# Patient Record
Sex: FEMALE | Race: BLACK OR AFRICAN AMERICAN | Employment: FULL TIME | URBAN - METROPOLITAN AREA
[De-identification: names, ages, dates, MRNs, and addresses within clinical notes are randomized per-mention and may not be internally consistent; named-entity substitution may affect disease eponyms.]

---

## 2017-05-19 ENCOUNTER — HOSPITAL ENCOUNTER (EMERGENCY)
Age: 47
Discharge: HOME | End: 2017-05-19
Attending: EMERGENCY MEDICINE
Payer: OTHER GOVERNMENT

## 2017-05-19 VITALS
WEIGHT: 153 LBS | TEMPERATURE: 97.9 F | HEIGHT: 63 IN | OXYGEN SATURATION: 98 % | SYSTOLIC BLOOD PRESSURE: 147 MMHG | DIASTOLIC BLOOD PRESSURE: 94 MMHG | BODY MASS INDEX: 27.11 KG/M2 | RESPIRATION RATE: 16 BRPM | HEART RATE: 56 BPM

## 2017-05-19 DIAGNOSIS — B02.9 HERPES ZOSTER WITHOUT COMPLICATION: Primary | ICD-10-CM

## 2017-05-19 PROCEDURE — 99282 EMERGENCY DEPT VISIT SF MDM: CPT

## 2017-05-19 RX ORDER — PREDNISONE 10 MG/1
TABLET ORAL
Qty: 21 TAB | Refills: 0 | Status: SHIPPED | OUTPATIENT
Start: 2017-05-19 | End: 2017-06-29

## 2017-05-19 RX ORDER — VALACYCLOVIR HYDROCHLORIDE 1 G/1
1000 TABLET, FILM COATED ORAL 3 TIMES DAILY
Qty: 21 TAB | Refills: 0 | Status: SHIPPED | OUTPATIENT
Start: 2017-05-19 | End: 2017-05-26

## 2017-05-19 RX ORDER — HYDROCODONE BITARTRATE AND ACETAMINOPHEN 5; 325 MG/1; MG/1
TABLET ORAL
Qty: 20 TAB | Refills: 0 | Status: SHIPPED | OUTPATIENT
Start: 2017-05-19 | End: 2018-01-21

## 2017-05-19 NOTE — ED PROVIDER NOTES
Patient is a 56 y.o. female presenting with rash. The history is provided by the patient. Rash    This is a new problem. The current episode started more than 2 days ago. The problem has been gradually worsening. There has been no fever. The rash is present on the trunk. The pain is at a severity of 8/10. The pain is moderate. The pain has been constant since onset. Associated symptoms include blisters, itching and pain. She has tried nothing for the symptoms. Pt states that a few blisters were near her left breast area 4 days ago but now have spread and is painful and itchy. She admits to having chicken pox as a child. No facial rash. No other complaint. Past Medical History:   Diagnosis Date    Allergic rhinitis, seasonal     spring    Anemia NEC     iron deficiency associated with heavy menstruation and fibroids noted 2008 with pregnancy    Dysmenorrhea     increasing past year    Fibroid, uterine     Headache     Menorrhagia     worsening past year       Past Surgical History:   Procedure Laterality Date    HX [facility]ST[facility]CTOMY  04/2014    [facility]         Family History:   Problem Relation Age of Onset    Hypertension Mother     Hypertension Father     Headache Sister     Diabetes Paternal Aunt        Social History     Social History    Marital status: UNKNOWN     Spouse name: N/A    Number of children: N/A    Years of education: N/A     Occupational History    Not on file. Social History Main Topics    Smoking status: Never Smoker    Smokeless tobacco: Never Used    Alcohol use 0.0 oz/week     0 Standard drinks or equivalent per week      Comment: occass: 2/month    Drug use: No    Sexual activity: Yes     Partners: Male     Birth control/ protection: Surgical      Comment: hysterectomy     Other Topics Concern    Not on file     Social History Narrative         ALLERGIES: Review of patient's allergies indicates no known allergies.     Review of Systems   Constitutional: Negative for appetite change, chills and fever. Eyes: Negative for visual disturbance. Respiratory: Negative for shortness of breath. Cardiovascular: Negative. Gastrointestinal: Negative. Endocrine: Negative. Genitourinary: Negative. Musculoskeletal: Negative for myalgias and neck pain. Skin: Positive for itching and rash. Painful rash on left chest wall near breast   Neurological: Negative for dizziness and headaches. All other systems reviewed and are negative. There were no vitals filed for this visit. Physical Exam   Constitutional: She is oriented to person, place, and time. She appears well-developed and well-nourished. She appears distressed. Appears in pain   HENT:   Head: Normocephalic and atraumatic. Mouth/Throat: Oropharynx is clear and moist.   No facial rash or mucosal rash   Eyes: Conjunctivae and EOM are normal. Right eye exhibits no discharge. Left eye exhibits no discharge. Neck: Normal range of motion. Pulmonary/Chest: Effort normal. No respiratory distress. Musculoskeletal: Normal range of motion. Neurological: She is alert and oriented to person, place, and time. Skin: Skin is warm. Rash noted. She is not diaphoretic. Red vesicular cluster/patch located at left chest near breast, also similar on left side upper back. Rash does not cross midline. No drainage    Is consistent with shingles outbreak   Psychiatric: Her behavior is normal.   Nursing note and vitals reviewed. MDM  Number of Diagnoses or Management Options  Herpes zoster without complication:   Diagnosis management comments: Shingles outbreak on torso. No other skin involvement    Rx valtrex, steroid dose pk, norco    Will dc home with fu pcp. Return to ED if any worsening sx's. Pt agrees with plan.      Risk of Complications, Morbidity, and/or Mortality  Presenting problems: moderate    Patient Progress  Patient progress: stable    ED Course       Procedures Vitals:  Patient Vitals for the past 12 hrs:   Temp Pulse Resp BP SpO2   05/19/17 0216 97.9 °F (36.6 °C) (!) 56 16 (!) 147/94 98 %               Disposition:  Diagnosis:   1. Herpes zoster without complication        Disposition: discharged    Follow-up Information     Follow up With Details Comments Contact Info    Lynnette Castro MD Call in 1 day  [address] EMERGENCY DEPT  As needed, If symptoms worsen [facility]  [phone]           Discharge Medication List as of 5/19/2017  3:21 AM      START taking these medications    Details   valACYclovir (VALTREX) 1 gram tablet Take 1 Tab by mouth three (3) times daily for 7 days. , Print, Disp-21 Tab, R-0      HYDROcodone-acetaminophen (NORCO) 5-325 mg per tablet Take 1-2 tabs PO every 4-6 hours PRN pain, Print, Disp-20 Tab, R-0      predniSONE (STERAPRED DS) 10 mg dose pack Take as written, Print, Disp-21 Tab, R-0         CONTINUE these medications which have NOT CHANGED    Details   topiramate (TOPAMAX) 25 mg tablet Take 1 Tab by mouth daily (with breakfast). , Print, Disp-90 Tab, R-1      amLODIPine (NORVASC) 5 mg tablet Take 1 Tab by mouth daily. , Print, Disp-90 Tab, R-1

## 2017-05-19 NOTE — ED TRIAGE NOTES
Reports rash on L breast and back since Monday. Reports pain, itching. Pt states \"it feels like a bunch of needles stabbing me\".

## 2017-05-19 NOTE — DISCHARGE INSTRUCTIONS

## 2017-05-22 ENCOUNTER — OFFICE VISIT (OUTPATIENT)
Dept: FAMILY MEDICINE CLINIC | Age: 47
End: 2017-05-22

## 2017-05-22 VITALS
BODY MASS INDEX: 27.11 KG/M2 | OXYGEN SATURATION: 99 % | DIASTOLIC BLOOD PRESSURE: 94 MMHG | SYSTOLIC BLOOD PRESSURE: 138 MMHG | TEMPERATURE: 97.9 F | RESPIRATION RATE: 16 BRPM | WEIGHT: 153 LBS | HEART RATE: 49 BPM | HEIGHT: 63 IN

## 2017-05-22 DIAGNOSIS — B02.29 NEURALGIA, POST-HERPETIC: ICD-10-CM

## 2017-05-22 DIAGNOSIS — B02.9 HERPES ZOSTER WITHOUT COMPLICATION: Primary | ICD-10-CM

## 2017-05-22 RX ORDER — GABAPENTIN 100 MG/1
100 CAPSULE ORAL 3 TIMES DAILY
Qty: 90 CAP | Refills: 0 | Status: SHIPPED | OUTPATIENT
Start: 2017-05-22 | End: 2017-06-29

## 2017-05-22 NOTE — PATIENT INSTRUCTIONS

## 2017-05-22 NOTE — PROGRESS NOTES
Yamile Connolly, 56 y.o.,  female    SUBJECTIVE  Shingles ff-up (Dr. Lila Montanez pt)    Developed painful itchy rash on L side of chest 5/15, seen in ED dx with shingles 5/19 and started on valtrex, po steroids, prn norco.  Says rash is crusting over, however pain on L side is the same and not better. ROS:  See HPI, all others negative        Patient Active Problem List   Diagnosis Code    Allergic rhinitis, seasonal J30.2    Anemia NEC     Essential hypertension I10    Migraine without aura and without status migrainosus, not intractable G43.009    S/P hysterectomy/ due to menorrhagea  Z90.710    Goiter/ seen endocrinology/ observe E04.9    Abnormal CBC/ seen hematology R79.89    Positive ALY (antinuclear antibody)/ test done by hemotology and requested referral by PCP R76.8    Chronic pain of both knees/ following ortho M25.561, M25.562, G89.29       Current Outpatient Prescriptions   Medication Sig Dispense Refill    gabapentin (NEURONTIN) 100 mg capsule Take 1 Cap by mouth three (3) times daily. 90 Cap 0    valACYclovir (VALTREX) 1 gram tablet Take 1 Tab by mouth three (3) times daily for 7 days. 21 Tab 0    HYDROcodone-acetaminophen (NORCO) 5-325 mg per tablet Take 1-2 tabs PO every 4-6 hours PRN pain 20 Tab 0    predniSONE (STERAPRED DS) 10 mg dose pack Take as written 21 Tab 0    topiramate (TOPAMAX) 25 mg tablet Take 1 Tab by mouth daily (with breakfast). 90 Tab 1    amLODIPine (NORVASC) 5 mg tablet Take 1 Tab by mouth daily.  80 Tab 1       No Known Allergies    Past Medical History:   Diagnosis Date    Allergic rhinitis, seasonal     spring    Anemia NEC     iron deficiency associated with heavy menstruation and fibroids noted 2008 with pregnancy    Dysmenorrhea     increasing past year    Fibroid, uterine     Headache     Menorrhagia     worsening past year       Social History     Social History    Marital status:      Spouse name: N/A    Number of children: N/A    Years of education: N/A     Occupational History    Not on file. Social History Main Topics    Smoking status: Never Smoker    Smokeless tobacco: Never Used    Alcohol use 0.0 oz/week     0 Standard drinks or equivalent per week      Comment: occass: 2/month    Drug use: No    Sexual activity: Yes     Partners: Male     Birth control/ protection: Surgical      Comment: hysterectomy     Other Topics Concern    Not on file     Social History Narrative       Family History   Problem Relation Age of Onset    Hypertension Mother     Hypertension Father     Headache Sister     Diabetes Paternal Aunt          OBJECTIVE    Physical Exam:     Visit Vitals    BP (!) 138/94 (BP 1 Location: Right arm, BP Patient Position: Sitting)    Pulse (!) 49    Temp 97.9 °F (36.6 °C) (Oral)    Resp 16    Ht 5' 3\" (1.6 m)    Wt 153 lb (69.4 kg)    LMP 10/08/2012    SpO2 99%    BMI 27.1 kg/m2       General: alert, well-appearing, in no apparent distress or pain  Skin: + crusting papular rash on L chest wall, along breast area extending to same level at the back  Psych:  mood and affect normal        ASSESSMENT/PLAN  Carlos was seen today for shingles. Diagnoses and all orders for this visit:    Herpes zoster without complication  To complete valtrex  Neuralgia, post-herpetic  Start:  -     gabapentin (NEURONTIN) 100 mg capsule; Take 1 Cap by mouth three (3) times daily. Discussed side effect profile, if with drowsiness, only take qhs. Follow-up Disposition:  Return in about 2 weeks (around 6/5/2017), or with Dr. Sanjeev Rodriguez. Patient understands plan of care. Patient has provided input and agrees with goals.

## 2017-05-22 NOTE — MR AVS SNAPSHOT
Visit Information Date & Time Provider Department Dept. Phone Encounter #  
 5/22/2017  8:45 AM Ophelia Skiff, [address] [id number] Follow-up Instructions Return in about 2 weeks (around 6/5/2017), or with Dr. Roselyn Sandoval. Upcoming Health Maintenance Date Due INFLUENZA AGE 9 TO ADULT 8/1/2017 PAP AKA CERVICAL CYTOLOGY 12/9/2018 DTaP/Tdap/Td series (3 - Td) 8/13/2024 Allergies as of 5/22/2017  Review Complete On: 5/22/2017 By: Precious Morris LPN No Known Allergies Current Immunizations  Reviewed on 4/1/2011 Name Date TDAP Vaccine 1/1/2008 Tdap 8/13/2014  6:15 AM  
  
 Not reviewed this visit You Were Diagnosed With   
  
 Codes Comments Herpes zoster without complication    -  Primary ICD-10-CM: B02.9 ICD-9-CM: 053.9 Neuralgia, post-herpetic     ICD-10-CM: B02.29 ICD-9-CM: 053.19 Vitals BP Pulse Temp Resp Height(growth percentile) Weight(growth percentile) (!) 138/94 (BP 1 Location: Right arm, BP Patient Position: Sitting) (!) 49 97.9 °F (36.6 °C) (Oral) 16 5' 3\" (1.6 m) 153 lb (69.4 kg) LMP SpO2 BMI OB Status Smoking Status 10/08/2012 99% 27.1 kg/m2 Hysterectomy Never Smoker BMI and BSA Data Body Mass Index Body Surface Area  
 27.1 kg/m 2 1.76 m 2 Preferred Pharmacy Pharmacy Name Phone [phone] [address], [address] [phone] Your Updated Medication List  
  
   
This list is accurate as of: 5/22/17  9:11 AM.  Always use your most recent med list. amLODIPine 5 mg tablet Commonly known as:  Jagdish Austin Take 1 Tab by mouth daily. gabapentin 100 mg capsule Commonly known as:  NEURONTIN Take 1 Cap by mouth three (3) times daily. HYDROcodone-acetaminophen 5-325 mg per tablet Commonly known as:  Milinda Copier Take 1-2 tabs PO every 4-6 hours PRN pain predniSONE 10 mg dose pack Commonly known as:  STERAPRED DS Take as written  
  
 topiramate 25 mg tablet Commonly known as:  TOPAMAX Take 1 Tab by mouth daily (with breakfast). valACYclovir 1 gram tablet Commonly known as:  VALTREX Take 1 Tab by mouth three (3) times daily for 7 days. Prescriptions Sent to Pharmacy Refills  
 gabapentin (NEURONTIN) 100 mg capsule 0 Sig: Take 1 Cap by mouth three (3) times daily. Class: Normal  
 Pharmacy: [facility] [address] [place] [address] AT [address] Ph #: [phone] Route: Oral  
  
Follow-up Instructions Return in about 2 weeks (around 6/5/2017), or with Dr. Cade Rowland. Patient Instructions Shingles: Care Instructions Your Care Instructions Shingles (herpes zoster) causes pain and a blistered rash. The rash can appear anywhere on the body but will be on only one side of the body, the left or right. It will be in a band, a strip, or a small area. The pain can be very severe. Shingles can also cause tingling or itching in the area of the rash. The blisters scab over after a few days and heal in 2 to 4 weeks. Medicines can help you feel better and may help prevent more serious problems caused by shingles. Shingles is caused by the same virus that causes chickenpox. When you have chickenpox, the virus gets into your nerve roots and stays there (becomes dormant) long after you get over the chickenpox. If the virus becomes active again, it can cause shingles. Follow-up care is a key part of your treatment and safety. Be sure to make and go to all appointments, and call your doctor if you are having problems. It's also a good idea to know your test results and keep a list of the medicines you take. How can you care for yourself at home? · Be safe with medicines. Take your medicines exactly as prescribed.  Call your doctor if you think you are having a problem with your medicine. Antiviral medicine helps you get better faster. · Try not to scratch or pick at the blisters. They will crust over and fall off on their own if you leave them alone. · Put cool, wet cloths on the area to relieve pain and itching. You can also use calamine lotion. Try not to use so much lotion that it cakes and is hard to get off. · Put cornstarch or baking soda on the sores to help dry them out so they heal faster. · Do not use thick ointment, such as petroleum jelly, on the sores. This will keep them from drying and healing. · To help remove loose crusts, soak them in tap water. This can help decrease oozing, and dry and soothe the skin. · Take an over-the-counter pain medicine, such as acetaminophen (Tylenol), ibuprofen (Advil, Motrin), or naproxen (Aleve). Read and follow all instructions on the label. · Avoid close contact with people until the blisters have healed. It is very important for you to avoid contact with anyone who has never had chickenpox or the chickenpox vaccine. Pregnant women, young babies, and anyone else who has a hard time fighting infection (such as someone with HIV, diabetes, or cancer) is especially at risk. When should you call for help? Call your doctor now or seek immediate medical care if: 
· You have a new or higher fever. · You have a severe headache and a stiff neck. · You lose the ability to think clearly. · The rash spreads to your forehead, nose, eyes, or eyelids. · You have eye pain, or your vision gets worse. · You have new pain in your face, or you cannot move the muscles in your face. · Blisters spread to new parts of your body. Watch closely for changes in your health, and be sure to contact your doctor if: · The rash has not healed after 2 to 4 weeks. · You still have pain after the rash has healed. Where can you learn more? Go to http://chilo.info/. Heaven Bird in the search box to learn more about \"Shingles: Care Instructions. \" Current as of: May 24, 2016 Content Version: 11.2 © 9340-4705 [employer]. Care instructions adapted under license by [employer] (which disclaims liability or warranty for this information). If you have questions about a medical condition or this instruction, always ask your healthcare professional. [address] any warranty or liability for your use of this information. Introducing [facility]! Dear Janet Duncan: 
Thank you for requesting a [facility] account. Our records indicate that you already have an active [facility] account. You can access your account anytime at [email or web address] Did you know that you can access your hospital and ER discharge instructions at any time in [facility]? You can also review all of your test results from your hospital stay or ER visit. Additional Information If you have questions, please visit the Frequently Asked Questions section of the [facility] website at [email or web address]. Remember, [facility] is NOT to be used for urgent needs. For medical emergencies, dial 911. Now available from your iPhone and Android! Please provide this summary of care documentation to your next provider. Your primary care clinician is listed as Keeley Briones. If you have any questions after today's visit, please call [phone].

## 2017-06-29 ENCOUNTER — OFFICE VISIT (OUTPATIENT)
Dept: FAMILY MEDICINE CLINIC | Age: 47
End: 2017-06-29

## 2017-06-29 VITALS
TEMPERATURE: 97.7 F | OXYGEN SATURATION: 94 % | RESPIRATION RATE: 16 BRPM | DIASTOLIC BLOOD PRESSURE: 86 MMHG | HEART RATE: 57 BPM | SYSTOLIC BLOOD PRESSURE: 120 MMHG | HEIGHT: 63 IN | WEIGHT: 157.2 LBS | BODY MASS INDEX: 27.85 KG/M2

## 2017-06-29 DIAGNOSIS — M54.50 ACUTE BILATERAL LOW BACK PAIN WITHOUT SCIATICA: Primary | ICD-10-CM

## 2017-06-29 DIAGNOSIS — G43.009 MIGRAINE WITHOUT AURA AND WITHOUT STATUS MIGRAINOSUS, NOT INTRACTABLE: ICD-10-CM

## 2017-06-29 DIAGNOSIS — I10 ESSENTIAL HYPERTENSION WITH GOAL BLOOD PRESSURE LESS THAN 130/80: ICD-10-CM

## 2017-06-29 RX ORDER — NAPROXEN 500 MG/1
500 TABLET ORAL 2 TIMES DAILY WITH MEALS
Qty: 60 TAB | Refills: 0 | Status: SHIPPED | OUTPATIENT
Start: 2017-06-29 | End: 2018-01-21

## 2017-06-29 RX ORDER — TOPIRAMATE 25 MG/1
25 TABLET ORAL
Qty: 90 TAB | Refills: 1 | Status: SHIPPED | OUTPATIENT
Start: 2017-06-29 | End: 2018-04-16 | Stop reason: SDUPTHER

## 2017-06-29 RX ORDER — CYCLOBENZAPRINE HCL 10 MG
TABLET ORAL
COMMUNITY
Start: 2017-06-24 | End: 2018-01-21

## 2017-06-29 RX ORDER — IBUPROFEN 800 MG/1
TABLET ORAL
COMMUNITY
Start: 2017-06-24 | End: 2017-06-29 | Stop reason: ALTCHOICE

## 2017-06-29 RX ORDER — AMLODIPINE BESYLATE 5 MG/1
5 TABLET ORAL DAILY
Qty: 90 TAB | Refills: 1 | Status: SHIPPED | OUTPATIENT
Start: 2017-06-29 | End: 2018-04-16 | Stop reason: SDUPTHER
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
## 2017-06-29 NOTE — PATIENT INSTRUCTIONS
Back Stretches: Exercises  Your Care Instructions  Here are some examples of exercises for stretching your back. Start each exercise slowly. Ease off the exercise if you start to have pain. Your doctor or physical therapist will tell you when you can start these exercises and which ones will work best for you. How to do the exercises  Overhead stretch    1. Stand comfortably with your feet shoulder-width apart. 2. Looking straight ahead, raise both arms over your head and reach toward the ceiling. Do not allow your head to tilt back. 3. Hold for 15 to 30 seconds, then lower your arms to your sides. 4. Repeat 2 to 4 times. Side stretch    1. Stand comfortably with your feet shoulder-width apart. 2. Raise one arm over your head, and then lean to the other side. 3. Slide your hand down your leg as you let the weight of your arm gently stretch your side muscles. Hold for 15 to 30 seconds. 4. Repeat 2 to 4 times on each side. Press-up    1. Lie on your stomach, supporting your body with your forearms. 2. Press your elbows down into the floor to raise your upper back. As you do this, relax your stomach muscles and allow your back to arch without using your back muscles. As your press up, do not let your hips or pelvis come off the floor. 3. Hold for 15 to 30 seconds, then relax. 4. Repeat 2 to 4 times. Relax and rest    1. Lie on your back with a rolled towel under your neck and a pillow under your knees. Extend your arms comfortably to your sides. 2. Relax and breathe normally. 3. Remain in this position for about 10 minutes. 4. If you can, do this 2 or 3 times each day. Follow-up care is a key part of your treatment and safety. Be sure to make and go to all appointments, and call your doctor if you are having problems. It's also a good idea to know your test results and keep a list of the medicines you take. Where can you learn more? Go to http://martin-ifeanyi.info/.   Enter K923 in the search box to learn more about \"Back Stretches: Exercises. \"  Current as of: March 21, 2017  Content Version: 11.3  © 2446-7719 [employer]. Care instructions adapted under license by [employer] (which disclaims liability or warranty for this information). If you have questions about a medical condition or this instruction, always ask your healthcare professional. [place] [address] any warranty or liability for your use of this information.

## 2017-06-29 NOTE — MR AVS SNAPSHOT
Visit Information Date & Time Provider Department Dept. Phone Encounter #  
 6/29/2017  8:00 AM Marvin Salamanca, [address] [id number] Follow-up Instructions Return in about 1 month (around 7/29/2017). Upcoming Health Maintenance Date Due INFLUENZA AGE 9 TO ADULT 8/1/2017 PAP AKA CERVICAL CYTOLOGY 12/9/2018 DTaP/Tdap/Td series (3 - Td) 8/13/2024 Allergies as of 6/29/2017  Review Complete On: 6/29/2017 By: Marvin Salamanca MD  
 No Known Allergies Current Immunizations  Reviewed on 4/1/2011 Name Date TDAP Vaccine 1/1/2008 Tdap 8/13/2014  6:15 AM  
  
 Not reviewed this visit You Were Diagnosed With   
  
 Codes Comments Acute bilateral low back pain without sciatica    -  Primary ICD-10-CM: M54.5 ICD-9-CM: 724.2, 338.19 Essential hypertension with goal blood pressure less than 130/80     ICD-10-CM: I10 
ICD-9-CM: 401.9 Migraine without aura and without status migrainosus, not intractable     ICD-10-CM: W24.715 ICD-9-CM: 346.10 Vitals BP Pulse Temp Resp Height(growth percentile) Weight(growth percentile) 120/86 (BP 1 Location: Left arm, BP Patient Position: Sitting) (!) 57 97.7 °F (36.5 °C) (Oral) 16 5' 3\" (1.6 m) 157 lb 3.2 oz (71.3 kg) LMP SpO2 BMI OB Status Smoking Status 10/08/2012 94% 27.85 kg/m2 Hysterectomy Never Smoker Vitals History BMI and BSA Data Body Mass Index Body Surface Area  
 27.85 kg/m 2 1.78 m 2 Preferred Pharmacy Pharmacy Name Phone [facility] [phone] [place], [address] AT [address] [phone] Your Updated Medication List  
  
   
This list is accurate as of: 6/29/17  8:38 AM.  Always use your most recent med list. amLODIPine 5 mg tablet Commonly known as:  Opal Heymann Take 1 Tab by mouth daily. cyclobenzaprine 10 mg tablet Commonly known as:  FLEXERIL  
  
 HYDROcodone-acetaminophen 5-325 mg per tablet Commonly known as:  Elyn Barley Take 1-2 tabs PO every 4-6 hours PRN pain  
  
 naproxen 500 mg tablet Commonly known as:  NAPROSYN Take 1 Tab by mouth two (2) times daily (with meals). topiramate 25 mg tablet Commonly known as:  TOPAMAX Take 1 Tab by mouth daily (with breakfast). Prescriptions Sent to Pharmacy Refills  
 amLODIPine (NORVASC) 5 mg tablet 1 Sig: Take 1 Tab by mouth daily. Class: Normal  
 Pharmacy: [facility] [place] [id number] AT [address] [place] [address] Rd [address] Ph #: [phone] Route: Oral  
 topiramate (TOPAMAX) 25 mg tablet 1 Sig: Take 1 Tab by mouth daily (with breakfast). Class: Normal  
 Pharmacy: [place] [address] AT [address] [place] [address] Rd [address] Ph #: [phone] Route: Oral  
 naproxen (NAPROSYN) 500 mg tablet 0 Sig: Take 1 Tab by mouth two (2) times daily (with meals). Class: Normal  
 Pharmacy: [place] [address] AT [address] [place] [address] Rd [address] Ph #: [phone] Route: Oral  
  
We Performed the Following REFERRAL TO PHYSICAL THERAPY [ETQ30 Custom] Comments:  
 Please evaluate patient for lower back pain Follow-up Instructions Return in about 1 month (around 7/29/2017). Referral Information Referral ID Referred By Referred To  
  
 [id number] [facility] R Not Available Visits Status Start Date End Date 1 New Request 6/29/17 6/29/18 If your referral has a status of pending review or denied, additional information will be sent to support the outcome of this decision. Patient Instructions Back Stretches: Exercises Your Care Instructions Here are some examples of exercises for stretching your back. Start each exercise slowly. Ease off the exercise if you start to have pain. Your doctor or physical therapist will tell you when you can start these exercises and which ones will work best for you. How to do the exercises Overhead stretch 1. Stand comfortably with your feet shoulder-width apart. 2. Looking straight ahead, raise both arms over your head and reach toward the ceiling. Do not allow your head to tilt back. 3. Hold for 15 to 30 seconds, then lower your arms to your sides. 4. Repeat 2 to 4 times. Side stretch 1. Stand comfortably with your feet shoulder-width apart. 2. Raise one arm over your head, and then lean to the other side. 3. Slide your hand down your leg as you let the weight of your arm gently stretch your side muscles. Hold for 15 to 30 seconds. 4. Repeat 2 to 4 times on each side. Press-up 1. Lie on your stomach, supporting your body with your forearms. 2. Press your elbows down into the floor to raise your upper back. As you do this, relax your stomach muscles and allow your back to arch without using your back muscles. As your press up, do not let your hips or pelvis come off the floor. 3. Hold for 15 to 30 seconds, then relax. 4. Repeat 2 to 4 times. Relax and rest 
 
1. Lie on your back with a rolled towel under your neck and a pillow under your knees. Extend your arms comfortably to your sides. 2. Relax and breathe normally. 3. Remain in this position for about 10 minutes. 4. If you can, do this 2 or 3 times each day. Follow-up care is a key part of your treatment and safety. Be sure to make and go to all appointments, and call your doctor if you are having problems. It's also a good idea to know your test results and keep a list of the medicines you take. Where can you learn more? Go to http://martin-ifeanyi.info/. Enter V596 in the search box to learn more about \"Back Stretches: Exercises. \" Current as of: March 21, 2017 Content Version: 11.3 © 6404-0859 [employer].  Care instructions adapted under license by [address] (which disclaims liability or warranty for this information). If you have questions about a medical condition or this instruction, always ask your healthcare professional. [address] any warranty or liability for your use of this information. Introducing [facility] & HEALTH SERVICES! Dear Shantanu Batres: 
Thank you for requesting a Talenz account. Our records indicate that you already have an active Talenz account. You can access your account anytime at [email or web address] Did you know that you can access your hospital and ER discharge instructions at any time in Talenz? You can also review all of your test results from your hospital stay or ER visit. Additional Information If you have questions, please visit the Frequently Asked Questions section of the Talenz website at [email or web address] Remember, Talenz is NOT to be used for urgent needs. For medical emergencies, dial 911. Now available from your iPhone and Android! Please provide this summary of care documentation to your next provider. Your primary care clinician is listed as Hari Brownlee. If you have any questions after today's visit, please call [phone].

## 2017-06-29 NOTE — PROGRESS NOTES
1. Have you been to the ER, urgent care clinic since your last visit? Hospitalized since your last visit? [facility] [address] 6/24/17    2. Have you seen or consulted any other health care providers outside of the [address] [place] [address] since your last visit? Include any pap smears or colon screening.  No

## 2017-07-11 ENCOUNTER — APPOINTMENT (OUTPATIENT)
Dept: PHYSICAL THERAPY | Age: 47
End: 2017-07-11

## 2017-07-25 ENCOUNTER — HOSPITAL ENCOUNTER (OUTPATIENT)
Dept: PHYSICAL THERAPY | Age: 47
Discharge: HOME | End: 2017-07-25
Payer: OTHER GOVERNMENT

## 2017-07-25 PROCEDURE — 97110 THERAPEUTIC EXERCISES: CPT

## 2017-07-25 PROCEDURE — 97162 PT EVAL MOD COMPLEX 30 MIN: CPT

## 2017-07-25 NOTE — MR AVS SNAPSHOT
Visit Information Date & Time Provider Department Dept. Phone Encounter #  
 7/25/2017  8:00 AM Loraine Galicia,  [address] [place] [id number] Your Appointments 7/27/2017  8:00 AM  
ROUTINE CARE with Harlan Turcios MD  
[facility] ([facility]) Appt Note: routine f/u back pain [address]  
[address] [facility] [address] Upcoming Health Maintenance Date Due INFLUENZA AGE 9 TO ADULT 8/1/2017 PAP AKA CERVICAL CYTOLOGY 12/9/2018 DTaP/Tdap/Td series (3 - Td) 8/13/2024 Allergies as of 7/25/2017  Review Complete On: 7/25/2017 By: Loraine Galicia PT No Known Allergies Current Immunizations  Reviewed on 4/1/2011 Name Date TDAP Vaccine 1/1/2008 Tdap 8/13/2014  6:15 AM  
  
 Not reviewed this visit Vitals LMP OB Status Smoking Status 10/08/2012 Hysterectomy Never Smoker Your Updated Medication List  
  
ASK your doctor about these medications   
 amLODIPine 5 mg tablet Commonly known as:  Sundra Tazewell Take 1 Tab by mouth daily. cyclobenzaprine 10 mg tablet Commonly known as:  FLEXERIL  
  
 HYDROcodone-acetaminophen 5-325 mg per tablet Commonly known as:  Verona Laity Take 1-2 tabs PO every 4-6 hours PRN pain  
  
 naproxen 500 mg tablet Commonly known as:  NAPROSYN Take 1 Tab by mouth two (2) times daily (with meals). topiramate 25 mg tablet Commonly known as:  TOPAMAX Take 1 Tab by mouth daily (with breakfast). Introducing [facility] & HEALTH SERVICES! Dear Ross Longo: 
Thank you for requesting a WellFX account. Our records indicate that you already have an active WellFX account. You can access your account anytime at [email or web address]. [email or web address] Therapeutics Did you know that you can access your hospital and ER discharge instructions at any time in Perficient? You can also review all of your test results from your hospital stay or ER visit. Additional Information If you have questions, please visit the Frequently Asked Questions section of the Perficient website at [email or web address] [email or web address]/[email or web address] Remember, Perficient is NOT to be used for urgent needs. For medical emergencies, dial 911. Now available from your iPhone and Android! Please provide this summary of care documentation to your next provider. Your primary care clinician is listed as Nyla Mireles. If you have any questions after today's visit, please call [phone].
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
## 2017-07-25 NOTE — PROGRESS NOTES
PT DAILY TREATMENT NOTE     Patient Name: Emily Chicago  Date:2017  : 1970  [x]  Patient  Verified  Payor:  / Plan: Rhiannon Carreno DEPENDENTS / Product Type: Merry Leak /    In time:0800  Out [id number]  Total Treatment Time (min): 45  Visit #: 1 of 8    Treatment Area: Low back pain [M54.5]    SUBJECTIVE  Pain Level (0-10 scale):  2  Any medication changes, allergies to medications, adverse drug reactions, diagnosis change, or new procedure performed?: [x] No    [] Yes (see summary sheet for update)  Subjective functional status/changes:   [] No changes reported       OBJECTIVE    Modality rationale:    Min Type Additional Details    [] Estim:  []Unatt       []IFC  []Premod                        []Other:  []w/ice   []w/heat  Position:  Location:    [] Estim: []Att    []TENS instruct  []NMES                    []Other:  []w/US   []w/ice   []w/heat  Position:  Location:    []  Traction: [] Cervical       []Lumbar                       [] Prone          []Supine                       []Intermittent   []Continuous Lbs:  [] before manual  [] after manual    []  Ultrasound: []Continuous   [] Pulsed                           []1MHz   []3MHz W/cm2:  Location:    []  Iontophoresis with dexamethasone         Location: [] Take home patch   [] In clinic    []  Ice     []  heat  []  Ice massage  []  Laser   []  Anodyne Position:  Location:    []  Laser with stim  []  Other:  Position:  Location:    []  Vasopneumatic Device Pressure:       [] lo [] med [] hi   Temperature: [] lo [] med [] hi   [] Skin assessment post-treatment:  []intact []redness- no adverse reaction    []redness - adverse reaction:     20 min [x]Eval                  []Re-Eval       25 min Therapeutic Exercise:  [] See flow sheet : HEP/TM run   Rationale: increase ROM and increase strength to improve the patients ability to perform ADL              With   [x] TE   [] TA   [] neuro   [] other: Patient Education: [x] Review HEP    [] Progressed/Changed HEP based on:   [] positioning   [] body mechanics   [] transfers   [] heat/ice application    [] other:      Other Objective/Functional Measures:       Pain Level (0-10 scale) post treatment: 2    ASSESSMENT/Changes in Function:      Patient will continue to benefit from skilled PT services to modify and progress therapeutic interventions, address functional mobility deficits, address ROM deficits, address strength deficits, analyze and address soft tissue restrictions, analyze and cue movement patterns and analyze and modify body mechanics/ergonomics to attain remaining goals.      []  See Plan of Care  []  See progress note/recertification  []  See Discharge Summary         Progress towards goals / Updated goals:  Per POC    PLAN  []  Upgrade activities as tolerated     [x]  Continue plan of care  []  Update interventions per flow sheet       []  Discharge due to:_  []  Other:_      Pricilla Hagan PT 7/25/2017  7:48 AM    Future Appointments  Date Time Provider Kingsley Argelia   7/25/2017 8:00 AM Pricilla Hagan PT [facility]   7/27/2017 8:00 AM Garth Alva MD [facility] 10.

## 2017-07-31 ENCOUNTER — HOSPITAL ENCOUNTER (OUTPATIENT)
Dept: PHYSICAL THERAPY | Age: 47
Discharge: HOME | End: 2017-07-31
Payer: OTHER GOVERNMENT

## 2017-07-31 PROCEDURE — 97140 MANUAL THERAPY 1/> REGIONS: CPT

## 2017-07-31 PROCEDURE — 97110 THERAPEUTIC EXERCISES: CPT

## 2017-07-31 PROCEDURE — 97112 NEUROMUSCULAR REEDUCATION: CPT

## 2017-07-31 NOTE — PROGRESS NOTES
PT DAILY TREATMENT NOTE     Patient Name: Michael Bush  Date:2017  : 1970  [x]  Patient  Verified  Payor:  / Plan: [facility]  RETIREES AND DEPENDENTS / Product Type: Keith Stammer /    In [id number]  Out time:8:20  Total Treatment Time (min): 48  Visit #: 2 of 8    Treatment Area: Low back pain [M54.5]    SUBJECTIVE  Pain Level (0-10 scale): 0  Any medication changes, allergies to medications, adverse drug reactions, diagnosis change, or new procedure performed?: [x] No    [] Yes (see summary sheet for update)  Subjective functional status/changes:   [] No changes reported  Pt reports she is doing okay at the moment    OBJECTIVE    Modality rationale:    Min Type Additional Details    [] Estim:  []Unatt       []IFC  []Premod                        []Other:  []w/ice   []w/heat  Position:  Location:    [] Estim: []Att    []TENS instruct  []NMES                    []Other:  []w/US   []w/ice   []w/heat  Position:  Location:    []  Traction: [] Cervical       []Lumbar                       [] Prone          []Supine                       []Intermittent   []Continuous Lbs:  [] before manual  [] after manual    []  Ultrasound: []Continuous   [] Pulsed                           []1MHz   []3MHz W/cm2:  Location:    []  Iontophoresis with dexamethasone         Location: [] Take home patch   [] In clinic    []  Ice     []  heat  []  Ice massage  []  Laser   []  Anodyne Position:  Location:    []  Laser with stim  []  Other:  Position:  Location:    []  Vasopneumatic Device Pressure:       [] lo [] med [] hi   Temperature: [] lo [] med [] hi   [] Skin assessment post-treatment:  []intact []redness- no adverse reaction    []redness - adverse reaction:       25 min Therapeutic Exercise:  [x] See flow sheet :   Rationale: increase ROM and increase strength to improve the patients ability to perform functional tasks     15 min Neuromuscular Re-education:  [x]  See flow sheet :   Rationale: increase strength, improve coordination, improve balance and increase proprioception  to improve the patients ability to perform functional tasks    8 min Manual Therapy:  Graston to L/S paraspinals   Rationale: decrease pain, increase ROM and increase tissue extensibility to perform functional tasks            With   [] TE   [] TA   [] neuro   [] other: Patient Education: [x] Review HEP    [] Progressed/Changed HEP based on:   [] positioning   [] body mechanics   [] transfers   [] heat/ice application    [] other:      Other Objective/Functional Measures: initiated therex per flow sheet     Pain Level (0-10 scale) post treatment: 0    ASSESSMENT/Changes in Function:  Pt glute and core weakness apparent. She will benefit from continued PT to with focus on mobility and stability improvement. Patient will continue to benefit from skilled PT services to modify and progress therapeutic interventions, address functional mobility deficits, address ROM deficits, address strength deficits, analyze and address soft tissue restrictions, analyze and cue movement patterns and analyze and modify body mechanics/ergonomics to attain remaining goals. []  See Plan of Care  []  See progress note/recertification  []  See Discharge Summary         Progress towards goals / Updated goals:  Short Term Goals: To be accomplished in 1 weeks:                         1. Pt will be I and compliant with HEP- progressing 7-31-17  Long Term Goals: To be accomplished in 4 weeks:                         1. Improve FOTO score to predicted outcome for improved ability for functional tasks                         2. Pt will report no c/o R LE symptoms with running                         3. Pt will demonstrate 4/5 glute max strength for improved ability for functional tasks. 4. Pt will demonstrate 4/5 B glute med strength for improved ability for running.                                 PLAN  []  Upgrade activities as tolerated [x]  Continue plan of care  []  Update interventions per flow sheet       []  Discharge due to:_  []  Other:_      Tarah Ospina, PT 7/31/2017  7:40 AM    Future Appointments  Date Time Provider Kingsley Stout   8/2/2017 7:30 AM Tracie Teague, PT [facility]   8/7/2017 7:30 AM Tarah Ospina, PT [facility]   8/9/2017 7:30 AM Tracie Teague, PT [facility]   8/14/2017 7:30 AM Tarah Ospina, PT [facility]   8/16/2017 7:30 AM Tracie Teague, PT [facility]   8/21/2017 7:30 AM Tarah Ospina, PT [facility]

## 2017-08-02 ENCOUNTER — HOSPITAL ENCOUNTER (OUTPATIENT)
Dept: PHYSICAL THERAPY | Age: 47
Discharge: HOME | End: 2017-08-02
Payer: OTHER GOVERNMENT

## 2017-08-02 PROCEDURE — 97110 THERAPEUTIC EXERCISES: CPT

## 2017-08-02 PROCEDURE — 97140 MANUAL THERAPY 1/> REGIONS: CPT

## 2017-08-02 PROCEDURE — 97112 NEUROMUSCULAR REEDUCATION: CPT

## 2017-08-02 NOTE — PROGRESS NOTES
PT DAILY TREATMENT NOTE 3-16    Patient Name: Denzel Rule  Date:2017  : 1970  [x]  Patient  Verified  Payor:  / Plan: [facility] DEPENDENTS / Product Type: [facility] [place] /    In [facility]  Out [id number]  Total Treatment Time (min): 38   1:1 35 minutes  Visit #: 3 of 8    Treatment Area: Low back pain [M54.5]    SUBJECTIVE  Pain Level (0-10 scale): 2  Any medication changes, allergies to medications, adverse drug reactions, diagnosis change, or new procedure performed?: [x] No    [] Yes (see summary sheet for update)  Subjective functional status/changes:   [] No changes reported  Pt reports pain in her lower back. Pt reports she has Hashimoto's. OBJECTIVE     min []Eval                  []Re-Eval     15 min Therapeutic Exercise:  [x] See flow sheet :   Rationale: increase ROM and increase strength to improve the patients ability to perform ADLs    15 min Neuromuscular Re-education:  [x]  See flow sheet :   Rationale: increase strength, improve coordination and increase proprioception  to improve the patients ability to improve stability     8 min Manual Therapy:  Lumbar  L1-5 grade II 5\"x8 each, R ant/L post SIJ with MET and shot gun with audible cavitation   Rationale: decrease pain, increase ROM and increase tissue extensibility to improve ADLs           With   [] TE   [] TA   [] neuro   [] other: Patient Education: [x] Review HEP    [] Progressed/Changed HEP based on:   [] positioning   [] body mechanics   [] transfers   [] heat/ice application    [] other:      Other Objective/Functional Measures:   Reports no R LE symptoms with ADLs, but increased low back pain. Pain Level (0-10 scale) post treatment: 1    ASSESSMENT/Changes in Function:   Pt demonstrates improving ability to perform stability and strengthening exercises without increase in pain. She stated the  along lumbar spine caused some soreness but she felt relieved pain overall before she left. Pt plans to order a standing desk today for work. Patient will continue to benefit from skilled PT services to modify and progress therapeutic interventions, address functional mobility deficits, address ROM deficits, address strength deficits, analyze and address soft tissue restrictions, analyze and cue movement patterns, analyze and modify body mechanics/ergonomics, assess and modify postural abnormalities and instruct in home and community integration to attain remaining goals. []  See Plan of Care  []  See progress note/recertification  []  See Discharge Summary         Progress towards goals / Updated goals:  Short Term Goals: To be accomplished in 1 weeks:                         1. Pt will be I and compliant with HEP- progressing 7-31-17  Long Term Goals: To be accomplished in 4 weeks:                         1. Improve FOTO score to predicted outcome for improved ability for functional tasks                         2. Pt will report no c/o R LE symptoms with running. No R LE symptoms with ADLs, but pt has not tried running yet 8/2/2017                         3. Pt will demonstrate 4/5 glute max strength for improved ability for functional tasks.                         4. Pt will demonstrate 4/5 B glute med strength for improved ability for running.      PLAN  [x]  Upgrade activities as tolerated     [x]  Continue plan of care  [x]  Update interventions per flow sheet       []  Discharge due to:_  []  Other:_      Ethan Bamberger, PT 8/2/2017  7:40 AM    Future Appointments  Date Time Provider Kingsley Stout   8/7/2017 7:30 AM Nohemy Marin, PT [facility]   8/9/2017 7:30 AM Ethan Bamberger, PT [facility]   8/14/2017 7:30 AM Nohemy Marin PT [facility]   8/16/2017 7:30 AM Ethan Bamberger, PT [facility]   8/21/2017 7:30 AM Nohemy Marin, PT [facility]

## 2017-08-07 ENCOUNTER — HOSPITAL ENCOUNTER (OUTPATIENT)
Dept: PHYSICAL THERAPY | Age: 47
Discharge: HOME | End: 2017-08-07
Payer: OTHER GOVERNMENT

## 2017-08-07 PROCEDURE — 97112 NEUROMUSCULAR REEDUCATION: CPT

## 2017-08-07 PROCEDURE — 97110 THERAPEUTIC EXERCISES: CPT

## 2017-08-07 PROCEDURE — 97140 MANUAL THERAPY 1/> REGIONS: CPT

## 2017-08-07 NOTE — PROGRESS NOTES
PT DAILY TREATMENT NOTE 3-16    Patient Name: Linda Wan  Date:2017  : 1970  [x]  Patient  Verified  Payor:  / Plan: [facility]  RETIREES AND DEPENDENTS / Product Type: [facility] /    In Creasie Boas time:819  Total Treatment Time (min):49    1:1  Minutes: 52  Visit #: 4 of 8    Treatment Area: Low back pain [M54.5]    SUBJECTIVE  Pain Level (0-10 scale): 0  Any medication changes, allergies to medications, adverse drug reactions, diagnosis change, or new procedure performed?: [x] No    [] Yes (see summary sheet for update)  Subjective functional status/changes:   [] No changes reported  Pt reports compliance with HEP. She feels HEP exercises and PT is helping. OBJECTIVE    25 min Therapeutic Exercise:  [x] See flow sheet :   Rationale: increase ROM and increase strength to improve the patients ability to perform ADLs    16 min Neuromuscular Re-education:  [x]  See flow sheet :   Rationale: increase strength, improve coordination and increase proprioception  to improve the patients ability to improve stability     8 min Manual Therapy:  Lumbar  L1-5 grade II, Graston B paraspinals and QL   Rationale: decrease pain, increase ROM and increase tissue extensibility to improve ADLs           With   [] TE   [] TA   [] neuro   [] other: Patient Education: [x] Review HEP    [] Progressed/Changed HEP based on:   [] positioning   [] body mechanics   [] transfers   [] heat/ice application    [] other:      Other Objective/Functional Measures:  No changes to therex. Level SI alignment    Pain Level (0-10 scale) post treatment: 0    ASSESSMENT/Changes in Function: pt progressing well with PT. Pain has diminished and myofascial restrictions are improving.        Patient will continue to benefit from skilled PT services to modify and progress therapeutic interventions, address functional mobility deficits, address ROM deficits, address strength deficits, analyze and address soft tissue restrictions, analyze and cue movement patterns, analyze and modify body mechanics/ergonomics, assess and modify postural abnormalities and instruct in home and community integration to attain remaining goals. []  See Plan of Care  []  See progress note/recertification  []  See Discharge Summary         Progress towards goals / Updated goals:  Short Term Goals: To be accomplished in 1 weeks:                         1. Pt will be I and compliant with HEP- MET 8-7-17  Long Term Goals: To be accomplished in 4 weeks:                         1. Improve FOTO score to predicted outcome for improved ability for functional tasks                         2. Pt will report no c/o R LE symptoms with running. No R LE symptoms with ADLs, but pt has not tried running yet 8/7/2017                         3. Pt will demonstrate 4/5 glute max strength for improved ability for functional tasks.                         4. Pt will demonstrate 4/5 B glute med strength for improved ability for running.      PLAN  [x]  Upgrade activities as tolerated     [x]  Continue plan of care  [x]  Update interventions per flow sheet       []  Discharge due to:_  []  Other:_      López Lassiter, PT 8/7/2017  7:40 AM    Future Appointments  Date Time Provider Kingsley Stout   8/9/2017 7:30 AM Ashley Barry, [facility]   8/14/2017 7:30 AM López Lassiter, NIMESH [facility]   8/16/2017 7:30 AM Ashley Barry, PT [facility]   8/21/2017 7:30 AM López Lassiter, NIMESH [facility]

## 2017-08-09 ENCOUNTER — HOSPITAL ENCOUNTER (OUTPATIENT)
Dept: PHYSICAL THERAPY | Age: 47
Discharge: HOME | End: 2017-08-09
Payer: OTHER GOVERNMENT

## 2017-08-09 PROCEDURE — 97110 THERAPEUTIC EXERCISES: CPT

## 2017-08-09 PROCEDURE — 97112 NEUROMUSCULAR REEDUCATION: CPT

## 2017-08-09 NOTE — PROGRESS NOTES
PT DAILY TREATMENT NOTE 3-16    Patient Name: Ehsan Copping  Date:2017  : 1970  [x]  Patient  Verified  Payor:  / Plan: [facility]  RETIREES AND DEPENDENTS / Product Type: Cre[facility]ie Dec[place] /    In time:730  Out time:811  Total Treatment Time (min): 41  (1:1  = 34 minutes)  Visit #: 5 of 8    Treatment Area: Low back pain [M54.5]    SUBJECTIVE  Pain Level (0-10 scale): 0  Any medication changes, allergies to medications, adverse drug reactions, diagnosis change, or new procedure performed?: [x] No    [] Yes (see summary sheet for update)  Subjective functional status/changes:   [] No changes reported  Pt reports she has been feeling pretty good, with the graston helping her back. Pt reports running a flat track on Saturday, 14 miles, without pain. She is not going to run hills right now. OBJECTIVE     min []Eval                  []Re-Eval     18 min Therapeutic Exercise:  [x] See flow sheet :   Rationale: increase ROM and increase strength to improve the patients ability to perform ADLs    23 min Neuromuscular Re-education:  [x]  See flow sheet :   Rationale: increase strength, improve coordination and increase proprioception  to improve the patients ability to improve stability            With   [] TE   [] TA   [] neuro   [] other: Patient Education: [x] Review HEP    [] Progressed/Changed HEP based on:   [] positioning   [] body mechanics   [] transfers   [] heat/ice application    [] other:      Other Objective/Functional Measures:   FOTO 61     Pain Level (0-10 scale) post treatment: 0    ASSESSMENT/Changes in Function:   Pt demonstrates improved stability and functional progress with exercises. Able to progress to green band, and to use the OOV with supine exercises today, though more instability noted with lifting L LE than R LE. Issued band for home exercises with warm up prior to running. FOTO declined 5 points to 61.     Patient will continue to benefit from skilled PT services to modify and progress therapeutic interventions, address functional mobility deficits, address ROM deficits, address strength deficits, analyze and address soft tissue restrictions, analyze and cue movement patterns, analyze and modify body mechanics/ergonomics, assess and modify postural abnormalities and instruct in home and community integration to attain remaining goals. []  See Plan of Care  []  See progress note/recertification  []  See Discharge Summary         Progress towards goals / Updated goals:  Short Term Goals: To be accomplished in 1 weeks:                         1. Pt will be I and compliant with HEP- MET 8-7-17  Long Term Goals: To be accomplished in 4 weeks:                         1. Improve FOTO score to predicted outcome for improved ability for functional tasks not met, 61/100 8/9/2017                         2. Pt will report no c/o R LE symptoms with running. No R LE symptoms with ADLs, but pt has not tried running yet 8/7/2017; met 8/9/2017                         3. Pt will demonstrate 4/5 glute max strength for improved ability for functional tasks.                         4. Pt will demonstrate 4/5 B glute med strength for improved ability for running.      PLAN  [x]  Upgrade activities as tolerated     [x]  Continue plan of care  [x]  Update interventions per flow sheet       []  Discharge due to:_  []  Other:_      Tamir Mulligan PT 8/9/2017  7:38 AM    Future Appointments  Date Time Provider Kingsley Stout   8/14/2017 7:30 AM Francis Estrada PT [facility]   [facility]16[facility]2017 [facility]   8/21/2017 7:30 AM Francis Estrada PT [facility]

## 2017-08-14 ENCOUNTER — APPOINTMENT (OUTPATIENT)
Dept: PHYSICAL THERAPY | Age: 47
End: 2017-08-14
Payer: OTHER GOVERNMENT

## 2017-08-16 ENCOUNTER — HOSPITAL ENCOUNTER (OUTPATIENT)
Dept: PHYSICAL THERAPY | Age: 47
Discharge: HOME | End: 2017-08-16
Payer: OTHER GOVERNMENT

## 2017-08-16 ENCOUNTER — APPOINTMENT (OUTPATIENT)
Dept: PHYSICAL THERAPY | Age: 47
End: 2017-08-16
Payer: OTHER GOVERNMENT

## 2017-08-16 PROCEDURE — 97112 NEUROMUSCULAR REEDUCATION: CPT

## 2017-08-16 PROCEDURE — 97110 THERAPEUTIC EXERCISES: CPT

## 2017-08-16 NOTE — PROGRESS NOTES
PT DAILY TREATMENT NOTE 3-16    Patient Name: Ayan Kang  Date:2017  : 1970  [x]  Patient  Verified  Payor:  / Plan: [facility]  RETIREES AND DEPENDENTS / Product Type:  /    In time:5:30  Out time:6:11  Total Treatment Time (min): 41   (1:1  = 41 minutes)  Visit #: 6 of 8    Treatment Area: Low back pain [M54.5]    SUBJECTIVE  Pain Level (0-10 scale): 0  Any medication changes, allergies to medications, adverse drug reactions, diagnosis change, or new procedure performed?: [x] No    [] Yes (see summary sheet for update)  Subjective functional status/changes:   [] No changes reported  Pt reports she has been feeling pretty good overall    OBJECTIVE      18 min Therapeutic Exercise:  [x] See flow sheet :   Rationale: increase ROM and increase strength to improve the patients ability to perform ADLs    23 min Neuromuscular Re-education:  [x]  See flow sheet :   Rationale: increase strength, improve coordination and increase proprioception  to improve the patients ability to improve stability            With   [] TE   [] TA   [] neuro   [] other: Patient Education: [x] Review HEP    [] Progressed/Changed HEP based on:   [] positioning   [] body mechanics   [] transfers   [] heat/ice application    [] other:      Other Objective/Functional Measures: added QP Oov     Pain Level (0-10 scale) post treatment: 0    ASSESSMENT/Changes in Function:  Pt with gradual progress. Stability and glute strength progressing but still noted weakness. Patient will continue to benefit from skilled PT services to modify and progress therapeutic interventions, address functional mobility deficits, address ROM deficits, address strength deficits, analyze and address soft tissue restrictions, analyze and cue movement patterns, analyze and modify body mechanics/ergonomics, assess and modify postural abnormalities and instruct in home and community integration to attain remaining goals.      [] See Plan of Care  []  See progress note/recertification  []  See Discharge Summary         Progress towards goals / Updated goals:  Short Term Goals: To be accomplished in 1 weeks:                         1. Pt will be I and compliant with HEP- MET 8-7-17  Long Term Goals: To be accomplished in 4 weeks:                         1. Improve FOTO score to predicted outcome for improved ability for functional tasks not met, 61/100 8/16/2017                         2. Pt will report no c/o R LE symptoms with running. No R LE symptoms with ADLs, but pt has not tried running yet 8/7/2017; met 8/16/2017                         3. Pt will demonstrate 4/5 glute max strength for improved ability for functional tasks.                         4. Pt will demonstrate 4/5 B glute med strength for improved ability for running.      PLAN  [x]  Upgrade activities as tolerated     [x]  Continue plan of care  [x]  Update interventions per flow sheet       []  Discharge due to:_  []  Other:_      Francis Estrada PT 8/16/2017  5:49 PM    Future Appointments  Date Time Provider Kingsley Stout   8/18/2017 5:30 PM Francis Estrada PT [facility]   8/23/2017 5:30 PM Francis Estrada PT [facility]

## 2017-08-18 ENCOUNTER — APPOINTMENT (OUTPATIENT)
Dept: PHYSICAL THERAPY | Age: 47
End: 2017-08-18
Payer: OTHER GOVERNMENT

## 2017-08-21 ENCOUNTER — APPOINTMENT (OUTPATIENT)
Dept: PHYSICAL THERAPY | Age: 47
End: 2017-08-21
Payer: OTHER GOVERNMENT

## 2017-08-23 ENCOUNTER — APPOINTMENT (OUTPATIENT)
Dept: PHYSICAL THERAPY | Age: 47
End: 2017-08-23
Payer: OTHER GOVERNMENT

## 2017-10-26 ENCOUNTER — TELEPHONE (OUTPATIENT)
Dept: FAMILY MEDICINE CLINIC | Age: 47
End: 2017-10-26

## 2017-10-26 NOTE — TELEPHONE ENCOUNTER
Patient requesting to see different orthopedic specialist.      Patient is requesting (New  Updated) referral to the following office:    Speciality: Orthopedics  Specialist Name: [address] [facility] orthopedic and spine specialist  Office Location: [place] [address], [address] [facility]  Phone (if available): [phone]  Fax (if available):   Diagnosis: Osteoarthritis bilat knee   Date of appointment (if scheduled): Np appt

## 2017-10-27 DIAGNOSIS — G89.29 CHRONIC PAIN OF BOTH KNEES: Primary | ICD-10-CM

## 2017-10-27 DIAGNOSIS — M25.561 CHRONIC PAIN OF BOTH KNEES: Primary | ICD-10-CM

## 2017-10-27 DIAGNOSIS — M25.562 CHRONIC PAIN OF BOTH KNEES: Primary | ICD-10-CM

## 2017-10-27 NOTE — TELEPHONE ENCOUNTER
A referral request has been faxed to [facility]. A fax confirmation has been received.

## 2017-11-14 ENCOUNTER — OFFICE VISIT (OUTPATIENT)
Dept: ORTHOPEDIC SURGERY | Age: 47
End: 2017-11-14

## 2017-11-14 VITALS
OXYGEN SATURATION: 100 % | WEIGHT: 157 LBS | HEIGHT: 63 IN | BODY MASS INDEX: 27.82 KG/M2 | SYSTOLIC BLOOD PRESSURE: 146 MMHG | HEART RATE: 61 BPM | DIASTOLIC BLOOD PRESSURE: 86 MMHG

## 2017-11-14 DIAGNOSIS — M25.562 CHRONIC PAIN OF BOTH KNEES: ICD-10-CM

## 2017-11-14 DIAGNOSIS — G89.29 CHRONIC PAIN OF BOTH KNEES: ICD-10-CM

## 2017-11-14 DIAGNOSIS — M17.0 PRIMARY OSTEOARTHRITIS OF BOTH KNEES: Primary | ICD-10-CM

## 2017-11-14 DIAGNOSIS — M25.561 CHRONIC PAIN OF BOTH KNEES: ICD-10-CM

## 2017-11-14 NOTE — PROGRESS NOTES
Shikha Oliveros  1970   Chief Complaint   Patient presents with    Knee Pain     bilateral        HISTORY OF PRESENT ILLNESS  Shikha Oliveros is a 56 y.o. female who presents today for evaluation of B/L knee pain, R>L. Patient referred by Dr. Vanessa Brewer for further evaluation. . she rates her pain 2/10 today. Pain has been present for years. States she has been getting Euflexxa injections by Dr. Yohan Pearson. Last Euflexxa injections were in January. She is not interested in returning to Dr. Linnea Rodriguez practice. She states she is a frequent runner and runs about 4 marathons a year. States she she has a goal to run marathons on every continent. Patient describes the pain as aching that is Constant in nature. Symptoms are worse with Exercise and running long periods and is better with  Rest and previous Euflexxa injections. Associated symptoms include Swelling. Since problem started, it: has worsened. Pain does not wake patient up at night. Has taken no medication for the problem. Has tried following treatments: Injections:YES; Brace:NO; Therapy:NO; Cane/Crutch:NO       No Known Allergies     Past Medical History:   Diagnosis Date    Allergic rhinitis, seasonal     spring    Anemia NEC     iron deficiency associated with heavy menstruation and fibroids noted 2008 with pregnancy    Dysmenorrhea     increasing past year    Fibroid, uterine     Headache(784.0)     Menorrhagia     worsening past year      Social History     Social History    Marital status:      Spouse name: N/A    Number of children: N/A    Years of education: N/A     Occupational History    Not on file.      Social History Main Topics    Smoking status: Never Smoker    Smokeless tobacco: Never Used    Alcohol use 0.0 oz/week     0 Standard drinks or equivalent per week      Comment: occass: 2/month    Drug use: No    Sexual activity: Yes     Partners: Male     Birth control/ protection: Surgical      Comment: hysterectomy     Other Topics Concern    Not on file     Social History Narrative      Past Surgical History:   Procedure Laterality Date    HX HYSTERECTOMY  04/2014    [facility]      Family History   Problem Relation Age of Onset    Hypertension Mother     Hypertension Father     Headache Sister     Diabetes Paternal Aunt       Current Outpatient Prescriptions   Medication Sig    amLODIPine (NORVASC) 5 mg tablet Take 1 Tab by mouth daily.  topiramate (TOPAMAX) 25 mg tablet Take 1 Tab by mouth daily (with breakfast).  cyclobenzaprine (FLEXERIL) 10 mg tablet     naproxen (NAPROSYN) 500 mg tablet Take 1 Tab by mouth two (2) times daily (with meals).  HYDROcodone-acetaminophen (NORCO) 5-325 mg per tablet Take 1-2 tabs PO every 4-6 hours PRN pain     No current facility-administered medications for this visit. REVIEW OF SYSTEM   Patient denies: Weight loss, Fever/Chills, HA, Visual changes, Fatigue, Chest pain, SOB, Abdominal pain, N/V/D/C, Blood in stool or urine, Edema. Pertinent positive as above in HPI. All others were negative    PHYSICAL EXAM:   Visit Vitals    /86    Pulse 61    Ht 5' 3\" (1.6 m)    Wt 157 lb (71.2 kg)    LMP 10/08/2012    SpO2 100%    BMI 27.81 kg/m2     The patient is a well-developed, well-nourished female   in no acute distress. The patient is alert and oriented times three. The patient is alert and oriented times three. Mood and affect are normal.  LYMPHATIC: lymph nodes are not enlarged and are within normal limits  SKIN: normal in color and non tender to palpation. There are no bruises or abrasions noted. NEUROLOGICAL: Motor sensory exam is within normal limits. Reflexes are equal bilaterally.  There is normal sensation to pinprick and light touch  MUSCULOSKELETAL:  Examination Left knee Right knee   Skin Intact Intact   Range of motion 0-130 0-130   Effusion + +   Medial joint line tenderness + +   Lateral joint line tenderness - -   Tenderness Pes Bursa - - Tenderness insertion MCL - -   Tenderness insertion LCL - -   Tos - -   Patella crepitus - -   Patella grind - -   Lachman - -   Pivot shift - -   Anterior drawer - -   Posterior drawer - -   Varus stress - -   Valgus stress - -   Neurovascular Intact Intact   Calf Swelling and Tenderness to Palpation - -   Madelyn's Test - -   Hamstring Cord Tightness - -       IMAGING: XR of the B/L knees dated 11/14/17 was reviewed and read: mildly decreased medial joint line and mild patellofemoral joint degenerative changes B/L    IMPRESSION:      ICD-10-CM ICD-9-CM    1. Primary osteoarthritis of both knees M17.0 715.16 PROCEDURE AUTHORIZATION TO    2. Chronic pain of both knees M25.561 719.46 AMB POC XRAY, KNEE; 1/2 VIEWS    M25.562 338.29 AMB POC XRAY, KNEE; 1/2 VIEWS    G89.29          PLAN:  1. Patient has XR documented degenerative changes of the B/L knees. Since visco supplementation has worked well for the patient in the past, we will seek authorization for Euflexxa in the B/L knees. Risk factors include: n/a  2. No cortisone injection indicated today   3. No Physical/Occupational Therapy indicated today  4. No diagnostic test indicated today  5. No durable medical equipment indicated today  6. No referral indicated today   7. No medications indicated today  8. No Narcotic indicated today    RTC Once we have received authorization for Euflexxa B/L knees  Office note will be sent to the referring provider. Follow-up Disposition: Not on File    Scribed by Demar Sanford [address]) as dictated by Raz Magallanes MD    I, Dr. Raz Magallanes, confirm that all documentation is accurate.     Raz Magallanes M.D.   [facility]

## 2017-11-14 NOTE — PATIENT INSTRUCTIONS
Arthritis: Care Instructions  Your Care Instructions  Arthritis, also called osteoarthritis, is a breakdown of the cartilage that cushions your joints. When the cartilage wears down, your bones rub against each other. This causes pain and stiffness. Many people have some arthritis as they age. Arthritis most often affects the joints of the spine, hands, hips, knees, or feet. You can take simple measures to protect your joints, ease your pain, and help you stay active. Follow-up care is a key part of your treatment and safety. Be sure to make and go to all appointments, and call your doctor if you are having problems. It's also a good idea to know your test results and keep a list of the medicines you take. How can you care for yourself at home? · Stay at a healthy weight. Being overweight puts extra strain on your joints. · Talk to your doctor or physical therapist about exercises that will help ease joint pain. ¨ Stretch. You may enjoy gentle forms of yoga to help keep your joints and muscles flexible. ¨ Walk instead of jog. Other types of exercise that are less stressful on the joints include riding a bicycle, swimming, steven chi, or water exercise. ¨ Lift weights. Strong muscles help reduce stress on your joints. Stronger thigh muscles, for example, take some of the stress off of the knees and hips. Learn the right way to lift weights so you do not make joint pain worse. · Take your medicines exactly as prescribed. Call your doctor if you think you are having a problem with your medicine. · Take pain medicines exactly as directed. ¨ If the doctor gave you a prescription medicine for pain, take it as prescribed. ¨ If you are not taking a prescription pain medicine, ask your doctor if you can take an over-the-counter medicine. · Use a cane, crutch, walker, or another device if you need help to get around. These can help rest your joints.  You also can use other things to make life easier, such as a higher toilet seat and padded handles on kitchen utensils. · Do not sit in low chairs, which can make it hard to get up. · Put heat or cold on your sore joints as needed. Use whichever helps you most. You also can take turns with hot and cold packs. ¨ Apply heat 2 or 3 times a day for 20 to 30 minutes-using a heating pad, hot shower, or hot pack-to relieve pain and stiffness. ¨ Put ice or a cold pack on your sore joint for 10 to 20 minutes at a time. Put a thin cloth between the ice and your skin. When should you call for help? Call your doctor now or seek immediate medical care if:  ? · You have sudden swelling, warmth, or pain in any joint. ? · You have joint pain and a fever or rash. ? · You have such bad pain that you cannot use a joint. ? Watch closely for changes in your health, and be sure to contact your doctor if:  ? · You have mild joint symptoms that continue even with more than 6 weeks of care at home. ? · You have stomach pain or other problems with your medicine. Where can you learn more? Go to http://martin-ifeanyi.info/. Enter I866 in the search box to learn more about \"Arthritis: Care Instructions. \"  Current as of: October 31, 2016  Content Version: 11.4  © 7156-4120 [employer]. Care instructions adapted under license by [employer] (which disclaims liability or warranty for this information). If you have questions about a medical condition or this instruction, always ask your healthcare professional. [place] [address] any warranty or liability for your use of this information.

## 2017-11-21 ENCOUNTER — DOCUMENTATION ONLY (OUTPATIENT)
Dept: ORTHOPEDIC SURGERY | Age: 47
End: 2017-11-21

## 2017-11-21 NOTE — PROGRESS NOTES
delfinm for pt to call back to offer sooner appt to begin her euflexxa injections. Please give call to arnie in the [facility].

## 2017-12-07 ENCOUNTER — OFFICE VISIT (OUTPATIENT)
Dept: ORTHOPEDIC SURGERY | Age: 47
End: 2017-12-07

## 2017-12-07 VITALS
SYSTOLIC BLOOD PRESSURE: 141 MMHG | TEMPERATURE: 98 F | WEIGHT: 162 LBS | HEART RATE: 78 BPM | BODY MASS INDEX: 28.7 KG/M2 | DIASTOLIC BLOOD PRESSURE: 82 MMHG | OXYGEN SATURATION: 99 %

## 2017-12-07 DIAGNOSIS — M17.0 PRIMARY OSTEOARTHRITIS OF BOTH KNEES: Primary | ICD-10-CM

## 2017-12-07 RX ORDER — HYALURONATE SODIUM 10 MG/ML
2 SYRINGE (ML) INTRAARTICULAR ONCE
Qty: 2 ML | Refills: 0
Start: 2017-12-07 | End: 2017-12-07

## 2017-12-07 NOTE — PROGRESS NOTES
Patient: Nabil Cabrales                MRN: [id number]       SSN: [id number]  YOB: 1970        AGE: 56 y.o. SEX: female  Body mass index is 28.7 kg/(m^2). PCP: Jairon Mendoza MD  12/07/17    No chief complaint on file. HISTORY:  Nabil Cabrales is a 56 y.o. female who is seen for first Euflexxa injections in B/L knees. PROCEDURE:  Patient's B/L knees, after timeout under sterile conditions, was injected with 2 cc of Euflexxa. [facility]  OFFICE PROCEDURE PROGRESS NOTE        Chart reviewed for the following:   Hipolito West MD, have reviewed the History, Physical and updated the Allergic reactions for Airais V Buenrostro     TIME OUT performed immediately prior to start of procedure:   Hipolito West MD, have performed the following reviews on Airais V Buenrostro prior to the start of the procedure:            * Patient was identified by name and date of birth   * Agreement on procedure being performed was verified  * Risks and Benefits explained to the patient  * Procedure site verified and marked as necessary  * Patient was positioned for comfort  * Consent was signed and verified     Time: 8:24 AM       Date of procedure: 12/7/2017    Procedure performed by:  Eneida Rojas MD    Provider assisted by: None     How tolerated by patient: tolerated the procedure well with no complications    Comments: none    IMPRESSION:     ICD-10-CM ICD-9-CM    1. Primary osteoarthritis of both knees M17.0 715.16 RI DRAIN/INJECT LARGE JOINT/BURSA      EUFLEXXA INJECTION PER DOSE      sodium hyaluronate (SUPARTZ FX/HYALGAN/GENIVSC) 10 mg/mL syrg injection        PLAN:  Ms. Clint Dinero will return in one week for her second Euflexxa injection. Scribed by Louie Muhammad ([address]) as dictated by Eneida Rojas MD    I, Dr. Germain, confirm that all documentation is accurate.     Eneida Rojas M.D. [facility] [address] [facility]ist

## 2017-12-14 ENCOUNTER — OFFICE VISIT (OUTPATIENT)
Dept: ORTHOPEDIC SURGERY | Age: 47
End: 2017-12-14

## 2017-12-14 VITALS
RESPIRATION RATE: 16 BRPM | HEART RATE: 72 BPM | DIASTOLIC BLOOD PRESSURE: 77 MMHG | HEIGHT: 63 IN | SYSTOLIC BLOOD PRESSURE: 121 MMHG | OXYGEN SATURATION: 96 % | BODY MASS INDEX: 28.46 KG/M2 | WEIGHT: 160.6 LBS

## 2017-12-14 DIAGNOSIS — M17.0 PRIMARY OSTEOARTHRITIS OF BOTH KNEES: Primary | ICD-10-CM

## 2017-12-14 RX ORDER — HYALURONATE SODIUM 10 MG/ML
2 SYRINGE (ML) INTRAARTICULAR ONCE
Qty: 2 ML | Refills: 0
Start: 2017-12-14 | End: 2017-12-14

## 2017-12-14 NOTE — PROGRESS NOTES
Patient: Sean Salas                MRN: [id number]       SSN: [id number]  YOB: 1970        AGE: 56 y.o. SEX: female  Body mass index is 28.45 kg/(m^2). PCP: Buddy Swann MD  12/14/17    No chief complaint on file. HISTORY:  Sean Salas is a 56 y.o. female who is seen for reevaluation of Bilateral knee here for 2nd injection of euflexxa. PROCEDURE:  Patient's Bilateral knee, after timeout under sterile conditions, was injected with 2 cc of Euflexxa. [facility]  OFFICE PROCEDURE PROGRESS NOTE        Chart reviewed for the following:   Rupert Deleon MD, have reviewed the History, Physical and updated the Allergic reactions for Airais V Buenrostro     TIME OUT performed immediately prior to start of procedure:   Rupert Deleon MD, have performed the following reviews on Airais V Buenrostro prior to the start of the procedure:            * Patient was identified by name and date of birth   * Agreement on procedure being performed was verified  * Risks and Benefits explained to the patient  * Procedure site verified and marked as necessary  * Patient was positioned for comfort  * Consent was signed and verified     Time: 8:45 AM    Date of procedure: 12/14/2017    Procedure performed by:  Ruiz Baptiste PA-C    Provider assisted by: None     How tolerated by patient: tolerated the procedure well with no complications    Comments: none    IMPRESSION:     ICD-10-CM ICD-9-CM    1. Primary osteoarthritis of both knees M17.0 715.16 GA DRAIN/INJECT LARGE JOINT/BURSA      EUFLEXXA INJECTION PER DOSE      sodium hyaluronate (SUPARTZ FX/HYALGAN/GENIVSC) 10 mg/mL syrg injection        PLAN:  Ms. Amber Davila will return in one week for her third Euflexxa injection.       Scribed by Rosi Gates[address]) as dictated by TAM Mcdonald [address] and Spine Specialist

## 2017-12-14 NOTE — PATIENT INSTRUCTIONS
Arthritis: Care Instructions  Your Care Instructions  Arthritis, also called osteoarthritis, is a breakdown of the cartilage that cushions your joints. When the cartilage wears down, your bones rub against each other. This causes pain and stiffness. Many people have some arthritis as they age. Arthritis most often affects the joints of the spine, hands, hips, knees, or feet. You can take simple measures to protect your joints, ease your pain, and help you stay active. Follow-up care is a key part of your treatment and safety. Be sure to make and go to all appointments, and call your doctor if you are having problems. It's also a good idea to know your test results and keep a list of the medicines you take. How can you care for yourself at home? · Stay at a healthy weight. Being overweight puts extra strain on your joints. · Talk to your doctor or physical therapist about exercises that will help ease joint pain. ¨ Stretch. You may enjoy gentle forms of yoga to help keep your joints and muscles flexible. ¨ Walk instead of jog. Other types of exercise that are less stressful on the joints include riding a bicycle, swimming, steven chi, or water exercise. ¨ Lift weights. Strong muscles help reduce stress on your joints. Stronger thigh muscles, for example, take some of the stress off of the knees and hips. Learn the right way to lift weights so you do not make joint pain worse. · Take your medicines exactly as prescribed. Call your doctor if you think you are having a problem with your medicine. · Take pain medicines exactly as directed. ¨ If the doctor gave you a prescription medicine for pain, take it as prescribed. ¨ If you are not taking a prescription pain medicine, ask your doctor if you can take an over-the-counter medicine. · Use a cane, crutch, walker, or another device if you need help to get around. These can help rest your joints.  You also can use other things to make life easier, such as a higher toilet seat and padded handles on kitchen utensils. · Do not sit in low chairs, which can make it hard to get up. · Put heat or cold on your sore joints as needed. Use whichever helps you most. You also can take turns with hot and cold packs. ¨ Apply heat 2 or 3 times a day for 20 to 30 minutes-using a heating pad, hot shower, or hot pack-to relieve pain and stiffness. ¨ Put ice or a cold pack on your sore joint for 10 to 20 minutes at a time. Put a thin cloth between the ice and your skin. When should you call for help? Call your doctor now or seek immediate medical care if:  ? · You have sudden swelling, warmth, or pain in any joint. ? · You have joint pain and a fever or rash. ? · You have such bad pain that you cannot use a joint. ? Watch closely for changes in your health, and be sure to contact your doctor if:  ? · You have mild joint symptoms that continue even with more than 6 weeks of care at home. ? · You have stomach pain or other problems with your medicine. Where can you learn more? Go to http://martin-ifeanyi.info/. Enter J796 in the search box to learn more about \"Arthritis: Care Instructions. \"  Current as of: October 31, 2016  Content Version: 11.4  © 3614-4048 [employer]. Care instructions adapted under license by [employer] (which disclaims liability or warranty for this information). If you have questions about a medical condition or this instruction, always ask your healthcare professional. [place] [address] any warranty or liability for your use of this information.

## 2017-12-14 NOTE — PROGRESS NOTES
Patient: Austin Rolon                MRN: [id number]       SSN: [id number]  YOB: 1970        AGE: 56 y.o. SEX: female  Body mass index is 28.45 kg/(m^2). PCP: Marlene Tobias MD  12/14/17    No chief complaint on file. HISTORY:  Austin Rolon is a 56 y.o. female who is seen for her second Euflexxa injections in B/L knees. PROCEDURE:  Patient's B/L knees, after timeout under sterile conditions, was injected with 2 cc of Euflexxa. [facility]  OFFICE PROCEDURE PROGRESS NOTE        Chart reviewed for the following:   Germaine Ortiz MD, have reviewed the History, Physical and updated the Allergic reactions for Airais V Buenrostro     TIME OUT performed immediately prior to start of procedure:   Germaine Ortiz MD, have performed the following reviews on Airais V Buenrostro prior to the start of the procedure:            * Patient was identified by name and date of birth   * Agreement on procedure being performed was verified  * Risks and Benefits explained to the patient  * Procedure site verified and marked as necessary  * Patient was positioned for comfort  * Consent was signed and verified     Time: 8:44 AM    Date of procedure: 12/14/2017    Procedure performed by:  Chuy Qureshi MD    Provider assisted by: None     How tolerated by patient: tolerated the procedure well with no complications    Comments: none    IMPRESSION:     ICD-10-CM ICD-9-CM    1. Primary osteoarthritis of both knees M17.0 715.16 NE DRAIN/INJECT LARGE JOINT/BURSA      EUFLEXXA INJECTION PER DOSE      sodium hyaluronate (SUPARTZ FX/HYALGAN/GENIVSC) 10 mg/mL syrg injection        PLAN:  Ms. Mervat Del Toro will return in one week for her third Euflexxa injection. Scribed by Dejan Mata ([address]) as dictated by Chuy Qureshi MD    I, Dr. Chuy Qureshi, confirm that all documentation is accurate.     Chuy Qureshi M.D. [facility]

## 2017-12-21 ENCOUNTER — OFFICE VISIT (OUTPATIENT)
Dept: ORTHOPEDIC SURGERY | Age: 47
End: 2017-12-21

## 2017-12-21 VITALS
TEMPERATURE: 96.5 F | SYSTOLIC BLOOD PRESSURE: 133 MMHG | HEIGHT: 63 IN | HEART RATE: 64 BPM | BODY MASS INDEX: 28.35 KG/M2 | WEIGHT: 160 LBS | RESPIRATION RATE: 16 BRPM | OXYGEN SATURATION: 100 % | DIASTOLIC BLOOD PRESSURE: 89 MMHG

## 2017-12-21 DIAGNOSIS — M17.0 PRIMARY OSTEOARTHRITIS OF BOTH KNEES: Primary | ICD-10-CM

## 2017-12-21 RX ORDER — HYALURONATE SODIUM 10 MG/ML
2 SYRINGE (ML) INTRAARTICULAR ONCE
Qty: 2 ML | Refills: 0
Start: 2017-12-21 | End: 2017-12-21

## 2017-12-21 NOTE — PROGRESS NOTES
Patient: Adore Slater                MRN: [id number]       SSN: [id number]  YOB: 1970        AGE: 56 y.o. SEX: female  Body mass index is 28.34 kg/(m^2). PCP: Julius Sigala MD  12/21/17    Chief Complaint   Patient presents with    Knee Pain     Bilateral       HISTORY:  Adore Slater is a 56 y.o. female who is seen for her third Euflexxa injection in the b/l knees. PROCEDURE:  Patient's b/l knees, after timeout under sterile conditions, was injected with 2 cc of Euflexxa. [facility]  OFFICE PROCEDURE PROGRESS NOTE        Chart reviewed for the following:   Karin Camacho MD, have reviewed the History, Physical and updated the Allergic reactions for Airais V Buenrostro     TIME OUT performed immediately prior to start of procedure:   Karin Camacho MD, have performed the following reviews on Airais V Buenrostro prior to the start of the procedure:            * Patient was identified by name and date of birth   * Agreement on procedure being performed was verified  * Risks and Benefits explained to the patient  * Procedure site verified and marked as necessary  * Patient was positioned for comfort  * Consent was signed and verified     Time: 8:14 AM       Date of procedure: 12/21/2017    Procedure performed by:  Klever Dunne MD    Provider assisted by: None     How tolerated by patient: tolerated the procedure well with no complications    Comments: none    IMPRESSION:     ICD-10-CM ICD-9-CM    1. Primary osteoarthritis of both knees M17.0 715.16 RI DRAIN/INJECT LARGE JOINT/BURSA      EUFLEXXA INJECTION PER DOSE      sodium hyaluronate (SUPARTZ FX/HYALGAN/GENIVSC) 10 mg/mL syrg injection        PLAN: Ms. Ziyad Nixon has completed her Euflexxa injection series. she will return as needed.       Scribed by Emelia Kunz ([address]) as dictated by Klever Dunne MD    I, Dr. Klever Dunne, confirm that all documentation is accurate.     Bernie Lehman M.D.   [facility]ist

## 2018-01-11 NOTE — PROGRESS NOTES
[facility]  [address] [facility] [address]  [place], [address]  [phone] fax    Physical Therapy Discharge Summary  Patient name: Corey Hamilton Start of Care: 2017   Referral source: Zacarias Lee MD : 1970                          Medical Diagnosis: Low back pain [M54.5] Onset Date:6-10-17                          Treatment Diagnosis: LBP   Prior Hospitalization: see medical history Provider#: [id number]   Medications: Verified on Patient summary List    Comorbidities: HTN, OA B knees   Prior Level of Function: functionally I with all activities, desk job, runs marathons    Vis[facility] from [facility] Care: 6    Missed Visits: 0  Reporting Period : 2017 to 2017    Summary of Care:  Short Term Goals: To be accomplished in 1 weeks:                         1. Pt will be I and compliant with HEP- MET 17  Long Term Goals: To be accomplished in 4 weeks:                         1. Improve FOTO score to predicted outcome for improved ability for functional tasks not met, 61/100 2017                         2. Pt will report no c/o R LE symptoms with running. No R LE symptoms with ADLs, but pt has not tried running yet 2017; met 2017                         3. Pt will demonstrate 4/5 glute max strength for improved ability for functional tasks.                         4. Pt will demonstrate 4/5 B glute med strength for improved ability for running. ASSESSMENT/RECOMMENDATIONS: Unable to further assess progress towards goals at this time due to non-compliance/lack of attendance. DC at this time with no further instructions to the patient.   Thank you for this referral.  [x]Discontinue therapy: []Patient has reached or is progressing toward set goals      [x]Patient is non-compliant or has abdicated      []Due to lack of appreciable progress towards set goals    Adrianna Jessica PT 2018 2:07 PM

## 2018-01-21 ENCOUNTER — HOSPITAL ENCOUNTER (EMERGENCY)
Age: 48
Discharge: HOME | End: 2018-01-21
Attending: EMERGENCY MEDICINE
Payer: OTHER GOVERNMENT

## 2018-01-21 VITALS
OXYGEN SATURATION: 96 % | HEIGHT: 63 IN | WEIGHT: 157 LBS | TEMPERATURE: 97.9 F | SYSTOLIC BLOOD PRESSURE: 115 MMHG | HEART RATE: 77 BPM | RESPIRATION RATE: 20 BRPM | BODY MASS INDEX: 27.82 KG/M2 | DIASTOLIC BLOOD PRESSURE: 78 MMHG

## 2018-01-21 DIAGNOSIS — G89.29 CHRONIC PAIN OF RIGHT KNEE: Primary | ICD-10-CM

## 2018-01-21 DIAGNOSIS — M25.561 CHRONIC PAIN OF RIGHT KNEE: Primary | ICD-10-CM

## 2018-01-21 PROCEDURE — 99283 EMERGENCY DEPT VISIT LOW MDM: CPT

## 2018-01-21 PROCEDURE — 74011250637 HC RX REV CODE- 250/637: Performed by: EMERGENCY MEDICINE

## 2018-01-21 RX ORDER — MELOXICAM 7.5 MG/1
7.5 TABLET ORAL DAILY
Qty: 14 TAB | Refills: 0 | Status: SHIPPED | OUTPATIENT
Start: 2018-01-21 | End: 2018-04-16

## 2018-01-21 RX ORDER — OXYCODONE AND ACETAMINOPHEN 5; 325 MG/1; MG/1
TABLET ORAL
Qty: 12 TAB | Refills: 0 | Status: SHIPPED | OUTPATIENT
Start: 2018-01-21 | End: 2018-04-16

## 2018-01-21 RX ORDER — IBUPROFEN 600 MG/1
600 TABLET ORAL
Status: COMPLETED | OUTPATIENT
Start: 2018-01-21 | End: 2018-01-21

## 2018-01-21 RX ORDER — OXYCODONE AND ACETAMINOPHEN 5; 325 MG/1; MG/1
1 TABLET ORAL
Status: COMPLETED | OUTPATIENT
Start: 2018-01-21 | End: 2018-01-21

## 2018-01-21 RX ADMIN — OXYCODONE HYDROCHLORIDE AND ACETAMINOPHEN 1 TABLET: 5; 325 TABLET ORAL at 21:50

## 2018-01-21 RX ADMIN — IBUPROFEN 600 MG: 600 TABLET, FILM COATED ORAL at 21:50

## 2018-01-22 ENCOUNTER — OFFICE VISIT (OUTPATIENT)
Dept: ORTHOPEDIC SURGERY | Age: 48
End: 2018-01-22

## 2018-01-22 VITALS
WEIGHT: 162 LBS | DIASTOLIC BLOOD PRESSURE: 77 MMHG | HEART RATE: 65 BPM | SYSTOLIC BLOOD PRESSURE: 128 MMHG | BODY MASS INDEX: 28.7 KG/M2 | HEIGHT: 63 IN | OXYGEN SATURATION: 100 % | TEMPERATURE: 97.1 F

## 2018-01-22 DIAGNOSIS — S83.241A ACUTE MEDIAL MENISCUS TEAR OF RIGHT KNEE, INITIAL ENCOUNTER: ICD-10-CM

## 2018-01-22 DIAGNOSIS — M17.11 PRIMARY OSTEOARTHRITIS OF RIGHT KNEE: Primary | ICD-10-CM

## 2018-01-22 RX ORDER — TRIAMCINOLONE ACETONIDE 40 MG/ML
40 INJECTION, SUSPENSION INTRA-ARTICULAR; INTRAMUSCULAR ONCE
Qty: 1 ML | Refills: 0
Start: 2018-01-22 | End: 2018-01-22

## 2018-01-22 NOTE — ED NOTES
Patient up for discharge. Discharge results have been reviewed with patient by the Provider.  Patient has been counseled regarding her diagnosis, treatment, and plan. Patient verbally conveys understanding and agreement of the signs, symptoms, diagnosis, treatment and prognosis and additionally agrees to follow up as discussed.  Patient also agrees with the care-plan and conveys that all of her questions have been answered.  I have also provided discharge instructions for the patient by the Provider, that include: educational information regarding their diagnosis and treatment, and list of reasons why they would want to return to the ED prior to their follow-up appointment, should her condition change. Patient has been provided with education for proper emergency department utilization. Any and all prescriptions for medicationif any, including it's  name, dosage, action, frequency, and side effects have been reviewed with the patient. Patient verbally stated their discharge instructions/ discharge plan of care, follow up with PCP and specialists, in their own words,  and stated what their discharge medications are; including the name, actions. Encouraged to adhere to MD discharge instructions. Armband removed and shredded per policy. Encouraged to voice any concerns, and all concerns addressed. Patient discharged in stable condition with no apparent distress.

## 2018-01-22 NOTE — PROGRESS NOTES
Pawan Barton  1970   Chief Complaint   Patient presents with    Knee Pain     right knee pain        HISTORY OF PRESENT ILLNESS  Carlos Buenrostro is a 56 y.o. female who presents today for reevaluation of right knee. She states she had euflexxa injections about 1 month ago in both knees. Her left knee is doing well but her right knee has persistent problems. She has significant swelling. Describes a sharp stabbing pain along with a pressure feeling. Worse with prolonged walking and better with rest.     Patient denies any fever, chills, chest pain, shortness of breath or calf pain. There are no new illness or injuries to report since last seen in the office. No changes in medications, allergies, social or family history. PHYSICAL EXAM:    Visit Vitals    /77 (BP 1 Location: Left arm, BP Patient Position: Sitting)    Pulse 65    Temp 97.1 °F (36.2 °C)    Ht 5' 3\" (1.6 m)    Wt 162 lb (73.5 kg)    LMP 10/08/2012    SpO2 100%    BMI 28.7 kg/m2     The patient is a well-developed, well-nourished female   in no acute distress. The patient is alert and oriented times three. The patient is alert and oriented times three. Mood and affect are normal.  LYMPHATIC: lymph nodes are not enlarged and are within normal limits  SKIN: normal in color and non tender to palpation. There are no bruises or abrasions noted. NEUROLOGICAL: Motor sensory exam is within normal limits. Reflexes are equal bilaterally.  There is normal sensation to pinprick and light touch  MUSCULOSKELETAL:  Examination Left knee   Skin Intact   Range of motion -5-100   Effusion +   Medial joint line tenderness +   Lateral joint line tenderness -   Tenderness Pes Bursa -   Tenderness insertion MCL -   Tenderness insertion LCL -   Tos -   Patella crepitus -   Patella grind -   Lachman -   Pivot shift -   Anterior drawer -   Posterior drawer -   Varus stress -   Valgus stress -   Neurovascular Intact   Calf Swelling and Tenderness to Palpation -   Madelyn's Test -   Hamstring Cord Tightness -          PROCEDURE: After sterile prep, left knee was aspirated. 46 cc of yellow fluid were obtained. The fluid was discarded. After sterile prep, 4 cc of Xylocaine and 1 cc of Kenalog were injected into the left knee. [facility]  OFFICE PROCEDURE PROGRESS NOTE        Chart reviewed for the following:  Susan RODAZ PA-C, have reviewed the History, Physical and updated the Allergic reactions for Airais V Buenrostro     TIME OUT performed immediately prior to start of procedure:  Susan ORDAZ PA-C, have performed the following reviews on Airais V Buenrostro prior to the start of the procedure:            * Patient was identified by name and date of birth   * Agreement on procedure being performed was verified  * Risks and Benefits explained to the patient  * Procedure site verified and marked as necessary  * Patient was positioned for comfort  * Consent was signed and verified     Time: 4:19 PM     Date of procedure: 1/22/2018    Procedure performed by:  HELEN Wade    Provider assisted by: (see medication administration)    How tolerated by patient: tolerated the procedure well with no complications    Comments: none            IMPRESSION:      ICD-10-CM ICD-9-CM    1. Primary osteoarthritis of right knee M17.11 715.16    2. Acute medial meniscus tear of right knee, initial encounter S83.241A 836.0         PLAN:   1. Patient with acute swelling in knee ddx degen arthritis acute exacerbation vs medial meniscus tear  2. Yes cortisone injection indicated today , knee aspirated as well  3. No Physical/Occupational Therapy indicated today  4. Yes diagnostic test indicated today: MRI r/o MMT  5. No durable medical equipment indicated today  6. No referral indicated today   7. No medications indicated today:   8.  No Narcotic indicated today for short term acute pain     RTC following MRI    Follow-up Disposition: Not on [address] [place] [address], T[facility] Spine Specialist

## 2018-01-22 NOTE — DISCHARGE INSTRUCTIONS
Chronic Pain: Care Instructions  Your Care Instructions    Chronic pain is pain that lasts a long time (months or even years) and may or may not have a clear cause. It is different from acute pain, which usually does have a clear cause-like an injury or illness-and gets better over time. Chronic pain:  · Lasts over time but may vary from day to day. · Does not go away despite efforts to end it. · May disrupt your sleep and lead to fatigue. · May cause depression or anxiety. · May make your muscles tense, causing more pain. · Can disrupt your work, hobbies, home life, and relationships with friends and family. Chronic pain is a very real condition. It is not just in your head. Treatment can help and usually includes several methods used together, such as medicines, physical therapy, exercise, and other treatments. Learning how to relax and changing negative thought patterns can also help you cope. Chronic pain is complex. Taking an active role in your treatment will help you better manage your pain. Tell your doctor if you have trouble dealing with your pain. You may have to try several things before you find what works best for you. Follow-up care is a key part of your treatment and safety. Be sure to make and go to all appointments, and call your doctor if you are having problems. It's also a good idea to know your test results and keep a list of the medicines you take. How can you care for yourself at home? · Pace yourself. Break up large jobs into smaller tasks. Save harder tasks for days when you have less pain, or go back and forth between hard tasks and easier ones. Take rest breaks. · Relax, and reduce stress. Relaxation techniques such as deep breathing or meditation can help. · Keep moving. Gentle, daily exercise can help reduce pain over the long run. Try low- or no-impact exercises such as walking, swimming, and stationary biking. Do stretches to stay flexible.   · Try heat, cold packs, and massage. · Get enough sleep. Chronic pain can make you tired and drain your energy. Talk with your doctor if you have trouble sleeping because of pain. · Think positive. Your thoughts can affect your pain level. Do things that you enjoy to distract yourself when you have pain instead of focusing on the pain. See a movie, read a book, listen to music, or spend time with a friend. · If you think you are depressed, talk to your doctor about treatment. · Keep a daily pain diary. Record how your moods, thoughts, sleep patterns, activities, and medicine affect your pain. You may find that your pain is worse during or after certain activities or when you are feeling a certain emotion. Having a record of your pain can help you and your doctor find the best ways to treat your pain. · Take pain medicines exactly as directed. ¨ If the doctor gave you a prescription medicine for pain, take it as prescribed. ¨ If you are not taking a prescription pain medicine, ask your doctor if you can take an over-the-counter medicine. Reducing constipation caused by pain medicine  · Include fruits, vegetables, beans, and whole grains in your diet each day. These foods are high in fiber. · Drink plenty of fluids, enough so that your urine is light yellow or clear like water. If you have kidney, heart, or liver disease and have to limit fluids, talk with your doctor before you increase the amount of fluids you drink. · If your doctor recommends it, get more exercise. Walking is a good choice. Bit by bit, increase the amount you walk every day. Try for at least 30 minutes on most days of the week. · Schedule time each day for a bowel movement. A daily routine may help. Take your time and do not strain when having a bowel movement. When should you call for help? Call your doctor now or seek immediate medical care if:  ? · Your pain gets worse or is out of control.    ? · You feel down or blue, or you do not enjoy things like you once did. You may be depressed, which is common in people with chronic pain. Depression can be treated. ? · You have vomiting or cramps for more than 2 hours. ? Watch closely for changes in your health, and be sure to contact your doctor if:  ? · You cannot sleep because of pain. ? · You are very worried or anxious about your pain. ? · You have trouble taking your pain medicine. ? · You have any concerns about your pain medicine. ? · You have trouble with bowel movements, such as:  ¨ No bowel movement in 3 days. ¨ Blood in the anal area, in your stool, or on the toilet paper. ¨ Diarrhea for more than 24 hours. Where can you learn more? Go to http://martin-ifeanyi.info/. Enter N004 in the search box to learn more about \"Chronic Pain: Care Instructions. \"  Current as of: October 14, 2016  Content Version: 11.4  © 7784-5540 [employer]. Care instructions adapted under license by [employer] (which disclaims liability or warranty for this information). If you have questions about a medical condition or this instruction, always ask your healthcare professional. [place] [address] any warranty or liability for your use of this information. Joint Pain: Care Instructions  Your Care Instructions    Many people have small aches and pains from overuse or injury to muscles and joints. Joint injuries often happen during sports or recreation, work tasks, or projects around the home. An overuse injury can happen when you put too much stress on a joint or when you do an activity that stresses the joint over and over, such as using the computer or rowing a boat. You can take action at home to help your muscles and joints get better. You should feel better in 1 to 2 weeks, but it can take 3 months or more to heal completely. Follow-up care is a key part of your treatment and safety.  Be sure to make and go to all appointments, and call your doctor if you are having problems. It's also a good idea to know your test results and keep a list of the medicines you take. How can you care for yourself at home? · Do not put weight on the injured joint for at least a day or two. · For the first day or two after an injury, do not take hot showers or baths, and do not use hot packs. The heat could make swelling worse. · Put ice or a cold pack on the sore joint for 10 to 20 minutes at a time. Try to do this every 1 to 2 hours for the next 3 days (when you are awake) or until the swelling goes down. Put a thin cloth between the ice and your skin. · Wrap the injury in an elastic bandage. Do not wrap it too tightly because this can cause more swelling. · Prop up the sore joint on a pillow when you ice it or anytime you sit or lie down during the next 3 days. Try to keep it above the level of your heart. This will help reduce swelling. · Take an over-the-counter pain medicine, such as acetaminophen (Tylenol), ibuprofen (Advil, Motrin), or naproxen (Aleve). Read and follow all instructions on the label. · After 1 or 2 days of rest, begin moving the joint gently. While the joint is still healing, you can begin to exercise using activities that do not strain or hurt the painful joint. When should you call for help? Call your doctor now or seek immediate medical care if:  ? · You have signs of infection, such as:  ¨ Increased pain, swelling, warmth, and redness. ¨ Red streaks leading from the joint. ¨ A fever. ? Watch closely for changes in your health, and be sure to contact your doctor if:  ? · Your movement or symptoms are not getting better after 1 to 2 weeks of home treatment. Where can you learn more? Go to http://martin-ifeanyi.info/. Enter P205 in the search box to learn more about \"Joint Pain: Care Instructions. \"  Current as of: March 21, 2017  Content Version: 11.4  © 4515-9300 [employer].  Care instructions adapted under license by [address] (which disclaims liability or warranty for this information). If you have questions about a medical condition or this instruction, always ask your healthcare professional. [address] any warranty or liability for your use of this information.

## 2018-01-22 NOTE — ED PROVIDER NOTES
EMERGENCY DEPARTMENT HISTORY AND PHYSICAL EXAM    9:33 PM      Date: 1/21/2018  Patient Name: Linnea Hernandez    History of Presenting Illness     Chief Complaint   Patient presents with    Knee Swelling         History Provided By: Patient    Chief Complaint: knee swelling  Duration:  since october 2017  Timing:  Constant and Worsening  Location: Rt knee  Quality: Aching  Severity: Mild  Modifying Factors: Pain worsens with walking. No alleviating factors. Associated Symptoms: denies any other associated signs or symptoms      Additional History (Context): Carlos Doherty is a 56 y.o. female with arthritis who presents with knee mild constant worsening aching Rt swelling since October 2017. Pain worsens with walking, No alleviating factors. Pt reports that she went to Dr Guanako Connolly and has been getting shots in the knee for the pain. She was told to wait and see if the swelling improves with time, but the Pt notes that the knee swelling worsened to the point where it hurts to walk. Pt would like the fluid on the right knee removed. Pt notes that she was told in the past she needs a knee replacement but the pt is on a quest to run marathons in all states. Pt is also being followed by Dr Bart Hughes for this. Pt denies fevers, chills, trauma, and any other Sx or complaints at this time. PCP: Alisa Marrufo MD    Current Facility-Administered Medications   Medication Dose Route Frequency Provider Last Rate Last Dose    oxyCODONE-acetaminophen (PERCOCET) 5-325 mg per tablet 1 Tab  1 Tab Oral NOW Prasad Buenrostro MD        ibuprofen (MOTRIN) tablet 600 mg  600 mg Oral NOW Prasad Buenrostro MD         Current Outpatient Prescriptions   Medication Sig Dispense Refill    oxyCODONE-acetaminophen (PERCOCET) 5-325 mg per tablet Take 1 tablet every 4-6 hours as needed for pain control.   If you were instructed to try over the counter ibuprofen or tylenol, only take the percocet for pain not controlled with the over the counter medication. 12 Tab 0    meloxicam (MOBIC) 7.5 mg tablet Take 1 Tab by mouth daily. 14 Tab 0    amLODIPine (NORVASC) 5 mg tablet Take 1 Tab by mouth daily. 90 Tab 1    topiramate (TOPAMAX) 25 mg tablet Take 1 Tab by mouth daily (with breakfast). 80 Tab 1       Past History     Past Medical History:  Past Medical History:   Diagnosis Date    Allergic rhinitis, seasonal     spring    Anemia NEC     iron deficiency associated with heavy menstruation and fibroids noted 2008 with pregnancy    Dysmenorrhea     increasing past year    Fibroid, uterine     Headache(784.0)     Ill-defined condition     Chronic knee pain    Menorrhagia     worsening past year       Past Surgical History:  Past Surgical History:   Procedure Laterality Date    [facility]       Family History:  Family History   Problem Relation Age of Onset    Hypertension Mother     Hypertension Father     Headache Sister     Diabetes Paternal Aunt        Social History:  Social History   Substance Use Topics    Smoking status: Never Smoker    Smokeless tobacco: Never Used    Alcohol use 0.0 oz/week     0 Standard drinks or equivalent per week      Comment: occass: 2/month       Allergies:  No Known Allergies      Review of Systems       Review of Systems   Constitutional: Negative. Negative for chills and fever. HENT: Negative. Eyes: Negative. Respiratory: Negative. Cardiovascular: Negative. Gastrointestinal: Negative. Endocrine: Negative. Genitourinary: Negative. Musculoskeletal: Positive for arthralgias. (+) Rt knee swelling   Skin: Negative. Allergic/Immunologic: Negative. Neurological: Negative. Hematological: Negative. Psychiatric/Behavioral: Negative. All other systems reviewed and are negative.         Physical Exam     Visit Vitals    /78 (BP 1 Location: Left arm, BP Patient Position: At rest)    Pulse 77    Temp 97.9 °F (36.6 °C)    Resp 20     5' 3\" (1.6 m)    Wt 71.2 kg (157 lb)    LMP 10/08/2012    SpO2 96%    BMI 27.81 kg/m2         Physical Exam   Constitutional: She is oriented to person, place, and time. She appears well-developed and well-nourished. No distress. HENT:   Head: Normocephalic. Right Ear: External ear normal.   Left Ear: External ear normal.   Mouth/Throat: No oropharyngeal exudate. Eyes: Conjunctivae and EOM are normal. Pupils are equal, round, and reactive to light. Right eye exhibits no discharge. Left eye exhibits no discharge. No scleral icterus. Neck: Normal range of motion. Neck supple. No JVD present. No tracheal deviation present. No thyromegaly present. Cardiovascular: Normal rate, regular rhythm, normal heart sounds and intact distal pulses. Exam reveals no gallop and no friction rub. No murmur heard. Pulmonary/Chest: Effort normal and breath sounds normal. No stridor. No respiratory distress. She has no wheezes. She has no rales. She exhibits no tenderness. Abdominal: Soft. Bowel sounds are normal. She exhibits no distension and no mass. There is no tenderness. There is no rebound and no guarding. Musculoskeletal: Normal range of motion. She exhibits no edema or tenderness. Mild Rt knee swelling noted on palpation    Lymphadenopathy:     She has no cervical adenopathy. Neurological: She is alert and oriented to person, place, and time. She displays normal reflexes. No cranial nerve deficit. She exhibits normal muscle tone. Coordination normal.   Skin: Skin is warm and dry. No rash noted. She is not diaphoretic. No erythema. No pallor. Nursing note and vitals reviewed. Diagnostic Study Results     Labs -  No results found for this or any previous visit (from the past 12 hour(s)). Radiologic Studies -   No orders to display         Medical Decision Making   I am the first provider for this patient.     I reviewed the vital signs, available nursing notes, past medical history, past surgical history, family history and social history. Vital Signs-Reviewed the patient's vital signs. Records Reviewed: Nursing Notes (Time of Review: 9:33 PM)    ED Course: Progress Notes, Reevaluation, and Consults:  9:44 PM I have assessed the patient and discussed her results and diagnosis. Pt is discharged is in stable condition. Patient will f/u with Dr Maxi Cleaning tomorrow. Patient is to return to emergency department if any new or worsening condition. Patient understands and verbalizes agreement with plan. Provider Notes (Medical Decision Making): arthritis, gout, Rheumatoid arthritis, pseudogout, ligament strain, bursitis     Diagnosis     Clinical Impression:   1. Chronic pain of right knee        Disposition: discharge    Follow-up Information     Follow up With Details Comments [address], [place] In 1 day For Follow Up [address] [place] [id number] [phone] [address] [facility] EMERGENCY DEPT Go to As needed, If symptoms worsen [address] [id number] [phone]           Patient's Medications   Start Taking    MELOXICAM (MOBIC) 7.5 MG TABLET    Take 1 Tab by mouth daily. OXYCODONE-ACETAMINOPHEN (PERCOCET) 5-325 MG PER TABLET    Take 1 tablet every 4-6 hours as needed for pain control. If you were instructed to try over the counter ibuprofen or tylenol, only take the percocet for pain not controlled with the over the counter medication. Continue Taking    AMLODIPINE (NORVASC) 5 MG TABLET    Take 1 Tab by mouth daily. TOPIRAMATE (TOPAMAX) 25 MG TABLET    Take 1 Tab by mouth daily (with breakfast). These Medications have changed    No medications on file   Stop Taking    CYCLOBENZAPRINE (FLEXERIL) 10 MG TABLET        HYDROCODONE-ACETAMINOPHEN (NORCO) 5-325 MG PER TABLET    Take 1-2 tabs PO every 4-6 hours PRN pain    NAPROXEN (NAPROSYN) 500 MG TABLET    Take 1 Tab by mouth two (2) times daily (with meals). _______________________________    Attestations:  Scribe Attestation     Wilton Jon acting as a scribe for and in the presence of Danny Martino MD      January 21, 2018 at 9:33 PM       Provider Attestation:      I personally performed the services described in the documentation, reviewed the documentation, as recorded by the scribe in my presence, and it accurately and completely records my words and actions.  January 21, 2018 at 9:33 PM - Danny Martino MD    _______________________________

## 2018-01-24 ENCOUNTER — HOSPITAL ENCOUNTER (OUTPATIENT)
Age: 48
Discharge: HOME | End: 2018-01-24
Attending: PHYSICIAN ASSISTANT
Payer: OTHER GOVERNMENT

## 2018-01-24 DIAGNOSIS — M17.11 PRIMARY OSTEOARTHRITIS OF RIGHT KNEE: ICD-10-CM

## 2018-01-24 DIAGNOSIS — S83.241A ACUTE MEDIAL MENISCUS TEAR OF RIGHT KNEE, INITIAL ENCOUNTER: ICD-10-CM

## 2018-01-24 PROCEDURE — 73721 MRI JNT OF LWR EXTRE W/O DYE: CPT

## 2018-01-30 ENCOUNTER — OFFICE VISIT (OUTPATIENT)
Dept: ORTHOPEDIC SURGERY | Age: 48
End: 2018-01-30

## 2018-01-30 VITALS
BODY MASS INDEX: 27.46 KG/M2 | OXYGEN SATURATION: 93 % | HEART RATE: 69 BPM | TEMPERATURE: 96.9 F | DIASTOLIC BLOOD PRESSURE: 76 MMHG | SYSTOLIC BLOOD PRESSURE: 113 MMHG | WEIGHT: 155 LBS | RESPIRATION RATE: 18 BRPM | HEIGHT: 63 IN

## 2018-01-30 DIAGNOSIS — M17.11 PRIMARY OSTEOARTHRITIS OF RIGHT KNEE: Primary | ICD-10-CM

## 2018-01-30 RX ORDER — MELOXICAM 15 MG/1
15 TABLET ORAL
Qty: 30 TAB | Refills: 1 | Status: SHIPPED | OUTPATIENT
Start: 2018-01-30 | End: 2018-08-13

## 2018-01-30 NOTE — PROGRESS NOTES
Zander Kraus  1970   Chief Complaint   Patient presents with    Knee Pain     Right        HISTORY OF PRESENT ILLNESS  Zander Kraus is a 56 y.o. female who presents today for reevaluation of right knee and to review MRI results. Patient rates pain as 1/10 today. At last OV, patient had a cortisone injection. She has some swelling in the knee. She completed a series of Euflexxa injections in December 2017. Patient states that she gained some weight over the last year. Pain is worse with prolonged walking and standing. Patient denies any fever, chills, chest pain, shortness of breath or calf pain. There are no new illness or injuries to report since last seen in the office. No changes in medications, allergies, social or family history. PHYSICAL EXAM:    Visit Vitals    /76    Pulse 69    Temp 96.9 °F (36.1 °C) (Oral)    Resp 18    Ht 5' 3\" (1.6 m)    Wt 155 lb (70.3 kg)    LMP 10/08/2012    SpO2 93%    BMI 27.46 kg/m2     The patient is a well-developed, well-nourished female   in no acute distress. The patient is alert and oriented times three. The patient is alert and oriented times three. Mood and affect are normal.  LYMPHATIC: lymph nodes are not enlarged and are within normal limits  SKIN: normal in color and non tender to palpation. There are no bruises or abrasions noted. NEUROLOGICAL: Motor sensory exam is within normal limits. Reflexes are equal bilaterally.  There is normal sensation to pinprick and light touch  MUSCULOSKELETAL:  Examination Left knee   Skin Intact   Range of motion 0-120   Effusion -   Medial joint line tenderness -   Lateral joint line tenderness -   Tenderness Pes Bursa -   Tenderness insertion MCL -   Tenderness insertion LCL -   Tos -   Patella crepitus -   Patella grind -   Lachman -   Pivot shift -   Anterior drawer -   Posterior drawer -   Varus stress -   Valgus stress -   Neurovascular Intact   Calf Swelling and Tenderness to Palpation -   Madelyn's Test -   Hamstring Cord Tightness -     IMAGING: MRI of the right knee dated 1/24/18 was reviewed and read: IMPRESSION:   1.  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 Mild  tricompartmental degenerative changes. 2.  Posterior horn of the medial meniscus has a focus of abnormal signal of uncertain significance. IMPRESSION:      ICD-10-CM ICD-9-CM    1. Primary osteoarthritis of right knee M17.11 715.16 meloxicam (MOBIC) 15 mg tablet        PLAN:   1. I discussed the results of the MRI and the treatment options with the patient. Patient has MRI documented degenerative changes. I am hopeful that we can treat her symptoms to buy time before proceeding with arthroscopy to clean the knee out. Risk factors include: htn  2. No cortisone injection indicated today   3. No Physical/Occupational Therapy indicated today  4. No diagnostic test indicated today: MRI r/o MMT  5. No durable medical equipment indicated today  6. No referral indicated today   7. Yes medications indicated today: MOBIC  8. No Narcotic indicated today for short term acute pain     RTC prn    Follow-up Disposition: Not on File    Scribed by Grabiel Viera [address] [place] [address] [address]) as dictated by Tim Ortega MD    I, Dr. Tim Ortega, confirm that all documentation is accurate.     Tim Ortega M.D.   [facility]

## 2018-02-20 ENCOUNTER — PATIENT MESSAGE (OUTPATIENT)
Dept: ORTHOPEDIC SURGERY | Age: 48
End: 2018-02-20

## 2018-02-20 DIAGNOSIS — M17.11 PRIMARY OSTEOARTHRITIS OF RIGHT KNEE: Primary | ICD-10-CM

## 2018-02-20 NOTE — TELEPHONE ENCOUNTER
From: Isrrael Smith  To: Sarah Lassiter MD  Sent: 2/20/2018 2:06 PM EST  Subject: Visit Follow-Up Question    I had Euflexxa injections followed by inflammation. The fluid was removed from my knee. Is it possible that removed the Euflexxa? If so can I get another round of injections before six months?

## 2018-02-20 NOTE — TELEPHONE ENCOUNTER
It is possible that some of the euflexxa was removed. We can put authorization in for injections to see if we can do earlier.

## 2018-02-23 NOTE — TELEPHONE ENCOUNTER
Patient called in requesting to speak with a nurse or Dr. Bart Hughes, states she has not heard a response from her [employer] message. Patient can be reached at [phone].

## 2018-02-26 NOTE — TELEPHONE ENCOUNTER
New order entered for Euflexxa injections for the right knee. LMOVM telling patient that we would submit this order to insurance and see if they will authorize it this early.

## 2018-03-01 ENCOUNTER — TELEPHONE (OUTPATIENT)
Dept: FAMILY MEDICINE CLINIC | Age: 48
End: 2018-03-01

## 2018-03-01 NOTE — TELEPHONE ENCOUNTER
Patient is requesting (New  Updated) referral to the following office:    Speciality: orthopedic  Specialist Name: [facility]  Office Location: [address], [address]  Phone (if available): [phone]  Fax (if available): [phone]  Diagnosis: osteoarthritis in knee/ 2nd opinion request  Date of appointment (if scheduled):  None  Prime

## 2018-03-02 ENCOUNTER — TELEPHONE (OUTPATIENT)
Dept: ORTHOPEDIC SURGERY | Age: 48
End: 2018-03-02

## 2018-03-02 NOTE — TELEPHONE ENCOUNTER
Patient calling checking on auth for Euflexxa injections which was to be done 02/26/2018. Please check and call patient as she is having a lot of rt knee pain.  Patient can be reached at [phone]

## 2018-03-08 ENCOUNTER — TELEPHONE (OUTPATIENT)
Dept: ORTHOPEDIC SURGERY | Facility: CLINIC | Age: 48
End: 2018-03-08

## 2018-03-08 NOTE — TELEPHONE ENCOUNTER
Called patient to inform her Euflexxa cannot be authorized for 6 months (last series ended 12/21/17)  Patient in pain. Very upset. She said her right knee was swollen when she came in . She asked for the fluid to be removed before Euflexxa given but Euflexxa was administered first.  She said she is in pain. She feels the Euflexxa was not effective because the fluid was removed after the Euflexxa was administered.  Please call patient back [phone]

## 2018-04-16 ENCOUNTER — OFFICE VISIT (OUTPATIENT)
Dept: FAMILY MEDICINE CLINIC | Age: 48
End: 2018-04-16

## 2018-04-16 VITALS
WEIGHT: 156 LBS | TEMPERATURE: 97.9 F | HEART RATE: 68 BPM | DIASTOLIC BLOOD PRESSURE: 76 MMHG | SYSTOLIC BLOOD PRESSURE: 126 MMHG | HEIGHT: 63 IN | RESPIRATION RATE: 16 BRPM | OXYGEN SATURATION: 98 % | BODY MASS INDEX: 27.64 KG/M2

## 2018-04-16 DIAGNOSIS — E04.9 GOITER: ICD-10-CM

## 2018-04-16 DIAGNOSIS — G43.009 MIGRAINE WITHOUT AURA AND WITHOUT STATUS MIGRAINOSUS, NOT INTRACTABLE: ICD-10-CM

## 2018-04-16 DIAGNOSIS — Z13.220 SCREENING FOR HYPERLIPIDEMIA: ICD-10-CM

## 2018-04-16 DIAGNOSIS — Z12.39 SCREENING FOR BREAST CANCER: ICD-10-CM

## 2018-04-16 DIAGNOSIS — Z13.1 SCREENING FOR DIABETES MELLITUS: ICD-10-CM

## 2018-04-16 DIAGNOSIS — I10 ESSENTIAL HYPERTENSION WITH GOAL BLOOD PRESSURE LESS THAN 130/80: Primary | ICD-10-CM

## 2018-04-16 DIAGNOSIS — R53.83 OTHER FATIGUE: ICD-10-CM

## 2018-04-16 RX ORDER — TOPIRAMATE 25 MG/1
25 TABLET ORAL
Qty: 90 TAB | Refills: 1 | Status: SHIPPED | OUTPATIENT
Start: 2018-04-16 | End: 2018-09-18 | Stop reason: SDUPTHER

## 2018-04-16 RX ORDER — AMLODIPINE BESYLATE 5 MG/1
5 TABLET ORAL DAILY
Qty: 90 TAB | Refills: 1 | Status: SHIPPED | OUTPATIENT
Start: 2018-04-16 | End: 2018-09-18 | Stop reason: SDUPTHER

## 2018-04-16 NOTE — PROGRESS NOTES
1. Have you been to the ER, urgent care clinic since your last visit? Hospitalized since your last visit? [facility] 1/21/2018 for knee pain    2. Have you seen or consulted any other health care providers outside of the [address] [place] [address] since your last visit? Include any pap smears or colon screening.  No    Last flu vaccine 10/2017

## 2018-04-16 NOTE — PATIENT INSTRUCTIONS
Learning About Low-Carbohydrate Diets for Weight Loss  What is a low-carbohydrate diet? Low-carb diets avoid foods that are high in carbohydrate. These high-carb foods include pasta, bread, rice, cereal, fruits, and starchy vegetables. Instead, these diets usually have you eat foods that are high in fat and protein. Many people lose weight quickly on a low-carb diet. But the early weight loss is water. People on this diet often gain the weight back after they start eating carbs again. Not all diet plans are safe or work well. A lot of the evidence shows that low-carb diets aren't healthy. That's because these diets often don't include healthy foods like fruits and vegetables. Losing weight safely means balancing protein, fat, and carbs with every meal and snack. And low-carb diets don't always provide the vitamins, minerals, and fiber you need. If you have a serious medical condition, talk to your doctor before you try any diet. These conditions include kidney disease, heart disease, type 2 diabetes, high cholesterol, and high blood pressure. If you are pregnant, it may not be safe for your baby if you are on a low-carb diet. How can you lose weight safely? You might have heard that a diet plan helped another person lose weight. But that doesn't mean that it will work for you. It is very hard to stay on a diet that includes lots of big changes in your eating habits. If you want to get to a healthy weight and stay there, making healthy lifestyle changes will often work better than dieting. These steps can help. · Make a plan for change. Work with your doctor to create a plan that is right for you. · See a dietitian. He or she can show you how to make healthy changes in your eating habits. · Manage stress. If you have a lot of stress in your life, it can be hard to focus on making healthy changes to your daily habits. · Track your food and activity.  You are likely to do better at losing weight if you keep track of what you eat and what you do. Follow-up care is a key part of your treatment and safety. Be sure to make and go to all appointments, and call your doctor if you are having problems. It's also a good idea to know your test results and keep a list of the medicines you take. Where can you learn more? Go to http://martin-ifeanyi.info/. Enter A121 in the search box to learn more about \"Learning About Low-Carbohydrate Diets for Weight Loss. \"  Current as of: May 12, 2017  Content Version: 11.4  © 2858-5318 [employer]. Care instructions adapted under license by [employer] (which disclaims liability or warranty for this information). If you have questions about a medical condition or this instruction, always ask your healthcare professional. [address] any warranty or liability for your use of this information. Migraine Headache: Care Instructions  Your Care Instructions  Migraines are painful, throbbing headaches that often start on one side of the head. They may cause nausea and vomiting and make you sensitive to light, sound, or smell. Without treatment, migraines can last from 4 hours to a few days. Medicines can help prevent migraines or stop them after they have started. Your doctor can help you find which ones work best for you. Follow-up care is a key part of your treatment and safety. Be sure to make and go to all appointments, and call your doctor if you are having problems. It's also a good idea to know your test results and keep a list of the medicines you take. How can you care for yourself at home? · Do not drive if you have taken a prescription pain medicine. · Rest in a quiet, dark room until your headache is gone. Close your eyes, and try to relax or go to sleep. Don't watch TV or read. · Put a cold, moist cloth or cold pack on the painful area for 10 to 20 minutes at a time.  Put a thin cloth between the cold pack and your skin. · Use a warm, moist towel or a heating pad set on low to relax tight shoulder and neck muscles. · Have someone gently massage your neck and shoulders. · Take your medicines exactly as prescribed. Call your doctor if you think you are having a problem with your medicine. You will get more details on the specific medicines your doctor prescribes. · Be careful not to take pain medicine more often than the instructions allow. You could get worse or more frequent headaches when the medicine wears off. To prevent migraines  · Keep a headache diary so you can figure out what triggers your headaches. Avoiding triggers may help you prevent headaches. Record when each headache began, how long it lasted, and what the pain was like. (Was it throbbing, aching, stabbing, or dull?) Write down any other symptoms you had with the headache, such as nausea, flashing lights or dark spots, or sensitivity to bright light or loud noise. Note if the headache occurred near your period. List anything that might have triggered the headache. Triggers may include certain foods (chocolate, cheese, wine) or odors, smoke, bright light, stress, or lack of sleep. · If your doctor has prescribed medicine for your migraines, take it as directed. You may have medicine that you take only when you get a migraine and medicine that you take all the time to help prevent migraines. ¨ If your doctor has prescribed medicine for when you get a headache, take it at the first sign of a migraine, unless your doctor has given you other instructions. ¨ If your doctor has prescribed medicine to prevent migraines, take it exactly as prescribed. Call your doctor if you think you are having a problem with your medicine. · Find healthy ways to deal with stress. Migraines are most common during or right after stressful times.  Take time to relax before and after you do something that has caused a migraine in the past.  · Try to keep your muscles relaxed by keeping good posture. Check your jaw, face, neck, and shoulder muscles for tension. Try to relax them. When you sit at a desk, change positions often. And make sure to stretch for 30 seconds each hour. · Get plenty of sleep and exercise. · Eat meals on a regular schedule. Avoid foods and drinks that often trigger migraines. These include chocolate, alcohol (especially red wine and port), aspartame, monosodium glutamate (MSG), and some additives found in foods (such as hot dogs, leon, cold cuts, aged cheeses, and pickled foods). · Limit caffeine. Don't drink too much coffee, tea, or soda. But don't quit caffeine suddenly. That can also give you migraines. · Do not smoke or allow others to smoke around you. If you need help quitting, talk to your doctor about stop-smoking programs and medicines. These can increase your chances of quitting for good. · If you are taking birth control pills or hormone therapy, talk to your doctor about whether they are triggering your migraines. When should you call for help? Call 911 anytime you think you may need emergency care. For example, call if:  ? · You have signs of a stroke. These may include:  ¨ Sudden numbness, paralysis, or weakness in your face, arm, or leg, especially on only one side of your body. ¨ Sudden vision changes. ¨ Sudden trouble speaking. ¨ Sudden confusion or trouble understanding simple statements. ¨ Sudden problems with walking or balance. ¨ A sudden, severe headache that is different from past headaches. ?Call your doctor now or seek immediate medical care if:  ? · You have new or worse nausea and vomiting. ? · You have a new or higher fever. ? · Your headache gets much worse. ? Watch closely for changes in your health, and be sure to contact your doctor if:  ? · You are not getting better after 2 days (48 hours). Where can you learn more? Go to http://martin-ifeanyi.info/.   Enter N521 in the search box to learn more about \"Migraine Headache: Care Instructions. \"  Current as of: October 14, 2016  Content Version: 11.4  © 4380-9201 [employer]. Care instructions adapted under license by [employer] (which disclaims liability or warranty for this information). If you have questions about a medical condition or this instruction, always ask your healthcare professional. [address] any warranty or liability for your use of this information. Goiter: Care Instructions  Your Care Instructions    A goiter is an enlarged thyroid gland. It can cause swelling in your neck. Your thyroid is found in the front of your neck. It makes a hormone that controls how your body uses energy. Goiters are caused by different things. Some are caused by high or low levels of thyroid hormone. Others are caused by too little iodine in the diet, or a growth or disease in the thyroid. In the [place], most goiters are caused by long-term autoimmune thyroiditis. This is also called Hashimoto's thyroiditis. It happens when the body's immune system damages the thyroid. You may take thyroid hormone to reduce the size of your goiter. Or you may need surgery or radioactive iodine treatment. Some people don't need any treatment. They only need to watch for changes in the goiter. Follow-up care is a key part of your treatment and safety. Be sure to make and go to all appointments, and call your doctor if you are having problems. It's also a good idea to know your test results and keep a list of the medicines you take. How can you care for yourself at home? · Be safe with medicines. Take your medicines exactly as prescribed. Call your doctor if you think you are having a problem with your medicine. You will get more details on the specific medicines your doctor prescribes. When should you call for help? Call 911 anytime you think you may need emergency care.  For example, call if:  ? · You have trouble breathing. ? Watch closely for changes in your health, and be sure to contact your doctor if:  ? · Your eyes turn red and bulge. ? · You have trouble swallowing. ? · You feel very tired or weak. ? · You lose weight but are eating the same or more than usual.   Where can you learn more? Go to http://martin-ifeanyi.info/. Enter H920 in the search box to learn more about \"Goiter: Care Instructions. \"  Current as of: May 12, 2017  Content Version: 11.4  © 2546-2792 [employer]. Care instructions adapted under license by [employer] (which disclaims liability or warranty for this information). If you have questions about a medical condition or this instruction, always ask your healthcare professional. [address] any warranty or liability for your use of this information. Thyroid Nodules: Care Instructions  Your Care Instructions  Thyroid nodules are growths or lumps in the thyroid gland. Your thyroid is in the front of your neck. It controls how your body uses energy. You may have tests to see if the nodule is caused by cancer. Most nodules aren't cancer and don't cause problems. Many don't even need treatment. If you do have cancer, it can usually be cured. Treatment will probably include surgery. You may also get radioactive iodine treatment. If your thyroid can't make thyroid hormone after treatment, you can take a pill every day to replace the hormone. Follow-up care is a key part of your treatment and safety. Be sure to make and go to all appointments, and call your doctor if you are having problems. It's also a good idea to know your test results and keep a list of the medicines you take. How can you care for yourself at home? · Be safe with medicines. If you take thyroid hormone medicine:  ¨ Take it exactly as prescribed. Call your doctor if you think you are having a problem with your medicine.  If you take the right amount and don't skip doses, you probably won't have side effects. ¨ Do not take it with calcium, vitamins, or iron. ¨ Try not to miss a dose. ¨ Do not take extra doses. This will not help you get better any faster. It may also cause side effects. ¨ Tell your doctor about any medicines you take. This includes over-the-counter medicines. ¨ Wear a medical alert bracelet or necklace that says you take thyroid hormones. You can buy these at most drugstores. When should you call for help? Call 911 anytime you think you may need emergency care. For example, call if:  · You lose consciousness. Call your doctor now or seek immediate medical care if:  · You have shortness of breath. Watch closely for changes in your health, and be sure to contact your doctor if:  · You have pain in your neck, jaw, or ear. · You have problems swallowing. · You feel weak and tired. · You have nervousness, a fast heartbeat, hand tremors, problems sleeping, increased sweating, and weight loss. · You do not feel better even though you are taking your medicine. Where can you learn more? Go to http://martin-ifeanyi.info/. Enter L347 in the search box to learn more about \"Thyroid Nodules: Care Instructions. \"  Current as of: May 12, 2017  Content Version: 11.4  © 3345-1482 [employer]. Care instructions adapted under license by [employer] (which disclaims liability or warranty for this information). If you have questions about a medical condition or this instruction, always ask your healthcare professional. [place] [address] any warranty or liability for your use of this information.

## 2018-04-16 NOTE — MR AVS SNAPSHOT
[facility] [address] 
[phone] Patient: Yousuf Cadet MRN: [id number] RWM:[id number] Visit Information Date & Time Provider Department Dept. Phone Encounter #  
 4/16/2018 10:00 AM Walker Drake, [address] [facility] [phone] [id number] Follow-up Instructions Return in about 3 months (around 7/16/2018) for also need an appt for physical . Upcoming Health Maintenance Date Due Influenza Age 5 to Adult 10/1/2018* PAP AKA CERVICAL CYTOLOGY 12/9/2018 DTaP/Tdap/Td series (3 - Td) 8/13/2024 *Topic was postponed. The date shown is not the original due date. Allergies as of 4/16/2018  Review Complete On: 4/16/2018 By: Walker Drake MD  
 No Known Allergies Current Immunizations  Reviewed on 4/1/2011 Name Date TDAP Vaccine 1/1/2008 Tdap 8/13/2014  6:15 AM  
  
 Not reviewed this visit You Were Diagnosed With   
  
 Codes Comments Essential hypertension with goal blood pressure less than 130/80    -  Primary ICD-10-CM: I10 
ICD-9-CM: 401.9 Migraine without aura and without status migrainosus, not intractable     ICD-10-CM: K85.332 ICD-9-CM: 346.10 BMI 27.0-27.9,adult     ICD-10-CM: L60.12 ICD-9-CM: V85.23 Goiter     ICD-10-CM: E04.9 ICD-9-CM: 240.9 Other fatigue     ICD-10-CM: R53.83 ICD-9-CM: 780.79 Screening for diabetes mellitus     ICD-10-CM: Z13.1 ICD-9-CM: V77.1 Screening for hyperlipidemia     ICD-10-CM: Z13.220 ICD-9-CM: V77.91 Screening for breast cancer     ICD-10-CM: Z12.31 
ICD-9-CM: V76.10 Vitals BP Pulse Temp Resp Height(growth percentile) Weight(growth percentile) 126/76 (BP 1 Location: Left arm, BP Patient Position: Sitting) 68 97.9 °F (36.6 °C) (Oral) 16 5' 3\" (1.6 m) 156 lb (70.8 kg) LMP SpO2 BMI OB Status Smoking Status 10/08/2012 98% 27.63 kg/m2 Hysterectomy Never Smoker BMI and BSA Data Body Mass Index Body Surface Area  
 27.63 kg/m 2 1.77 m 2 Preferred Pharmacy Pharmacy Name Phone Radha Flores [address] [facility], [address] [place] [address] [phone] Your Updated Medication List  
  
   
This list is accurate as of 4/16/18 10:37 AM.  Always use your most recent med list. amLODIPine 5 mg tablet Commonly known as:  Seble Maira Take 1 Tab by mouth daily. meloxicam 15 mg tablet Commonly known as:  MOBIC Take 1 Tab by mouth daily (with breakfast). topiramate 25 mg tablet Commonly known as:  TOPAMAX Take 1 Tab by mouth daily (with breakfast). Prescriptions Sent to Pharmacy Refills  
 amLODIPine (NORVASC) 5 mg tablet 1 Sig: Take 1 Tab by mouth daily. Class: Normal  
 Pharmacy: [facility] [address] ANI PERERA [address] Ph #: [phone] Route: Oral  
 topiramate (TOPAMAX) 25 mg tablet 1 Sig: Take 1 Tab by mouth daily (with breakfast). Class: Normal  
 Pharmacy: [facility] [address] ANI PERERA [address] Ph #: [phone] Route: Oral  
  
Follow-up Instructions Return in about 3 months (around 7/16/2018) for also need an appt for physical . To-Do List   
 04/16/2018 Lab:  CBC W/O DIFF   
  
 04/16/2018 Lab:  HEMOGLOBIN A1C WITH EAG   
  
 04/16/2018 Lab:  LIPID PANEL   
  
 04/16/2018 Imaging:  [facility] BI SCREENING INCL CAD   
  
 04/16/2018 Lab:  TSH 3RD GENERATION   
  
 04/16/2018 Imaging:  US THYROID/PARATHYROID/SOFT TISS   
  
 04/16/2018 Lab:  VITAMIN D, 25 HYDROXY Patient Instructions Learning About Low-Carbohydrate Diets for Weight Loss What is a low-carbohydrate diet? Low-carb diets avoid foods that are high in carbohydrate. These high-carb foods include pasta, bread, rice, cereal, fruits, and starchy vegetables.  Instead, these diets usually have you eat foods that are high in fat and protein. Many people lose weight quickly on a low-carb diet. But the early weight loss is water. People on this diet often gain the weight back after they start eating carbs again. Not all diet plans are safe or work well. A lot of the evidence shows that low-carb diets aren't healthy. That's because these diets often don't include healthy foods like fruits and vegetables. Losing weight safely means balancing protein, fat, and carbs with every meal and snack. And low-carb diets don't always provide the vitamins, minerals, and fiber you need. If you have a serious medical condition, talk to your doctor before you try any diet. These conditions include kidney disease, heart disease, type 2 diabetes, high cholesterol, and high blood pressure. If you are pregnant, it may not be safe for your baby if you are on a low-carb diet. How can you lose weight safely? You might have heard that a diet plan helped another person lose weight. But that doesn't mean that it will work for you. It is very hard to stay on a diet that includes lots of big changes in your eating habits. If you want to get to a healthy weight and stay there, making healthy lifestyle changes will often work better than dieting. These steps can help. · Make a plan for change. Work with your doctor to create a plan that is right for you. · See a dietitian. He or she can show you how to make healthy changes in your eating habits. · Manage stress. If you have a lot of stress in your life, it can be hard to focus on making healthy changes to your daily habits. · Track your food and activity. You are likely to do better at losing weight if you keep track of what you eat and what you do. Follow-up care is a key part of your treatment and safety. Be sure to make and go to all appointments, and call your doctor if you are having problems. It's also a good idea to know your test results and keep a list of the medicines you take. Where can you learn more? Go to http://martin-ifeanyi.info/. Enter A121 in the search box to learn more about \"Learning About Low-Carbohydrate Diets for Weight Loss. \" Current as of: May 12, 2017 Content Version: 11.4 © 3403-7755 [employer]. Care instructions adapted under license by [employer] (which disclaims liability or warranty for this information). If you have questions about a medical condition or this instruction, always ask your healthcare professional. [address] any warranty or liability for your use of this information. Migraine Headache: Care Instructions Your Care Instructions Migraines are painful, throbbing headaches that often start on one side of the head. They may cause nausea and vomiting and make you sensitive to light, sound, or smell. Without treatment, migraines can last from 4 hours to a few days. Medicines can help prevent migraines or stop them after they have started. Your doctor can help you find which ones work best for you. Follow-up care is a key part of your treatment and safety. Be sure to make and go to all appointments, and call your doctor if you are having problems. It's also a good idea to know your test results and keep a list of the medicines you take. How can you care for yourself at home? · Do not drive if you have taken a prescription pain medicine. · Rest in a quiet, dark room until your headache is gone. Close your eyes, and try to relax or go to sleep. Don't watch TV or read. · Put a cold, moist cloth or cold pack on the painful area for 10 to 20 minutes at a time. Put a thin cloth between the cold pack and your skin. · Use a warm, moist towel or a heating pad set on low to relax tight shoulder and neck muscles. · Have someone gently massage your neck and shoulders. · Take your medicines exactly as prescribed.  Call your doctor if you think you are having a problem with your medicine. You will get more details on the specific medicines your doctor prescribes. · Be careful not to take pain medicine more often than the instructions allow. You could get worse or more frequent headaches when the medicine wears off. To prevent migraines · Keep a headache diary so you can figure out what triggers your headaches. Avoiding triggers may help you prevent headaches. Record when each headache began, how long it lasted, and what the pain was like. (Was it throbbing, aching, stabbing, or dull?) Write down any other symptoms you had with the headache, such as nausea, flashing lights or dark spots, or sensitivity to bright light or loud noise. Note if the headache occurred near your period. List anything that might have triggered the headache. Triggers may include certain foods (chocolate, cheese, wine) or odors, smoke, bright light, stress, or lack of sleep. · If your doctor has prescribed medicine for your migraines, take it as directed. You may have medicine that you take only when you get a migraine and medicine that you take all the time to help prevent migraines. ¨ If your doctor has prescribed medicine for when you get a headache, take it at the first sign of a migraine, unless your doctor has given you other instructions. ¨ If your doctor has prescribed medicine to prevent migraines, take it exactly as prescribed. Call your doctor if you think you are having a problem with your medicine. · Find healthy ways to deal with stress. Migraines are most common during or right after stressful times. Take time to relax before and after you do something that has caused a migraine in the past. 
· Try to keep your muscles relaxed by keeping good posture. Check your jaw, face, neck, and shoulder muscles for tension. Try to relax them. When you sit at a desk, change positions often. And make sure to stretch for 30 seconds each hour. · Get plenty of sleep and exercise. · Eat meals on a regular schedule. Avoid foods and drinks that often trigger migraines. These include chocolate, alcohol (especially red wine and port), aspartame, monosodium glutamate (MSG), and some additives found in foods (such as hot dogs, leon, cold cuts, aged cheeses, and pickled foods). · Limit caffeine. Don't drink too much coffee, tea, or soda. But don't quit caffeine suddenly. That can also give you migraines. · Do not smoke or allow others to smoke around you. If you need help quitting, talk to your doctor about stop-smoking programs and medicines. These can increase your chances of quitting for good. · If you are taking birth control pills or hormone therapy, talk to your doctor about whether they are triggering your migraines. When should you call for help? Call 911 anytime you think you may need emergency care. For example, call if: 
? · You have signs of a stroke. These may include: 
¨ Sudden numbness, paralysis, or weakness in your face, arm, or leg, especially on only one side of your body. ¨ Sudden vision changes. ¨ Sudden trouble speaking. ¨ Sudden confusion or trouble understanding simple statements. ¨ Sudden problems with walking or balance. ¨ A sudden, severe headache that is different from past headaches. ?Call your doctor now or seek immediate medical care if: 
? · You have new or worse nausea and vomiting. ? · You have a new or higher fever. ? · Your headache gets much worse. ? Watch closely for changes in your health, and be sure to contact your doctor if: 
? · You are not getting better after 2 days (48 hours). Where can you learn more? Go to http://martin-ifeanyi.info/. Enter G470 in the search box to learn more about \"Migraine Headache: Care Instructions. \" Current as of: October 14, 2016 Content Version: 11.4 © 3386-0140 [employer]wise[employer] Inc[employer].  Care instructions adapted under license by [address] (which disclaims liability or warranty for this information). If you have questions about a medical condition or this instruction, always ask your healthcare professional. [address] any warranty or liability for your use of this information. Goiter: Care Instructions Your Care Instructions A goiter is an enlarged thyroid gland. It can cause swelling in your neck. Your thyroid is found in the front of your neck. It makes a hormone that controls how your body uses energy. Goiters are caused by different things. Some are caused by high or low levels of thyroid hormone. Others are caused by too little iodine in the diet, or a growth or disease in the thyroid. In the [place], most goiters are caused by long-term autoimmune thyroiditis. This is also called Hashimoto's thyroiditis. It happens when the body's immune system damages the thyroid. You may take thyroid hormone to reduce the size of your goiter. Or you may need surgery or radioactive iodine treatment. Some people don't need any treatment. They only need to watch for changes in the goiter. Follow-up care is a key part of your treatment and safety. Be sure to make and go to all appointments, and call your doctor if you are having problems. It's also a good idea to know your test results and keep a list of the medicines you take. How can you care for yourself at home? · Be safe with medicines. Take your medicines exactly as prescribed. Call your doctor if you think you are having a problem with your medicine. You will get more details on the specific medicines your doctor prescribes. When should you call for help? Call 911 anytime you think you may need emergency care. For example, call if: 
? · You have trouble breathing. ? Watch closely for changes in your health, and be sure to contact your doctor if: 
? · Your eyes turn red and bulge. ? · You have trouble swallowing. ? · You feel very tired or weak. ? · You lose weight but are eating the same or more than usual.  
Where can you learn more? Go to http://martin-ifeanyi.info/. Enter D537 in the search box to learn more about \"Goiter: Care Instructions. \" Current as of: May 12, 2017 Content Version: 11.4 © 3462-6170 [employer]. Care instructions adapted under license by [employer] (which disclaims liability or warranty for this information). If you have questions about a medical condition or this instruction, always ask your healthcare professional. [place] [address] any warranty or liability for your use of this information. Thyroid Nodules: Care Instructions Your Care Instructions Thyroid nodules are growths or lumps in the thyroid gland. Your thyroid is in the front of your neck. It controls how your body uses energy. You may have tests to see if the nodule is caused by cancer. Most nodules aren't cancer and don't cause problems. Many don't even need treatment. If you do have cancer, it can usually be cured. Treatment will probably include surgery. You may also get radioactive iodine treatment. If your thyroid can't make thyroid hormone after treatment, you can take a pill every day to replace the hormone. Follow-up care is a key part of your treatment and safety. Be sure to make and go to all appointments, and call your doctor if you are having problems. It's also a good idea to know your test results and keep a list of the medicines you take. How can you care for yourself at home? · Be safe with medicines. If you take thyroid hormone medicine: ¨ Take it exactly as prescribed. Call your doctor if you think you are having a problem with your medicine. If you take the right amount and don't skip doses, you probably won't have side effects. ¨ Do not take it with calcium, vitamins, or iron. ¨ Try not to miss a dose. ¨ Do not take extra doses. This will not help you get better any faster. It may also cause side effects. ¨ Tell your doctor about any medicines you take. This includes over-the-counter medicines. ¨ Wear a medical alert bracelet or necklace that says you take thyroid hormones. You can buy these at most drugstores. When should you call for help? Call 911 anytime you think you may need emergency care. For example, call if: 
· You lose consciousness. Call your doctor now or seek immediate medical care if: 
· You have shortness of breath. Watch closely for changes in your health, and be sure to contact your doctor if: 
· You have pain in your neck, jaw, or ear. · You have problems swallowing. · You feel weak and tired. · You have nervousness, a fast heartbeat, hand tremors, problems sleeping, increased sweating, and weight loss. · You do not feel better even though you are taking your medicine. Where can you learn more? Go to http://martin-ifeanyi.info/. Enter T330 in the search box to learn more about \"Thyroid Nodules: Care Instructions. \" Current as of: May 12, 2017 Content Version: 11.4 © 4301-8994 [email or web address]. Care instructions adapted under license by [employer] (which disclaims liability or warranty for this information). If you have questions about a medical condition or this instruction, always ask your healthcare professional. [place] [address] any warranty or liability for your use of this information. Introducing [address]! Dear Gertrudis Patel: 
Thank you for requesting a [employer] account. Our records indicate that you already have an active [employer] account. You can access your account anytime at [email or web address] Did you know that you can access your hospital and ER discharge instructions at any time in [employer]? You can also review all of your test results from your hospital stay or ER visit. Additional Information If you have questions, please visit the Frequently Asked Questions section of the [employer] website at [email or web address] Remember, [employer] is NOT to be used for urgent needs. For medical emergencies, dial 911. Now available from your iPhone and Android! Please provide this summary of care documentation to your next provider. Your primary care clinician is listed as Heather Andino. If you have any questions after today's visit, please call [phone].

## 2018-04-16 NOTE — PROGRESS NOTES
HISTORY OF PRESENT ILLNESS  Arabella Gonzalez is a 53 y.o. female. HPI: Here for routine follow up. H/o hypertension. Stable today. Asymptomatic. complaint with medication and no side effects. Denies any headache, dizziness, no chest pain or trouble breathing, no arm or leg weakness. No nausea or vomiting, no weight or appetite changes, no depression or anxiety. No urine or bowel complains, no palpitation, no diaphoresis. H/o rt thyroid nodule. Last ultrasound in 2016. Said lately feeling fatigue. No heat or cold intolerance. No change in mood or bowel movement. Discussed high BMI. She is doing healthy diet and also exercise. Very active. Discussed importance of weight loss and healthy life style. Discussed to be compliant with it. Visit Vitals    /76 (BP 1 Location: Left arm, BP Patient Position: Sitting)    Pulse 68    Temp 97.9 °F (36.6 °C) (Oral)    Resp 16    Ht 5' 3\" (1.6 m)    Wt 156 lb (70.8 kg)    SpO2 98%    BMI 27.63 kg/m2     Review medication list, vitals, problem list,allergies. Also h/o migraine headache. Topamax is helping and now reduce frequency and intensity of migraine headaches. Happy with the result. No side effects of medication.    Lab Results   Component Value Date/Time    WBC 3.2 (L) 02/24/2016 09:02 AM    HGB 13.1 02/24/2016 09:02 AM    HCT 39.0 02/24/2016 09:02 AM    PLATELET 966 82/03/8431 09:02 AM    MCV 91.1 02/24/2016 09:02 AM     Lab Results   Component Value Date/Time    Sodium 142 12/21/2015 07:40 AM    Potassium 3.8 12/21/2015 07:40 AM    Chloride 108 12/21/2015 07:40 AM    CO2 28 12/21/2015 07:40 AM    Anion gap 6 12/21/2015 07:40 AM    Glucose 89 12/21/2015 07:40 AM    BUN 12 12/21/2015 07:40 AM    Creatinine 0.84 12/21/2015 07:40 AM    BUN/Creatinine ratio 14 12/21/2015 07:40 AM    GFR est AA >60 12/21/2015 07:40 AM    GFR est non-AA >60 12/21/2015 07:40 AM    Calcium 8.1 (L) 12/21/2015 07:40 AM    Bilirubin, total 0.5 12/21/2015 07:40 AM    AST (SGOT) 23 12/21/2015 07:40 AM    Alk. phosphatase 80 12/21/2015 07:40 AM    Protein, total 7.3 12/21/2015 07:40 AM    Albumin 3.7 12/21/2015 07:40 AM    Globulin 3.6 12/21/2015 07:40 AM    A-G Ratio 1.0 12/21/2015 07:40 AM    ALT (SGPT) 25 12/21/2015 07:40 AM     Lab Results   Component Value Date/Time    Cholesterol, total 184 12/21/2015 07:40 AM    HDL Cholesterol 71 (H) 12/21/2015 07:40 AM    LDL, calculated 102.6 (H) 12/21/2015 07:40 AM    VLDL, calculated 10.4 12/21/2015 07:40 AM    Triglyceride 52 12/21/2015 07:40 AM    CHOL/HDL Ratio 2.6 12/21/2015 07:40 AM     Lab Results   Component Value Date/Time    TSH 1.52 12/30/2016 03:02 PM     Lab Results   Component Value Date/Time    Hemoglobin A1c 5.7 12/21/2015 07:40 AM     ROS: see HPI     Physical Exam   Constitutional: She is oriented to person, place, and time. No distress. Neck: Thyromegaly present. Cardiovascular: Normal rate, regular rhythm and normal heart sounds. Pulmonary/Chest:   CTA   Abdominal: Soft. Bowel sounds are normal. There is no tenderness. Musculoskeletal: She exhibits no edema. Lymphadenopathy:     She has no cervical adenopathy. Neurological: She is oriented to person, place, and time. Psychiatric: Her behavior is normal.       ASSESSMENT and PLAN    ICD-10-CM ICD-9-CM    1. Essential hypertension with goal blood pressure less than 130/80: stable at this time. Low salt diet. Exercise as tolerated. Will continue current plan. I10 401.9 amLODIPine (NORVASC) 5 mg tablet   2. Migraine without aura and without status migrainosus, not intractable: topamax is helping . reduced frequency and intensity of headaches. Will continue current plan. G43.009 346.10 topiramate (TOPAMAX) 25 mg tablet   3. BMI 27.0-27.9,adult: Discussed high BMI. She is doing healthy diet and also exercise. Very active. Discussed importance of weight loss and healthy life style. Discussed to be compliant with it. Z68.27 V85.23    4.  Goiter/ seen endocrinology/ observe; repeat ultrasound and labs. E04.9 240.9 TSH 3RD GENERATION      US THYROID/PARATHYROID/SOFT TISS   5. Other fatigue: checking labs. Recently started multivitamin. R53.83 780.79 TSH 3RD GENERATION      VITAMIN D, 25 HYDROXY      CBC W/O DIFF   6. Screening for diabetes mellitus Z13.1 V77.1 HEMOGLOBIN A1C WITH EAG   7.  Screening for hyperlipidemia Z13.220 V77.91 LIPID PANEL   8. Screening for breast cancer Z12.31 V76.10 [facility] BI SCREENING INCL CAD   Pt understood and agree with the plan   Review    Follow-up Disposition:  Return in about 3 months (around 7/16/2018) for also need an appt for physical .

## 2018-04-30 ENCOUNTER — HOSPITAL ENCOUNTER (OUTPATIENT)
Dept: ULTRASOUND IMAGING | Age: 48
Discharge: HOME | End: 2018-04-30
Attending: FAMILY MEDICINE
Payer: OTHER GOVERNMENT

## 2018-04-30 ENCOUNTER — HOSPITAL ENCOUNTER (OUTPATIENT)
Dept: MAMMOGRAPHY | Age: 48
Discharge: HOME | End: 2018-04-30
Attending: FAMILY MEDICINE
Payer: OTHER GOVERNMENT

## 2018-04-30 DIAGNOSIS — Z12.39 SCREENING FOR BREAST CANCER: ICD-10-CM

## 2018-04-30 DIAGNOSIS — E04.9 GOITER: ICD-10-CM

## 2018-04-30 PROCEDURE — 76536 US EXAM OF HEAD AND NECK: CPT

## 2018-04-30 PROCEDURE — 77067 SCR MAMMO BI INCL CAD: CPT

## 2018-04-30 NOTE — PROGRESS NOTES
Let pt know that thyroid ultrasound showed mild thyroid gland enlargement. For now observe and further discussion on follow up visit. Thanks.

## 2018-05-03 NOTE — PROGRESS NOTES
Contacted patient and verified identity using name and date of birth (2- identifiers)  Spoke with patient and she verbalized understanding of mild thyroid gland enlargement- plan to observe for now and further discussion at follow-up.

## 2018-05-03 NOTE — PROGRESS NOTES
Contacted patient and verified identity using name and date of birth (2- identifiers)  Spoke with patient and she verbalized understanding of normal mammogram

## 2018-05-04 ENCOUNTER — HOSPITAL ENCOUNTER (OUTPATIENT)
Dept: LAB | Age: 48
Discharge: HOME | End: 2018-05-04
Payer: OTHER GOVERNMENT

## 2018-05-04 ENCOUNTER — HOSPITAL ENCOUNTER (OUTPATIENT)
Dept: LAB | Age: 48
Discharge: HOME | End: 2018-05-04

## 2018-05-04 DIAGNOSIS — E04.9 GOITER: ICD-10-CM

## 2018-05-04 DIAGNOSIS — R53.83 OTHER FATIGUE: ICD-10-CM

## 2018-05-04 DIAGNOSIS — Z13.220 SCREENING FOR HYPERLIPIDEMIA: ICD-10-CM

## 2018-05-04 DIAGNOSIS — E55.9 VITAMIN D DEFICIENCY: Primary | ICD-10-CM

## 2018-05-04 DIAGNOSIS — I10 ESSENTIAL HYPERTENSION, MALIGNANT: ICD-10-CM

## 2018-05-04 DIAGNOSIS — Z13.1 SCREENING FOR DIABETES MELLITUS: ICD-10-CM

## 2018-05-04 LAB
25(OH)D3 SERPL-MCNC: 19.4 NG/ML (ref 30–100)
ALBUMIN SERPL-MCNC: 4.1 G/DL (ref 3.4–5)
ALBUMIN/GLOB SERPL: 1.2 {RATIO} (ref 0.8–1.7)
ALP SERPL-CCNC: 92 U/L (ref 45–117)
ALT SERPL-CCNC: 16 U/L (ref 13–56)
ANION GAP SERPL CALC-SCNC: 2 MMOL/L (ref 3–18)
AST SERPL-CCNC: 17 U/L (ref 15–37)
BILIRUB SERPL-MCNC: 0.4 MG/DL (ref 0.2–1)
BUN SERPL-MCNC: 8 MG/DL (ref 7–18)
BUN/CREAT SERPL: 13 (ref 12–20)
CALCIUM SERPL-MCNC: 9.2 MG/DL (ref 8.5–10.1)
CHLORIDE SERPL-SCNC: 111 MMOL/L (ref 100–108)
CHOLEST SERPL-MCNC: 198 MG/DL
CO2 SERPL-SCNC: 31 MMOL/L (ref 21–32)
CREAT SERPL-MCNC: 0.61 MG/DL (ref 0.6–1.3)
ERYTHROCYTE [DISTWIDTH] IN BLOOD BY AUTOMATED COUNT: 13.1 % (ref 11.6–14.5)
EST. AVERAGE GLUCOSE BLD GHB EST-MCNC: 105 MG/DL
GLOBULIN SER CALC-MCNC: 3.4 G/DL (ref 2–4)
GLUCOSE SERPL-MCNC: 76 MG/DL (ref 74–99)
HBA1C MFR BLD: 5.3 % (ref 4.2–5.6)
HCT VFR BLD AUTO: 39.2 % (ref 35–45)
HDLC SERPL-MCNC: 72 MG/DL (ref 40–60)
HDLC SERPL: 2.8 {RATIO} (ref 0–5)
HGB BLD-MCNC: 13 G/DL (ref 12–16)
LDLC SERPL CALC-MCNC: 111 MG/DL (ref 0–100)
LIPID PROFILE,FLP: ABNORMAL
MCH RBC QN AUTO: 29.9 PG (ref 24–34)
MCHC RBC AUTO-ENTMCNC: 33.2 G/DL (ref 31–37)
MCV RBC AUTO: 90.1 FL (ref 74–97)
PLATELET # BLD AUTO: 254 K/UL (ref 135–420)
PMV BLD AUTO: 10.5 FL (ref 9.2–11.8)
POTASSIUM SERPL-SCNC: 3.8 MMOL/L (ref 3.5–5.5)
PROT SERPL-MCNC: 7.5 G/DL (ref 6.4–8.2)
RBC # BLD AUTO: 4.35 M/UL (ref 4.2–5.3)
SODIUM SERPL-SCNC: 144 MMOL/L (ref 136–145)
TRIGL SERPL-MCNC: 75 MG/DL (ref ?–150)
TSH SERPL DL<=0.05 MIU/L-ACNC: 1.24 UIU/ML (ref 0.36–3.74)
VLDLC SERPL CALC-MCNC: 15 MG/DL
WBC # BLD AUTO: 2.8 K/UL (ref 4.6–13.2)

## 2018-05-04 PROCEDURE — 85027 COMPLETE CBC AUTOMATED: CPT | Performed by: FAMILY MEDICINE

## 2018-05-04 PROCEDURE — 83036 HEMOGLOBIN GLYCOSYLATED A1C: CPT | Performed by: FAMILY MEDICINE

## 2018-05-04 PROCEDURE — 84443 ASSAY THYROID STIM HORMONE: CPT | Performed by: FAMILY MEDICINE

## 2018-05-04 PROCEDURE — 80061 LIPID PANEL: CPT | Performed by: FAMILY MEDICINE

## 2018-05-04 PROCEDURE — 93005 ELECTROCARDIOGRAM TRACING: CPT

## 2018-05-04 PROCEDURE — 80053 COMPREHEN METABOLIC PANEL: CPT | Performed by: ORTHOPAEDIC SURGERY

## 2018-05-04 PROCEDURE — 36415 COLL VENOUS BLD VENIPUNCTURE: CPT | Performed by: FAMILY MEDICINE

## 2018-05-04 PROCEDURE — 82306 VITAMIN D 25 HYDROXY: CPT | Performed by: FAMILY MEDICINE

## 2018-05-04 RX ORDER — ERGOCALCIFEROL 1.25 MG/1
50000 CAPSULE ORAL
Qty: 12 CAP | Refills: 0 | Status: SHIPPED | OUTPATIENT
Start: 2018-05-04 | End: 2018-08-13

## 2018-05-04 NOTE — PROGRESS NOTES
Let pt know that low vitamin d level. Giving drisdol and repeat labs in 3 months. Still cbc result pending. Thanks.

## 2018-05-05 LAB
ATRIAL RATE: 49 BPM
CALCULATED P AXIS, ECG09: 8 DEGREES
CALCULATED R AXIS, ECG10: 42 DEGREES
CALCULATED T AXIS, ECG11: 32 DEGREES
DIAGNOSIS, 93000: NORMAL
P-R INTERVAL, ECG05: 152 MS
Q-T INTERVAL, ECG07: 452 MS
QRS DURATION, ECG06: 82 MS
QTC CALCULATION (BEZET), ECG08: 408 MS
VENTRICULAR RATE, ECG03: 49 BPM

## 2018-05-07 NOTE — PROGRESS NOTES
Le tpt know that wbc count still low. Need to have follow up with hematology in case any further recommendations. Thanks.

## 2018-05-09 NOTE — PROGRESS NOTES
Notes Recorded by Opal Hdez LPN on 5/0/3084 at 6:47 PM  Contacted patient and verified identity using name and date of birth (2- identifiers)  Spoke with patient and advised of low Vit D and need for Drisdol weekly for 3 months and repeat after completion.  Also advised of low wbc level and need to schedule a follow-up with Hematology

## 2018-05-09 NOTE — PROGRESS NOTES
Contacted patient and verified identity using name and date of birth (2- identifiers)  Spoke with patient and advised of low Vit D and need for Drisdol weekly for 3 months and repeat after completion.  Also advised of low wbc level and need to schedule a follow-up with Hematology

## 2018-08-13 ENCOUNTER — OFFICE VISIT (OUTPATIENT)
Dept: FAMILY MEDICINE CLINIC | Age: 48
End: 2018-08-13

## 2018-08-13 VITALS
TEMPERATURE: 97.7 F | WEIGHT: 150 LBS | DIASTOLIC BLOOD PRESSURE: 84 MMHG | HEIGHT: 63 IN | SYSTOLIC BLOOD PRESSURE: 126 MMHG | OXYGEN SATURATION: 98 % | BODY MASS INDEX: 26.58 KG/M2 | HEART RATE: 52 BPM | RESPIRATION RATE: 16 BRPM

## 2018-08-13 DIAGNOSIS — E55.9 VITAMIN D DEFICIENCY: ICD-10-CM

## 2018-08-13 DIAGNOSIS — R79.89 ABNORMAL CBC: ICD-10-CM

## 2018-08-13 DIAGNOSIS — Z00.00 WELL WOMAN EXAM (NO GYNECOLOGICAL EXAM): Primary | ICD-10-CM

## 2018-08-13 DIAGNOSIS — I10 ESSENTIAL HYPERTENSION: ICD-10-CM

## 2018-08-13 RX ORDER — ONDANSETRON 4 MG/1
TABLET, ORALLY DISINTEGRATING ORAL
COMMUNITY
Start: 2018-05-08 | End: 2019-02-08

## 2018-08-13 RX ORDER — MELOXICAM 7.5 MG/1
TABLET ORAL
COMMUNITY
Start: 2018-06-30 | End: 2019-02-08

## 2018-08-13 NOTE — PROGRESS NOTES
Subjective:   53 y.o. female for Well Woman Check. H/o hysterectomy for benign reason. Has ovaries. No pelvic pain or vaginal discharge. Sexually active with one partner. Not using protection. Does on and off self breast exam. No concern. Patient Active Problem List    Diagnosis Date Noted    Chronic pain of both knees/ following ortho 06/27/2016    Positive ALY (antinuclear antibody)/ test done by hemotology and requested referral by PCP 05/02/2016    Abnormal CBC/ seen hematology 03/22/2016    Goiter/ seen endocrinology/ observe 12/27/2015    Essential hypertension 12/09/2015    Migraine without aura and without status migrainosus, not intractable 12/09/2015    S/P hysterectomy/ due to menorrhagea  12/09/2015    Allergic rhinitis, seasonal     Anemia NEC      Current Outpatient Prescriptions   Medication Sig Dispense Refill    ondansetron (ZOFRAN ODT) 4 mg disintegrating tablet       meloxicam (MOBIC) 7.5 mg tablet       amLODIPine (NORVASC) 5 mg tablet Take 1 Tab by mouth daily. 90 Tab 1    topiramate (TOPAMAX) 25 mg tablet Take 1 Tab by mouth daily (with breakfast). 90 Tab 1    ergocalciferol (DRISDOL) 50,000 unit capsule Take 1 Cap by mouth every seven (7) days. 12 Cap 0    meloxicam (MOBIC) 15 mg tablet Take 1 Tab by mouth daily (with breakfast).  30 Tab 1     No Known Allergies  Past Medical History:   Diagnosis Date    Allergic rhinitis, seasonal     spring    Anemia NEC     iron deficiency associated with heavy menstruation and fibroids noted 2008 with pregnancy    Dysmenorrhea     increasing past year    Fibroid, uterine     Headache(784.0)     Hypertension     Ill-defined condition     Chronic knee pain    Menorrhagia     worsening past year     Past Surgical History:   Procedure Laterality Date    HX [facility]ST[facility]  04/2014    [facility]     Family History   Problem Relation Age of Onset    Hypertension Mother     Hypertension Father     Headache Sister    [facility] Diabetes Paternal Aunt      Social History   Substance Use Topics    Smoking status: Never Smoker    Smokeless tobacco: Never Used    Alcohol use 0.0 oz/week     0 Standard drinks or equivalent per week      Comment: occass: 2/month        Review labs. Lab Results   Component Value Date/Time    WBC 2.8 (L) 05/04/2018 10:07 AM    HGB 13.0 05/04/2018 10:07 AM    HCT 39.2 05/04/2018 10:07 AM    PLATELET 431 31/15/7398 10:07 AM    MCV 90.1 05/04/2018 10:07 AM     Lab Results   Component Value Date/Time    Sodium 144 05/04/2018 09:47 AM    Potassium 3.8 05/04/2018 09:47 AM    Chloride 111 (H) 05/04/2018 09:47 AM    CO2 31 05/04/2018 09:47 AM    Anion gap 2 (L) 05/04/2018 09:47 AM    Glucose 76 05/04/2018 09:47 AM    BUN 8 05/04/2018 09:47 AM    Creatinine 0.61 05/04/2018 09:47 AM    BUN/Creatinine ratio 13 05/04/2018 09:47 AM    GFR est AA >60 05/04/2018 09:47 AM    GFR est non-AA >60 05/04/2018 09:47 AM    Calcium 9.2 05/04/2018 09:47 AM    Bilirubin, total 0.4 05/04/2018 09:47 AM    AST (SGOT) 17 05/04/2018 09:47 AM    Alk. phosphatase 92 05/04/2018 09:47 AM    Protein, total 7.5 05/04/2018 09:47 AM    Albumin 4.1 05/04/2018 09:47 AM    Globulin 3.4 05/04/2018 09:47 AM    A-G Ratio 1.2 05/04/2018 09:47 AM    ALT (SGPT) 16 05/04/2018 09:47 AM   '  Lab Results   Component Value Date/Time    Cholesterol, total 198 05/04/2018 10:07 AM    HDL Cholesterol 72 (H) 05/04/2018 10:07 AM    LDL, calculated 111 (H) 05/04/2018 10:07 AM    VLDL, calculated 15 05/04/2018 10:07 AM    Triglyceride 75 05/04/2018 10:07 AM    CHOL/HDL Ratio 2.8 05/04/2018 10:07 AM     Lab Results   Component Value Date/Time    TSH 1.24 05/04/2018 10:07 AM     Lab Results   Component Value Date/Time    Vitamin D 25-Hydroxy 19.4 (L) 05/04/2018 10:07 AM       H/o low vitamin d. Now due to repeat labs. Denies any unusual fatigue. Also seen hematology for low wbc. Denies any frequent infection or sickness. ROS: Feeling generally well.  No TIA's or unusual headaches, no dysphagia. No prolonged cough. No dyspnea or chest pain on exertion. No abdominal pain, change in bowel habits, black or bloody stools. No urinary tract symptoms. No new or unusual musculoskeletal symptoms. Objective: The patient appears well, alert, oriented x 3, in no distress. Visit Vitals    /84 (BP 1 Location: Left arm, BP Patient Position: Sitting)    Pulse (!) 52    Temp 97.7 °F (36.5 °C) (Oral)    Resp 16    Ht 5' 3\" (1.6 m)    Wt 150 lb (68 kg)    LMP 10/08/2012    SpO2 98%    BMI 26.57 kg/m2     ENT normal.  Neck supple. No adenopathy or thyromegaly. JOSE. Lungs are clear, good air entry, no wheezes, rhonchi or rales. S1 and S2 normal, no murmurs, regular rate and rhythm. Abdomen soft without tenderness, guarding, mass or organomegaly. Extremities show no edema, normal peripheral pulses. Neurological is normal, no focal findings. Breast exam: bilaterally symmetrical, no palpable lump or abnormality. No axillary lymph nodes palpable bilaterally. No nipple pain or discharge bilaterally. Pelvic pain: absence of cervix and uterus. No adnexal mass or tenderness. Assessment/Plan:       ICD-10-CM ICD-9-CM    1. Well woman exam (no gynecological exam) Z00.00 V70.0     [V70.0]   2. Abnormal CBC; seen hematology. Following their recommendations. R79.89 790.6    3. Essential hypertension: stable at this time. Low salt diet. Exercise as tolerated. Will continue current plan. I10 401.9    4. Vitamin D deficiency: not on supplement. Recheck labs. E55.9 268.9    Pt understood and agree with the plan     Follow-up Disposition:  Return in about 4 months (around 12/13/2018).

## 2018-08-13 NOTE — MR AVS SNAPSHOT
[address] 
[phone] Patient: Adore Slater MRN: [id number]:[id number] Visit Information Date & Time Provider Department Dept. Phone Encounter #  
 8/13/2018 11:30 AM Julius Sigala, [address] [place] [address] Road [id number] Follow-up Instructions Return in about 4 months (around 12/13/2018). Upcoming Health Maintenance Date Due Influenza Age 5 to Adult 10/1/2018* DTaP/Tdap/Td series (3 - Td) 8/13/2024 *Topic was postponed. The date shown is not the original due date. Allergies as of 8/13/2018  Review Complete On: 8/13/2018 By: Gume Mobley LPN No Known Allergies Current Immunizations  Reviewed on 4/1/2011 Name Date TDAP Vaccine 1/1/2008 Tdap 8/13/2014  6:15 AM  
  
 Not reviewed this visit You Were Diagnosed With   
  
 Codes Comments Well woman exam (no gynecological exam)    -  Primary ICD-10-CM: Z00.00 ICD-9-CM: V70.0 [V70.0] Abnormal CBC     ICD-10-CM: R79.89 ICD-9-CM: 790.6 Essential hypertension     ICD-10-CM: I10 
ICD-9-CM: 401.9 Vitamin D deficiency     ICD-10-CM: E55.9 ICD-9-CM: 268.9 Vitals BP Pulse Temp Resp Height(growth percentile) Weight(growth percentile) 126/84 (BP 1 Location: Left arm, BP Patient Position: Sitting) (!) 52 97.7 °F (36.5 °C) (Oral) 16 5' 3\" (1.6 m) 150 lb (68 kg) LMP SpO2 BMI OB Status Smoking Status 10/08/2012 98% 26.57 kg/m2 Hysterectomy Never Smoker BMI and BSA Data Body Mass Index Body Surface Area  
 26.57 kg/m 2 1.74 m 2 Preferred Pharmacy Pharmacy Name Phone [address] [place] [address] [phone] Your Updated Medication List  
  
   
This list is accurate as of 8/13/18 12:06 PM.  Always use your most recent med list. amLODIPine 5 mg tablet Commonly known as:  [place] Take 1 Tab by mouth daily. ergocalciferol 50,000 unit capsule Commonly known as:  DRISDOL Take 1 Cap by mouth every seven (7) days. * meloxicam 15 mg tablet Commonly known as:  MOBIC Take 1 Tab by mouth daily (with breakfast). * meloxicam 7.5 mg tablet Commonly known as:  MOBIC  
  
 ondansetron 4 mg disintegrating tablet Commonly known as:  ZOFRAN ODT  
  
 topiramate 25 mg tablet Commonly known as:  TOPAMAX Take 1 Tab by mouth daily (with breakfast). * Notice: This list has 2 medication(s) that are the same as other medications prescribed for you. Read the directions carefully, and ask your doctor or other care provider to review them with you. Follow-up Instructions Return in about 4 months (around 12/13/2018). Patient Instructions Well Visit, Ages 25 to 48: Care Instructions Your Care Instructions Physical exams can help you stay healthy. Your doctor has checked your overall health and may have suggested ways to take good care of yourself. He or she also may have recommended tests. At home, you can help prevent illness with healthy eating, regular exercise, and other steps. Follow-up care is a key part of your treatment and safety. Be sure to make and go to all appointments, and call your doctor if you are having problems. It's also a good idea to know your test results and keep a list of the medicines you take. How can you care for yourself at home? · Reach and stay at a healthy weight. This will lower your risk for many problems, such as obesity, diabetes, heart disease, and high blood pressure. · Get at least 30 minutes of physical activity on most days of the week. Walking is a good choice. You also may want to do other activities, such as running, swimming, cycling, or playing tennis or team sports. Discuss any changes in your exercise program with your doctor. · Do not smoke or allow others to smoke around you. If you need help quitting, talk to your doctor about stop-smoking programs and medicines. These can increase your chances of quitting for good. · Talk to your doctor about whether you have any risk factors for sexually transmitted infections (STIs). Having one sex partner (who does not have STIs and does not have sex with anyone else) is a good way to avoid these infections. · Use birth control if you do not want to have children at this time. Talk with your doctor about the choices available and what might be best for you. · Protect your skin from too much sun. When you're outdoors from 10 a.m. to 4 p.m., stay in the shade or cover up with clothing and a hat with a wide brim. Wear sunglasses that block UV rays. Even when it's cloudy, put broad-spectrum sunscreen (SPF 30 or higher) on any exposed skin. · See a dentist one or two times a year for checkups and to have your teeth cleaned. · Wear a seat belt in the car. · Drink alcohol in moderation, if at all. That means no more than 2 drinks a day for men and 1 drink a day for women. Follow your doctor's advice about when to have certain tests. These tests can spot problems early. For everyone · Cholesterol. Have the fat (cholesterol) in your blood tested after age 21. Your doctor will tell you how often to have this done based on your age, family history, or other things that can increase your risk for heart disease. · Blood pressure. Have your blood pressure checked during a routine doctor visit. Your doctor will tell you how often to check your blood pressure based on your age, your blood pressure results, and other factors. · Vision. Talk with your doctor about how often to have a glaucoma test. 
· Diabetes. Ask your doctor whether you should have tests for diabetes. · Colon cancer. Have a test for colon cancer at age 48.  You may have one of several tests. If you are younger than 48, you may need a test earlier if you have any risk factors. Risk factors include whether you already had a precancerous polyp removed from your colon or whether your parent, brother, sister, or child has had colon cancer. For women · Breast exam and mammogram. Talk to your doctor about when you should have a clinical breast exam and a mammogram. Medical experts differ on whether and how often women under 50 should have these tests. Your doctor can help you decide what is right for you. · Pap test and pelvic exam. Begin Pap tests at age 24. A Pap test is the best way to find cervical cancer. The test often is part of a pelvic exam. Ask how often to have this test. 
· Tests for sexually transmitted infections (STIs). Ask whether you should have tests for STIs. You may be at risk if you have sex with more than one person, especially if your partners do not wear condoms. For men · Tests for sexually transmitted infections (STIs). Ask whether you should have tests for STIs. You may be at risk if you have sex with more than one person, especially if you do not wear a condom. · Testicular cancer exam. Ask your doctor whether you should check your testicles regularly. · Prostate exam. Talk to your doctor about whether you should have a blood test (called a PSA test) for prostate cancer. Experts differ on whether and when men should have this test. Some experts suggest it if you are older than 39 and are -American or have a father or brother who got prostate cancer when he was younger than 72. When should you call for help? Watch closely for changes in your health, and be sure to contact your doctor if you have any problems or symptoms that concern you. Where can you learn more? Go to http://martin-ifeanyi.info/. Enter P072 in the search box to learn more about \"Well Visit, Ages 25 to 48: Care Instructions. \" Current as of: May 16, 2017 Content Version: 11.7 © 1338-7218 [employer]. Care instructions adapted under license by [employer] (which disclaims liability or warranty for this information). If you have questions about a medical condition or this instruction, always ask your healthcare professional. [address] any warranty or liability for your use of this information. Breast Self-Exam: Care Instructions Your Care Instructions A breast self-exam is when you check your breasts for lumps or changes. This regular exam helps you learn how your breasts normally look and feel. Most breast problems or changes are not because of cancer. Breast self-exam is not a substitute for a mammogram. Having regular breast exams by your doctor and regular mammograms improve your chances of finding any problems with your breasts. Some women set a time each month to do a step-by-step breast self-exam. Other women like a less formal system. They might look at their breasts as they brush their teeth, or feel their breasts once in a while in the shower. If you notice a change in your breast, tell your doctor. Follow-up care is a key part of your treatment and safety. Be sure to make and go to all appointments, and call your doctor if you are having problems. It's also a good idea to know your test results and keep a list of the medicines you take. How do you do a breast self-exam? 
· The best time to examine your breasts is usually one week after your menstrual period begins. Your breasts should not be tender then. If you do not have periods, you might do your exam on a day of the month that is easy to remember. · To examine your breasts: ¨ Remove all your clothes above the waist and lie down. When you are lying down, your breast tissue spreads evenly over your chest wall, which makes it easier to feel all your breast tissue.  
¨ Use the pads-not the fingertips-of the 3 middle fingers of your left hand to check your right breast. Move your fingers slowly in small coin-sized circles that overlap. ¨ Use three levels of pressure to feel of all your breast tissue. Use light pressure to feel the tissue close to the skin surface. Use medium pressure to feel a little deeper. Use firm pressure to feel your tissue close to your breastbone and ribs. Use each pressure level to feel your breast tissue before moving on to the next spot. ¨ Check your entire breast, moving up and down as if following a strip from the collarbone to the bra line, and from the armpit to the ribs. Repeat until you have covered the entire breast. 
¨ Repeat this procedure for your left breast, using the pads of the 3 middle fingers of your right hand. · To examine your breasts while in the shower: 
¨ Place one arm over your head and lightly soap your breast on that side. ¨ Using the pads of your fingers, gently move your hand over your breast (in the strip pattern described above), feeling carefully for any lumps or changes. ¨ Repeat for the other breast. 
· Have your doctor inspect anything you notice to see if you need further testing. Where can you learn more? Go to http://martin-ifeanyi.info/. Enter P148 in the search box to learn more about \"Breast Self-Exam: Care Instructions. \" Current as of: May 12, 2017 Content Version: 11.7 © 0783-2414 [employer]. Care instructions adapted under license by [employer] (which disclaims liability or warranty for this information). If you have questions about a medical condition or this instruction, always ask your healthcare professional. [place] [address] any warranty or liability for your use of this information. Introducing [facility]! Dear Cesilia Nobles: 
Thank you for requesting a [employer] account. Our records indicate that you already have an active [employer] account.   You can access your account anytime at [email or web address] Did you know that you can access your hospital and ER discharge instructions at any time in [employer]? You can also review all of your test results from your hospital stay or ER visit. Additional Information If you have questions, please visit the Frequently Asked Questions section of the [employer] website at [email or web address] Remember, [employer] is NOT to be used for urgent needs. For medical emergencies, dial 911. Now available from your iPhone and Android! Please provide this summary of care documentation to your next provider. Your primary care clinician is listed as Larry Mendez. If you have any questions after today's visit, please call [phone].

## 2018-08-13 NOTE — PATIENT INSTRUCTIONS
Well Visit, Ages 25 to 48: Care Instructions  Your Care Instructions    Physical exams can help you stay healthy. Your doctor has checked your overall health and may have suggested ways to take good care of yourself. He or she also may have recommended tests. At home, you can help prevent illness with healthy eating, regular exercise, and other steps. Follow-up care is a key part of your treatment and safety. Be sure to make and go to all appointments, and call your doctor if you are having problems. It's also a good idea to know your test results and keep a list of the medicines you take. How can you care for yourself at home? · Reach and stay at a healthy weight. This will lower your risk for many problems, such as obesity, diabetes, heart disease, and high blood pressure. · Get at least 30 minutes of physical activity on most days of the week. Walking is a good choice. You also may want to do other activities, such as running, swimming, cycling, or playing tennis or team sports. Discuss any changes in your exercise program with your doctor. · Do not smoke or allow others to smoke around you. If you need help quitting, talk to your doctor about stop-smoking programs and medicines. These can increase your chances of quitting for good. · Talk to your doctor about whether you have any risk factors for sexually transmitted infections (STIs). Having one sex partner (who does not have STIs and does not have sex with anyone else) is a good way to avoid these infections. · Use birth control if you do not want to have children at this time. Talk with your doctor about the choices available and what might be best for you. · Protect your skin from too much sun. When you're outdoors from 10 a.m. to 4 p.m., stay in the shade or cover up with clothing and a hat with a wide brim. Wear sunglasses that block UV rays. Even when it's cloudy, put broad-spectrum sunscreen (SPF 30 or higher) on any exposed skin.   · See a dentist one or two times a year for checkups and to have your teeth cleaned. · Wear a seat belt in the car. · Drink alcohol in moderation, if at all. That means no more than 2 drinks a day for men and 1 drink a day for women. Follow your doctor's advice about when to have certain tests. These tests can spot problems early. For everyone  · Cholesterol. Have the fat (cholesterol) in your blood tested after age 21. Your doctor will tell you how often to have this done based on your age, family history, or other things that can increase your risk for heart disease. · Blood pressure. Have your blood pressure checked during a routine doctor visit. Your doctor will tell you how often to check your blood pressure based on your age, your blood pressure results, and other factors. · Vision. Talk with your doctor about how often to have a glaucoma test.  · Diabetes. Ask your doctor whether you should have tests for diabetes. · Colon cancer. Have a test for colon cancer at age 48. You may have one of several tests. If you are younger than 48, you may need a test earlier if you have any risk factors. Risk factors include whether you already had a precancerous polyp removed from your colon or whether your parent, brother, sister, or child has had colon cancer. For women  · Breast exam and mammogram. Talk to your doctor about when you should have a clinical breast exam and a mammogram. Medical experts differ on whether and how often women under 50 should have these tests. Your doctor can help you decide what is right for you. · Pap test and pelvic exam. Begin Pap tests at age 24. A Pap test is the best way to find cervical cancer. The test often is part of a pelvic exam. Ask how often to have this test.  · Tests for sexually transmitted infections (STIs). Ask whether you should have tests for STIs. You may be at risk if you have sex with more than one person, especially if your partners do not wear condoms.   For men  · Tests for sexually transmitted infections (STIs). Ask whether you should have tests for STIs. You may be at risk if you have sex with more than one person, especially if you do not wear a condom. · Testicular cancer exam. Ask your doctor whether you should check your testicles regularly. · Prostate exam. Talk to your doctor about whether you should have a blood test (called a PSA test) for prostate cancer. Experts differ on whether and when men should have this test. Some experts suggest it if you are older than 39 and are -American or have a father or brother who got prostate cancer when he was younger than 72. When should you call for help? Watch closely for changes in your health, and be sure to contact your doctor if you have any problems or symptoms that concern you. Where can you learn more? Go to http://martin-ifeanyi.info/. Enter P072 in the search box to learn more about \"Well Visit, Ages 25 to 48: Care Instructions. \"  Current as of: May 16, 2017  Content Version: 11.7  © 5837-5365 [employer]. Care instructions adapted under license by [employer] (which disclaims liability or warranty for this information). If you have questions about a medical condition or this instruction, always ask your healthcare professional. [address] any warranty or liability for your use of this information. Breast Self-Exam: Care Instructions  Your Care Instructions    A breast self-exam is when you check your breasts for lumps or changes. This regular exam helps you learn how your breasts normally look and feel. Most breast problems or changes are not because of cancer. Breast self-exam is not a substitute for a mammogram. Having regular breast exams by your doctor and regular mammograms improve your chances of finding any problems with your breasts.   Some women set a time each month to do a step-by-step breast self-exam. Other women like a less formal system. They might look at their breasts as they brush their teeth, or feel their breasts once in a while in the shower. If you notice a change in your breast, tell your doctor. Follow-up care is a key part of your treatment and safety. Be sure to make and go to all appointments, and call your doctor if you are having problems. It's also a good idea to know your test results and keep a list of the medicines you take. How do you do a breast self-exam?  · The best time to examine your breasts is usually one week after your menstrual period begins. Your breasts should not be tender then. If you do not have periods, you might do your exam on a day of the month that is easy to remember. · To examine your breasts:  ¨ Remove all your clothes above the waist and lie down. When you are lying down, your breast tissue spreads evenly over your chest wall, which makes it easier to feel all your breast tissue. ¨ Use the pads-not the fingertips-of the 3 middle fingers of your left hand to check your right breast. Move your fingers slowly in small coin-sized circles that overlap. ¨ Use three levels of pressure to feel of all your breast tissue. Use light pressure to feel the tissue close to the skin surface. Use medium pressure to feel a little deeper. Use firm pressure to feel your tissue close to your breastbone and ribs. Use each pressure level to feel your breast tissue before moving on to the next spot. ¨ Check your entire breast, moving up and down as if following a strip from the collarbone to the bra line, and from the armpit to the ribs. Repeat until you have covered the entire breast.  ¨ Repeat this procedure for your left breast, using the pads of the 3 middle fingers of your right hand. · To examine your breasts while in the shower:  ¨ Place one arm over your head and lightly soap your breast on that side.   ¨ Using the pads of your fingers, gently move your hand over your breast (in the strip pattern described above), feeling carefully for any lumps or changes. ¨ Repeat for the other breast.  · Have your doctor inspect anything you notice to see if you need further testing. Where can you learn more? Go to http://martin-ifeanyi.info/. Enter P148 in the search box to learn more about \"Breast Self-Exam: Care Instructions. \"  Current as of: May 12, 2017  Content Version: 11.7  © 9363-0869 [employer]. Care instructions adapted under license by [employer] (which disclaims liability or warranty for this information). If you have questions about a medical condition or this instruction, always ask your healthcare professional. [place] [address] any warranty or liability for your use of this information.

## 2018-08-13 NOTE — PROGRESS NOTES
Chief Complaint   Patient presents with    Well Woman     No pap- hysterectomy     Patient presents for annual physical. Last pap 12. Abnormal Pap smears -No.  Procedures if indicated Hysterectomy. Form of contraception -Hyst. Mammogram (if indicated) 18. Family history of breast CA -No, colon CA -MGF , cervical CA -No.[place] 14.

## 2018-09-18 DIAGNOSIS — I10 ESSENTIAL HYPERTENSION WITH GOAL BLOOD PRESSURE LESS THAN 130/80: ICD-10-CM

## 2018-09-18 DIAGNOSIS — G43.009 MIGRAINE WITHOUT AURA AND WITHOUT STATUS MIGRAINOSUS, NOT INTRACTABLE: ICD-10-CM

## 2018-09-19 NOTE — TELEPHONE ENCOUNTER
Please print to take to [facility]. Patient did not  Rx that were recently sent to other local pharm.

## 2018-09-20 RX ORDER — AMLODIPINE BESYLATE 5 MG/1
5 TABLET ORAL DAILY
Qty: 90 TAB | Refills: 1 | Status: SHIPPED | OUTPATIENT
Start: 2018-09-20 | End: 2019-11-26 | Stop reason: SDUPTHER

## 2018-09-20 RX ORDER — TOPIRAMATE 25 MG/1
25 TABLET ORAL
Qty: 90 TAB | Refills: 1 | Status: SHIPPED | OUTPATIENT
Start: 2018-09-20 | End: 2020-04-14

## 2019-02-08 ENCOUNTER — OFFICE VISIT (OUTPATIENT)
Dept: FAMILY MEDICINE CLINIC | Age: 49
End: 2019-02-08

## 2019-02-08 VITALS
HEART RATE: 59 BPM | TEMPERATURE: 98.1 F | RESPIRATION RATE: 16 BRPM | BODY MASS INDEX: 26.05 KG/M2 | SYSTOLIC BLOOD PRESSURE: 118 MMHG | DIASTOLIC BLOOD PRESSURE: 70 MMHG | OXYGEN SATURATION: 98 % | WEIGHT: 147 LBS | HEIGHT: 63 IN

## 2019-02-08 DIAGNOSIS — R76.8 POSITIVE ANA (ANTINUCLEAR ANTIBODY): ICD-10-CM

## 2019-02-08 DIAGNOSIS — M25.562 CHRONIC PAIN OF BOTH KNEES: ICD-10-CM

## 2019-02-08 DIAGNOSIS — G43.009 MIGRAINE WITHOUT AURA AND WITHOUT STATUS MIGRAINOSUS, NOT INTRACTABLE: ICD-10-CM

## 2019-02-08 DIAGNOSIS — E04.9 GOITER: ICD-10-CM

## 2019-02-08 DIAGNOSIS — E55.9 VITAMIN D DEFICIENCY: ICD-10-CM

## 2019-02-08 DIAGNOSIS — R79.89 ABNORMAL CBC: ICD-10-CM

## 2019-02-08 DIAGNOSIS — Z90.710 S/P HYSTERECTOMY: ICD-10-CM

## 2019-02-08 DIAGNOSIS — I10 ESSENTIAL HYPERTENSION: Primary | ICD-10-CM

## 2019-02-08 DIAGNOSIS — M25.561 CHRONIC PAIN OF BOTH KNEES: ICD-10-CM

## 2019-02-08 DIAGNOSIS — G89.29 CHRONIC PAIN OF BOTH KNEES: ICD-10-CM

## 2019-02-08 NOTE — PATIENT INSTRUCTIONS
Low Sodium Diet (2,000 Milligram): Care Instructions Your Care Instructions Too much sodium causes your body to hold on to extra water. This can raise your blood pressure and force your heart and kidneys to work harder. In very serious cases, this could cause you to be put in the hospital. It might even be life-threatening. By limiting sodium, you will feel better and lower your risk of serious problems. The most common source of sodium is salt. People get most of the salt in their diet from canned, prepared, and packaged foods. Fast food and restaurant meals also are very high in sodium. Your doctor will probably limit your sodium to less than 2,000 milligrams (mg) a day. This limit counts all the sodium in prepared and packaged foods and any salt you add to your food. Follow-up care is a key part of your treatment and safety. Be sure to make and go to all appointments, and call your doctor if you are having problems. It's also a good idea to know your test results and keep a list of the medicines you take. How can you care for yourself at home? Read food labels · Read labels on cans and food packages. The labels tell you how much sodium is in each serving. Make sure that you look at the serving size. If you eat more than the serving size, you have eaten more sodium. · Food labels also tell you the Percent Daily Value for sodium. Choose products with low Percent Daily Values for sodium. · Be aware that sodium can come in forms other than salt, including monosodium glutamate (MSG), sodium citrate, and sodium bicarbonate (baking soda). MSG is often added to Asian food. When you eat out, you can sometimes ask for food without MSG or added salt. Buy low-sodium foods · Buy foods that are labeled \"unsalted\" (no salt added), \"sodium-free\" (less than 5 mg of sodium per serving), or \"low-sodium\" (less than 140 mg of sodium per serving).  Foods labeled \"reduced-sodium\" and \"light sodium\" may still have too much sodium. Be sure to read the label to see how much sodium you are getting. · Buy fresh vegetables, or frozen vegetables without added sauces. Buy low-sodium versions of canned vegetables, soups, and other canned goods. Prepare low-sodium meals · Cut back on the amount of salt you use in cooking. This will help you adjust to the taste. Do not add salt after cooking. One teaspoon of salt has about 2,300 mg of sodium. · Take the salt shaker off the table. · Flavor your food with garlic, lemon juice, onion, vinegar, herbs, and spices. Do not use soy sauce, lite soy sauce, steak sauce, onion salt, garlic salt, celery salt, mustard, or ketchup on your food. · Use low-sodium salad dressings, sauces, and ketchup. Or make your own salad dressings and sauces without adding salt. · Use less salt (or none) when recipes call for it. You can often use half the salt a recipe calls for without losing flavor. Other foods such as rice, pasta, and grains do not need added salt. · Rinse canned vegetables, and cook them in fresh water. This removes somebut not allof the salt. · Avoid water that is naturally high in sodium or that has been treated with water softeners, which add sodium. Call your local water company to find out the sodium content of your water supply. If you buy bottled water, read the label and choose a sodium-free brand. Avoid high-sodium foods · Avoid eating: 
? Smoked, cured, salted, and canned meat, fish, and poultry. ? Ham, leon, hot dogs, and luncheon meats. ? Regular, hard, and processed cheese and regular peanut butter. ? Crackers with salted tops, and other salted snack foods such as pretzels, chips, and salted popcorn. ? Frozen prepared meals, unless labeled low-sodium. ? Canned and dried soups, broths, and bouillon, unless labeled sodium-free or low-sodium. ? Canned vegetables, unless labeled sodium-free or low-sodium. ? [employer]ur fries, pizza, tacos, and other fast foods. ? Pickles, olives, ketchup, and other condiments, especially soy sauce, unless labeled sodium-free or low-sodium. Where can you learn more? Go to http://martin-ifeanyi.info/. Enter L579 in the search box to learn more about \"Low Sodium Diet (2,000 Milligram): Care Instructions. \" Current as of: March 28, 2018 Content Version: 11.9 © 1091-6276 [employer]. Care instructions adapted under license by [employer] (which disclaims liability or warranty for this information). If you have questions about a medical condition or this instruction, always ask your healthcare professional. [address] any warranty or liability for your use of this information. Eating Healthy Foods: Care Instructions Your Care Instructions Eating healthy foods can help lower your risk for disease. Healthy food gives you energy and keeps your heart strong, your brain active, your muscles working, and your bones strong. A healthy diet includes a variety of foods from the basic food groups: grains, vegetables, fruits, milk and milk products, and meat and beans. Some people may eat more of their favorite foods from only one food group and, as a result, miss getting the nutrients they need. So, it is important to pay attention not only to what you eat but also to what you are missing from your diet. You can eat a healthy, balanced diet by making a few small changes. Follow-up care is a key part of your treatment and safety. Be sure to make and go to all appointments, and call your doctor if you are having problems. It's also a good idea to know your test results and keep a list of the medicines you take. How can you care for yourself at home? Look at what you eat · Keep a food diary for a week or two and record everything you eat or drink. Track the number of servings you eat from each food group. · For a balanced diet every day, eat a variety of: 
? 6 or more ounce-equivalents of grains, such as cereals, breads, crackers, rice, or pasta, every day. An ounce-equivalent is 1 slice of bread, 1 cup of ready-to-eat cereal, or ½ cup of cooked rice, cooked pasta, or cooked cereal. 
? 2½ cups of vegetables, especially: § Dark-green vegetables such as broccoli and spinach. § Orange vegetables such as carrots and sweet potatoes. § Dry beans (such as alejo and kidney beans) and peas (such as lentils). ? 2 cups of fresh, frozen, or canned fruit. A small apple or 1 banana or orange equals 1 cup. ? 3 cups of nonfat or low-fat milk, yogurt, or other milk products. ? 5½ ounces of meat and beans, such as chicken, fish, lean meat, beans, nuts, and seeds. One egg, 1 tablespoon of peanut butter, ½ ounce nuts or seeds, or ¼ cup of cooked beans equals 1 ounce of meat. · Learn how to read food labels for serving sizes and ingredients. Fast-food and convenience-food meals often contain few or no fruits or vegetables. Make sure you eat some fruits and vegetables to make the meal more nutritious. · Look at your food diary. For each food group, add up what you have eaten and then divide the total by the number of days. This will give you an idea of how much you are eating from each food group. See if you can find some ways to change your diet to make it more healthy. Start small · Do not try to make dramatic changes to your diet all at once. You might feel that you are missing out on your favorite foods and then be more likely to fail. · Start slowly, and gradually change your habits. Try some of the following: ? Use whole wheat bread instead of white bread. ? Use nonfat or low-fat milk instead of whole milk. ? Eat brown rice instead of white rice, and eat whole wheat pasta instead of white-flour pasta. ? Try low-fat cheeses and low-fat yogurt. ? Add more fruits and vegetables to meals and have them for snacks. ? Add lettuce, tomato, cucumber, and onion to sandwiches. ? Add fruit to yogurt and cereal. 
Enjoy food · You can still eat your favorite foods. You just may need to eat less of them. If your favorite foods are high in fat, salt, and sugar, limit how often you eat them, but do not cut them out entirely. · Eat a wide variety of foods. Make healthy choices when eating out · The type of restaurant you choose can help you make healthy choices. Even fast-food chains are now offering more low-fat or healthier choices on the menu. · Choose smaller portions, or take half of your meal home. · When eating out, try: ? A veggie pizza with a whole wheat crust or grilled chicken (instead of sausage or pepperoni). ? Pasta with roasted vegetables, grilled chicken, or marinara sauce instead of cream sauce. ? A vegetable wrap or grilled chicken wrap. ? Broiled or poached food instead of fried or breaded items. Make healthy choices easy · Buy packaged, prewashed, ready-to-eat fresh vegetables and fruits, such as baby carrots, salad mixes, and chopped or shredded broccoli and cauliflower. · Buy packaged, presliced fruits, such as melon or pineapple. · Choose 100% fruit or vegetable juice instead of soda. Limit juice intake to 4 to 6 oz (½ to ¾ cup) a day. · Blend low-fat yogurt, fruit juice, and canned or frozen fruit to make a smoothie for breakfast or a snack. Where can you learn more? Go to http://martin-ifeanyi.info/. Enter T756 in the search box to learn more about \"Eating Healthy Foods: Care Instructions. \" Current as of: March 28, 2018 Content Version: 11.9 © 6073-3402 [employer]. Care instructions adapted under license by [employer] (which disclaims liability or warranty for this information).  If you have questions about a medical condition or this instruction, always ask your healthcare professional. [employer] disclaims any warranty or liability for your use of this information. Goiter: Care Instructions Your Care Instructions A goiter is an enlarged thyroid gland. It can cause swelling in your neck. Your thyroid is found in the front of your neck. It makes a hormone that controls how your body uses energy. Goiters are caused by different things. Some are caused by high or low levels of thyroid hormone. Others are caused by too little iodine in the diet, or a growth or disease in the thyroid. In the [place], most goiters are caused by long-term autoimmune thyroiditis. This is also called Hashimoto's thyroiditis. It happens when the body's immune system damages the thyroid. You may take thyroid hormone to reduce the size of your goiter. Or you may need surgery or radioactive iodine treatment. Some people don't need any treatment. They only need to watch for changes in the goiter. Follow-up care is a key part of your treatment and safety. Be sure to make and go to all appointments, and call your doctor if you are having problems. It's also a good idea to know your test results and keep a list of the medicines you take. How can you care for yourself at home? · Be safe with medicines. Take your medicines exactly as prescribed. Call your doctor if you think you are having a problem with your medicine. You will get more details on the specific medicines your doctor prescribes. When should you call for help? Call 911 anytime you think you may need emergency care. For example, call if: 
  · You have trouble breathing.  
 Watch closely for changes in your health, and be sure to contact your doctor if: 
  · Your eyes turn red and bulge.  
  · You have trouble swallowing.  
  · You feel very tired or weak.  
  · You lose weight but are eating the same or more than usual.  
Where can you learn more? Go to http://martin-ifeanyi.info/. Enter F172 in the search box to learn more about \"Goiter: Care Instructions. \" Current as of: March 14, 2018 Content Version: 11.9 © 5440-0603 [employer]. Care instructions adapted under license by [employer] (which disclaims liability or warranty for this information). If you have questions about a medical condition or this instruction, always ask your healthcare professional. [place] [address] any warranty or liability for your use of this information.

## 2019-02-08 NOTE — PROGRESS NOTES
HISTORY OF PRESENT ILLNESS David Lynn is a 53 y.o. female. HPI: here for follow up. H/o hypertension. Stable vitals. Asymptomatic. Denies any headache, dizziness, no chest pain or trouble breathing, no arm or leg weakness. No nausea or vomiting, no weight or appetite changes, no depression or anxiety. No urine or bowel complains, no palpitation, no diaphoresis. Also h.o migraine headaches. It has been stable on topamax. No concern. Tolerating medication well. Has bilateral knee pain. Was following Dr. Kurtis Diaz and now as he is moving out of area will search on her own to find a new ortho specialist. If she needs will call for a referral. Recent rt knee arthroscopy with meniscal tear repair. Now back to running and following their recommendations. Past h/o elevated ALY. Seen rheumatology but recommended to observe and not indicated any active rheumatology process. Also low wbc. See hematology. No new recommendations. Denies any unusual fatigue. No sleep concern. No abdominal pain. Visit Vitals /70 (BP 1 Location: Left arm, BP Patient Position: Sitting) Pulse (!) 59 Temp 98.1 °F (36.7 °C) (Oral) Resp 16 Ht 5' 3\" (1.6 m) Wt 147 lb (66.7 kg) SpO2 98% BMI 26.04 kg/m² Review medication list, vitals, problem list,allergies. 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 No restriction medically. H/o vitamin D deficiency. On supplement. Discussed high BMI. For now she is doing daily exercise and diet modification. Agree to see nutritionist. She is an athlete and also running marathon soon. Following recommendations for that as well. Lab Results Component Value Date/Time WBC 2.8 (L) 05/04/2018 10:07 AM  
 HGB 13.0 05/04/2018 10:07 AM  
 HCT 39.2 05/04/2018 10:07 AM  
 PLATELET 446 44/64/9449 10:07 AM  
 MCV 90.1 05/04/2018 10:07 AM  
 
Lab Results Component Value Date/Time  Sodium 144 05/04/2018 09:47 AM  
 Potassium 3.8 05/04/2018 09:47 AM  
 Chloride 111 (H) 05/04/2018 09:47 AM  
 CO2 31 05/04/2018 09:47 AM  
 Anion gap 2 (L) 05/04/2018 09:47 AM  
 Glucose 76 05/04/2018 09:47 AM  
 BUN 8 05/04/2018 09:47 AM  
 Creatinine 0.61 05/04/2018 09:47 AM  
 BUN/Creatinine ratio 13 05/04/2018 09:47 AM  
 GFR est AA >60 05/04/2018 09:47 AM  
 GFR est non-AA >60 05/04/2018 09:47 AM  
 Calcium 9.2 05/04/2018 09:47 AM  
 Bilirubin, total 0.4 05/04/2018 09:47 AM  
 AST (SGOT) 17 05/04/2018 09:47 AM  
 Alk. phosphatase 92 05/04/2018 09:47 AM  
 Protein, total 7.5 05/04/2018 09:47 AM  
 Albumin 4.1 05/04/2018 09:47 AM  
 Globulin 3.4 05/04/2018 09:47 AM  
 A-G Ratio 1.2 05/04/2018 09:47 AM  
 ALT (SGPT) 16 05/04/2018 09:47 AM  
 
Lab Results Component Value Date/Time Cholesterol, total 198 05/04/2018 10:07 AM  
 HDL Cholesterol 72 (H) 05/04/2018 10:07 AM  
 LDL, calculated 111 (H) 05/04/2018 10:07 AM  
 VLDL, calculated 15 05/04/2018 10:07 AM  
 Triglyceride 75 05/04/2018 10:07 AM  
 CHOL/HDL Ratio 2.8 05/04/2018 10:07 AM  
 
Lab Results Component Value Date/Time TSH 1.24 05/04/2018 10:07 AM  
 
Lab Results Component Value Date/Time Hemoglobin A1c 5.3 05/04/2018 10:07 AM  
 
Lab Results Component Value Date/Time Vitamin D 25-Hydroxy 19.4 (L) 05/04/2018 10:07 AM  
   
Lab Results Component Value Date/Time Vitamin B12 381 04/16/2011 12:00 AM  
 Folate 13.2 04/16/2011 12:00 AM  
 
H/o goiter. Denies any change in voice or trouble swallowing . used to follow endo and not seen them more than a year. Will observe. She is asymptomatic. ROS: see HPI Physical Exam  
Constitutional: She is oriented to person, place, and time. No distress. Cardiovascular: Normal rate, regular rhythm and normal heart sounds. Pulmonary/Chest: CTA Abdominal: Soft. Bowel sounds are normal. There is no tenderness. Musculoskeletal: She exhibits no edema. Lymphadenopathy:  
  She has no cervical adenopathy. Neurological: She is oriented to person, place, and time. Psychiatric: Her behavior is normal.  
 
 
ASSESSMENT and PLAN 
  ICD-10-CM ICD-9-CM 1. Essential hypertension: stable at this time. Low salt diet. Exercise as tolerated. Will continue current plan. G34 056.6 METABOLIC PANEL, COMPREHENSIVE REFERRAL TO NUTRITION 2. Migraine without aura and without status migrainosus, not intractable G43.009 346.10   
3. Goiter/ used to see endo. Not seen more than a year. Will observe and repeat tsh./  E04.9 240.9 TSH 3RD GENERATION  
   REFERRAL TO NUTRITION 4. S/P hysterectomy/ due to menorrhagea  Z90.710 V88.01   
5. Abnormal CBC/ seen hematology: no new recommendations. Will observe.  R79.89 790.6 6. Chronic pain of both knees/ following ortho: see HPI.  M25.561 719.46   
 M25.562 338.29 G89.29    
7. Positive ALY (antinuclear antibody)/ test done by hemotology \": seen rheumatology. No new recommendations. See  HPI  R76.8 795.79   
8. BMI 26.0-26.9,adult: see HPI  Z68.26 V85.22 REFERRAL TO NUTRITION 9. Vitamin D deficiency: op supplement. E55.9 268.9 VITAMIN D, 25 HYDROXY REFERRAL TO NUTRITION Pt understood and agree with the plan Review HM Follow-up Disposition: 
Return in about 3 months (around 5/8/2019).

## 2019-05-03 ENCOUNTER — HOSPITAL ENCOUNTER (OUTPATIENT)
Dept: LAB | Age: 49
Discharge: HOME | End: 2019-05-03
Payer: OTHER GOVERNMENT

## 2019-05-03 DIAGNOSIS — I10 ESSENTIAL HYPERTENSION: ICD-10-CM

## 2019-05-03 DIAGNOSIS — E55.9 VITAMIN D DEFICIENCY: Primary | ICD-10-CM

## 2019-05-03 DIAGNOSIS — E55.9 VITAMIN D DEFICIENCY: ICD-10-CM

## 2019-05-03 DIAGNOSIS — E04.9 GOITER: ICD-10-CM

## 2019-05-03 LAB
25(OH)D3 SERPL-MCNC: 16.9 NG/ML (ref 30–100)
ALBUMIN SERPL-MCNC: 4.1 G/DL (ref 3.4–5)
ALBUMIN/GLOB SERPL: 1.3 {RATIO} (ref 0.8–1.7)
ALP SERPL-CCNC: 97 U/L (ref 45–117)
ALT SERPL-CCNC: 21 U/L (ref 13–56)
ANION GAP SERPL CALC-SCNC: 4 MMOL/L (ref 3–18)
AST SERPL-CCNC: 16 U/L (ref 15–37)
BILIRUB SERPL-MCNC: 0.5 MG/DL (ref 0.2–1)
BUN SERPL-MCNC: 11 MG/DL (ref 7–18)
BUN/CREAT SERPL: 14 (ref 12–20)
CALCIUM SERPL-MCNC: 9.3 MG/DL (ref 8.5–10.1)
CHLORIDE SERPL-SCNC: 108 MMOL/L (ref 100–108)
CO2 SERPL-SCNC: 33 MMOL/L (ref 21–32)
CREAT SERPL-MCNC: 0.78 MG/DL (ref 0.6–1.3)
GLOBULIN SER CALC-MCNC: 3.2 G/DL (ref 2–4)
GLUCOSE SERPL-MCNC: 68 MG/DL (ref 74–99)
POTASSIUM SERPL-SCNC: 3.7 MMOL/L (ref 3.5–5.5)
PROT SERPL-MCNC: 7.3 G/DL (ref 6.4–8.2)
SODIUM SERPL-SCNC: 145 MMOL/L (ref 136–145)
TSH SERPL DL<=0.05 MIU/L-ACNC: 1.56 UIU/ML (ref 0.36–3.74)

## 2019-05-03 PROCEDURE — 36415 COLL VENOUS BLD VENIPUNCTURE: CPT

## 2019-05-03 PROCEDURE — 84443 ASSAY THYROID STIM HORMONE: CPT

## 2019-05-03 PROCEDURE — 82306 VITAMIN D 25 HYDROXY: CPT

## 2019-05-03 PROCEDURE — 80053 COMPREHEN METABOLIC PANEL: CPT

## 2019-05-03 RX ORDER — ERGOCALCIFEROL 1.25 MG/1
50000 CAPSULE ORAL
Qty: 12 CAP | Refills: 0 | Status: SHIPPED | OUTPATIENT
Start: 2019-05-03 | End: 2019-05-08 | Stop reason: SDUPTHER

## 2019-05-03 NOTE — PROGRESS NOTES
Low vitamin d level. Giving drisdol. Recheck labs in 3 months. Further discussion on follow up visit.

## 2019-05-08 DIAGNOSIS — E55.9 VITAMIN D DEFICIENCY: ICD-10-CM

## 2019-05-08 RX ORDER — ERGOCALCIFEROL 1.25 MG/1
50000 CAPSULE ORAL
Qty: 12 CAP | Refills: 0 | Status: SHIPPED | OUTPATIENT
Start: 2019-05-08 | End: 2020-01-15 | Stop reason: SDUPTHER

## 2019-05-08 NOTE — PROGRESS NOTES
Contacted patient and verified identity using name and date of birth (2- identifiers)  Spoke with patient and she verbalized understanding of low Vit D and need for Dridol weekly and recheck labs in 3 months

## 2019-11-25 ENCOUNTER — TELEPHONE (OUTPATIENT)
Dept: FAMILY MEDICINE CLINIC | Age: 49
End: 2019-11-25

## 2019-11-25 NOTE — TELEPHONE ENCOUNTER
Patient is requesting (New  Updated) referral to the following office:    [facility]  Specialist Name: Dominguez Perdomo  Office Location:   Phone (if available): [phone]   Fax (if available):   Diagnosis:   Date of appointment (if scheduled):

## 2019-11-26 DIAGNOSIS — I10 ESSENTIAL HYPERTENSION WITH GOAL BLOOD PRESSURE LESS THAN 130/80: ICD-10-CM

## 2019-11-26 DIAGNOSIS — M25.562 CHRONIC PAIN OF BOTH KNEES: Primary | ICD-10-CM

## 2019-11-26 DIAGNOSIS — G89.29 CHRONIC PAIN OF BOTH KNEES: Primary | ICD-10-CM

## 2019-11-26 DIAGNOSIS — M25.561 CHRONIC PAIN OF BOTH KNEES: Primary | ICD-10-CM

## 2019-11-26 RX ORDER — AMLODIPINE BESYLATE 5 MG/1
5 TABLET ORAL DAILY
Qty: 90 TAB | Refills: 1 | Status: SHIPPED | OUTPATIENT
Start: 2019-11-26 | End: 2020-01-14 | Stop reason: SDUPTHER

## 2019-11-26 NOTE — TELEPHONE ENCOUNTER
A referral request was submitted online to [facility]; payor response was the following: \"This beneficiary is only eligible for \"Direct Care\" provided at a  Treatment Facility\". Left a voice message on patient's phone advising of  response. She was given the customer service number for  and was asked to contact them directly to discuss referral. Patient was asked to contact \[facility]\"" at [phone] option [phone], if any further assistance is needed.

## 2019-11-27 ENCOUNTER — TELEPHONE (OUTPATIENT)
Dept: FAMILY MEDICINE CLINIC | Age: 49
End: 2019-11-27

## 2019-11-27 NOTE — TELEPHONE ENCOUNTER
Pt called and state thst she has worked the problem with  and the referral to Mateusz Kowalski can be resubmitted. She states that it is for her knee problem.  If you have any questions please call her at [phone]

## 2019-12-03 NOTE — TELEPHONE ENCOUNTER
[id number] [address] approval has been received for consult with Dr. Hillary Rubinstein; Auth: [id number] beginning 11/27/19 and expires 11/26/20 valid for four visits.

## 2019-12-09 ENCOUNTER — TELEPHONE (OUTPATIENT)
Dept: FAMILY MEDICINE CLINIC | Age: 49
End: 2019-12-09

## 2019-12-09 NOTE — TELEPHONE ENCOUNTER
Spoke with patient (all identifiers verified) to advise  approval has been received for consult with Dr. Autumn Sevilla for bilateral knee pain. Patient aware codes for bilateral knee pain were submitted to  on 12/03/19. I will fax full  approval to [facility].  approval faxed to [facility]. A fax confirmation has been received.

## 2019-12-09 NOTE — TELEPHONE ENCOUNTER
Pt called and stated the orthopedic knee referral is written incorrectly in the  portal. The notes stated bilateral knee but in the portal and the actual orthopedic off pt states the referral only states right knee. Pt states her Ortho appt is this Thursday at 8 am. Please call pt at your earliest convenience.

## 2019-12-12 ENCOUNTER — OFFICE VISIT (OUTPATIENT)
Dept: ORTHOPEDIC SURGERY | Age: 49
End: 2019-12-12

## 2019-12-12 VITALS
BODY MASS INDEX: 28.35 KG/M2 | HEIGHT: 63 IN | SYSTOLIC BLOOD PRESSURE: 135 MMHG | WEIGHT: 160 LBS | DIASTOLIC BLOOD PRESSURE: 89 MMHG | HEART RATE: 71 BPM

## 2019-12-12 DIAGNOSIS — M17.0 PRIMARY OSTEOARTHRITIS OF BOTH KNEES: Primary | ICD-10-CM

## 2019-12-12 DIAGNOSIS — M25.561 PAIN IN BOTH KNEES, UNSPECIFIED CHRONICITY: ICD-10-CM

## 2019-12-12 DIAGNOSIS — M25.562 PAIN IN BOTH KNEES, UNSPECIFIED CHRONICITY: ICD-10-CM

## 2019-12-12 RX ORDER — TRIAMCINOLONE ACETONIDE 40 MG/ML
40 INJECTION, SUSPENSION INTRA-ARTICULAR; INTRAMUSCULAR ONCE
Qty: 1 ML | Refills: 0
Start: 2019-12-12 | End: 2019-12-12

## 2019-12-12 RX ORDER — MELOXICAM 15 MG/1
15 TABLET ORAL
Qty: 30 TAB | Refills: 1 | Status: SHIPPED | OUTPATIENT
Start: 2019-12-12 | End: 2020-05-12 | Stop reason: ALTCHOICE

## 2019-12-12 NOTE — PROGRESS NOTES
Carlos Buenrostro  1970   Chief Complaint   Patient presents with    Knee Pain     bilat        HISTORY OF PRESENT ILLNESS  Carlos Skinner is a 51 y.o. female who presents today for reevaluation of b/l knee pain. Patient rates pain as 7/10 today. Pain has been present for awhile, patient was last seen here in January 2018. The patient has had a cortisone injection before. 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 She states the knees still feel achy when she takes a break from running. She completed a series of Euflexxa injections in December 2017. She states she used to be morbidly obese, lost a lot of weight with running. Patient denies any fever, chills, chest pain, shortness of breath or calf pain. There are no new illness or injuries to report since last seen in the office. No changes in medications, allergies, social or family history. Pain Assessment  12/12/2019   Location of Pain Knee   Location Modifiers Left   Severity of Pain 7   Quality of Pain Dull;Aching   Duration of Pain -   Frequency of Pain Constant   Date Pain First Started -   Aggravating Factors Stairs;Squatting   Limiting Behavior Some   Relieving Factors Nothing   Result of Injury No   Work-Related Injury -   Type of Injury -       PHYSICAL EXAM:    Visit Vitals  /89   Pulse 71   Ht 5' 3\" (1.6 m)   Wt 160 lb (72.6 kg)   LMP 10/08/2012   BMI 28.34 kg/m²     The patient is a well-developed, well-nourished female   in no acute distress. The patient is alert and oriented times three. The patient is alert and oriented times three. Mood and affect are normal.  LYMPHATIC: lymph nodes are not enlarged and are within normal limits  SKIN: normal in color and non tender to palpation. There are no bruises or abrasions noted. NEUROLOGICAL: Motor sensory exam is within normal limits. Reflexes are equal bilaterally.  There is normal sensation to pinprick and light touch  MUSCULOSKELETAL:  Examination Left knee Right knee   Skin Intact Intact   Range of motion 0-130 0-130   Effusion + +   Medial joint line tenderness + +   Lateral joint line tenderness - -   Tenderness Pes Bursa - -   Tenderness insertion MCL - -   Tenderness insertion LCL - -   Tos - -   Patella crepitus + +   Patella grind + +   Lachman - -   Pivot shift - -   Anterior drawer - -   Posterior drawer - -   Varus stress - -   Valgus stress - -   Neurovascular Intact Intact   Calf Swelling and Tenderness to Palpation - -   Madelyn's Test - -   Hamstring Cord Tightness - -     PROCEDURE: After sterile prep, 4 cc of Xylocaine and 1 cc of Kenalog were injected into the bilateral  knees. [facility]  OFFICE PROCEDURE PROGRESS NOTE        Chart reviewed for the following:  Shelly Perez MD, have reviewed the History, Physical and updated the Allergic reactions for Carlos Buenrostro     TIME OUT performed immediately prior to start of procedure:  Shelly Perez MD, have performed the following reviews on Carlos Buenrostro prior to the start of the procedure:            * Patient was identified by name and date of birth   * Agreement on procedure being performed was verified  * Risks and Benefits explained to the patient  * Procedure site verified and marked as necessary  * Patient was positioned for comfort  * Consent was signed and verified     Time: 8:49 AM    Date of procedure: 12/12/2019    Procedure performed by:  Silvia Davis MD    Provider assisted by: (see medication administration)    How tolerated by patient: tolerated the procedure well with no complications    Comments: none    IMAGING: XR of b/l knees dated 12/12/19 was reviewed and read by Dr. Daniel Parikh: Decreased joint space on medial side with moderate degenerative changes of the patellofemoral joint with slight varus angulation bilaterally.     MRI of the right knee dated 1/24/18 was reviewed and read:   IMPRESSION:   1.  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 Mild  tricompartmental degenerative changes. 2.  Posterior horn of the medial meniscus has a focus of abnormal signal of uncertain significance. IMPRESSION:      ICD-10-CM ICD-9-CM    1. Primary osteoarthritis of both knees M17.0 715.16 meloxicam (MOBIC) 15 mg tablet      DRAIN/INJECT LARGE JOINT/BURSA      TRIAMCINOLONE ACETONIDE INJ      triamcinolone acetonide (KENALOG) 40 mg/mL injection   2. Pain in both knees, unspecified chronicity M25.561 719.46 AMB POC XRAY, KNEE; 1/2 VIEWS    M25.562  AMB POC XRAY, KNEE; 1/2 VIEWS        PLAN:   1. Pt presents today with b/l knee pain due to primary OA and I would like to try injections today. I advised her that she may need to modify her exercise routine to include more low-impact activities. She was also given a prescription for Mobic today. Risk factors include: htn  2. Yes cortisone injection indicated today B/L KNEES  3. No Physical/Occupational Therapy indicated today  4. No diagnostic test indicated today  5. No durable medical equipment indicated today  6. No referral indicated today   7. Yes medications indicated today MOBIC  8. No Narcotic indicated today     RTC 4 weeks      Scribed by Madelin Bose) as dictated by North Anders MD    I, Dr. North Anders, confirm that all documentation is accurate.     North Anders M.D.   [facility]

## 2020-01-09 ENCOUNTER — OFFICE VISIT (OUTPATIENT)
Dept: ORTHOPEDIC SURGERY | Age: 50
End: 2020-01-09

## 2020-01-09 VITALS
WEIGHT: 154.4 LBS | TEMPERATURE: 96.6 F | DIASTOLIC BLOOD PRESSURE: 84 MMHG | RESPIRATION RATE: 16 BRPM | OXYGEN SATURATION: 98 % | BODY MASS INDEX: 27.36 KG/M2 | HEIGHT: 63 IN | HEART RATE: 65 BPM | SYSTOLIC BLOOD PRESSURE: 137 MMHG

## 2020-01-09 DIAGNOSIS — M17.0 PRIMARY OSTEOARTHRITIS OF BOTH KNEES: Primary | ICD-10-CM

## 2020-01-09 NOTE — PROGRESS NOTES
Carlos Buenrostro  1970   Chief Complaint   Patient presents with    Knee Pain     [place] knee pain         HISTORY OF PRESENT ILLNESS  Carlos Pena is a 51 y.o. female who presents today for reevaluation of b/l knee pain. Patient rates pain as 3/10 today. At last OV on 12/12/19, patient had a cortisone injection in both knees which provided great relief. Pt is doing well today. She completed a series of Euflexxa injections in December 2017. Pt is a runner. She states she used to be morbidly obese, lost a lot of weight with running. Patient denies any fever, chills, chest pain, shortness of breath or calf pain. There are no new illness or injuries to report since last seen in the office. No changes in medications, allergies, social or family history. Pain Assessment  1/9/2020   Location of Pain Knee   Location Modifiers Right;Left   Severity of Pain 3   Quality of Pain Aching   Quality of Pain Comment Stiffness   Duration of Pain Persistent   Frequency of Pain Constant   Date Pain First Started -   Aggravating Factors Standing;Walking;Stairs;Squatting   Aggravating Factors Comment Hard to stand up some days    Limiting Behavior -   Relieving Factors Rest;Elevation   Result of Injury No   Work-Related Injury -   Type of Injury -       PHYSICAL EXAM:    Visit Vitals  /84   Pulse 65   Temp 96.6 °F (35.9 °C) (Oral)   Resp 16   Ht 5' 3\" (1.6 m)   Wt 154 lb 6.4 oz (70 kg)   LMP 10/08/2012   SpO2 98%   BMI 27.35 kg/m²     The patient is a well-developed, well-nourished female   in no acute distress. The patient is alert and oriented times three. The patient is alert and oriented times three. Mood and affect are normal.  LYMPHATIC: lymph nodes are not enlarged and are within normal limits  SKIN: normal in color and non tender to palpation. There are no bruises or abrasions noted. NEUROLOGICAL: Motor sensory exam is within normal limits. Reflexes are equal bilaterally.  There is normal sensation to pinprick and light touch  MUSCULOSKELETAL:  Examination Left knee Right knee   Skin Intact Intact   Range of motion 0-130 0-130   Effusion + +   Medial joint line tenderness + +   Lateral joint line tenderness - -   Tenderness Pes Bursa - -   Tenderness insertion MCL - -   Tenderness insertion LCL - -   Tos - -   Patella crepitus + +   Patella grind + +   Lachman - -   Pivot shift - -   Anterior drawer - -   Posterior drawer - -   Varus stress - -   Valgus stress - -   Neurovascular Intact Intact   Calf Swelling and Tenderness to Palpation - -   Madelyn's Test - -   Hamstring Cord Tightness - -     IMAGING: XR of b/l knees dated 12/12/19 was reviewed and read by Dr. Tevin Gould: Decreased joint space on medial side with moderate degenerative changes of the patellofemoral joint with slight varus angulation bilaterally. MRI of the right knee dated 1/24/18 was reviewed and read:   IMPRESSION:   1.  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 Mild  tricompartmental degenerative changes. 2.  Posterior horn of the medial meniscus has a focus of abnormal signal of uncertain significance. IMPRESSION:      ICD-10-CM ICD-9-CM    1. Primary osteoarthritis of both knees M17.0 715.16         PLAN:   1. Pt presents today with b/l knee pain due to primary OA that was helped by the cortisone injections given at last OV. Pt is doing well today. I advised her that she may need to modify her activities, avoid high-impact activities, squatting, kneeling. She can return as needed. Risk factors include: htn  2. No cortisone injection indicated today   3. No Physical/Occupational Therapy indicated today  4. No diagnostic test indicated today  5. No durable medical equipment indicated today  6. No referral indicated today   7. No medications indicated today  8.  No Narcotic indicated today     RTC prn      Scribed by [employer]) as dictated by Raz Magallanes MD    IDr. Sadia Goodrich, confirm that all documentation is accurate.     Sadia Goodrich M.D.   [facility]

## 2020-01-09 NOTE — PROGRESS NOTES
1. Have you been to the ER, urgent care clinic since your last visit? Hospitalized since your last visit? No    2. Have you seen or consulted any other health care providers outside of the [address] [place] [address] since your last visit? Include any pap smears or colon screening.  No

## 2020-01-14 ENCOUNTER — HOSPITAL ENCOUNTER (OUTPATIENT)
Dept: LAB | Age: 50
Discharge: HOME | End: 2020-01-14
Payer: OTHER GOVERNMENT

## 2020-01-14 ENCOUNTER — OFFICE VISIT (OUTPATIENT)
Dept: FAMILY MEDICINE CLINIC | Age: 50
End: 2020-01-14

## 2020-01-14 VITALS
BODY MASS INDEX: 27.93 KG/M2 | HEART RATE: 54 BPM | SYSTOLIC BLOOD PRESSURE: 122 MMHG | RESPIRATION RATE: 16 BRPM | OXYGEN SATURATION: 98 % | HEIGHT: 63 IN | TEMPERATURE: 97.6 F | DIASTOLIC BLOOD PRESSURE: 86 MMHG | WEIGHT: 157.6 LBS

## 2020-01-14 DIAGNOSIS — I10 ESSENTIAL HYPERTENSION WITH GOAL BLOOD PRESSURE LESS THAN 130/80: Primary | ICD-10-CM

## 2020-01-14 DIAGNOSIS — M25.562 CHRONIC PAIN OF BOTH KNEES: ICD-10-CM

## 2020-01-14 DIAGNOSIS — R53.83 FATIGUE, UNSPECIFIED TYPE: ICD-10-CM

## 2020-01-14 DIAGNOSIS — R76.8 ELEVATED ANTINUCLEAR ANTIBODY (ANA) LEVEL: ICD-10-CM

## 2020-01-14 DIAGNOSIS — Z12.39 SCREENING FOR BREAST CANCER: ICD-10-CM

## 2020-01-14 DIAGNOSIS — Z23 ENCOUNTER FOR IMMUNIZATION: ICD-10-CM

## 2020-01-14 DIAGNOSIS — K59.00 CONSTIPATION, UNSPECIFIED CONSTIPATION TYPE: ICD-10-CM

## 2020-01-14 DIAGNOSIS — E04.9 ENLARGED THYROID GLAND: ICD-10-CM

## 2020-01-14 DIAGNOSIS — Z90.710 STATUS POST HYSTERECTOMY: ICD-10-CM

## 2020-01-14 DIAGNOSIS — E55.9 VITAMIN D DEFICIENCY: ICD-10-CM

## 2020-01-14 DIAGNOSIS — R79.89 ABNORMAL CBC: ICD-10-CM

## 2020-01-14 DIAGNOSIS — M25.561 CHRONIC PAIN OF BOTH KNEES: ICD-10-CM

## 2020-01-14 DIAGNOSIS — Z86.69 H/O MIGRAINE: ICD-10-CM

## 2020-01-14 DIAGNOSIS — G89.29 CHRONIC PAIN OF BOTH KNEES: ICD-10-CM

## 2020-01-14 DIAGNOSIS — R52 GENERALIZED BODY ACHES: ICD-10-CM

## 2020-01-14 LAB
25(OH)D3 SERPL-MCNC: 14 NG/ML (ref 30–100)
ALBUMIN SERPL-MCNC: 3.8 G/DL (ref 3.4–5)
ALBUMIN/GLOB SERPL: 1.2 {RATIO} (ref 0.8–1.7)
ALP SERPL-CCNC: 87 U/L (ref 45–117)
ALT SERPL-CCNC: 14 U/L (ref 13–56)
ANION GAP SERPL CALC-SCNC: 3 MMOL/L (ref 3–18)
AST SERPL-CCNC: 17 U/L (ref 10–38)
BASOPHILS # BLD: 0 K/UL (ref 0–0.1)
BASOPHILS NFR BLD: 0 % (ref 0–2)
BILIRUB SERPL-MCNC: 0.3 MG/DL (ref 0.2–1)
BUN SERPL-MCNC: 18 MG/DL (ref 7–18)
BUN/CREAT SERPL: 28 (ref 12–20)
CALCIUM SERPL-MCNC: 8.9 MG/DL (ref 8.5–10.1)
CHLORIDE SERPL-SCNC: 109 MMOL/L (ref 100–111)
CO2 SERPL-SCNC: 28 MMOL/L (ref 21–32)
CREAT SERPL-MCNC: 0.65 MG/DL (ref 0.6–1.3)
DIFFERENTIAL METHOD BLD: ABNORMAL
EOSINOPHIL # BLD: 0 K/UL (ref 0–0.4)
EOSINOPHIL NFR BLD: 1 % (ref 0–5)
ERYTHROCYTE [DISTWIDTH] IN BLOOD BY AUTOMATED COUNT: 13.7 % (ref 11.6–14.5)
ERYTHROCYTE [SEDIMENTATION RATE] IN BLOOD: 13 MM/HR (ref 0–20)
GLOBULIN SER CALC-MCNC: 3.3 G/DL (ref 2–4)
GLUCOSE SERPL-MCNC: 83 MG/DL (ref 74–99)
HCT VFR BLD AUTO: 38.1 % (ref 35–45)
HGB BLD-MCNC: 12.4 G/DL (ref 12–16)
LYMPHOCYTES # BLD: 1.9 K/UL (ref 0.9–3.6)
LYMPHOCYTES NFR BLD: 45 % (ref 21–52)
MCH RBC QN AUTO: 29.5 PG (ref 24–34)
MCHC RBC AUTO-ENTMCNC: 32.5 G/DL (ref 31–37)
MCV RBC AUTO: 90.5 FL (ref 74–97)
MONOCYTES # BLD: 0.3 K/UL (ref 0.05–1.2)
MONOCYTES NFR BLD: 8 % (ref 3–10)
NEUTS SEG # BLD: 2 K/UL (ref 1.8–8)
NEUTS SEG NFR BLD: 46 % (ref 40–73)
PLATELET # BLD AUTO: 252 K/UL (ref 135–420)
PMV BLD AUTO: 10.4 FL (ref 9.2–11.8)
POTASSIUM SERPL-SCNC: 3.8 MMOL/L (ref 3.5–5.5)
PROT SERPL-MCNC: 7.1 G/DL (ref 6.4–8.2)
RBC # BLD AUTO: 4.21 M/UL (ref 4.2–5.3)
RHEUMATOID FACT SERPL-ACNC: <10 IU/ML
SODIUM SERPL-SCNC: 140 MMOL/L (ref 136–145)
TSH SERPL DL<=0.05 MIU/L-ACNC: 1.07 UIU/ML (ref 0.36–3.74)
WBC # BLD AUTO: 4.3 K/UL (ref 4.6–13.2)

## 2020-01-14 PROCEDURE — 85025 COMPLETE CBC W/AUTO DIFF WBC: CPT

## 2020-01-14 PROCEDURE — 86038 ANTINUCLEAR ANTIBODIES: CPT

## 2020-01-14 PROCEDURE — 82306 VITAMIN D 25 HYDROXY: CPT

## 2020-01-14 PROCEDURE — 85652 RBC SED RATE AUTOMATED: CPT

## 2020-01-14 PROCEDURE — 86431 RHEUMATOID FACTOR QUANT: CPT

## 2020-01-14 PROCEDURE — 84443 ASSAY THYROID STIM HORMONE: CPT

## 2020-01-14 PROCEDURE — 36415 COLL VENOUS BLD VENIPUNCTURE: CPT

## 2020-01-14 PROCEDURE — 80053 COMPREHEN METABOLIC PANEL: CPT

## 2020-01-14 RX ORDER — AMLODIPINE BESYLATE 5 MG/1
5 TABLET ORAL DAILY
Qty: 90 TAB | Refills: 1 | Status: SHIPPED | OUTPATIENT
Start: 2020-01-14 | End: 2020-10-05 | Stop reason: SDUPTHER

## 2020-01-14 NOTE — PROGRESS NOTES
Patient presents for flu vaccine. Consent obtained, Tolerated procedure well at right deltoid. Patient remained in office 10 minutes post vaccine. No side effects noted.      Lot #  [id number]  exp 06/30/20  [facility]  [address]: [id number]

## 2020-01-14 NOTE — PATIENT INSTRUCTIONS
Vaccine Information Statement Influenza (Flu) Vaccine (Inactivated or Recombinant): What You Need to Know Many Vaccine Information Statements are available in [place] and other languages. See www.immunize.org/vis Hojas de información sobre vacunas están disponibles en español y en muchos otros idiomas. Visite www.immunize.org/vis 1. Why get vaccinated? Influenza vaccine can prevent influenza (flu). Flu is a contagious disease that spreads around the [facility] every year, usually between October and May. Anyone can get the flu, but it is more dangerous for some people. Infants and young children, people 72years of age and older, pregnant women, and people with certain health conditions or a weakened immune system are at greatest risk of flu complications. Pneumonia, bronchitis, sinus infections and ear infections are examples of flu-related complications. If you have a medical condition, such as heart disease, cancer or diabetes, flu can make it worse. Flu can cause fever and chills, sore throat, muscle aches, fatigue, cough, headache, and runny or stuffy nose. Some people may have vomiting and diarrhea, though this is more common in children than adults. Each year thousands of people in the [facility] die from flu, and many more are hospitalized. Flu vaccine prevents millions of illnesses and flu-related visits to the doctor each year. 2. Influenza vaccines CDC recommends everyone 10months of age and older get vaccinated every flu season. Children 6 months through 6years of age may need 2 doses during a single flu season. Everyone else needs only 1 dose each flu season. It takes about 2 weeks for protection to develop after vaccination. There are many flu viruses, and they are always changing. Each year a new flu vaccine is made to protect against three or four viruses that are likely to cause disease in the upcoming flu season.  Even when the vaccine doesnt exactly match these viruses, it may still provide some protection. Influenza vaccine does not cause flu. Influenza vaccine may be given at the same time as other vaccines. 3. Talk with your health care provider Tell your vaccine provider if the person getting the vaccine: 
 Has had an allergic reaction after a previous dose of influenza vaccine, or has any severe, life-threatening allergies.  Has ever had Guillain-Barré Syndrome (also called GBS). In some cases, your health care provider may decide to postpone influenza vaccination to a future visit. People with minor illnesses, such as a cold, may be vaccinated. People who are moderately or severely ill should usually wait until they recover before getting influenza vaccine. Your health care provider can give you more information. 4. Risks of a reaction  Soreness, redness, and swelling where shot is given, fever, muscle aches, and headache can happen after influenza vaccine.  There may be a very small increased risk of Guillain-Barré Syndrome (GBS) after inactivated influenza vaccine (the flu shot). [place]ra [place]oll children who get the flu shot along with pneumococcal vaccine (PCV13), and/or DTaP vaccine at the same time might be slightly more likely to have a seizure caused by fever. Tell your health care provider if a child who is getting flu vaccine has ever had a seizure. People sometimes faint after medical procedures, including vaccination. Tell your provider if you feel dizzy or have vision changes or ringing in the ears. As with any medicine, there is a very remote chance of a vaccine causing a severe allergic reaction, other serious injury, or death. 5. What if there is a serious problem? An allergic reaction could occur after the vaccinated person leaves the clinic.  If you see signs of a severe allergic reaction (hives, swelling of the face and throat, difficulty breathing, a fast heartbeat, dizziness, or weakness), call 9-1-1 and get the person to the nearest hospital. 
 
For other signs that concern you, call your health care provider. Adverse reactions should be reported to the Vaccine Adverse Event Reporting System (VAERS). Your health care provider will usually file this report, or you can do it yourself. Visit the VAERS website at www.vaers. hhs.gov or call 5-735.978.8818. VAERS is only for reporting reactions, and VAERS staff do not give medical advice. 6. The National Vaccine Injury Compensation Program 
 
The [facility] Vaccine Injury Compensation Program (VICP) is a federal program that was created to compensate people who may have been injured by certain vaccines. Visit the VICP website at www.[facility].gov/vaccinecompensation or call 3-569.201.5671 to learn about the program and about filing a claim. There is a time limit to file a claim for compensation. 7. How can I learn more?  Ask your health care provider.  Call your local or state health department.  Contact the Centers for Disease Control and Prevention (CDC): 
- Call 8-810.541.8031 (2-598-KFW-INFO) or 
- Visit CDCs influenza website at www.cdc.gov/flu Vaccine Information Statement (Interim) Inactivated Influenza Vaccine 8/15/2019 
42 OLIVER Melgar [id number] Department of Health and [employer] Centers for Disease Control and Prevention Office Use Only Fatigue: Care Instructions Your Care Instructions Fatigue is a feeling of tiredness, exhaustion, or lack of energy. You may feel fatigue because of too much or not enough activity. It can also come from stress, lack of sleep, boredom, and poor diet. Many medical problems, such as viral infections, can cause fatigue. Emotional problems, especially depression, are often the cause of fatigue. Fatigue is most often a symptom of another problem. Treatment for fatigue depends on the cause.  For example, if you have fatigue because you have a certain health problem, treating this problem also treats your fatigue. If depression or anxiety is the cause, treatment may help. Follow-up care is a key part of your treatment and safety. Be sure to make and go to all appointments, and call your doctor if you are having problems. It's also a good idea to know your test results and keep a list of the medicines you take. How can you care for yourself at home? · Get regular exercise. But don't overdo it. Go back and forth between rest and exercise. · Get plenty of rest. 
· Eat a healthy diet. Do not skip meals, especially breakfast. 
· Reduce your use of caffeine, tobacco, and alcohol. Caffeine is most often found in coffee, tea, cola drinks, and chocolate. · Limit medicines that can cause fatigue. This includes tranquilizers and cold and allergy medicines. When should you call for help? Watch closely for changes in your health, and be sure to contact your doctor if: 
  · You have new symptoms such as fever or a rash.  
  · Your fatigue gets worse.  
  · You have been feeling down, depressed, or hopeless. Or you may have lost interest in things that you usually enjoy.  
  · You are not getting better as expected. Where can you learn more? Go to http://martin-ifeanyi.info/. Enter U767 in the search box to learn more about \"Fatigue: Care Instructions. \" Current as of: June 26, 2019 Content Version: 12.2 © 1945-2853 [employer]. Care instructions adapted under license by [employer] (which disclaims liability or warranty for this information). If you have questions about a medical condition or this instruction, always ask your healthcare professional. [address] any warranty or liability for your use of this information. Goiter: Care Instructions Your Care Instructions A goiter is an enlarged thyroid gland. It can cause swelling in your neck. Your thyroid is found in the front of your neck. It makes a hormone that controls how your body uses energy. Goiters are caused by different things. Some are caused by high or low levels of thyroid hormone. Others are caused by too little iodine in the diet, or a growth or disease in the thyroid. In the [place], most goiters are caused by long-term autoimmune thyroiditis. This is also called Hashimoto's thyroiditis. It happens when the body's immune system damages the thyroid. You may take thyroid hormone to reduce the size of your goiter. Or you may need surgery or radioactive iodine treatment. Some people don't need any treatment. They only need to watch for changes in the goiter. Follow-up care is a key part of your treatment and safety. Be sure to make and go to all appointments, and call your doctor if you are having problems. It's also a good idea to know your test results and keep a list of the medicines you take. How can you care for yourself at home? · Be safe with medicines. Take your medicines exactly as prescribed. Call your doctor if you think you are having a problem with your medicine. You will get more details on the specific medicines your doctor prescribes. When should you call for help? Call 911 anytime you think you may need emergency care. For example, call if: 
  · You have trouble breathing.  
 Watch closely for changes in your health, and be sure to contact your doctor if: 
  · Your eyes turn red and bulge.  
  · You have trouble swallowing.  
  · You feel very tired or weak.  
  · You lose weight but are eating the same or more than usual.  
Where can you learn more? Go to http://martin-ifeanyi.info/. Enter G460 in the search box to learn more about \"Goiter: Care Instructions. \" Current as of: November 6, 2018 Content Version: 12.2 © 7775-0939 [employer].  Care instructions adapted under license by [address] (which disclaims liability or warranty for this information). If you have questions about a medical condition or this instruction, always ask your healthcare professional. [address] any warranty or liability for your use of this information. A Healthy Lifestyle: Care Instructions Your Care Instructions A healthy lifestyle can help you feel good, stay at a healthy weight, and have plenty of energy for both work and play. A healthy lifestyle is something you can share with your whole family. A healthy lifestyle also can lower your risk for serious health problems, such as high blood pressure, heart disease, and diabetes. You can follow a few steps listed below to improve your health and the health of your family. Follow-up care is a key part of your treatment and safety. Be sure to make and go to all appointments, and call your doctor if you are having problems. It's also a good idea to know your test results and keep a list of the medicines you take. How can you care for yourself at home? · Do not eat too much sugar, fat, or fast foods. You can still have dessert and treats now and then. The goal is moderation. · Start small to improve your eating habits. Pay attention to portion sizes, drink less juice and soda pop, and eat more fruits and vegetables. ? Eat a healthy amount of food. A 3-ounce serving of meat, for example, is about the size of a deck of cards. Fill the rest of your plate with vegetables and whole grains. ? Limit the amount of soda and sports drinks you have every day. Drink more water when you are thirsty. ? Eat at least 5 servings of fruits and vegetables every day. It may seem like a lot, but it is not hard to reach this goal. A serving or helping is 1 piece of fruit, 1 cup of vegetables, or 2 cups of leafy, raw vegetables.  Have an apple or some carrot sticks as an afternoon snack instead of a candy bar. Try to have fruits and/or vegetables at every meal. 
· Make exercise part of your daily routine. You may want to start with simple activities, such as walking, bicycling, or slow swimming. Try to be active 30 to 60 minutes every day. You do not need to do all 30 to 60 minutes all at once. For example, you can exercise 3 times a day for 10 or 20 minutes. Moderate exercise is safe for most people, but it is always a good idea to talk to your doctor before starting an exercise program. 
· Keep moving. Adarsh Myesha the lawn, work in the garden, or [employer]. Take the stairs instead of the elevator at work. · If you smoke, quit. People who smoke have an increased risk for heart attack, stroke, cancer, and other lung illnesses. Quitting is hard, but there are ways to boost your chance of quitting tobacco for good. ? Use nicotine gum, patches, or lozenges. ? Ask your doctor about stop-smoking programs and medicines. ? Keep trying. In addition to reducing your risk of diseases in the future, you will notice some benefits soon after you stop using tobacco. If you have shortness of breath or asthma symptoms, they will likely get better within a few weeks after you quit. · Limit how much alcohol you drink. Moderate amounts of alcohol (up to 2 drinks a day for men, 1 drink a day for women) are okay. But drinking too much can lead to liver problems, high blood pressure, and other health problems. Family health If you have a family, there are many things you can do together to improve your health. · Eat meals together as a family as often as possible. · Eat healthy foods. This includes fruits, vegetables, lean meats and dairy, and whole grains. · Include your family in your fitness plan. Most people think of activities such as jogging or tennis as the way to fitness, but there are many ways you and your family can be more active.  Anything that makes you breathe hard and gets your heart pumping is exercise. Here are some tips: 
? Walk to do errands or to take your child to school or the bus. 
? Go for a family bike ride after dinner instead of watching TV. Where can you learn more? Go to http://martin-ifeanyi.info/. Enter A747 in the search box to learn more about \"A Healthy Lifestyle: Care Instructions. \" Current as of: May 28, 2019 Content Version: 12.2 © 6463-9222 [employer]. Care instructions adapted under license by [employer] (which disclaims liability or warranty for this information). If you have questions about a medical condition or this instruction, always ask your healthcare professional. [address] any warranty or liability for your use of this information. High-Fiber Diet: Care Instructions Your Care Instructions A high-fiber diet may help you relieve constipation and feel less bloated. Your doctor and dietitian will help you make a high-fiber eating plan based on your personal needs. The plan will include the things you like to eat. It will also make sure that you get 30 grams of fiber a day. Before you make changes to the way you eat, be sure to talk with your doctor or dietitian. Follow-up care is a key part of your treatment and safety. Be sure to make and go to all appointments, and call your doctor if you are having problems. It's also a good idea to know your test results and keep a list of the medicines you take. How can you care for yourself at home? · You can increase how much fiber you get if you eat more of certain foods. These foods include: 
? Whole-grain breads and cereals. ? Fruits, such as pears, apples, and peaches. Eat the skins, peels, and seeds, if you can. 
? Vegetables, such as broccoli, cabbage, spinach, carrots, asparagus, and squash. ? Starchy vegetables. These include potatoes with skins, kidney beans, and lima beans. · Take a fiber supplement every day if your doctor recommends it. Examples are Benefiber, Citrucel, FiberCon, and Metamucil. Ask your doctor how much to take. · Drink plenty of fluids, enough so that your urine is light yellow or clear like water. If you have kidney, heart, or liver disease and have to limit fluids, talk with your doctor before you increase the amount of fluids you drink. · Get some exercise every day. Exercise helps stool move through the colon. It also helps prevent constipation. · Keep a food diary. Try to notice and write down what foods cause gas, pain, or other symptoms. Then you can avoid these foods. Where can you learn more? Go to http://martin-ifeanyi.info/. Enter R102 in the search box to learn more about \"High-Fiber Diet: Care Instructions. \" Current as of: November 7, 2018 Content Version: 12.2 © 7927-1200 [employer]. Care instructions adapted under license by [employer] (which disclaims liability or warranty for this information). If you have questions about a medical condition or this instruction, always ask your healthcare professional. [place] [address] any warranty or liability for your use of this information. Constipation: Care Instructions Your Care Instructions Constipation means that you have a hard time passing stools (bowel movements). People pass stools from 3 times a day to once every 3 days. What is normal for you may be different. Constipation may occur with pain in the rectum and cramping. The pain may get worse when you try to pass stools. Sometimes there are small amounts of bright red blood on toilet paper or the surface of stools. This is because of enlarged veins near the rectum (hemorrhoids). A few changes in your diet and lifestyle may help you avoid ongoing constipation. Your doctor may also prescribe medicine to help loosen your stool. Some medicines can cause constipation. These include pain medicines and antidepressants. Tell your doctor about all the medicines you take. Your doctor may want to make a medicine change to ease your symptoms. Follow-up care is a key part of your treatment and safety. Be sure to make and go to all appointments, and call your doctor if you are having problems. It's also a good idea to know your test results and keep a list of the medicines you take. How can you care for yourself at home? · Drink plenty of fluids, enough so that your urine is light yellow or clear like water. If you have kidney, heart, or liver disease and have to limit fluids, talk with your doctor before you increase the amount of fluids you drink. · Include high-fiber foods in your diet each day. These include fruits, vegetables, beans, and whole grains. · Get at least 30 minutes of exercise on most days of the week. Walking is a good choice. You also may want to do other activities, such as running, swimming, cycling, or playing tennis or team sports. · Take a fiber supplement, such as Citrucel or Metamucil, every day. Read and follow all instructions on the label. · Schedule time each day for a bowel movement. A daily routine may help. Take your time having your bowel movement. · Support your feet with a small step stool when you sit on the toilet. This helps flex your hips and places your pelvis in a squatting position. · Your doctor may recommend an over-the-counter laxative to relieve your constipation. Examples are Milk of Magnesia and MiraLax. Read and follow all instructions on the label. Do not use laxatives on a long-term basis. When should you call for help? Call your doctor now or seek immediate medical care if: 
  · You have new or worse belly pain.  
  · You have new or worse nausea or vomiting.  
  · You have blood in your stools.  
 Watch closely for changes in your health, and be sure to contact your doctor if:   · Your constipation is getting worse.  
  · You do not get better as expected. Where can you learn more? Go to http://martin-ifeanyi.info/. Enter 21  in the search box to learn more about \"Constipation: Care Instructions. \" Current as of: June 26, 2019 Content Version: 12.2 © 4334-5185 [employer]. Care instructions adapted under license by [employer] (which disclaims liability or warranty for this information). If you have questions about a medical condition or this instruction, always ask your healthcare professional. [place] [address] any warranty or liability for your use of this information.

## 2020-01-15 DIAGNOSIS — E55.9 VITAMIN D DEFICIENCY: ICD-10-CM

## 2020-01-15 RX ORDER — ERGOCALCIFEROL 1.25 MG/1
50000 CAPSULE ORAL
Qty: 12 CAP | Refills: 0 | Status: SHIPPED | OUTPATIENT
Start: 2020-01-15 | End: 2020-11-04 | Stop reason: ALTCHOICE

## 2020-01-15 NOTE — PROGRESS NOTES
Still low vitamin d. Sending drisdol. Wbc count is low but improved from before. Further discussion on follow up visit.

## 2020-01-16 LAB — ANA TITR SER IF: NEGATIVE {TITER}

## 2020-01-17 NOTE — PROGRESS NOTES
Contacted patient and verified identity using name and date of birth (2- identifiers)  Spoke with patient and she verbalized understanding of low Vit D and need for weekly drisdol. WBC count low but improved.  AL[place] is normal. Further discussion at follow-up

## 2020-03-18 ENCOUNTER — HOSPITAL ENCOUNTER (OUTPATIENT)
Dept: MAMMOGRAPHY | Age: 50
Discharge: HOME | End: 2020-03-18
Attending: FAMILY MEDICINE
Payer: OTHER GOVERNMENT

## 2020-03-18 DIAGNOSIS — Z12.39 SCREENING FOR BREAST CANCER: ICD-10-CM

## 2020-03-18 PROCEDURE — 77067 SCR MAMMO BI INCL CAD: CPT

## 2020-04-14 ENCOUNTER — VIRTUAL VISIT (OUTPATIENT)
Dept: FAMILY MEDICINE CLINIC | Age: 50
End: 2020-04-14

## 2020-04-14 DIAGNOSIS — R53.83 FATIGUE, UNSPECIFIED TYPE: ICD-10-CM

## 2020-04-14 DIAGNOSIS — E55.9 VITAMIN D DEFICIENCY: ICD-10-CM

## 2020-04-14 DIAGNOSIS — Z86.69 H/O MIGRAINE: Primary | ICD-10-CM

## 2020-04-14 DIAGNOSIS — M25.561 PAIN IN BOTH KNEES, UNSPECIFIED CHRONICITY: ICD-10-CM

## 2020-04-14 DIAGNOSIS — R52 PAIN OF MULTIPLE SITES: ICD-10-CM

## 2020-04-14 DIAGNOSIS — I10 ESSENTIAL HYPERTENSION: ICD-10-CM

## 2020-04-14 DIAGNOSIS — K59.00 CONSTIPATION, UNSPECIFIED CONSTIPATION TYPE: ICD-10-CM

## 2020-04-14 DIAGNOSIS — E04.9 ENLARGED THYROID GLAND: ICD-10-CM

## 2020-04-14 DIAGNOSIS — M25.562 PAIN IN BOTH KNEES, UNSPECIFIED CHRONICITY: ICD-10-CM

## 2020-04-14 NOTE — PATIENT INSTRUCTIONS
For now keep log for blood pressure. Take tylenol as needed for headache. F/u sooner if problem gets worse.

## 2020-05-12 ENCOUNTER — VIRTUAL VISIT (OUTPATIENT)
Dept: ORTHOPEDIC SURGERY | Age: 50
End: 2020-05-12

## 2020-05-12 ENCOUNTER — VIRTUAL VISIT (OUTPATIENT)
Dept: FAMILY MEDICINE CLINIC | Age: 50
End: 2020-05-12

## 2020-05-12 DIAGNOSIS — M17.0 PRIMARY OSTEOARTHRITIS OF BOTH KNEES: Primary | ICD-10-CM

## 2020-05-12 DIAGNOSIS — M25.562 CHRONIC PAIN OF BOTH KNEES: Primary | ICD-10-CM

## 2020-05-12 DIAGNOSIS — M25.561 CHRONIC PAIN OF BOTH KNEES: Primary | ICD-10-CM

## 2020-05-12 DIAGNOSIS — E55.9 VITAMIN D DEFICIENCY: ICD-10-CM

## 2020-05-12 DIAGNOSIS — I10 ESSENTIAL HYPERTENSION: ICD-10-CM

## 2020-05-12 DIAGNOSIS — E04.9 GOITER: ICD-10-CM

## 2020-05-12 DIAGNOSIS — R76.8 POSITIVE ANA (ANTINUCLEAR ANTIBODY): ICD-10-CM

## 2020-05-12 DIAGNOSIS — G89.29 CHRONIC PAIN OF BOTH KNEES: Primary | ICD-10-CM

## 2020-05-12 DIAGNOSIS — G47.9 TROUBLE IN SLEEPING: ICD-10-CM

## 2020-05-12 DIAGNOSIS — G43.009 MIGRAINE WITHOUT AURA AND WITHOUT STATUS MIGRAINOSUS, NOT INTRACTABLE: ICD-10-CM

## 2020-05-12 RX ORDER — DICLOFENAC SODIUM 10 MG/G
2 GEL TOPICAL 4 TIMES DAILY
Qty: 100 G | Refills: 0 | Status: SHIPPED | OUTPATIENT
Start: 2020-05-12 | End: 2020-12-02

## 2020-05-12 NOTE — PROGRESS NOTES
Andrew Rojo is a 53 y.o. female who was seen by synchronous (real-time) audio-video technology on 5/12/2020 via [employer] me. The visit was performed by myself in the office while the patient was at home. Noemi Burgess LPN helped to initiate the visit and was present during entire visit. Subjective:   Carlos Lopez is a 53 y.o. female who presents today for reevaluation of b/l knee pain. Pain score 5/10. Pain is described as achy and throbbing pain that is constant in nature. Pain is worse in the morning. Pt received cortisone injections for both knees on 12/12/19. Pt states cortisone injections only helped for 3 months. Pt states she received gel injections years ago at Dr. Neville Jalloh office. She states the gel injections lasted close to 8 months. Pt would like to try gel injections again. Patient denies any fever, chills, chest pain, shortness of breath or calf pain. The remainder of the review of systems is negative. There are no new illness or injuries to report since last seen in the office. No changes in medications, allergies, social or family history. Prior to Admission medications    Medication Sig Start Date End Date Taking? Authorizing Provider   diclofenac (VOLTAREN) 1 % gel Apply 2 g to affected area four (4) times daily. 5/12/20   Adryan Jiang MD   ergocalciferol (DRISDOL) 50,000 unit capsule Take 1 Cap by mouth every seven (7) days. 1/15/20   Adryan Jiang MD   amLODIPine (NORVASC) 5 mg tablet Take 1 Tab by mouth daily.  1/14/20   Adryan Jiang MD     No Known Allergies        ROS      Objective:     General: alert, cooperative, no distress   Mental  status: mental status: alert, oriented to person, place, and time, normal mood, behavior, speech, dress, motor activity, and thought processes   Resp: resp: normal effort and no respiratory distress   Neuro: neuro: no gross deficits   Skin: skin: no discoloration or lesions of concern on visible areas   ORTHO exam of :b/l knee unchanged    Due to this being a TeleHealth evaluation, many elements of the physical examination are unable to be assessed. IMAGING: none  XR of b/l knees dated 12/12/19 was reviewed and read by Dr. Crescencio Wylie: Decreased joint space on medial side with moderate degenerative changes of the patellofemoral joint with slight varus angulation bilaterally.     MRI of the right knee dated 1/24/18 was reviewed and read:   IMPRESSION:   1.  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 Mild  tricompartmental degenerative changes. 2.  Posterior horn of the medial meniscus has a focus of abnormal signal of uncertain significance. Assessment & Plan:   Diagnoses and all orders for this visit:    1. Primary osteoarthritis of both knees  -     PROCEDURE AUTHORIZATION TO             1. We will put in authorization for Euflexxa. Risk factors include:   2. No cortisone injection indicated today   3. No Physical/Occupational Therapy indicated today  4. No diagnostic test indicated today:   5. No durable medical equipment indicated today  6. No referral indicated today   7. No medications indicated today:   8. No Narcotic indicated today for short term acute pain secondary to b/l knee pain. Patient given pain medication for short term acute pain relief. Goal is to treat patient according to above plan and to ultimately have patient off all pain medications once appropriate. If chronic pain management is required beyond what is expected for current orthopedic problem, will refer patient to pain management.  was reviewed and will be reviewed with every medication refill request.         RTC after Euflexxa has been approved. We discussed the expected course, resolution and complications of the diagnosis(es) in detail. Medication risks, benefits, costs, interactions, and alternatives were discussed as indicated.   I advised her to contact the office if her condition worsens, changes or fails to improve as anticipated. She expressed understanding with the diagnosis(es) and plan. CPT Codes 29480-53465 for Established Patients may apply to this Telehealth Visit  Time-based coding, delete if not needed: I spent at least 15 minutes with this established patient, and >50% of the time was spent counseling and/or coordinating care regarding both knees    Pursuant to the emergency declaration under the [address] and the [employer], 1135 waiver authority and the [employer] and [employer] Act, this Virtual  Visit was conducted, with patient's consent, to reduce the patient's risk of exposure to COVID-19 and provide continuity of care for an established patient. Services were provided through a video synchronous discussion virtually to substitute for in-person clinic visit.       Vona Pallas MD [phone]

## 2020-05-12 NOTE — PROGRESS NOTES
Lisa Garcia is a 53 y.o. female who was seen by synchronous (real-time) audio-video technology on 5/12/2020. Consent: Ramesh Buenrostro, who was seen by synchronous (real-time) audio-video technology, and/or her healthcare decision maker, is aware that this patient-initiated, Telehealth encounter on 5/12/2020 is a billable service, with coverage as determined by her insurance carrier. She is aware that she may receive a bill and has provided verbal consent to proceed: Yes. Assessment & Plan:   Diagnoses and all orders for this visit:    Chronic pain of both knees/ following ortho: has seen ortho in the past. Problem improved with PT ./ joint injection done. Seen Dr. Anita Aj . Now advised to make an appt as worsening of pain again. Will follow their recommendations. Done referral in case she needs it. Continue symptomatic treatment with Ice and also given NSAID gel. tylenol as needed. -     REFERRAL TO ORTHOPEDICS  -     diclofenac (VOLTAREN) 1 % gel; Apply 2 g to affected area four (4) times daily. , Normal, Disp-100 g, R-0    Positive ALY (antinuclear antibody)/ repeat labs wnl. Also seen rheumatology and no new recommendations. For now will observe. Comments:  seen rheuamtology in the past , no new recommendations. repeat ALY wnl. Essential hypertension: stable. Home blood pressure log stable. Low salt diet. Said systolic between 682-487. And diastolic in 29'V. Goiter/ seen endocrinology/ observe:  No trouble swallowing or change in voice. Will observe. Migraine without aura and without status migrainosus, not intractable: improved at this time. Not on any medication. Comments:  improved since done ear piercing . Trouble in sleeping: for now sleep hygiene. Has chronic fatigue. Could be from lack of sleep and that might be from pain. Will observe and if needed will consider adding Cymbalta or trazodone for sleep. Vitamin D deficiency: on supplement.      Pt understood and agree with the plan   Review        712  Subjective:   Keira Coombs is a 53 y.o. female who was seen for No chief complaint on file. done visit with audio-video technology. H/o hypertension and it has been stable. Asymptomatic. Complaint with taking medication and no side effects. Home blood pressure log systolic between 380-478 and diastolic in 43'I. Chronic bilateral knee pain and lately restarted it . Used to see ortho in the past. Now said it is mild to modecate intensity. Gets swollen on and off. Trouble ambulation. No fall or recent direct injury. Sitting comfortable and did not appear in any acute distress during visit. Had prior MRI and showed degenerative changes and cartilage abnormality. Improved with joint injection and PT. Now restarted problem again since few weeks. No fall or direct injury. Gets swelling on and off. Trouble ambulating. Concern with sleep. Said sometimes feels bad dream.s she is little bit stressed out with ongoing pandemic situation. Social stress as taking care of her daughter study, working full time at home etc.  Does not feel depressed or anxious. Discussed sleep hygiene. Her chronic fatigue on and off. Prior lab showed elevated ALY but repeat was wnl. Seen rheumatology and advised no further recommendations. Symptomatic treatment. Currently taking tylenol and helping for short time. During video visit Denies any headache, dizziness, no chest pain or trouble breathing, no arm or leg weakness. No nausea or vomiting, no weight or appetite changes, no mood changes . No urine or bowel complains, no palpitation, no diaphoresis. No abdominal pain. No cold or cough. No leg swelling. No fever.    Lab Results   Component Value Date/Time    WBC 4.3 (L) 01/14/2020 04:14 PM    HGB 12.4 01/14/2020 04:14 PM    HCT 38.1 01/14/2020 04:14 PM    PLATELET 253 41/41/9725 04:14 PM    MCV 90.5 01/14/2020 04:14 PM     Lab Results   Component Value Date/Time Sodium 140 01/14/2020 04:14 PM    Potassium 3.8 01/14/2020 04:14 PM    Chloride 109 01/14/2020 04:14 PM    CO2 28 01/14/2020 04:14 PM    Anion gap 3 01/14/2020 04:14 PM    Glucose 83 01/14/2020 04:14 PM    BUN 18 01/14/2020 04:14 PM    Creatinine 0.65 01/14/2020 04:14 PM    BUN/Creatinine ratio 28 (H) 01/14/2020 04:14 PM    GFR est AA >60 01/14/2020 04:14 PM    GFR est non-AA >60 01/14/2020 04:14 PM    Calcium 8.9 01/14/2020 04:14 PM    Bilirubin, total 0.3 01/14/2020 04:14 PM    AST (SGOT) 17 01/14/2020 04:14 PM    Alk. phosphatase 87 01/14/2020 04:14 PM    Protein, total 7.1 01/14/2020 04:14 PM    Albumin 3.8 01/14/2020 04:14 PM    Globulin 3.3 01/14/2020 04:14 PM    A-G Ratio 1.2 01/14/2020 04:14 PM    ALT (SGPT) 14 01/14/2020 04:14 PM     Lab Results   Component Value Date/Time    Cholesterol, total 198 05/04/2018 10:07 AM    HDL Cholesterol 72 (H) 05/04/2018 10:07 AM    LDL, calculated 111 (H) 05/04/2018 10:07 AM    VLDL, calculated 15 05/04/2018 10:07 AM    Triglyceride 75 05/04/2018 10:07 AM    CHOL/HDL Ratio 2.8 05/04/2018 10:07 AM     Lab Results   Component Value Date/Time    TSH 1.07 01/14/2020 04:14 PM     Lab Results   Component Value Date/Time    Hemoglobin A1c 5.3 05/04/2018 10:07 AM     Lab Results   Component Value Date/Time    Vitamin D 25-Hydroxy 14.0 (L) 01/14/2020 04:13 PM             Prior to Admission medications    Medication Sig Start Date End Date Taking? Authorizing Provider   diclofenac (VOLTAREN) 1 % gel Apply 2 g to affected area four (4) times daily. 5/12/20  Yes Lindsey Ortiz MD   ergocalciferol (DRISDOL) 50,000 unit capsule Take 1 Cap by mouth every seven (7) days. 1/15/20  Yes Lindsey Ortiz MD   amLODIPine (NORVASC) 5 mg tablet Take 1 Tab by mouth daily. 1/14/20  Yes Lindsey Ortiz MD   meloxicam (MOBIC) 15 mg tablet Take 1 Tab by mouth daily (with breakfast).  12/12/19 5/12/20  Radha Beaver MD     No Known Allergies    Patient Active Problem List    Diagnosis Date Noted    Chronic pain of both knees/ following ortho 06/27/2016    Positive ALY (antinuclear antibody)/ test done by hemotology and requested referral by PCP 05/02/2016    Abnormal CBC/ seen hematology 03/22/2016    Goiter/ seen endocrinology/ observe 12/27/2015    Essential hypertension 12/09/2015    Migraine without aura and without status migrainosus, not intractable 12/09/2015    S/P hysterectomy/ due to menorrhagea  12/09/2015    Allergic rhinitis, seasonal     Anemia NEC      Current Outpatient Medications   Medication Sig Dispense Refill    diclofenac (VOLTAREN) 1 % gel Apply 2 g to affected area four (4) times daily. 100 g 0    ergocalciferol (DRISDOL) 50,000 unit capsule Take 1 Cap by mouth every seven (7) days. 12 Cap 0    amLODIPine (NORVASC) 5 mg tablet Take 1 Tab by mouth daily. 80 Tab 1     No Known Allergies  Past Medical History:   Diagnosis Date    Allergic rhinitis, seasonal     spring    Anemia NEC     iron deficiency associated with heavy menstruation and fibroids noted 2008 with pregnancy    Dysmenorrhea     increasing past year    Fibroid, uterine     Headache(784.0)     Hypertension     Ill-defined condition     Chronic knee pain    Menorrhagia     worsening past year       ROS: see HPI       Objective:     Visit Vitals  LMP 10/08/2012      General: alert, cooperative, no distress   Mental  status: normal mood, behavior, speech, dress, motor activity, and thought processes, able to follow commands   HENT: NCAT   Neck: no visualized mass   Resp: no respiratory distress   Neuro: no gross deficits   Skin: no discoloration or lesions of concern on visible areas   Psychiatric: normal affect, consistent with stated mood, no evidence of hallucinations     Additional exam findings: We discussed the expected course, resolution and complications of the diagnosis(es) in detail. Medication risks, benefits, costs, interactions, and alternatives were discussed as indicated. I advised her to contact the office if her condition worsens, changes or fails to improve as anticipated. She expressed understanding with the diagnosis(es) and plan. Ruel Lopez is a 53 y.o. female who was evaluated by a video visit encounter for concerns as above. Patient identification was verified prior to start of the visit. A caregiver was present when appropriate. Due to this being a TeleHealth encounter ([facility]), evaluation of the following organ systems was limited: Vitals/Constitutional/EENT/Resp/CV/GI//MS/Neuro/Skin/Heme-Lymph-Imm. Pursuant to the emergency declaration under the [facility], 1135 waiver authority and the [employer] and [employer] Act, this Virtual  Visit was conducted, with patient's (and/or legal guardian's) consent, to reduce the patient's risk of exposure to COVID-19 and provide necessary medical care. Services were provided through a video synchronous discussion virtually to substitute for in-person clinic visit. Patient and provider were located at their individual homes.       Paddy Duane, MD

## 2020-05-16 DIAGNOSIS — E55.9 VITAMIN D DEFICIENCY: ICD-10-CM

## 2020-05-18 DIAGNOSIS — E55.9 VITAMIN D DEFICIENCY: Primary | ICD-10-CM

## 2020-05-18 RX ORDER — ERGOCALCIFEROL 1.25 MG/1
50000 CAPSULE ORAL
Qty: 12 CAP | Refills: 0 | OUTPATIENT
Start: 2020-05-18

## 2020-05-18 NOTE — TELEPHONE ENCOUNTER
Advise pt to take otc 2000 units daily for now. Repeat vitamin d and if needed will send drisdol. Further discussion on follow up visit.

## 2020-05-18 NOTE — TELEPHONE ENCOUNTER
Kymberly Nugent [phone] (home)  advising patient that per Dr. Conner Mcmahon she can take otc Vitamin D 2000 units daily for now. Repeat vitamin d and if needed will send drisdol. Further discussion on follow up visit. Patient asked to contact office to schedule virtual visit.

## 2020-06-23 ENCOUNTER — OFFICE VISIT (OUTPATIENT)
Dept: ORTHOPEDIC SURGERY | Age: 50
End: 2020-06-23

## 2020-06-23 VITALS
HEIGHT: 63 IN | RESPIRATION RATE: 16 BRPM | SYSTOLIC BLOOD PRESSURE: 147 MMHG | BODY MASS INDEX: 28 KG/M2 | DIASTOLIC BLOOD PRESSURE: 85 MMHG | TEMPERATURE: 97.7 F | HEART RATE: 79 BPM | WEIGHT: 158 LBS | OXYGEN SATURATION: 98 %

## 2020-06-23 DIAGNOSIS — M17.0 PRIMARY OSTEOARTHRITIS OF BOTH KNEES: Primary | ICD-10-CM

## 2020-06-23 RX ORDER — HYALURONATE SODIUM 10 MG/ML
2 SYRINGE (ML) INTRAARTICULAR ONCE
Qty: 2 ML | Refills: 0
Start: 2020-06-23 | End: 2020-06-23

## 2020-06-23 NOTE — PROGRESS NOTES
1. Have you been to the ER, urgent care clinic since your last visit? Hospitalized since your last visit? No    2. Have you seen or consulted any other health care providers outside of the [address] [place] [address] since your last visit? Include any pap smears or colon screening.  No

## 2020-06-23 NOTE — PROGRESS NOTES
Patient: Florinda Laura                MRN: [id number]       SSN: [id number]  YOB: 1970        AGE: 53 y.o. SEX: female  Body mass index is 27.99 kg/m². PCP: Monet Tripp MD  06/23/20    Chief Complaint   Patient presents with    Knee Pain     Navin knee pain       HISTORY:  Florinda Laura is a 53 y.o. female who is seen for reevaluation of Bilateral knee and here for 1st injection of Euflexxa. PROCEDURE:  The patient's Bilateral knee, after timeout under sterile conditions, was injected with 2 cc of Euflexxa. [facility]  OFFICE PROCEDURE PROGRESS NOTE        Chart reviewed for the following:   Tahira Sheriff MD, have reviewed the History, Physical and updated the Allergic reactions for Carlos Buenrostro     TIME OUT performed immediately prior to start of procedure:   Tahira Sheriff MD, have performed the following reviews on Carlos Buenrostro prior to the start of the procedure:            * Patient was identified by name and date of birth   * Agreement on procedure being performed was verified  * Risks and Benefits explained to the patient  * Procedure site verified and marked as necessary  * Patient was positioned for comfort  * Consent was signed and verified     Time: 1:45 PM       Date of procedure: 6/23/2020    Procedure performed by:  Karan Bradley MD    Provider assisted by: None     How tolerated by patient: tolerated the procedure well with no complications    Comments: none    IMPRESSION:     ICD-10-CM ICD-9-CM    1. Primary osteoarthritis of both knees M17.0 715.16 MS DRAIN/INJECT LARGE JOINT/BURSA      EUFLEXXA INJECTION PER DOSE      sodium hyaluronate (SUPARTZ FX/HYALGAN/GENIVSC) 10 mg/mL syrg injection        PLAN:  Ms. Sandie Graf will return in one week for her second Euflexxa injection.       Scribed by Ambrose Kanner Floyde Stacks) as dictated by Karan Bradley MD    I, Dr. Sola Dodd, confirm that all documentation is accurate.     Sola Dodd M.D.   [facility]

## 2020-06-30 ENCOUNTER — OFFICE VISIT (OUTPATIENT)
Dept: ORTHOPEDIC SURGERY | Age: 50
End: 2020-06-30

## 2020-06-30 VITALS
HEART RATE: 64 BPM | TEMPERATURE: 97.8 F | BODY MASS INDEX: 26.61 KG/M2 | DIASTOLIC BLOOD PRESSURE: 91 MMHG | OXYGEN SATURATION: 99 % | WEIGHT: 150.2 LBS | SYSTOLIC BLOOD PRESSURE: 155 MMHG | HEIGHT: 63 IN | RESPIRATION RATE: 15 BRPM

## 2020-06-30 DIAGNOSIS — M17.0 PRIMARY OSTEOARTHRITIS OF BOTH KNEES: Primary | ICD-10-CM

## 2020-06-30 RX ORDER — HYALURONATE SODIUM 10 MG/ML
2 SYRINGE (ML) INTRAARTICULAR ONCE
Qty: 2 ML | Refills: 0
Start: 2020-06-30 | End: 2020-06-30

## 2020-06-30 NOTE — PROGRESS NOTES
Patient: Keira Coombs                MRN: [id number]       SSN: [id number]  YOB: 1970        AGE: 53 y.o. SEX: female  Body mass index is 26.61 kg/m². PCP: Promise Bell MD  06/30/20    Chief Complaint   Patient presents with    Knee Pain     ankit knee pain       HISTORY:  Keira Coombs is a 53 y.o. female who is seen for reevaluation of Bilateral knee and here for 2nd injection of Euflexxa. PROCEDURE:  The patient's Bilateral knee, after timeout under sterile conditions, was injected with 2 cc of Euflexxa. [facility]  OFFICE PROCEDURE PROGRESS NOTE        Chart reviewed for the following:   Ale Thrasher MD, have reviewed the History, Physical and updated the Allergic reactions for Carlos Buenrostro     TIME OUT performed immediately prior to start of procedure:   Ale Thrasher MD, have performed the following reviews on Carlos Buenrostro prior to the start of the procedure:            * Patient was identified by name and date of birth   * Agreement on procedure being performed was verified  * Risks and Benefits explained to the patient  * Procedure site verified and marked as necessary  * Patient was positioned for comfort  * Consent was signed and verified     Time: 9:10 AM    Date of procedure: 6/30/2020    Procedure performed by:  Maricruz Frey MD    Provider assisted by: None     How tolerated by patient: tolerated the procedure well with no complications    Comments: none    IMPRESSION:     ICD-10-CM ICD-9-CM    1. Primary osteoarthritis of both knees M17.0 715.16 [place] DRAIN/INJECT LARGE JOINT/BURSA      EUFLEXXA INJECTION PER DOSE      sodium hyaluronate (SUPARTZ FX/HYALGAN/GENIVSC) 10 mg/mL syrg injection        PLAN:  Ms. Yadiel Farnsworth will return in one week for her third Euflexxa injection.       Scribed by Keven Eng [address]) as dictated by Maricruz Frey MD    IDr. David Rios, confirm that all documentation is accurate.     David Rios M.D.   [facility]

## 2020-07-06 NOTE — PROGRESS NOTES
Patient: Damian Bond                MRN: [id number]       SSN: [id number]  YOB: 1970        AGE: 53 y.o. SEX: female  Body mass index is 28.52 kg/m². PCP: Maritza Cotter MD  07/07/20    Chief Complaint   Patient presents with    Knee Pain     bilateral knee injections       HISTORY:  Damian Bond is a 53 y.o. female who is seen for reevaluation of Bilateral knee and here for 3rd and final injection of Euflexxa. PROCEDURE:  The patient's Bilateral knee, after timeout under sterile conditions, was injected with 2 cc of Euflexxa. [facility]  OFFICE PROCEDURE PROGRESS NOTE        Chart reviewed for the following:   Lottie Griggs MD, have reviewed the History, Physical and updated the Allergic reactions for Carlos Buenrostro     TIME OUT performed immediately prior to start of procedure:   Lottie Griggs MD, have performed the following reviews on Carlos Buenrostro prior to the start of the procedure:            * Patient was identified by name and date of birth   * Agreement on procedure being performed was verified  * Risks and Benefits explained to the patient  * Procedure site verified and marked as necessary  * Patient was positioned for comfort  * Consent was signed and verified     Time: 2:19 PM       Date of procedure: 7/7/2020    Procedure performed by:  Avani Villegas MD    Provider assisted by: None     How tolerated by patient: tolerated the procedure well with no complications    Comments: none    IMPRESSION:     ICD-10-CM ICD-9-CM    1. Primary osteoarthritis of both knees M17.0 715.16 [place] DRAIN/INJECT LARGE JOINT/BURSA      EUFLEXXA INJECTION PER DOSE      sodium hyaluronate (SUPARTZ FX/HYALGAN/GENIVSC) 10 mg/mL syrg injection        PLAN: Ms. Lawson Valencia has completed her Euflexxa injection series. she will return as needed.       Scribed by [facility]) as dictated by MD ORLIN Hendrix, Dr. Sydnie Corea, confirm that all documentation is accurate.     Sydnie Corea M.D.   [facility]

## 2020-07-07 ENCOUNTER — OFFICE VISIT (OUTPATIENT)
Dept: ORTHOPEDIC SURGERY | Age: 50
End: 2020-07-07

## 2020-07-07 VITALS
OXYGEN SATURATION: 100 % | TEMPERATURE: 98.4 F | RESPIRATION RATE: 16 BRPM | HEIGHT: 63 IN | BODY MASS INDEX: 28.53 KG/M2 | WEIGHT: 161 LBS | HEART RATE: 61 BPM | SYSTOLIC BLOOD PRESSURE: 146 MMHG | DIASTOLIC BLOOD PRESSURE: 104 MMHG

## 2020-07-07 DIAGNOSIS — M17.0 PRIMARY OSTEOARTHRITIS OF BOTH KNEES: Primary | ICD-10-CM

## 2020-07-07 RX ORDER — HYALURONATE SODIUM 10 MG/ML
2 SYRINGE (ML) INTRAARTICULAR ONCE
Qty: 2 ML | Refills: 0
Start: 2020-07-07 | End: 2020-07-07

## 2020-08-13 ENCOUNTER — TELEPHONE (OUTPATIENT)
Dept: FAMILY MEDICINE CLINIC | Age: 50
End: 2020-08-13

## 2020-08-13 NOTE — TELEPHONE ENCOUNTER
Patient is requesting (New  Updated) referral to the following office:    Speciality:  Orthopedic   Specialist Name:  Jose Guadalupe Susy  Office Location: [address] [place] [address], [address]   Phone (if available):  [phone]  Fax (if available): [phone]   Diagnosis: left hip pain   Date of appointment (if scheduled):  None  prime

## 2020-08-20 DIAGNOSIS — M25.551 HIP PAIN, RIGHT: Primary | ICD-10-CM

## 2020-08-25 ENCOUNTER — TELEPHONE (OUTPATIENT)
Dept: ORTHOPEDIC SURGERY | Age: 50
End: 2020-08-25

## 2020-08-25 NOTE — TELEPHONE ENCOUNTER
Patient is requesting a refill on meloxicam (MOBIC) 15 mg. I show this was d/c by the patient's PCP on 5/12/20 citing \"alternate therapy\". Please advise and pend new Rx if appropriate. Preferred Pharmacy is [facility] in [place]    Last visit:  7/7/20 with MD Claudia Mares  Next appt:   Advised to follow-up PRN  Previous refill encounter:  12/12/19 #30 with 1 refill

## 2020-08-25 NOTE — TELEPHONE ENCOUNTER
Looks like her pcp put her on voltaren gel instead of mobic.  Will defer to PCP if she can be on mobic as well

## 2020-09-01 ENCOUNTER — OFFICE VISIT (OUTPATIENT)
Dept: ORTHOPEDIC SURGERY | Age: 50
End: 2020-09-01

## 2020-09-01 VITALS
DIASTOLIC BLOOD PRESSURE: 91 MMHG | WEIGHT: 158 LBS | OXYGEN SATURATION: 98 % | HEART RATE: 65 BPM | SYSTOLIC BLOOD PRESSURE: 141 MMHG | TEMPERATURE: 98.7 F | HEIGHT: 63 IN | RESPIRATION RATE: 16 BRPM | BODY MASS INDEX: 28 KG/M2

## 2020-09-01 DIAGNOSIS — M17.0 PRIMARY OSTEOARTHRITIS OF BOTH KNEES: ICD-10-CM

## 2020-09-01 DIAGNOSIS — M16.11 PRIMARY OSTEOARTHRITIS OF RIGHT HIP: ICD-10-CM

## 2020-09-01 DIAGNOSIS — M16.12 PRIMARY OSTEOARTHRITIS OF LEFT HIP: Primary | ICD-10-CM

## 2020-09-01 DIAGNOSIS — M25.552 BILATERAL HIP PAIN: ICD-10-CM

## 2020-09-01 DIAGNOSIS — M25.551 BILATERAL HIP PAIN: ICD-10-CM

## 2020-09-01 NOTE — PROGRESS NOTES
Carlos Buenrostro  1970   Chief Complaint   Patient presents with    Knee Pain     follow up on the bilateral knee pain    Hip Pain     New pain in bilateral hips. HISTORY OF PRESENT ILLNESS  Carlos Payan is a 53 y.o. female who presents today for reevaluation of b/l knee pain. Patient rates pain as 5/10 today. Pt finished a series of Euflexxa injections for both knees on 7/07/2020 which provided minimal relief. Pt received cortisone injections for both knees on 12/12/19. Pt states cortisone injections only helped for 3 months. She received gel injections years ago at Dr. Haydee Bernard office. She states the gel injections lasted close to 8 months. Pt also complains of b/l hip pain today. Pain has been present for 2-3 years. Pain has gotten worse. Pain in the hips is 6/10 today. Pt has pain in the groin region. She has been to see a Rheumatologist and Endocrinologist in the past. Patient denies any fever, chills, chest pain, shortness of breath or calf pain. The remainder of the review of systems is negative. There are no new illness or injuries to report since last seen in the office. There are no changes to medications, allergies, family or social history.      Pain Assessment  9/1/2020   Location of Pain Hip   Location Modifiers Right;Left   Severity of Pain 6   Quality of Pain Aching   Quality of Pain Comment -   Duration of Pain Persistent   Frequency of Pain Constant   Date Pain First Started -   Aggravating Factors Exercise   Aggravating Factors Comment -   Limiting Behavior Yes   Relieving Factors Other (Comment)   Relieving Factors Comment medication   Result of Injury No   Work-Related Injury -   Type of Injury -       PHYSICAL EXAM:   Visit Vitals  BP (!) 141/91 (BP 1 Location: Right arm, BP Patient Position: Sitting)   Pulse 65   Temp 98.7 °F (37.1 °C)   Resp 16   Ht 5' 3\" (1.6 m)   Wt 158 lb (71.7 kg)   LMP 10/08/2012   SpO2 98%   BMI 27.99 kg/m²     The patient is a well-developed, well-nourished female   in no acute distress. The patient is alert and oriented times three. The patient is alert and oriented times three. Mood and affect are normal.  LYMPHATIC: lymph nodes are not enlarged and are within normal limits  SKIN: normal in color and non tender to palpation. There are no bruises or abrasions noted. NEUROLOGICAL: Motor sensory exam is within normal limits. Reflexes are equal bilaterally.  There is normal sensation to pinprick and light touch  MUSCULOSKELETAL:  Examination Left knee Right knee   Skin Intact Intact   Range of motion 0-130 0-130   Effusion + +   Medial joint line tenderness + +   Lateral joint line tenderness - -   Tenderness Pes Bursa - -   Tenderness insertion MCL - -   Tenderness insertion LCL - -   Tos - -   Patella crepitus + +   Patella grind + +   Lachman - -   Pivot shift - -   Anterior drawer - -   Posterior drawer - -   Varus stress - -   Valgus stress - -   Neurovascular Intact Intact   Calf Swelling and Tenderness to Palpation - -   Madelyn's Test - -   Hamstring Cord Tightness - -         Examination Left hip Right hip   Skin Intact Intact   Flexion ROM 80 80   Extension ROM 0 0   External Rotation ROM 10 10   Internal Rotation ROM 0 0   Abduction ROM 20 20   Adduction ROM 20 20   FADDIR - -   MEHUL - -   Log roll test - -   Trochanteric tenderness - -   Yen test - -   Neurovascular Intact Intact         IMAGING: XR of right hip obtained in the office dated 9/01/2020 was reviewed and read by Dr. Claudia Mares: Marked degenerative arthritis      XR of left hip obtained in the office dated 9/01/2020 was reviewed and read by Dr. Claudia Mares: Marked degenerative arthritis         XR of b/l knees dated 12/12/19 was reviewed and read by Dr. Claudia Mares: Decreased joint space on medial side with moderate degenerative changes of the patellofemoral joint with slight varus angulation bilaterally.       MRI of the right knee dated 1/24/18 was reviewed and read:   IMPRESSION:   1.  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 Mild  tricompartmental degenerative changes. 2.  Posterior horn of the medial meniscus has a focus of abnormal signal of uncertain significance. IMPRESSION:      ICD-10-CM ICD-9-CM    1. Primary osteoarthritis of left hip  M16.12 715.15 REFERRAL TO RHEUMATOLOGY      XR FLUORO GUIDE ASP/BX/INJ/LOC   2. Bilateral hip pain  M25.551 719.45 AMB POC X-RAY HIPS, BILAT, 2+ VIEWS    M25.552  REFERRAL TO RHEUMATOLOGY      CANCELED: AMB POC X-RAY RADEX HIP UNI WITH PELVIS 2-3 VIEWS      CANCELED: AMB POC XRAY, HIPS, BILAT, MIN 5 VIEWS   3. Primary osteoarthritis of right hip  M16.11 715.15 REFERRAL TO RHEUMATOLOGY      XR FLUORO GUIDE ASP/BX/INJ/LOC   4. Primary osteoarthritis of both knees  M17.0 715.16         PLAN:   1. Pt presents today with b/l knee pain due to primary OA that persists despite the Euflexxa injections. She also complains of b/l hip pain due to primary OA and I am putting in an order for b/l intraarticular hip injections under fluoroscopy. Given her complaints of multiple joint pain, I believe she would benefit from a referral to Dr. Gaby Mcfarland in Rheumatology. I am also referring her to Dr. Mariah Hook to discuss further treatment of the hips. Risk factors include: n/a  2. No ultrasound exam indicated today  3. Yes cortisone injection indicated today B/L HIP INTRAARTICULAR UNDER FLUORO  4. No Physical/Occupational Therapy indicated today  5. No diagnostic test indicated today:   6. No durable medical equipment indicated today  7. Yes referral indicated today [address]  8. No medications indicated today:   9. No Narcotic indicated today     RTC prn      Scribed by [address]) as dictated by Mckenna Gann MD    I, Dr. Mckenna Gann, confirm that all documentation is accurate.     Mckenna Gann M.D.   [facility]

## 2020-09-02 ENCOUNTER — TELEPHONE (OUTPATIENT)
Dept: ORTHOPEDIC SURGERY | Age: 50
End: 2020-09-02

## 2020-09-02 RX ORDER — MELOXICAM 15 MG/1
15 TABLET ORAL
Qty: 30 TAB | Refills: 2 | Status: SHIPPED | OUTPATIENT
Start: 2020-09-02 | End: 2020-12-02 | Stop reason: CLARIF

## 2020-09-02 RX ORDER — MELOXICAM 15 MG/1
15 TABLET ORAL DAILY
Qty: 30 TAB | Refills: 1 | Status: SHIPPED | OUTPATIENT
Start: 2020-09-02 | End: 2020-10-02 | Stop reason: SDUPTHER

## 2020-09-02 NOTE — TELEPHONE ENCOUNTER
Patient reports that she never filled the rx Voltaren. Can we prescribe the Mobic or another pain medicine to be called in to [facility]? Please advise patient at [phone].

## 2020-09-02 NOTE — TELEPHONE ENCOUNTER
Spoke with patient and informed her that handicap placard is ready to be picked up at the [facility] location.

## 2020-09-02 NOTE — TELEPHONE ENCOUNTER
PT STATES HER MEDICATION FOR MOBIC WAS SENT TO THE WRONG [facility]. IT WAS SENT TO THE [facility]. .., SHE NEEDS IT SENT TO THE ONE IN [facility] LOCATION    PLEASE CONTACT THE PT TO CONFIRM WHEN DONE,   PT M#[phone]

## 2020-09-02 NOTE — TELEPHONE ENCOUNTER
Patient is requesting a temporary handicap placard due to her knee and hip issues. Please advise patient at [phone].

## 2020-09-08 ENCOUNTER — TELEPHONE (OUTPATIENT)
Dept: FAMILY MEDICINE CLINIC | Age: 50
End: 2020-09-08

## 2020-09-08 DIAGNOSIS — M16.12 PRIMARY OSTEOARTHRITIS OF LEFT HIP: ICD-10-CM

## 2020-09-08 DIAGNOSIS — M16.11 PRIMARY OSTEOARTHRITIS OF RIGHT HIP: Primary | ICD-10-CM

## 2020-09-08 NOTE — TELEPHONE ENCOUNTER
Patient is requesting (New  Updated) referral to the following office:    Speciality: orthopedic   Specialist Name: Patricia Gabriel   Office Location: [address] [place] [address]  Phone (if available): [phone]  Fax (if available): [phone]  Diagnosis: M16.12 (ICD-10-CM) - Primary osteoarthritis of left hip  M16.11 (ICD-10-CM) - Primary osteoarthritis of right hip   Date of appointment (if scheduled): Estephanie Campbell

## 2020-09-10 NOTE — TELEPHONE ENCOUNTER
[address] [address] approval has been received for consult with Dr. Julienne Hooker; Auth: [id number] beginning 9/04/2020 and expires 9/04/2021 valid four visits. The approval has been faxed to Dr. Leslie Reeves. A fax confirmation has been received.

## 2020-09-14 ENCOUNTER — HOSPITAL ENCOUNTER (OUTPATIENT)
Dept: GENERAL RADIOLOGY | Age: 50
Discharge: HOME | End: 2020-09-14
Attending: ORTHOPAEDIC SURGERY
Payer: OTHER GOVERNMENT

## 2020-09-14 DIAGNOSIS — M16.12 PRIMARY OSTEOARTHRITIS OF LEFT HIP: ICD-10-CM

## 2020-09-14 DIAGNOSIS — M16.11 PRIMARY OSTEOARTHRITIS OF RIGHT HIP: ICD-10-CM

## 2020-09-14 PROCEDURE — 77002 NEEDLE LOCALIZATION BY XRAY: CPT

## 2020-09-14 PROCEDURE — 74011250636 HC RX REV CODE- 250/636: Performed by: ORTHOPAEDIC SURGERY

## 2020-09-14 PROCEDURE — 74011000636 HC RX REV CODE- 636: Performed by: ORTHOPAEDIC SURGERY

## 2020-09-14 PROCEDURE — 74011000250 HC RX REV CODE- 250: Performed by: ORTHOPAEDIC SURGERY

## 2020-09-14 RX ORDER — METHYLPREDNISOLONE ACETATE 40 MG/ML
40 INJECTION, SUSPENSION INTRA-ARTICULAR; INTRALESIONAL; INTRAMUSCULAR; SOFT TISSUE
Status: COMPLETED | OUTPATIENT
Start: 2020-09-14 | End: 2020-09-14

## 2020-09-14 RX ORDER — LIDOCAINE HYDROCHLORIDE 10 MG/ML
30 INJECTION, SOLUTION EPIDURAL; INFILTRATION; INTRACAUDAL; PERINEURAL
Status: COMPLETED | OUTPATIENT
Start: 2020-09-14 | End: 2020-09-14

## 2020-09-14 RX ADMIN — METHYLPREDNISOLONE ACETATE 40 MG: 40 INJECTION, SUSPENSION INTRA-ARTICULAR; INTRALESIONAL; INTRAMUSCULAR; SOFT TISSUE at 10:01

## 2020-09-14 RX ADMIN — METHYLPREDNISOLONE ACETATE 40 MG: 40 INJECTION, SUSPENSION INTRA-ARTICULAR; INTRALESIONAL; INTRAMUSCULAR; SOFT TISSUE at 09:59

## 2020-09-14 RX ADMIN — LIDOCAINE HYDROCHLORIDE 24 ML: 10 INJECTION, SOLUTION EPIDURAL; INFILTRATION; INTRACAUDAL; PERINEURAL at 09:59

## 2020-09-14 RX ADMIN — IOPAMIDOL 4 ML: 408 INJECTION, SOLUTION INTRATHECAL at 09:59

## 2020-09-14 NOTE — PROCEDURES
RADIOLOGY POST PROCEDURE NOTE     September 14, 2020       10:02 AM     Preoperative Diagnosis:   Bilateral hip pain    Postoperative Diagnosis:  Same. :  HELEN Carrero    Assistant:  None. Attending: Lorena Crawford MD    Type of Anesthesia: 1% plain lidocaine    Procedure/Description:  Image guided bilateral hip steroid injection    Findings:   Successful bilateral hip steroid injection. Images saved for documentation. Estimated blood Loss:  None    Specimen Removed:   No    Contrast: Less than 5 mL    Blood transfusions:  None. Implants:  None.     Complications: None    Condition: Stable    Discharge Plan:  discharge home     [address] [place] [address]

## 2020-10-02 ENCOUNTER — OFFICE VISIT (OUTPATIENT)
Dept: ORTHOPEDIC SURGERY | Age: 50
End: 2020-10-02
Payer: OTHER GOVERNMENT

## 2020-10-02 VITALS
HEIGHT: 63 IN | WEIGHT: 161 LBS | SYSTOLIC BLOOD PRESSURE: 127 MMHG | HEART RATE: 70 BPM | TEMPERATURE: 97.3 F | OXYGEN SATURATION: 91 % | DIASTOLIC BLOOD PRESSURE: 85 MMHG | BODY MASS INDEX: 28.53 KG/M2

## 2020-10-02 DIAGNOSIS — M16.12 PRIMARY OSTEOARTHRITIS OF LEFT HIP: ICD-10-CM

## 2020-10-02 DIAGNOSIS — M16.11 PRIMARY OSTEOARTHRITIS OF RIGHT HIP: Primary | ICD-10-CM

## 2020-10-02 PROCEDURE — 99214 OFFICE O/P EST MOD 30 MIN: CPT | Performed by: ORTHOPAEDIC SURGERY

## 2020-10-02 NOTE — PROGRESS NOTES
[address] [facility] [address] [id number]  [phone]           Patient: Venice Holstein                MRN: [id number]       SSN: [id number]  YOB: 1970        AGE: 53 y.o. SEX: female  Body mass index is 28.52 kg/m². PCP: Manju Erazo MD  10/02/20      This office note has been dictated. REVIEW OF SYSTEMS:  Constitutional: Negative for fever, chills, weight loss and malaise/fatigue. HENT: Negative. Eyes: Negative. Respiratory: Negative. Cardiovascular: Negative. Gastrointestinal: No bowel incontinence or constipation. Genitourinary: No bladder incontinence or saddle anesthesia. Skin: Negative. Neurological: Negative. Endo/Heme/Allergies: Negative. Psychiatric/Behavioral: Negative. Musculoskeletal: As per HPI above. Past Medical History:   Diagnosis Date    Allergic rhinitis, seasonal     spring    Anemia NEC     iron deficiency associated with heavy menstruation and fibroids noted 2008 with pregnancy    Dysmenorrhea     increasing past year    Fibroid, uterine     Headache(784.0)     Hypertension     Ill-defined condition     Chronic knee pain    Menorrhagia     worsening past year         Current Outpatient Medications:     meloxicam (Mobic) 15 mg tablet, Take 1 Tab by mouth daily (with breakfast). , Disp: 30 Tab, Rfl: 2    amLODIPine (NORVASC) 5 mg tablet, Take 1 Tab by mouth daily. , Disp: 90 Tab, Rfl: 1    diclofenac (VOLTAREN) 1 % gel, Apply 2 g to affected area four (4) times daily. , Disp: 100 g, Rfl: 0    ergocalciferol (DRISDOL) 50,000 unit capsule, Take 1 Cap by mouth every seven (7) days. , Disp: 12 Cap, Rfl: 0    No Known Allergies    Social History     Socioeconomic History    Marital status:      Spouse name: Not on file    Number of children: Not on file    Years of education: Not on file    Highest education level: Not on file   Occupational History    Not on file   Social Needs    Financial resource strain: Not on file    Food insecurity     Worry: Not on file     Inability: Not on file    Transportation needs     Medical: Not on file     Non-medical: Not on file   Tobacco Use    Smoking status: Never Smoker    Smokeless tobacco: Never Used   Substance and Sexual Activity    Alcohol use: Yes     Alcohol/week: 0.0 standard drinks     Comment: occass: 2/month    Drug use: No    Sexual activity: Yes     Partners: Male     Birth control/protection: Surgical     Comment: hysterectomy   Lifestyle    Physical activity     Days per week: Not on file     Minutes per session: Not on file    Stress: Not on file   Relationships    Social connections     Talks on phone: Not on file     Gets together: Not on file     Attends Anabaptist service: Not on file     Active member of club or organization: Not on file     Attends meetings of clubs or organizations: Not on file     Relationship status: Not on file    Intimate partner violence     Fear of current or ex partner: Not on file     Emotionally abused: Not on file     Physically abused: Not on file     Forced sexual activity: Not on file   Other Topics Concern    Not on file   Social History Narrative    Not on file       Past Surgical History:   Procedure Laterality Date   Maggie Beverly HYST[facility]CTOMY  04/2014    [facility]    HX KNEE ARTHROSCOPY Right              Patient seen and evaluated today for complaints of bilateral hip pain left greater than right. She had intra-articular injection of the hips about 3 weeks ago only lasted for couple days. She is having pain which is affecting her quality of life. She has trouble put on her shoes and socks. She has trouble getting in and out of a car as well as an out of bed. She has night pain. She has felt a pressure of the hips like to get back to a better quality of life. Patient denies recent fevers, chills, chest pain, SOB, or injuries.    No recent systemic changes noted. A 12-point review of systems is performed today. Pertinent positives are noted. All other systems reviewed and otherwise are negative. Physical exam: General: Alert and oriented x3, nad.  well-developed, well nourished. normal affect, AF. NC/AT, EOMI, neck supple, trachea midline, no JVD present. Breathing is non-labored. Examination of lower extremities reveals decreased range of motion the hips with reproduction of groin thigh discomfort bilaterally. She has negative straight leg raise. Negative calf tenderness. Negative Homans. Signs of DVT present. The leg lengths show the left lower extremity slightly longer grossly sitting in a chair. Radiographs reviewed from previous does confirm end-stage arthritis of each of the hips. Assessment: Bilateral hip end-stage arthritis    Plan: At this point, the patient was seen and evaluated Dr. Cindy Goldman and told replacements were discussed. We will plan on moving forward with a left total replacement. The alternatives, risks, complications, as well as expected outcome were discussed and the patient understands and wished to proceed. Please asked that she provide medical clearance for this upcoming surgery.         JR Josh MARTIN, PAScarlettC, ATC

## 2020-10-05 DIAGNOSIS — I10 ESSENTIAL HYPERTENSION WITH GOAL BLOOD PRESSURE LESS THAN 130/80: ICD-10-CM

## 2020-10-06 ENCOUNTER — HOSPITAL ENCOUNTER (OUTPATIENT)
Dept: LAB | Age: 50
Discharge: HOME | End: 2020-10-06
Payer: OTHER GOVERNMENT

## 2020-10-06 ENCOUNTER — TELEPHONE (OUTPATIENT)
Dept: FAMILY MEDICINE CLINIC | Age: 50
End: 2020-10-06

## 2020-10-06 DIAGNOSIS — M16.12 PRIMARY OSTEOARTHRITIS OF LEFT HIP: ICD-10-CM

## 2020-10-06 LAB
ALBUMIN SERPL-MCNC: 3.7 G/DL (ref 3.4–5)
ALBUMIN/GLOB SERPL: 1.1 {RATIO} (ref 0.8–1.7)
ALP SERPL-CCNC: 96 U/L (ref 45–117)
ALT SERPL-CCNC: 15 U/L (ref 13–56)
ANION GAP SERPL CALC-SCNC: 4 MMOL/L (ref 3–18)
AST SERPL-CCNC: 13 U/L (ref 10–38)
BILIRUB SERPL-MCNC: 0.5 MG/DL (ref 0.2–1)
BUN SERPL-MCNC: 10 MG/DL (ref 7–18)
BUN/CREAT SERPL: 15 (ref 12–20)
CALCIUM SERPL-MCNC: 8.8 MG/DL (ref 8.5–10.1)
CHLORIDE SERPL-SCNC: 113 MMOL/L (ref 100–111)
CO2 SERPL-SCNC: 29 MMOL/L (ref 21–32)
CREAT SERPL-MCNC: 0.67 MG/DL (ref 0.6–1.3)
CRP SERPL-MCNC: <0.3 MG/DL (ref 0–0.3)
ERYTHROCYTE [DISTWIDTH] IN BLOOD BY AUTOMATED COUNT: 13.5 % (ref 11.6–14.5)
ERYTHROCYTE [SEDIMENTATION RATE] IN BLOOD: 11 MM/HR (ref 0–20)
GLOBULIN SER CALC-MCNC: 3.4 G/DL (ref 2–4)
GLUCOSE SERPL-MCNC: 86 MG/DL (ref 74–99)
HCT VFR BLD AUTO: 39 % (ref 35–45)
HGB BLD-MCNC: 12.8 G/DL (ref 12–16)
MCH RBC QN AUTO: 29.7 PG (ref 24–34)
MCHC RBC AUTO-ENTMCNC: 32.8 G/DL (ref 31–37)
MCV RBC AUTO: 90.5 FL (ref 74–97)
PLATELET # BLD AUTO: 221 K/UL (ref 135–420)
PMV BLD AUTO: 10.9 FL (ref 9.2–11.8)
POTASSIUM SERPL-SCNC: 3.3 MMOL/L (ref 3.5–5.5)
PROT SERPL-MCNC: 7.1 G/DL (ref 6.4–8.2)
RBC # BLD AUTO: 4.31 M/UL (ref 4.2–5.3)
RHEUMATOID FACT SERPL-ACNC: <10 IU/ML
SODIUM SERPL-SCNC: 146 MMOL/L (ref 136–145)
URATE SERPL-MCNC: 2.8 MG/DL (ref 2.6–7.2)
WBC # BLD AUTO: 2.5 K/UL (ref 4.6–13.2)

## 2020-10-06 PROCEDURE — 36415 COLL VENOUS BLD VENIPUNCTURE: CPT

## 2020-10-06 PROCEDURE — 85652 RBC SED RATE AUTOMATED: CPT

## 2020-10-06 PROCEDURE — 85027 COMPLETE CBC AUTOMATED: CPT

## 2020-10-06 PROCEDURE — 86431 RHEUMATOID FACTOR QUANT: CPT

## 2020-10-06 PROCEDURE — 83520 IMMUNOASSAY QUANT NOS NONAB: CPT

## 2020-10-06 PROCEDURE — 84550 ASSAY OF BLOOD/URIC ACID: CPT

## 2020-10-06 PROCEDURE — 86140 C-REACTIVE PROTEIN: CPT

## 2020-10-06 PROCEDURE — 86038 ANTINUCLEAR ANTIBODIES: CPT

## 2020-10-06 PROCEDURE — 80053 COMPREHEN METABOLIC PANEL: CPT

## 2020-10-06 NOTE — TELEPHONE ENCOUNTER
Spoke with patient (all identifiers verified) to inquire if she had a consult with [facility] on current  referral dated 8/27/2020. She stated she has not seen [address] yet and requested to be referred to another rheumatologist. She wants to check with ortho to see if they want her to see a particular rheumatologist, as they had done a referral as well. Patient will return call to [facility] with provider info.

## 2020-10-06 NOTE — TELEPHONE ENCOUNTER
Pt is not comfortable with the rheumatologist  that she has been seeing because she feels that she was misdiagnosis when she saw her before. She would like to know if she can get another referral and if so does she have to go through the process all over again or can it just be changed with . Please advise.

## 2020-10-07 ENCOUNTER — TELEPHONE (OUTPATIENT)
Dept: ORTHOPEDIC SURGERY | Age: 50
End: 2020-10-07

## 2020-10-07 LAB — IL6 SERPL-MCNC: <0.7 PG/ML (ref 0–15.5)

## 2020-10-07 RX ORDER — AMLODIPINE BESYLATE 5 MG/1
5 TABLET ORAL DAILY
Qty: 90 TAB | Refills: 1 | Status: SHIPPED | OUTPATIENT
Start: 2020-10-07 | End: 2021-04-13 | Stop reason: SDUPTHER

## 2020-10-07 NOTE — TELEPHONE ENCOUNTER
Patient contacted the [facility] to inquire about who we recommended for a Rheumatologist. Per Jocelin Helms, Dr. Eliza Ruiz. Patient notified of this and his office phone number was provided. [phone].

## 2020-10-08 LAB — ANA TITR SER IF: NEGATIVE {TITER}

## 2020-10-14 ENCOUNTER — TELEPHONE (OUTPATIENT)
Dept: FAMILY MEDICINE CLINIC | Age: 50
End: 2020-10-14

## 2020-10-15 ENCOUNTER — TELEPHONE (OUTPATIENT)
Dept: FAMILY MEDICINE CLINIC | Age: 50
End: 2020-10-15

## 2020-10-15 DIAGNOSIS — D72.819 LEUKOPENIA, UNSPECIFIED TYPE: Primary | ICD-10-CM

## 2020-10-15 NOTE — TELEPHONE ENCOUNTER
Pt came into the office for oncologist/hemotatlogist updated referral. Please call pt at your earliest convenience.

## 2020-10-15 NOTE — TELEPHONE ENCOUNTER
Contacted patient and verified identity using name and date of birth (2- identifiers)  Spoke with patient and she stated that she previously saw Gaviota Tadeo for low WBC count. She states that it was recently checked and is low @ 2.5. Labs ordered by Halima Balbuena @[place].  Please advise

## 2020-10-16 NOTE — TELEPHONE ENCOUNTER
[address] [address] approval has been received for consult with Dr. Monet Walters; Auth: [id number] beginning 10/10/2020 and expires 10/10/2021 valid 25 visits. An appointment request has been faxed to [facility], along with the  approval. A fax confirmation has been received. [facility] will contact patient to schedule.

## 2020-10-19 ENCOUNTER — TELEPHONE (OUTPATIENT)
Dept: FAMILY MEDICINE CLINIC | Age: 50
End: 2020-10-19

## 2020-10-20 ENCOUNTER — HOSPITAL ENCOUNTER (OUTPATIENT)
Dept: LAB | Age: 50
Discharge: HOME | End: 2020-10-20
Payer: OTHER GOVERNMENT

## 2020-10-20 ENCOUNTER — OFFICE VISIT (OUTPATIENT)
Dept: FAMILY MEDICINE CLINIC | Age: 50
End: 2020-10-20
Payer: OTHER GOVERNMENT

## 2020-10-20 VITALS
RESPIRATION RATE: 16 BRPM | BODY MASS INDEX: 28.7 KG/M2 | HEIGHT: 63 IN | HEART RATE: 59 BPM | WEIGHT: 162 LBS | TEMPERATURE: 98.3 F | OXYGEN SATURATION: 98 % | SYSTOLIC BLOOD PRESSURE: 146 MMHG | DIASTOLIC BLOOD PRESSURE: 100 MMHG

## 2020-10-20 DIAGNOSIS — E04.9 ENLARGED THYROID GLAND: ICD-10-CM

## 2020-10-20 DIAGNOSIS — G89.29 CHRONIC PAIN OF BOTH HIPS: ICD-10-CM

## 2020-10-20 DIAGNOSIS — R35.0 URINARY FREQUENCY: Primary | ICD-10-CM

## 2020-10-20 DIAGNOSIS — R35.0 URINARY FREQUENCY: ICD-10-CM

## 2020-10-20 DIAGNOSIS — G89.29 CHRONIC PAIN OF BOTH KNEES: ICD-10-CM

## 2020-10-20 DIAGNOSIS — M25.552 CHRONIC PAIN OF BOTH HIPS: ICD-10-CM

## 2020-10-20 DIAGNOSIS — D72.819 LEUKOPENIA, UNSPECIFIED TYPE: ICD-10-CM

## 2020-10-20 DIAGNOSIS — M25.551 CHRONIC PAIN OF BOTH HIPS: ICD-10-CM

## 2020-10-20 DIAGNOSIS — M25.562 CHRONIC PAIN OF BOTH KNEES: ICD-10-CM

## 2020-10-20 DIAGNOSIS — Z23 ENCOUNTER FOR IMMUNIZATION: ICD-10-CM

## 2020-10-20 DIAGNOSIS — R80.8 OTHER PROTEINURIA: ICD-10-CM

## 2020-10-20 DIAGNOSIS — R03.0 ELEVATED BLOOD PRESSURE READING: ICD-10-CM

## 2020-10-20 DIAGNOSIS — M25.561 CHRONIC PAIN OF BOTH KNEES: ICD-10-CM

## 2020-10-20 LAB
BILIRUB UR QL STRIP: ABNORMAL
GLUCOSE UR-MCNC: NEGATIVE MG/DL
KETONES P FAST UR STRIP-MCNC: ABNORMAL MG/DL
PH UR STRIP: 7 [PH] (ref 4.6–8)
PROT UR QL STRIP: ABNORMAL
SP GR UR STRIP: 1.02 (ref 1–1.03)
UA UROBILINOGEN AMB POC: ABNORMAL (ref 0.2–1)
URINALYSIS CLARITY POC: CLEAR
URINALYSIS COLOR POC: ABNORMAL
URINE BLOOD POC: NEGATIVE
URINE LEUKOCYTES POC: NEGATIVE
URINE NITRITES POC: NEGATIVE

## 2020-10-20 PROCEDURE — 87086 URINE CULTURE/COLONY COUNT: CPT

## 2020-10-20 PROCEDURE — 90686 IIV4 VACC NO PRSV 0.5 ML IM: CPT | Performed by: FAMILY MEDICINE

## 2020-10-20 PROCEDURE — 81003 URINALYSIS AUTO W/O SCOPE: CPT | Performed by: FAMILY MEDICINE

## 2020-10-20 PROCEDURE — 90471 IMMUNIZATION ADMIN: CPT | Performed by: FAMILY MEDICINE

## 2020-10-20 PROCEDURE — 99214 OFFICE O/P EST MOD 30 MIN: CPT | Performed by: FAMILY MEDICINE

## 2020-10-20 NOTE — PATIENT INSTRUCTIONS
Influenza (Flu) Vaccine (Inactivated or Recombinant): What You Need to Know Why get vaccinated? Influenza vaccine can prevent influenza (flu). Flu is a contagious disease that spreads around the [place] every year, usually between October and May. Anyone can get the flu, but it is more dangerous for some people. Infants and young children, people 72years of age and older, pregnant women, and people with certain health conditions or a weakened immune system are at greatest risk of flu complications. Pneumonia, bronchitis, sinus infections and ear infections are examples of flu-related complications. If you have a medical condition, such as heart disease, cancer or diabetes, flu can make it worse. Flu can cause fever and chills, sore throat, muscle aches, fatigue, cough, headache, and runny or stuffy nose. Some people may have vomiting and diarrhea, though this is more common in children than adults. Each year, thousands of people in the [facility] die from flu, and many more are hospitalized. Flu vaccine prevents millions of illnesses and flu-related visits to the doctor each year. Influenza vaccine CDC recommends everyone 10months of age and older get vaccinated every flu season. Children 6 months through 6years of age may need 2 doses during a single flu season. Everyone else needs only 1 dose each flu season. It takes about 2 weeks for protection to develop after vaccination. There are many flu viruses, and they are always changing. Each year a new flu vaccine is made to protect against three or four viruses that are likely to cause disease in the upcoming flu season. Even when the vaccine doesn't exactly match these viruses, it may still provide some protection. Influenza vaccine does not cause flu. Influenza vaccine may be given at the same time as other vaccines. Talk with your health care provider Tell your vaccine provider if the person getting the vaccine: · Has had an allergic reaction after a previous dose of influenza vaccine, or has any severe, life-threatening allergies. · Has ever had Guillain-Barré Syndrome (also called GBS). In some cases, your health care provider may decide to postpone influenza vaccination to a future visit. People with minor illnesses, such as a cold, may be vaccinated. People who are moderately or severely ill should usually wait until they recover before getting influenza vaccine. Your health care provider can give you more information. Risks of a vaccine reaction · Soreness, redness, and swelling where shot is given, fever, muscle aches, and headache can happen after influenza vaccine. · There may be a very small increased risk of Guillain-Barré Syndrome (GBS) after inactivated influenza vaccine (the flu shot). Dionisio San children who get the flu shot along with pneumococcal vaccine (PCV13), and/or DTaP vaccine at the same time might be slightly more likely to have a seizure caused by fever. Tell your health care provider if a child who is getting flu vaccine has ever had a seizure. People sometimes faint after medical procedures, including vaccination. Tell your provider if you feel dizzy or have vision changes or ringing in the ears. As with any medicine, there is a very remote chance of a vaccine causing a severe allergic reaction, other serious injury, or death. What if there is a serious problem? An allergic reaction could occur after the vaccinated person leaves the clinic. If you see signs of a severe allergic reaction (hives, swelling of the face and throat, difficulty breathing, a fast heartbeat, dizziness, or weakness), call 9-1-1 and get the person to the nearest hospital. 
For other signs that concern you, call your health care provider. Adverse reactions should be reported to the Vaccine Adverse Event Reporting System (VAERS).  Your health care provider will usually file this report, or you can do it yourself. Visit the VAERS website at www.vaers. [facility].gov or call 9-794.376.1031. VAERS is only for reporting reactions, and VAERS staff do not give medical advice. The National Vaccine Injury Compensation Program 
The National Vaccine Injury Compensation Program (VICP) is a federal program that was created to compensate people who may have been injured by certain vaccines. Visit the VICP website at www.hrsa.gov/vaccinecompensation or call 8-637.904.9499 to learn about the program and about filing a claim. There is a time limit to file a claim for compensation. How can I learn more? · Ask your healthcare provider. · Call your local or state health department. · Contact the Centers for Disease Control and Prevention (CDC): 
? Call 6-344.815.2887 (1-800-CDC-INFO) or 
? Visit CDC's website at www.cdc.gov/flu Vaccine Information Statement (Interim) Inactivated Influenza Vaccine 8/15/2019 
[address] [id number] [facility] and [place] Centers for Disease Control and Prevention Many Vaccine Information Statements are available in [place] and other languages. See www.immunize.org/vis. Muchas hojas de información sobre vacunas están disponibles en español y en otros idiomas. Visite www.immunize.org/vis. Care instructions adapted under license by [employer] (which disclaims liability or warranty for this information). If you have questions about a medical condition or this instruction, always ask your healthcare professional. [place] [address] any warranty or liability for your use of this information.

## 2020-10-20 NOTE — PROGRESS NOTES
HISTORY OF PRESENT ILLNESS  Carlos Laughlin is a 53 y.o. female. HPI: here with c/o urine frequency. No blood in urine. No abdominal pain. No nausea or vomiting. No back pain. Going on since one month. Noted on UA +1 protein and ketone. Negative for infection and glucose. No h/o diabetes. Denies any increase use of fluid. Sitting comfortable and did not appear in any acute distress. Currently following ortho with bilateral knee and hip pain. Going to have a hip replacement procedure in December. Will be following ortho recommendations. Visit Vitals  BP (!) 146/100 (BP 1 Location: Left arm, BP Patient Position: Sitting)   Pulse (!) 59   Temp 98.3 °F (36.8 °C) (Oral)   Resp 16   Ht 5' 3\" (1.6 m)   Wt 162 lb (73.5 kg)   SpO2 98%   BMI 28.70 kg/m²     Review medication list, vitals, problem list,allergies. Noted elevated blood pressure. has not taken her medication for blood pressure since 3 days. Asymptomatic. Will have f/u nurse visit for BP check. Review labs. Lab Results   Component Value Date/Time    WBC 2.5 (L) 10/06/2020 09:11 AM    HGB 12.8 10/06/2020 09:11 AM    HCT 39.0 10/06/2020 09:11 AM    PLATELET 254 18/75/3028 09:11 AM    MCV 90.5 10/06/2020 09:11 AM     Lab Results   Component Value Date/Time    Sodium 146 (H) 10/06/2020 09:11 AM    Potassium 3.3 (L) 10/06/2020 09:11 AM    Chloride 113 (H) 10/06/2020 09:11 AM    CO2 29 10/06/2020 09:11 AM    Anion gap 4 10/06/2020 09:11 AM    Glucose 86 10/06/2020 09:11 AM    BUN 10 10/06/2020 09:11 AM    Creatinine 0.67 10/06/2020 09:11 AM    BUN/Creatinine ratio 15 10/06/2020 09:11 AM    GFR est AA >60 10/06/2020 09:11 AM    GFR est non-AA >60 10/06/2020 09:11 AM    Calcium 8.8 10/06/2020 09:11 AM    Bilirubin, total 0.5 10/06/2020 09:11 AM    Alk.  phosphatase 96 10/06/2020 09:11 AM    Protein, total 7.1 10/06/2020 09:11 AM    Albumin 3.7 10/06/2020 09:11 AM    Globulin 3.4 10/06/2020 09:11 AM    A-G Ratio 1.1 10/06/2020 09:11 AM    ALT (SGPT) 15 10/06/2020 09:11 AM    AST (SGOT) 13 10/06/2020 09:11 AM     Lab Results   Component Value Date/Time    Cholesterol, total 198 05/04/2018 10:07 AM    HDL Cholesterol 72 (H) 05/04/2018 10:07 AM    LDL, calculated 111 (H) 05/04/2018 10:07 AM    VLDL, calculated 15 05/04/2018 10:07 AM    Triglyceride 75 05/04/2018 10:07 AM    CHOL/HDL Ratio 2.8 05/04/2018 10:07 AM     Lab Results   Component Value Date/Time    TSH 1.07 01/14/2020 04:14 PM     Lab Results   Component Value Date/Time    Hemoglobin A1c 5.3 05/04/2018 10:07 AM     Lab Results   Component Value Date/Time    Vitamin D 25-Hydroxy 14.0 (L) 01/14/2020 04:13 PM       Lab Results   Component Value Date/Time    Vitamin B12 381 04/16/2011 12:00 AM    Folate 13.2 04/16/2011 12:00 AM     Negative ALY, rheumatoid factor, ESR, CRP. In the past she was also evaluated by rheumatologist and endocrinologist.  Prior thyroid ultrasound showed enlarged thyroid gland. Her thyroid functions TSH all is within normal limits. [place] [facility] , she does not recall the name of the provider retreat has been few years back. Currently sitting comfortable and did not appear in any acute distress  No trouble swallowing or change in voice  Also for leukopenia she  has pending appointment with hematology. Also has a pending appointment with rheumatology      ROS: See HPI    Physical Exam  Pulmonary:      Effort: Pulmonary effort is normal. No respiratory distress. Breath sounds: No wheezing. Abdominal:      Palpations: Abdomen is soft. Tenderness: There is no abdominal tenderness. Musculoskeletal:         General: No swelling. Lymphadenopathy:      Cervical: No cervical adenopathy. Neurological:      Mental Status: She is alert and oriented to person, place, and time. Psychiatric:         Behavior: Behavior normal.         ASSESSMENT and PLAN    ICD-10-CM ICD-9-CM    1. Urinary frequency : UA showed no signs of infection.   +1 protein and ketone. Will send culture as she is symptomatic with urine frequency and urgency. Repeat the UA in couple of weeks R35.0 788.41 AMB POC URINALYSIS DIP STICK AUTO W/O MICRO      CULTURE, URINE      URINALYSIS W/ RFLX MICROSCOPIC   2. Encounter for immunization  Z23 V03.89 INFLUENZA VIRUS VAC QUAD,SPLIT,PRESV FREE SYRINGE IM   3. Leukopenia, unspecified type: Pending appointment with hematology for further evaluation. Seen hematology in the past around in 2016. D72.819 288.50    4. Chronic pain of both hips: Been following Ortho. Also pending appointment with rheumatology. Baseline autoimmune lab work has been negative from myself and from Ortho M25.551 719.45     M25.552 338.29     G89.29     5. Chronic pain of both knees  M25.561 719.46     M25.562 338.29     G89.29     6. Enlarged thyroid gland: By ultrasound generalized enlarged gland. TSH within normal limits E04.9 240.9    7. Elevated blood pressure reading: Asymptomatic. Elevated blood pressure as she has not taken her blood pressure medicine since 3  days. Will ask her to come for nurse visit in 2 weeks R03.0 796.2    8. Other proteinuria: Recheck labs and if still persist will consider probably nephrology input R80.8 791.0 URINALYSIS W/ RFLX MICROSCOPIC   Patient understood and agreed with the plan  Review health maintenance    Follow-up and Dispositions    · Return in about 2 months (around 12/20/2020) for 2 wks for bp check as nurse visit. same time she can have urine dip stick as a nurse visit. Zhang Kelley

## 2020-10-22 LAB
BACTERIA SPEC CULT: NORMAL
CC UR VC: NORMAL
SERVICE CMNT-IMP: NORMAL

## 2020-10-22 NOTE — PROGRESS NOTES
Not enough bacteria to treat. Will observe for now. If continue symptoms please advise to call office.

## 2020-11-04 ENCOUNTER — CLINICAL SUPPORT (OUTPATIENT)
Dept: FAMILY MEDICINE CLINIC | Age: 50
End: 2020-11-04
Payer: OTHER GOVERNMENT

## 2020-11-04 VITALS — SYSTOLIC BLOOD PRESSURE: 130 MMHG | DIASTOLIC BLOOD PRESSURE: 90 MMHG

## 2020-11-04 DIAGNOSIS — I10 ESSENTIAL HYPERTENSION: ICD-10-CM

## 2020-11-04 DIAGNOSIS — N39.0 URINARY TRACT INFECTION WITHOUT HEMATURIA, SITE UNSPECIFIED: Primary | ICD-10-CM

## 2020-11-04 LAB
BILIRUB UR QL STRIP: NEGATIVE
GLUCOSE UR-MCNC: NEGATIVE MG/DL
KETONES P FAST UR STRIP-MCNC: NEGATIVE MG/DL
PH UR STRIP: 7.5 [PH] (ref 4.6–8)
PROT UR QL STRIP: NORMAL
SP GR UR STRIP: 1.01 (ref 1–1.03)
UA UROBILINOGEN AMB POC: NORMAL (ref 0.2–1)
URINALYSIS CLARITY POC: CLEAR
URINALYSIS COLOR POC: NORMAL
URINE BLOOD POC: NEGATIVE
URINE LEUKOCYTES POC: NEGATIVE
URINE NITRITES POC: NEGATIVE

## 2020-11-04 PROCEDURE — 81003 URINALYSIS AUTO W/O SCOPE: CPT | Performed by: FAMILY MEDICINE

## 2020-11-04 RX ORDER — ACETAMINOPHEN 500 MG
TABLET ORAL
Qty: 1 KIT | Refills: 0 | Status: SHIPPED | OUTPATIENT
Start: 2020-11-04

## 2020-11-04 NOTE — PROGRESS NOTES
Chief Complaint   Patient presents with    Blood Pressure Check    Bladder Infection     recheck     BP in office today 130/90. Patient reports to taking her Amlodipine approximately 30 minutes prior to arrival. She is unable to check BP at home as her machine is not working. Discussed with Dr. Freddie Pryor. Patient to  Continue:   Amlodipine 5 mg daily   Low Sodium Diet   BP machine sent to pharmacy for daily BP monitoring    Follow-up as scheduled 12/8/20. Patient verbalized understanding.

## 2020-11-23 ENCOUNTER — TELEPHONE (OUTPATIENT)
Dept: FAMILY MEDICINE CLINIC | Age: 50
End: 2020-11-23

## 2020-11-23 NOTE — TELEPHONE ENCOUNTER
Maria R Sanchez from Dr Rain Muñiz office is requesting a pre op for pt to have a [address] replacement on 12/15/2020 she will have her lab, xray and ekg on 12/1/2020 and will be having general anesthesia. Please call Maria R Sanchez at [phone] to schedule and they are looking for a date around 12/8/2020 if possible.  Thank you

## 2020-11-24 DIAGNOSIS — Z01.818 PREOPERATIVE TESTING: Primary | ICD-10-CM

## 2020-11-24 DIAGNOSIS — M16.12 PRIMARY OSTEOARTHRITIS OF LEFT HIP: ICD-10-CM

## 2020-12-01 ENCOUNTER — HOSPITAL ENCOUNTER (OUTPATIENT)
Dept: GENERAL RADIOLOGY | Age: 50
Discharge: HOME | End: 2020-12-01
Payer: OTHER GOVERNMENT

## 2020-12-01 ENCOUNTER — HOSPITAL ENCOUNTER (OUTPATIENT)
Dept: PREADMISSION TESTING | Age: 50
Discharge: HOME | End: 2020-12-01
Payer: OTHER GOVERNMENT

## 2020-12-01 DIAGNOSIS — Z01.818 PREOPERATIVE TESTING: ICD-10-CM

## 2020-12-01 DIAGNOSIS — M16.12 PRIMARY OSTEOARTHRITIS OF LEFT HIP: ICD-10-CM

## 2020-12-01 LAB
ABO + RH BLD: NORMAL
ALBUMIN SERPL-MCNC: 4 G/DL (ref 3.4–5)
ANION GAP SERPL CALC-SCNC: 4 MMOL/L (ref 3–18)
APPEARANCE UR: CLEAR
APTT PPP: 29.4 SEC (ref 23–36.4)
BASOPHILS # BLD: 0 K/UL (ref 0–0.1)
BASOPHILS NFR BLD: 1 % (ref 0–2)
BILIRUB UR QL: NEGATIVE
BLOOD GROUP ANTIBODIES SERPL: NORMAL
BUN SERPL-MCNC: 11 MG/DL (ref 7–18)
BUN/CREAT SERPL: 17 (ref 12–20)
CALCIUM SERPL-MCNC: 8.7 MG/DL (ref 8.5–10.1)
CHLORIDE SERPL-SCNC: 110 MMOL/L (ref 100–111)
CO2 SERPL-SCNC: 30 MMOL/L (ref 21–32)
COLOR UR: YELLOW
CREAT SERPL-MCNC: 0.63 MG/DL (ref 0.6–1.3)
DIFFERENTIAL METHOD BLD: ABNORMAL
EOSINOPHIL # BLD: 0 K/UL (ref 0–0.4)
EOSINOPHIL NFR BLD: 1 % (ref 0–5)
ERYTHROCYTE [DISTWIDTH] IN BLOOD BY AUTOMATED COUNT: 13.4 % (ref 11.6–14.5)
EST. AVERAGE GLUCOSE BLD GHB EST-MCNC: 108 MG/DL
GLUCOSE SERPL-MCNC: 87 MG/DL (ref 74–99)
GLUCOSE UR STRIP.AUTO-MCNC: NEGATIVE MG/DL
HBA1C MFR BLD: 5.4 % (ref 4.2–5.6)
HCT VFR BLD AUTO: 39.2 % (ref 35–45)
HGB BLD-MCNC: 13.2 G/DL (ref 12–16)
HGB UR QL STRIP: NEGATIVE
INR PPP: 1 (ref 0.8–1.2)
KETONES UR QL STRIP.AUTO: ABNORMAL MG/DL
LEUKOCYTE ESTERASE UR QL STRIP.AUTO: NEGATIVE
LYMPHOCYTES # BLD: 1.5 K/UL (ref 0.9–3.6)
LYMPHOCYTES NFR BLD: 45 % (ref 21–52)
MCH RBC QN AUTO: 29.8 PG (ref 24–34)
MCHC RBC AUTO-ENTMCNC: 33.7 G/DL (ref 31–37)
MCV RBC AUTO: 88.5 FL (ref 74–97)
MONOCYTES # BLD: 0.3 K/UL (ref 0.05–1.2)
MONOCYTES NFR BLD: 9 % (ref 3–10)
NEUTS SEG # BLD: 1.4 K/UL (ref 1.8–8)
NEUTS SEG NFR BLD: 44 % (ref 40–73)
NITRITE UR QL STRIP.AUTO: NEGATIVE
PH UR STRIP: 5.5 [PH] (ref 5–8)
PLATELET # BLD AUTO: 230 K/UL (ref 135–420)
PMV BLD AUTO: 10.7 FL (ref 9.2–11.8)
POTASSIUM SERPL-SCNC: 3.5 MMOL/L (ref 3.5–5.5)
PROT UR STRIP-MCNC: NEGATIVE MG/DL
PROTHROMBIN TIME: 12.6 SEC (ref 11.5–15.2)
RBC # BLD AUTO: 4.43 M/UL (ref 4.2–5.3)
SODIUM SERPL-SCNC: 144 MMOL/L (ref 136–145)
SP GR UR REFRACTOMETRY: 1.02 (ref 1–1.03)
SPECIMEN EXP DATE BLD: NORMAL
UROBILINOGEN UR QL STRIP.AUTO: 1 EU/DL (ref 0.2–1)
WBC # BLD AUTO: 3.3 K/UL (ref 4.6–13.2)

## 2020-12-01 PROCEDURE — 71046 X-RAY EXAM CHEST 2 VIEWS: CPT

## 2020-12-01 PROCEDURE — 82040 ASSAY OF SERUM ALBUMIN: CPT

## 2020-12-01 PROCEDURE — 81003 URINALYSIS AUTO W/O SCOPE: CPT

## 2020-12-01 PROCEDURE — 87086 URINE CULTURE/COLONY COUNT: CPT

## 2020-12-01 PROCEDURE — 36415 COLL VENOUS BLD VENIPUNCTURE: CPT

## 2020-12-01 PROCEDURE — 80048 BASIC METABOLIC PNL TOTAL CA: CPT

## 2020-12-01 PROCEDURE — 86900 BLOOD TYPING SEROLOGIC ABO: CPT

## 2020-12-01 PROCEDURE — 93005 ELECTROCARDIOGRAM TRACING: CPT

## 2020-12-01 PROCEDURE — 85610 PROTHROMBIN TIME: CPT

## 2020-12-01 PROCEDURE — 85730 THROMBOPLASTIN TIME PARTIAL: CPT

## 2020-12-01 PROCEDURE — 83036 HEMOGLOBIN GLYCOSYLATED A1C: CPT

## 2020-12-01 PROCEDURE — 85025 COMPLETE CBC W/AUTO DIFF WBC: CPT

## 2020-12-02 ENCOUNTER — OFFICE VISIT (OUTPATIENT)
Dept: FAMILY MEDICINE CLINIC | Age: 50
End: 2020-12-02
Payer: OTHER GOVERNMENT

## 2020-12-02 VITALS
OXYGEN SATURATION: 98 % | DIASTOLIC BLOOD PRESSURE: 80 MMHG | TEMPERATURE: 98.3 F | WEIGHT: 160 LBS | BODY MASS INDEX: 28.35 KG/M2 | HEIGHT: 63 IN | HEART RATE: 70 BPM | RESPIRATION RATE: 18 BRPM | SYSTOLIC BLOOD PRESSURE: 128 MMHG

## 2020-12-02 DIAGNOSIS — G43.009 MIGRAINE WITHOUT AURA AND WITHOUT STATUS MIGRAINOSUS, NOT INTRACTABLE: ICD-10-CM

## 2020-12-02 DIAGNOSIS — I10 ESSENTIAL HYPERTENSION: ICD-10-CM

## 2020-12-02 DIAGNOSIS — E04.9 GOITER: ICD-10-CM

## 2020-12-02 DIAGNOSIS — R79.89 ABNORMAL CBC: ICD-10-CM

## 2020-12-02 DIAGNOSIS — Z01.818 PRE-OPERATIVE CLEARANCE: Primary | ICD-10-CM

## 2020-12-02 LAB
ATRIAL RATE: 58 BPM
BACTERIA SPEC CULT: NORMAL
CALCULATED P AXIS, ECG09: 56 DEGREES
CALCULATED R AXIS, ECG10: 32 DEGREES
CALCULATED T AXIS, ECG11: 52 DEGREES
DIAGNOSIS, 93000: NORMAL
P-R INTERVAL, ECG05: 138 MS
Q-T INTERVAL, ECG07: 424 MS
QRS DURATION, ECG06: 78 MS
QTC CALCULATION (BEZET), ECG08: 416 MS
SERVICE CMNT-IMP: NORMAL
VENTRICULAR RATE, ECG03: 58 BPM

## 2020-12-02 PROCEDURE — 99214 OFFICE O/P EST MOD 30 MIN: CPT | Performed by: FAMILY MEDICINE

## 2020-12-02 RX ORDER — CELECOXIB 200 MG/1
200 CAPSULE ORAL DAILY
COMMUNITY
Start: 2020-11-23 | End: 2020-12-16

## 2020-12-02 NOTE — PATIENT INSTRUCTIONS
Goiter: Care Instructions  Your Care Instructions     A goiter is an enlarged thyroid gland. It can cause swelling in your neck. Your thyroid is found in the front of your neck. It makes a hormone that controls how your body uses energy. Goiters are caused by different things. Some are caused by high or low levels of thyroid hormone. Others are caused by too little iodine in the diet, or a growth or disease in the thyroid. In the [place], most goiters are caused by long-term autoimmune thyroiditis. This is also called Hashimoto's thyroiditis. It happens when the body's immune system damages the thyroid. You may take thyroid hormone to reduce the size of your goiter. Or you may need surgery or radioactive iodine treatment. Some people don't need any treatment. They only need to watch for changes in the goiter. Follow-up care is a key part of your treatment and safety. Be sure to make and go to all appointments, and call your doctor if you are having problems. It's also a good idea to know your test results and keep a list of the medicines you take. How can you care for yourself at home? · Be safe with medicines. Take your medicines exactly as prescribed. Call your doctor if you think you are having a problem with your medicine. You will get more details on the specific medicines your doctor prescribes. When should you call for help? Call 911 anytime you think you may need emergency care. For example, call if:    · You have trouble breathing. Watch closely for changes in your health, and be sure to contact your doctor if:    · Your eyes turn red and bulge.     · You have trouble swallowing.     · You feel very tired or weak.     · You lose weight but are eating the same or more than usual.   Where can you learn more? Go to http://www.gray.com/  Enter I716 in the search box to learn more about \"Goiter: Care Instructions. \"  Current as of: March 31, 2020               Content Version: 12.6  © 2516-7820 [employer]. Care instructions adapted under license by [employer]formerly [employer]) (which disclaims liability or warranty for this information). If you have questions about a medical condition or this instruction, always ask your healthcare professional. [address] any warranty or liability for your use of this information.

## 2020-12-02 NOTE — PROGRESS NOTES
1. Have you been to the ER, urgent care clinic since your last visit? Hospitalized since your last visit? No    2. Have you seen or consulted any other health care providers outside of the [address] [place] [address] since your last visit? Include any pap smears or colon screening.  No

## 2020-12-02 NOTE — PROGRESS NOTES
HISTORY OF PRESENT ILLNESS  Carlos Ayoub is a 53 y.o. female. HPI: Here for pre op clearance for left hip replacement with Dr. Wendy Ward on 12/15/2020. No recent antibiotic or steroid. H/o hypertension. Asymptomatic. Today vitals stable. Taking medication with compliance and no side effects. H/o migraine headache. Stable at this time. Low wbc count. Following hematology. Cleared for procedure per patient. H/o goiter. No trouble swallowing or change in voice. No weight or appetite changes. No bowel complains. Was seeing endo but has not kept follow up since more than year. Review pre op labs. Lab Results   Component Value Date/Time    WBC 3.3 (L) 12/01/2020 09:58 AM    HGB 13.2 12/01/2020 09:58 AM    HCT 39.2 12/01/2020 09:58 AM    PLATELET 526 23/59/7439 09:58 AM    MCV 88.5 12/01/2020 09:58 AM     Lab Results   Component Value Date/Time    Sodium 144 12/01/2020 09:58 AM    Potassium 3.5 12/01/2020 09:58 AM    Chloride 110 12/01/2020 09:58 AM    CO2 30 12/01/2020 09:58 AM    Anion gap 4 12/01/2020 09:58 AM    Glucose 87 12/01/2020 09:58 AM    BUN 11 12/01/2020 09:58 AM    Creatinine 0.63 12/01/2020 09:58 AM    BUN/Creatinine ratio 17 12/01/2020 09:58 AM    GFR est AA >60 12/01/2020 09:58 AM    GFR est non-AA >60 12/01/2020 09:58 AM    Calcium 8.7 12/01/2020 09:58 AM    Bilirubin, total 0.5 10/06/2020 09:11 AM    Alk.  phosphatase 96 10/06/2020 09:11 AM    Protein, total 7.1 10/06/2020 09:11 AM    Albumin 4.0 12/01/2020 09:58 AM    Globulin 3.4 10/06/2020 09:11 AM    A-G Ratio 1.1 10/06/2020 09:11 AM    ALT (SGPT) 15 10/06/2020 09:11 AM    AST (SGOT) 13 10/06/2020 09:11 AM     Lab Results   Component Value Date/Time    Cholesterol, total 198 05/04/2018 10:07 AM    HDL Cholesterol 72 (H) 05/04/2018 10:07 AM    LDL, calculated 111 (H) 05/04/2018 10:07 AM    VLDL, calculated 15 05/04/2018 10:07 AM    Triglyceride 75 05/04/2018 10:07 AM    CHOL/HDL Ratio 2.8 05/04/2018 10:07 AM     Lab Results Component Value Date/Time    TSH 1.07 01/14/2020 04:14 PM     Lab Results   Component Value Date/Time    aPTT 29.4 12/01/2020 09:58 AM     Results for orders placed or performed in visit on 11/04/20   AMB POC URINALYSIS DIP STICK AUTO W/O MICRO     Status: Normal   Result Value Ref Range Status    Color (UA POC) Dark Yellow  Final    Clarity (UA POC) Clear  Final    Glucose (UA POC) Negative Negative Final    Bilirubin (UA POC) Negative Negative Final    Ketones (UA POC) Negative Negative Final    Specific gravity (UA POC) 1.015 1.001 - 1.035 Final    Blood (UA POC) Negative Negative Final    pH (UA POC) 7.5 4.6 - 8.0 Final    Protein (UA POC) Trace Negative Final    Urobilinogen (UA POC) 1 mg/dL 0.2 - 1 Final    Nitrites (UA POC) Negative Negative Final    Leukocyte esterase (UA POC) Negative Negative Final       Visit Vitals  /80 (BP 1 Location: Left arm, BP Patient Position: Sitting)   Pulse 70   Temp 98.3 °F (36.8 °C) (Oral)   Resp 18   Ht 5' 3\" (1.6 m)   Wt 160 lb (72.6 kg)   SpO2 98%   BMI 28.34 kg/m²     Patient Active Problem List   Diagnosis Code    Allergic rhinitis, seasonal J30.2    Anemia NEC     Essential hypertension I10    Migraine without aura and without status migrainosus, not intractable G43.009    S/P hysterectomy/ due to menorrhagea  Z90.710    Goiter/ seen endocrinology/ observe E04.9    Abnormal CBC/ seen hematology R79.89    Positive ALY (antinuclear antibody)/ test done by hemotology and requested referral by PCP R76.8    Chronic pain of both knees/ following ortho M25.561, M25.562, G89.29     Patient Active Problem List    Diagnosis Date Noted    Chronic pain of both knees/ following ortho 06/27/2016    Positive ALY (antinuclear antibody)/ test done by hemotology and requested referral by PCP 05/02/2016    Abnormal CBC/ seen hematology 03/22/2016    Goiter/ seen endocrinology/ observe 12/27/2015    Essential hypertension 12/09/2015    Migraine without aura and without status migrainosus, not intractable 12/09/2015    S/P hysterectomy/ due to menorrhagea  12/09/2015    Allergic rhinitis, seasonal     Anemia NEC      Current Outpatient Medications   Medication Sig Dispense Refill    celecoxib (CELEBREX) 200 mg capsule Take 200 mg by mouth daily.  amLODIPine (NORVASC) 5 mg tablet Take 1 Tab by mouth daily. 90 Tab 1    Blood Pressure Monitor kit Once a day 1 Kit 0     No Known Allergies  Past Medical History:   Diagnosis Date    Allergic rhinitis, seasonal     spring    Anemia NEC     iron deficiency associated with heavy menstruation and fibroids noted 2008 with pregnancy    Dysmenorrhea     increasing past year    Fibroid, uterine     Headache(784.0)     Hypertension     Ill-defined condition     Chronic knee pain    Menorrhagia     worsening past year     Past Surgical History:   Procedure Laterality Date    HX HYSTERECTOMY  04/2014    [facility]    HX KNEE ARTHROSCOPY Right      Family History   Problem Relation Age of Onset    Hypertension Mother     Hypertension Father     Headache Sister     Diabetes Paternal Aunt      Social History     Tobacco Use    Smoking status: Never Smoker    Smokeless tobacco: Never Used   Substance Use Topics    Alcohol use: Yes     Alcohol/week: 0.0 standard drinks     Comment: occass: 2/month          ROS: Denies any headache, dizziness, no chest pain or trouble breathing, no arm or leg weakness. No nausea or vomiting, no weight or appetite changes, no mood changes . No urine or bowel complains, no palpitation, no diaphoresis. No abdominal pain. No cold or cough. No leg swelling. No fever. No sleep trouble. Physical Exam  Constitutional:       General: She is not in acute distress. Neck:      Comments: Goiter   Cardiovascular:      Rate and Rhythm: Normal rate and regular rhythm. Heart sounds: Normal heart sounds.    Abdominal:      General: Bowel sounds are normal.      Palpations: Abdomen is soft.      Tenderness: There is no abdominal tenderness. Musculoskeletal:         General: No swelling. Neurological:      Mental Status: She is oriented to person, place, and time. Psychiatric:         Behavior: Behavior normal.         ASSESSMENT and PLAN    ICD-10-CM ICD-9-CM    1. Pre-operative clearance : cleared for scheduled procedure  Z01.818 V72.84     osteoarthrtis of left hip with Dr. Cindy Goldman    2. Essential hypertension :well controlled. Continue current dose of medication and low salt diet. Exercise as tolerated. I10 401.9    3. Migraine without aura and without status migrainosus, not intractable : stable. G43.009 346.10    4. Abnormal CBC/ seen hematology : seen 2 weeks ago. Cleared for procedure. R79.89 790.6    5. Goiter/ seen endocrinology/ observe : not seen more than a year. Now resent a referral and repeat ultrasound. Asymptomatic. No trouble swallowing or change in voice. Will repeat ultrasound. E04.9 240.9 US THYROID/PARATHYROID/SOFT TISS      TSH 3RD GENERATION      REFERRAL TO ENDOCRINOLOGY   Pt understood and agree with the plan   Review hM   Follow-up and Dispositions    · Return in about 3 months (around 3/2/2021).

## 2020-12-03 DIAGNOSIS — Z01.818 PREOPERATIVE TESTING: Primary | ICD-10-CM

## 2020-12-03 DIAGNOSIS — M16.12 PRIMARY OSTEOARTHRITIS OF LEFT HIP: ICD-10-CM

## 2020-12-05 ENCOUNTER — HOSPITAL ENCOUNTER (OUTPATIENT)
Dept: LAB | Age: 50
Discharge: HOME | End: 2020-12-05
Payer: OTHER GOVERNMENT

## 2020-12-05 DIAGNOSIS — E04.9 GOITER: ICD-10-CM

## 2020-12-05 LAB — TSH SERPL DL<=0.05 MIU/L-ACNC: 1.04 UIU/ML (ref 0.36–3.74)

## 2020-12-05 PROCEDURE — 84443 ASSAY THYROID STIM HORMONE: CPT

## 2020-12-07 DIAGNOSIS — M16.12 PRIMARY OSTEOARTHRITIS OF LEFT HIP: Primary | ICD-10-CM

## 2020-12-07 PROCEDURE — 73503 X-RAY EXAM HIP UNI 4/> VIEWS: CPT | Performed by: PHYSICIAN ASSISTANT

## 2020-12-08 ENCOUNTER — HOSPITAL ENCOUNTER (OUTPATIENT)
Dept: ULTRASOUND IMAGING | Age: 50
Discharge: HOME | End: 2020-12-08
Attending: FAMILY MEDICINE
Payer: OTHER GOVERNMENT

## 2020-12-08 PROCEDURE — 76536 US EXAM OF HEAD AND NECK: CPT

## 2020-12-09 ENCOUNTER — OFFICE VISIT (OUTPATIENT)
Dept: ORTHOPEDIC SURGERY | Age: 50
End: 2020-12-09
Payer: OTHER GOVERNMENT

## 2020-12-09 VITALS
HEART RATE: 74 BPM | OXYGEN SATURATION: 98 % | SYSTOLIC BLOOD PRESSURE: 145 MMHG | WEIGHT: 162.8 LBS | BODY MASS INDEX: 28.84 KG/M2 | RESPIRATION RATE: 16 BRPM | HEIGHT: 63 IN | DIASTOLIC BLOOD PRESSURE: 88 MMHG | TEMPERATURE: 96.9 F

## 2020-12-09 DIAGNOSIS — M16.12 PRIMARY OSTEOARTHRITIS OF LEFT HIP: ICD-10-CM

## 2020-12-09 DIAGNOSIS — Z01.818 PRE-OPERATIVE EXAM: Primary | ICD-10-CM

## 2020-12-09 PROCEDURE — 90000 NO LOS: CPT | Performed by: PHYSICIAN ASSISTANT

## 2020-12-09 RX ORDER — PREGABALIN 25 MG/1
75 CAPSULE ORAL ONCE
Status: CANCELLED | OUTPATIENT
Start: 2020-12-09 | End: 2020-12-09

## 2020-12-09 RX ORDER — CELECOXIB 100 MG/1
400 CAPSULE ORAL ONCE
Status: CANCELLED | OUTPATIENT
Start: 2020-12-09 | End: 2020-12-09

## 2020-12-09 RX ORDER — ACETAMINOPHEN 325 MG/1
1000 TABLET ORAL ONCE
Status: CANCELLED | OUTPATIENT
Start: 2020-12-09 | End: 2020-12-09

## 2020-12-09 RX ORDER — DEXAMETHASONE SODIUM PHOSPHATE 4 MG/ML
8 INJECTION, SOLUTION INTRA-ARTICULAR; INTRALESIONAL; INTRAMUSCULAR; INTRAVENOUS; SOFT TISSUE
Status: CANCELLED | OUTPATIENT
Start: 2020-12-09 | End: 2020-12-09

## 2020-12-09 NOTE — H&P
[address] [facility], [address] [facility]  [phone]           HISTORY & PHYSICAL      Patient: Wanda Weston                MRN: [id number]       SSN: [id number]  YOB: 1970        AGE: 53 y.o. SEX: female  Body mass index is 28.84 kg/m². PCP: Zabrina Salas MD  12/09/20      CC: left hip end stage OA  Problem List Items Addressed This Visit     None      Visit Diagnoses     Pre-operative exam    -  Primary    Primary osteoarthritis of left hip                HPI:  The patient is a pleasant 53 y.o. whom has end stage OA of their Left hip and has failed conservative treatment including but not limited to NSAIDS, cortisone injections, viscosupplementation, PT, and pain medicine. Due to the current findings and affected activities of daily living, surgical intervention is indicated. The alternatives, risks, complications, as well as expected outcome were discussed. These include but are not limited to infection, blood loss, need for blood transfusion, neurovascular damage, DVT, PE,  post-op stiffness and pain, leg length discrepancy, dislocation, anesthetic complications, prothesis longevity, need for more surgery, MI, stroke, and even death. The patient understands and wishes to proceed with surgery. Past Medical History:   Diagnosis Date    Allergic rhinitis, seasonal     spring    Anemia NEC     iron deficiency associated with heavy menstruation and fibroids noted 2008 with pregnancy    Dysmenorrhea     increasing past year    Fibroid, uterine     Headache(784.0)     Hypertension     Ill-defined condition     Chronic knee pain    Menorrhagia     worsening past year         Current Outpatient Medications:     celecoxib (CELEBREX) 200 mg capsule, Take 200 mg by mouth daily. , Disp: , Rfl:     Blood Pressure Monitor kit, Once a day, Disp: 1 Kit, Rfl: 0    amLODIPine (NORVASC) 5 mg tablet, Take 1 Tab by mouth daily. , Disp: 90 Tab, Rfl: 1    No Known Allergies    Social History     Socioeconomic History    Marital status:      Spouse name: Not on file    Number of children: Not on file    Years of education: Not on file    Highest education level: Not on file   Occupational History    Not on file   Social Needs    Financial resource strain: Not on file    Food insecurity     Worry: Not on file     Inability: Not on file    Transportation needs     Medical: Not on file     Non-medical: Not on file   Tobacco Use    Smoking status: Never Smoker    Smokeless tobacco: Never Used   Substance and Sexual Activity    Alcohol use: Yes     Alcohol/week: 0.0 standard drinks     Comment: occass: 2/month    Drug use: No    Sexual activity: Yes     Partners: Male     Birth control/protection: Surgical     Comment: hysterectomy   Lifestyle    Physical activity     Days per week: Not on file     Minutes per session: Not on file    Stress: Not on file   Relationships    Social connections     Talks on phone: Not on file     Gets together: Not on file     Attends Taoist service: Not on file     Active member of club or organization: Not on file     Attends meetings of clubs or organizations: Not on file     Relationship status: Not on file    Intimate partner violence     Fear of current or ex partner: Not on file     Emotionally abused: Not on file     Physically abused: Not on file     Forced sexual activity: Not on file   Other Topics Concern    Not on file   Social History Narrative    Not on file       Past Surgical History:   Procedure Laterality Date   Alexandre Jon HYSTERECTOMY  04/2014    [facility]    HX KNEE ARTHROSCOPY Right        Family History:  Non-contributory.      PE:  Visit Vitals  BP (!) 145/88 (BP 1 Location: Left arm, BP Patient Position: Sitting)   Pulse 74   Temp 96.9 °F (36.1 °C) (Temporal)   Resp 16   Ht 5' 3\" (1.6 m)   Wt 162 lb 12.8 oz (73.8 kg)   LMP 10/08/2012   SpO2 98%   BMI 28.84 kg/m²     A&O X3, NAD, well develop, well nourished  Heart: S1-S2, rrr  Lungs: CTA bilat  Abd: soft, nt, nt, + bs in all quadrants  Ext:  Pos distal pulses to DP, PT  Leg lengths show the left LE to be longer grossly sitting in the chair, this was explained to the patient. X-ray: Radiographs of the left hip done 12/7/2020 at the [facility] location including AP pelvis, AP and crosstable lateral of the left hip as well as AP of the contralateral hip shows end-stage arthritis of the left hip. Labs: labs were reviewed and wnl.  ua neg    A:  Left  hip end stage OA    P:  At this point we will move forward with surgery. Again, the alternatives, risks, complications, as well as expected outcome were discussed and the patient wishes to proceed with surgery. Pt has been instructed to stop aspirin, nsaids, rheumatologic medications and blood thinners. They have also been instructed to continue on any heart and bp meds and to take them the morning of surgery with sips of water. Lateral approach  The patient will require in-patient admission. Admission as an in-patient is reasonable and necessary due to increased risk of surgery due to the factors indicated as well as the possible need for prolonged in-hospital or skilled post-acute care in order to improve this patient's functional ability. The patient was counseled at length about the risks of mounika Covid-19 during their perioperative period and any recovery window from their procedure. The patient was made aware that mounika Covid-19  may worsen their prognosis for recovering from their procedure and lend to a higher morbidity and/or mortality risk. All material risks, benefits, and reasonable alternatives including postponing the procedure were discussed. The patient does  wish to proceed with the procedure at this time.             Randa Bustillo

## 2020-12-09 NOTE — H&P (VIEW-ONLY)
[address], [address] [place] [address] 
[phone] HISTORY & PHYSICAL Patient: Augusta Head                MRN: [id number]       SSN: [id number] YOB: 1970        AGE: 53 y.o. SEX: female Body mass index is 28.84 kg/m². PCP: Shanae Glaser MD 
12/09/20 CC: left hip end stage OA Problem List Items Addressed This Visit None Visit Diagnoses Pre-operative exam    -  Primary Primary osteoarthritis of left hip HPI:  The patient is a pleasant 53 y.o. whom has end stage OA of their Left hip and has failed conservative treatment including but not limited to NSAIDS, cortisone injections, viscosupplementation, PT, and pain medicine. Due to the current findings and affected activities of daily living, surgical intervention is indicated. The alternatives, risks, complications, as well as expected outcome were discussed. These include but are not limited to infection, blood loss, need for blood transfusion, neurovascular damage, DVT, PE,  post-op stiffness and pain, leg length discrepancy, dislocation, anesthetic complications, prothesis longevity, need for more surgery, MI, stroke, and even death. The patient understands and wishes to proceed with surgery. Past Medical History:  
Diagnosis Date  Allergic rhinitis, seasonal   
 spring  Anemia NEC   
 iron deficiency associated with heavy menstruation and fibroids noted 2008 with pregnancy  Dysmenorrhea   
 increasing past year  Fibroid, uterine  Headache(784.0)  Hypertension  Ill-defined condition Chronic knee pain  Menorrhagia   
 worsening past year Current Outpatient Medications:  
  celecoxib (CELEBREX) 200 mg capsule, Take 200 mg by mouth daily. , Disp: , Rfl:   Blood Pressure Monitor kit, Once a day, Disp: 1 Kit, Rfl: 0 
   amLODIPine (NORVASC) 5 mg tablet, Take 1 Tab by mouth daily. , Disp: 90 Tab, Rfl: 1 No Known Allergies Social History Socioeconomic History  Marital status:  Spouse name: Not on file  Number of children: Not on file  Years of education: Not on file  Highest education level: Not on file Occupational History  Not on file Social Needs  Financial resource strain: Not on file  Food insecurity Worry: Not on file Inability: Not on file  Transportation needs Medical: Not on file Non-medical: Not on file Tobacco Use  Smoking status: Never Smoker  Smokeless tobacco: Never Used Substance and Sexual Activity  Alcohol use: Yes Alcohol/week: 0.0 standard drinks Comment: occass: 2/month  Drug use: No  
 Sexual activity: Yes  
  Partners: Male Birth control/protection: Surgical  
  Comment: hysterectomy Lifestyle  Physical activity Days per week: Not on file Minutes per session: Not on file  Stress: Not on file Relationships  Social connections Talks on phone: Not on file Gets together: Not on file Attends [employer] service: Not on file Active member of club or organization: Not on file Attends meetings of clubs or organizations: Not on file Relationship status: Not on file  Intimate partner violence Fear of current or ex partner: Not on file Emotionally abused: Not on file Physically abused: Not on file Forced sexual activity: Not on file Other Topics Concern  Not on file Social History Narrative  Not on file Past Surgical History:  
Procedure Laterality Date  HX HYSTERECTOMY  04/2014 [address] HX KNEE ARTHROSCOPY Right Family History:  Non-contributory. PE: 
Visit Vitals BP (!) 145/88 (BP 1 Location: Left arm, BP Patient Position: Sitting) Pulse 74 Temp 96.9 °F (36.1 °C) (Temporal) Resp 16 Ht 5' 3\" (1.6 m) Wt 162 lb 12.8 oz (73.8 kg) LMP 10/08/2012 SpO2 98% BMI 28.84 kg/m² A&O X3, NAD, well develop, well nourished Heart: S1-S2, rrr 
Lungs: CTA bilat Abd: soft, nt, nt, + bs in all quadrants Ext:  Pos distal pulses to DP, PT Leg lengths show the left LE to be longer grossly sitting in the chair, this was explained to the patient. X-ray: Radiographs of the left hip done 12/7/2020 at the [facility] location including AP pelvis, AP and crosstable lateral of the left hip as well as AP of the contralateral hip shows end-stage arthritis of the left hip. Labs: labs were reviewed and wnl.  ua neg A:  Left  hip end stage OA 
 
P:  At this point we will move forward with surgery. Again, the alternatives, risks, complications, as well as expected outcome were discussed and the patient wishes to proceed with surgery. Pt has been instructed to stop aspirin, nsaids, rheumatologic medications and blood thinners. They have also been instructed to continue on any heart and bp meds and to take them the morning of surgery with sips of water. Lateral approach The patient will require in-patient admission. Admission as an in-patient is reasonable and necessary due to increased risk of surgery due to the factors indicated as well as the possible need for prolonged in-hospital or skilled post-acute care in order to improve this patient's functional ability. The patient was counseled at length about the risks of mounika Covid-19 during their perioperative period and any recovery window from their procedure. The patient was made aware that mounika Covid-19  may worsen their prognosis for recovering from their procedure and lend to a higher morbidity and/or mortality risk. All material risks, benefits, and reasonable alternatives including postponing the procedure were discussed. The patient does  wish to proceed with the procedure at this time. Kamla Adams

## 2020-12-10 ENCOUNTER — HOSPITAL ENCOUNTER (OUTPATIENT)
Dept: PREADMISSION TESTING | Age: 50
Discharge: HOME | End: 2020-12-10
Payer: OTHER GOVERNMENT

## 2020-12-10 DIAGNOSIS — M16.12 PRIMARY OSTEOARTHRITIS OF LEFT HIP: ICD-10-CM

## 2020-12-10 DIAGNOSIS — Z01.818 PREOPERATIVE TESTING: ICD-10-CM

## 2020-12-10 PROCEDURE — 87635 SARS-COV-2 COVID-19 AMP PRB: CPT

## 2020-12-11 LAB — SARS-COV-2, COV2NT: NOT DETECTED

## 2020-12-14 ENCOUNTER — ANESTHESIA EVENT (OUTPATIENT)
Dept: SURGERY | Age: 50
DRG: 470 | End: 2020-12-14
Payer: OTHER GOVERNMENT

## 2020-12-15 ENCOUNTER — HOSPITAL ENCOUNTER (INPATIENT)
Age: 50
LOS: 1 days | Discharge: HOME HEALTH | DRG: 470 | End: 2020-12-16
Attending: ORTHOPAEDIC SURGERY | Admitting: ORTHOPAEDIC SURGERY
Payer: OTHER GOVERNMENT

## 2020-12-15 ENCOUNTER — APPOINTMENT (OUTPATIENT)
Dept: GENERAL RADIOLOGY | Age: 50
DRG: 470 | End: 2020-12-15
Attending: PHYSICIAN ASSISTANT
Payer: OTHER GOVERNMENT

## 2020-12-15 ENCOUNTER — TELEPHONE (OUTPATIENT)
Dept: ORTHOPEDIC SURGERY | Age: 50
End: 2020-12-15

## 2020-12-15 ENCOUNTER — ANESTHESIA (OUTPATIENT)
Dept: SURGERY | Age: 50
DRG: 470 | End: 2020-12-15
Payer: OTHER GOVERNMENT

## 2020-12-15 DIAGNOSIS — M24.7: ICD-10-CM

## 2020-12-15 DIAGNOSIS — M24.7 PROTRUSIO ACETABULI: ICD-10-CM

## 2020-12-15 DIAGNOSIS — M16.10 HIP ARTHRITIS: ICD-10-CM

## 2020-12-15 DIAGNOSIS — M24.652 ANKYLOSIS OF LEFT HIP: ICD-10-CM

## 2020-12-15 PROCEDURE — 97530 THERAPEUTIC ACTIVITIES: CPT

## 2020-12-15 PROCEDURE — 27130 TOTAL HIP ARTHROPLASTY: CPT | Performed by: PHYSICIAN ASSISTANT

## 2020-12-15 PROCEDURE — 01214 ANES OPEN PX TOT HIP ARTHRP: CPT | Performed by: NURSE ANESTHETIST, CERTIFIED REGISTERED

## 2020-12-15 PROCEDURE — C9290 INJ, BUPIVACAINE LIPOSOME: HCPCS | Performed by: ORTHOPAEDIC SURGERY

## 2020-12-15 PROCEDURE — 65270000029 HC RM PRIVATE

## 2020-12-15 PROCEDURE — 74011000250 HC RX REV CODE- 250: Performed by: PHYSICIAN ASSISTANT

## 2020-12-15 PROCEDURE — 76942 ECHO GUIDE FOR BIOPSY: CPT | Performed by: ANESTHESIOLOGY

## 2020-12-15 PROCEDURE — 97161 PT EVAL LOW COMPLEX 20 MIN: CPT

## 2020-12-15 PROCEDURE — 64450 NJX AA&/STRD OTHER PN/BRANCH: CPT | Performed by: ANESTHESIOLOGY

## 2020-12-15 PROCEDURE — 74011250637 HC RX REV CODE- 250/637: Performed by: NURSE ANESTHETIST, CERTIFIED REGISTERED

## 2020-12-15 PROCEDURE — 77030003028 HC SUT VCRL J&J -A: Performed by: ORTHOPAEDIC SURGERY

## 2020-12-15 PROCEDURE — 77030020294 HC ABD PLLW HIP DERY -B: Performed by: ORTHOPAEDIC SURGERY

## 2020-12-15 PROCEDURE — 74011250636 HC RX REV CODE- 250/636: Performed by: PHYSICIAN ASSISTANT

## 2020-12-15 PROCEDURE — 64449 NJX AA&/STRD LMBR PLEX NFS: CPT | Performed by: ANESTHESIOLOGY

## 2020-12-15 PROCEDURE — 77030008683 HC TU ET CUF COVD -A: Performed by: ANESTHESIOLOGY

## 2020-12-15 PROCEDURE — 2709999900 HC NON-CHARGEABLE SUPPLY

## 2020-12-15 PROCEDURE — 74011250637 HC RX REV CODE- 250/637: Performed by: ORTHOPAEDIC SURGERY

## 2020-12-15 PROCEDURE — 74011250636 HC RX REV CODE- 250/636: Performed by: NURSE ANESTHETIST, CERTIFIED REGISTERED

## 2020-12-15 PROCEDURE — 77030006835 HC BLD SAW SAG STRY -B: Performed by: ORTHOPAEDIC SURGERY

## 2020-12-15 PROCEDURE — 76210000017 HC OR PH I REC 1.5 TO 2 HR: Performed by: ORTHOPAEDIC SURGERY

## 2020-12-15 PROCEDURE — C1776 JOINT DEVICE (IMPLANTABLE): HCPCS | Performed by: ORTHOPAEDIC SURGERY

## 2020-12-15 PROCEDURE — 74011250636 HC RX REV CODE- 250/636: Performed by: ORTHOPAEDIC SURGERY

## 2020-12-15 PROCEDURE — 88311 DECALCIFY TISSUE: CPT

## 2020-12-15 PROCEDURE — 2709999900 HC NON-CHARGEABLE SUPPLY: Performed by: ORTHOPAEDIC SURGERY

## 2020-12-15 PROCEDURE — 77030012935 HC DRSG AQUACEL BMS -B: Performed by: ORTHOPAEDIC SURGERY

## 2020-12-15 PROCEDURE — 76060000035 HC ANESTHESIA 2 TO 2.5 HR: Performed by: ORTHOPAEDIC SURGERY

## 2020-12-15 PROCEDURE — 77030019605: Performed by: ORTHOPAEDIC SURGERY

## 2020-12-15 PROCEDURE — 74011000250 HC RX REV CODE- 250: Performed by: ORTHOPAEDIC SURGERY

## 2020-12-15 PROCEDURE — 77030003601 HC NDL NRV BLK BBMI -A: Performed by: ORTHOPAEDIC SURGERY

## 2020-12-15 PROCEDURE — 74011000250 HC RX REV CODE- 250: Performed by: NURSE ANESTHETIST, CERTIFIED REGISTERED

## 2020-12-15 PROCEDURE — 01214 ANES OPEN PX TOT HIP ARTHRP: CPT | Performed by: ANESTHESIOLOGY

## 2020-12-15 PROCEDURE — 27130 TOTAL HIP ARTHROPLASTY: CPT | Performed by: ORTHOPAEDIC SURGERY

## 2020-12-15 PROCEDURE — 77030019557 HC ELECTRD VES SEAL MEDT -F: Performed by: ORTHOPAEDIC SURGERY

## 2020-12-15 PROCEDURE — 74011000258 HC RX REV CODE- 258: Performed by: ORTHOPAEDIC SURGERY

## 2020-12-15 PROCEDURE — 77030027138 HC INCENT SPIROMETER -A: Performed by: ORTHOPAEDIC SURGERY

## 2020-12-15 PROCEDURE — 74011250636 HC RX REV CODE- 250/636

## 2020-12-15 PROCEDURE — 3E0T3BZ INTRODUCTION OF ANESTHETIC AGENT INTO PERIPHERAL NERVES AND PLEXI, PERCUTANEOUS APPROACH: ICD-10-PCS | Performed by: ANESTHESIOLOGY

## 2020-12-15 PROCEDURE — 77030034967 HC GRFT DBM PLS PST ALLOFUS 3CC ALLO- D: Performed by: ORTHOPAEDIC SURGERY

## 2020-12-15 PROCEDURE — 77030040361 HC SLV COMPR DVT MDII -B: Performed by: ORTHOPAEDIC SURGERY

## 2020-12-15 PROCEDURE — 88304 TISSUE EXAM BY PATHOLOGIST: CPT

## 2020-12-15 PROCEDURE — 77030040922 HC BLNKT HYPOTHRM STRY -A: Performed by: ORTHOPAEDIC SURGERY

## 2020-12-15 PROCEDURE — 0SRB01Z REPLACEMENT OF LEFT HIP JOINT WITH METAL SYNTHETIC SUBSTITUTE, OPEN APPROACH: ICD-10-PCS | Performed by: ORTHOPAEDIC SURGERY

## 2020-12-15 PROCEDURE — 77030031139 HC SUT VCRL2 J&J -A: Performed by: ORTHOPAEDIC SURGERY

## 2020-12-15 PROCEDURE — 77030002933 HC SUT MCRYL J&J -A: Performed by: ORTHOPAEDIC SURGERY

## 2020-12-15 PROCEDURE — 77030026188 HC BN CANC CHP CRSH PR LIFV -E: Performed by: ORTHOPAEDIC SURGERY

## 2020-12-15 PROCEDURE — 74011000258 HC RX REV CODE- 258: Performed by: PHYSICIAN ASSISTANT

## 2020-12-15 PROCEDURE — 77030013708 HC HNDPC SUC IRR PULS STRY –B: Performed by: ORTHOPAEDIC SURGERY

## 2020-12-15 PROCEDURE — 0QU50KZ SUPPLEMENT LEFT ACETABULUM WITH NONAUTOLOGOUS TISSUE SUBSTITUTE, OPEN APPROACH: ICD-10-PCS | Performed by: ORTHOPAEDIC SURGERY

## 2020-12-15 PROCEDURE — 73502 X-RAY EXAM HIP UNI 2-3 VIEWS: CPT

## 2020-12-15 PROCEDURE — 76010000131 HC OR TIME 2 TO 2.5 HR: Performed by: ORTHOPAEDIC SURGERY

## 2020-12-15 PROCEDURE — 77030008462 HC STPLR SKN PROX J&J -A: Performed by: ORTHOPAEDIC SURGERY

## 2020-12-15 PROCEDURE — 77030012890: Performed by: ORTHOPAEDIC SURGERY

## 2020-12-15 PROCEDURE — 74011250637 HC RX REV CODE- 250/637: Performed by: PHYSICIAN ASSISTANT

## 2020-12-15 DEVICE — BONE CHIP CANC CRSH 1-8MM 30ML --: Type: IMPLANTABLE DEVICE | Site: HIP | Status: FUNCTIONAL

## 2020-12-15 DEVICE — STEM FEM SZ 3 127D 30X102MM -- ACCOLADE II V40: Type: IMPLANTABLE DEVICE | Site: HIP | Status: FUNCTIONAL

## 2020-12-15 DEVICE — HEAD FEM DELT V40 -5MM NK 36MM -- V40 BIOLOX: Type: IMPLANTABLE DEVICE | Site: HIP | Status: FUNCTIONAL

## 2020-12-15 DEVICE — INSERT ACET 0DEG 36MM E X3 -- TRIDENT: Type: IMPLANTABLE DEVICE | Site: HIP | Status: FUNCTIONAL

## 2020-12-15 DEVICE — SCR HEX 6.5X25MM -- TRIDENT II: Type: IMPLANTABLE DEVICE | Site: HIP | Status: FUNCTIONAL

## 2020-12-15 DEVICE — SHELL ACET CLUS H 54E TRTANIUM -- TRIDENT II: Type: IMPLANTABLE DEVICE | Site: HIP | Status: FUNCTIONAL

## 2020-12-15 DEVICE — COMPONENT TOT HIP CAPPED LNR POLYETH [H2STRYKER] [STRYKER CORP]: Type: IMPLANTABLE DEVICE | Site: HIP | Status: FUNCTIONAL

## 2020-12-15 DEVICE — GRAFT BONE PASTE DEMINERLIZED BONE MTRX 3CC ALLOFUSE +: Type: IMPLANTABLE DEVICE | Site: HIP | Status: FUNCTIONAL

## 2020-12-15 RX ORDER — SODIUM CHLORIDE, SODIUM LACTATE, POTASSIUM CHLORIDE, CALCIUM CHLORIDE 600; 310; 30; 20 MG/100ML; MG/100ML; MG/100ML; MG/100ML
25 INJECTION, SOLUTION INTRAVENOUS CONTINUOUS
Status: DISCONTINUED | OUTPATIENT
Start: 2020-12-15 | End: 2020-12-15 | Stop reason: HOSPADM

## 2020-12-15 RX ORDER — SODIUM CHLORIDE 0.9 % (FLUSH) 0.9 %
5-40 SYRINGE (ML) INJECTION EVERY 8 HOURS
Status: DISCONTINUED | OUTPATIENT
Start: 2020-12-15 | End: 2020-12-16 | Stop reason: HOSPADM

## 2020-12-15 RX ORDER — ACETAMINOPHEN 500 MG
1000 TABLET ORAL EVERY 6 HOURS
Status: DISCONTINUED | OUTPATIENT
Start: 2020-12-15 | End: 2020-12-16 | Stop reason: HOSPADM

## 2020-12-15 RX ORDER — DEXAMETHASONE SODIUM PHOSPHATE 4 MG/ML
8 INJECTION, SOLUTION INTRA-ARTICULAR; INTRALESIONAL; INTRAMUSCULAR; INTRAVENOUS; SOFT TISSUE
Status: DISCONTINUED | OUTPATIENT
Start: 2020-12-15 | End: 2020-12-15 | Stop reason: HOSPADM

## 2020-12-15 RX ORDER — CELECOXIB 400 MG/1
400 CAPSULE ORAL ONCE
Status: COMPLETED | OUTPATIENT
Start: 2020-12-15 | End: 2020-12-15

## 2020-12-15 RX ORDER — NALOXONE HYDROCHLORIDE 0.4 MG/ML
0.4 INJECTION, SOLUTION INTRAMUSCULAR; INTRAVENOUS; SUBCUTANEOUS AS NEEDED
Status: DISCONTINUED | OUTPATIENT
Start: 2020-12-15 | End: 2020-12-16 | Stop reason: HOSPADM

## 2020-12-15 RX ORDER — FENTANYL CITRATE 50 UG/ML
INJECTION, SOLUTION INTRAMUSCULAR; INTRAVENOUS AS NEEDED
Status: DISCONTINUED | OUTPATIENT
Start: 2020-12-15 | End: 2020-12-15 | Stop reason: HOSPADM

## 2020-12-15 RX ORDER — AMLODIPINE BESYLATE 5 MG/1
5 TABLET ORAL DAILY
Status: DISCONTINUED | OUTPATIENT
Start: 2020-12-16 | End: 2020-12-16 | Stop reason: HOSPADM

## 2020-12-15 RX ORDER — FLUMAZENIL 0.1 MG/ML
0.2 INJECTION INTRAVENOUS AS NEEDED
Status: DISCONTINUED | OUTPATIENT
Start: 2020-12-15 | End: 2020-12-16 | Stop reason: HOSPADM

## 2020-12-15 RX ORDER — POVIDONE-IODINE 10 %
SOLUTION, NON-ORAL TOPICAL AS NEEDED
Status: DISCONTINUED | OUTPATIENT
Start: 2020-12-15 | End: 2020-12-15 | Stop reason: HOSPADM

## 2020-12-15 RX ORDER — SODIUM CHLORIDE 0.9 % (FLUSH) 0.9 %
5-40 SYRINGE (ML) INJECTION AS NEEDED
Status: DISCONTINUED | OUTPATIENT
Start: 2020-12-15 | End: 2020-12-15 | Stop reason: HOSPADM

## 2020-12-15 RX ORDER — ACETAMINOPHEN 500 MG
1000 TABLET ORAL ONCE
Status: COMPLETED | OUTPATIENT
Start: 2020-12-15 | End: 2020-12-15

## 2020-12-15 RX ORDER — SODIUM CHLORIDE 9 MG/ML
100 INJECTION, SOLUTION INTRAVENOUS CONTINUOUS
Status: DISCONTINUED | OUTPATIENT
Start: 2020-12-15 | End: 2020-12-16 | Stop reason: HOSPADM

## 2020-12-15 RX ORDER — GLYCOPYRROLATE 0.2 MG/ML
INJECTION INTRAMUSCULAR; INTRAVENOUS AS NEEDED
Status: DISCONTINUED | OUTPATIENT
Start: 2020-12-15 | End: 2020-12-15 | Stop reason: HOSPADM

## 2020-12-15 RX ORDER — CELECOXIB 100 MG/1
200 CAPSULE ORAL 2 TIMES DAILY
Status: DISCONTINUED | OUTPATIENT
Start: 2020-12-15 | End: 2020-12-16 | Stop reason: HOSPADM

## 2020-12-15 RX ORDER — ROPIVACAINE HYDROCHLORIDE 2 MG/ML
INJECTION, SOLUTION EPIDURAL; INFILTRATION; PERINEURAL
Status: COMPLETED
Start: 2020-12-15 | End: 2020-12-15

## 2020-12-15 RX ORDER — DEXAMETHASONE SODIUM PHOSPHATE 4 MG/ML
INJECTION, SOLUTION INTRA-ARTICULAR; INTRALESIONAL; INTRAMUSCULAR; INTRAVENOUS; SOFT TISSUE AS NEEDED
Status: DISCONTINUED | OUTPATIENT
Start: 2020-12-15 | End: 2020-12-15 | Stop reason: HOSPADM

## 2020-12-15 RX ORDER — MIDAZOLAM HYDROCHLORIDE 1 MG/ML
2 INJECTION, SOLUTION INTRAMUSCULAR; INTRAVENOUS ONCE
Status: COMPLETED | OUTPATIENT
Start: 2020-12-15 | End: 2020-12-15

## 2020-12-15 RX ORDER — LIDOCAINE HYDROCHLORIDE 20 MG/ML
INJECTION, SOLUTION EPIDURAL; INFILTRATION; INTRACAUDAL; PERINEURAL AS NEEDED
Status: DISCONTINUED | OUTPATIENT
Start: 2020-12-15 | End: 2020-12-15 | Stop reason: HOSPADM

## 2020-12-15 RX ORDER — FENTANYL CITRATE 50 UG/ML
100 INJECTION, SOLUTION INTRAMUSCULAR; INTRAVENOUS ONCE
Status: COMPLETED | OUTPATIENT
Start: 2020-12-15 | End: 2020-12-15

## 2020-12-15 RX ORDER — OXYCODONE AND ACETAMINOPHEN 7.5; 325 MG/1; MG/1
1-2 TABLET ORAL
Qty: 42 TAB | Refills: 0 | Status: SHIPPED | OUTPATIENT
Start: 2020-12-15 | End: 2020-12-22

## 2020-12-15 RX ORDER — ROCURONIUM BROMIDE 10 MG/ML
INJECTION, SOLUTION INTRAVENOUS AS NEEDED
Status: DISCONTINUED | OUTPATIENT
Start: 2020-12-15 | End: 2020-12-15 | Stop reason: HOSPADM

## 2020-12-15 RX ORDER — SODIUM CHLORIDE 0.9 % (FLUSH) 0.9 %
5-40 SYRINGE (ML) INJECTION EVERY 8 HOURS
Status: DISCONTINUED | OUTPATIENT
Start: 2020-12-15 | End: 2020-12-15 | Stop reason: HOSPADM

## 2020-12-15 RX ORDER — LIDOCAINE HYDROCHLORIDE 10 MG/ML
0.1 INJECTION, SOLUTION EPIDURAL; INFILTRATION; INTRACAUDAL; PERINEURAL AS NEEDED
Status: DISCONTINUED | OUTPATIENT
Start: 2020-12-15 | End: 2020-12-15 | Stop reason: HOSPADM

## 2020-12-15 RX ORDER — LANOLIN ALCOHOL/MO/W.PET/CERES
1 CREAM (GRAM) TOPICAL 2 TIMES DAILY WITH MEALS
Status: DISCONTINUED | OUTPATIENT
Start: 2020-12-15 | End: 2020-12-16 | Stop reason: HOSPADM

## 2020-12-15 RX ORDER — ONDANSETRON 2 MG/ML
4 INJECTION INTRAMUSCULAR; INTRAVENOUS
Status: DISCONTINUED | OUTPATIENT
Start: 2020-12-15 | End: 2020-12-16 | Stop reason: HOSPADM

## 2020-12-15 RX ORDER — FAMOTIDINE 20 MG/1
20 TABLET, FILM COATED ORAL ONCE
Status: COMPLETED | OUTPATIENT
Start: 2020-12-15 | End: 2020-12-15

## 2020-12-15 RX ORDER — CEPHALEXIN 500 MG/1
500 CAPSULE ORAL 4 TIMES DAILY
Qty: 12 CAP | Refills: 0 | Status: SHIPPED | OUTPATIENT
Start: 2020-12-15 | End: 2021-01-08 | Stop reason: ALTCHOICE

## 2020-12-15 RX ORDER — KETOROLAC TROMETHAMINE 15 MG/ML
15 INJECTION, SOLUTION INTRAMUSCULAR; INTRAVENOUS EVERY 6 HOURS
Status: DISCONTINUED | OUTPATIENT
Start: 2020-12-15 | End: 2020-12-16 | Stop reason: HOSPADM

## 2020-12-15 RX ORDER — ONDANSETRON 2 MG/ML
INJECTION INTRAMUSCULAR; INTRAVENOUS AS NEEDED
Status: DISCONTINUED | OUTPATIENT
Start: 2020-12-15 | End: 2020-12-15 | Stop reason: HOSPADM

## 2020-12-15 RX ORDER — OXYCODONE HYDROCHLORIDE 5 MG/1
10-15 TABLET ORAL
Status: DISCONTINUED | OUTPATIENT
Start: 2020-12-15 | End: 2020-12-16 | Stop reason: HOSPADM

## 2020-12-15 RX ORDER — SODIUM CHLORIDE, SODIUM LACTATE, POTASSIUM CHLORIDE, CALCIUM CHLORIDE 600; 310; 30; 20 MG/100ML; MG/100ML; MG/100ML; MG/100ML
INJECTION, SOLUTION INTRAVENOUS
Status: DISCONTINUED | OUTPATIENT
Start: 2020-12-15 | End: 2020-12-15 | Stop reason: HOSPADM

## 2020-12-15 RX ORDER — KETAMINE HYDROCHLORIDE 100 MG/ML
INJECTION, SOLUTION INTRAMUSCULAR; INTRAVENOUS AS NEEDED
Status: DISCONTINUED | OUTPATIENT
Start: 2020-12-15 | End: 2020-12-15 | Stop reason: HOSPADM

## 2020-12-15 RX ORDER — PREGABALIN 25 MG/1
25 CAPSULE ORAL 2 TIMES DAILY
Status: DISCONTINUED | OUTPATIENT
Start: 2020-12-15 | End: 2020-12-16 | Stop reason: HOSPADM

## 2020-12-15 RX ORDER — EPHEDRINE SULFATE/0.9% NACL/PF 25 MG/5 ML
SYRINGE (ML) INTRAVENOUS AS NEEDED
Status: DISCONTINUED | OUTPATIENT
Start: 2020-12-15 | End: 2020-12-15 | Stop reason: HOSPADM

## 2020-12-15 RX ORDER — HYDROMORPHONE HYDROCHLORIDE 2 MG/ML
INJECTION, SOLUTION INTRAMUSCULAR; INTRAVENOUS; SUBCUTANEOUS AS NEEDED
Status: DISCONTINUED | OUTPATIENT
Start: 2020-12-15 | End: 2020-12-15 | Stop reason: HOSPADM

## 2020-12-15 RX ORDER — VANCOMYCIN HYDROCHLORIDE 1 G/20ML
INJECTION, POWDER, LYOPHILIZED, FOR SOLUTION INTRAVENOUS AS NEEDED
Status: DISCONTINUED | OUTPATIENT
Start: 2020-12-15 | End: 2020-12-15 | Stop reason: HOSPADM

## 2020-12-15 RX ORDER — CEFAZOLIN SODIUM 2 G/50ML
2 SOLUTION INTRAVENOUS EVERY 8 HOURS
Status: COMPLETED | OUTPATIENT
Start: 2020-12-15 | End: 2020-12-16

## 2020-12-15 RX ORDER — ROPIVACAINE HYDROCHLORIDE 2 MG/ML
30 INJECTION, SOLUTION EPIDURAL; INFILTRATION; PERINEURAL
Status: COMPLETED | OUTPATIENT
Start: 2020-12-15 | End: 2020-12-15

## 2020-12-15 RX ORDER — ZOLPIDEM TARTRATE 5 MG/1
5 TABLET ORAL
Status: DISCONTINUED | OUTPATIENT
Start: 2020-12-15 | End: 2020-12-16 | Stop reason: HOSPADM

## 2020-12-15 RX ORDER — PREGABALIN 75 MG/1
75 CAPSULE ORAL ONCE
Status: COMPLETED | OUTPATIENT
Start: 2020-12-15 | End: 2020-12-15

## 2020-12-15 RX ORDER — PHENYLEPHRINE HCL IN 0.9% NACL 1 MG/10 ML
SYRINGE (ML) INTRAVENOUS AS NEEDED
Status: DISCONTINUED | OUTPATIENT
Start: 2020-12-15 | End: 2020-12-15 | Stop reason: HOSPADM

## 2020-12-15 RX ORDER — SODIUM CHLORIDE 0.9 % (FLUSH) 0.9 %
5-40 SYRINGE (ML) INJECTION AS NEEDED
Status: DISCONTINUED | OUTPATIENT
Start: 2020-12-15 | End: 2020-12-16 | Stop reason: HOSPADM

## 2020-12-15 RX ORDER — CEFAZOLIN SODIUM 2 G/50ML
2 SOLUTION INTRAVENOUS
Status: COMPLETED | OUTPATIENT
Start: 2020-12-15 | End: 2020-12-15

## 2020-12-15 RX ORDER — ASPIRIN 325 MG
325 TABLET ORAL 2 TIMES DAILY
Qty: 60 TAB | Refills: 0 | Status: SHIPPED | OUTPATIENT
Start: 2020-12-15 | End: 2021-03-03

## 2020-12-15 RX ORDER — CELECOXIB 200 MG/1
200 CAPSULE ORAL 2 TIMES DAILY
Qty: 60 CAP | Refills: 2 | Status: SHIPPED | OUTPATIENT
Start: 2020-12-15 | End: 2021-03-03

## 2020-12-15 RX ORDER — PROPOFOL 10 MG/ML
INJECTION, EMULSION INTRAVENOUS AS NEEDED
Status: DISCONTINUED | OUTPATIENT
Start: 2020-12-15 | End: 2020-12-15 | Stop reason: HOSPADM

## 2020-12-15 RX ORDER — SUCCINYLCHOLINE CHLORIDE 20 MG/ML
INJECTION INTRAMUSCULAR; INTRAVENOUS AS NEEDED
Status: DISCONTINUED | OUTPATIENT
Start: 2020-12-15 | End: 2020-12-15 | Stop reason: HOSPADM

## 2020-12-15 RX ORDER — AMOXICILLIN 250 MG
1 CAPSULE ORAL 2 TIMES DAILY
Status: DISCONTINUED | OUTPATIENT
Start: 2020-12-15 | End: 2020-12-16 | Stop reason: HOSPADM

## 2020-12-15 RX ORDER — ASPIRIN 325 MG
325 TABLET, DELAYED RELEASE (ENTERIC COATED) ORAL 2 TIMES DAILY
Status: DISCONTINUED | OUTPATIENT
Start: 2020-12-16 | End: 2020-12-16 | Stop reason: HOSPADM

## 2020-12-15 RX ORDER — POLYMYXIN B 500000 [USP'U]/1
INJECTION, POWDER, LYOPHILIZED, FOR SOLUTION INTRAMUSCULAR; INTRATHECAL; INTRAVENOUS; OPHTHALMIC AS NEEDED
Status: DISCONTINUED | OUTPATIENT
Start: 2020-12-15 | End: 2020-12-15 | Stop reason: HOSPADM

## 2020-12-15 RX ADMIN — MIDAZOLAM 2 MG: 1 INJECTION INTRAMUSCULAR; INTRAVENOUS at 10:06

## 2020-12-15 RX ADMIN — TRANEXAMIC ACID 1 G: 100 INJECTION, SOLUTION INTRAVENOUS at 11:55

## 2020-12-15 RX ADMIN — ONDANSETRON 4 MG: 2 INJECTION INTRAMUSCULAR; INTRAVENOUS at 10:37

## 2020-12-15 RX ADMIN — GLYCOPYRROLATE 0.1 MG: 0.2 INJECTION INTRAMUSCULAR; INTRAVENOUS at 10:48

## 2020-12-15 RX ADMIN — CELECOXIB 200 MG: 100 CAPSULE ORAL at 17:15

## 2020-12-15 RX ADMIN — SUGAMMADEX 147 MG: 100 INJECTION, SOLUTION INTRAVENOUS at 12:14

## 2020-12-15 RX ADMIN — PREGABALIN 75 MG: 75 CAPSULE ORAL at 08:23

## 2020-12-15 RX ADMIN — KETOROLAC TROMETHAMINE 15 MG: 15 INJECTION, SOLUTION INTRAMUSCULAR; INTRAVENOUS at 17:16

## 2020-12-15 RX ADMIN — Medication 5 MG: at 10:52

## 2020-12-15 RX ADMIN — Medication 50 MCG: at 10:49

## 2020-12-15 RX ADMIN — SODIUM CHLORIDE 100 ML/HR: 900 INJECTION, SOLUTION INTRAVENOUS at 17:28

## 2020-12-15 RX ADMIN — Medication 5 MG: at 10:49

## 2020-12-15 RX ADMIN — ROPIVACAINE HYDROCHLORIDE 60 MG: 2 INJECTION, SOLUTION EPIDURAL; INFILTRATION; PERINEURAL at 10:05

## 2020-12-15 RX ADMIN — SODIUM CHLORIDE, SODIUM LACTATE, POTASSIUM CHLORIDE, AND CALCIUM CHLORIDE: 600; 310; 30; 20 INJECTION, SOLUTION INTRAVENOUS at 11:41

## 2020-12-15 RX ADMIN — ROCURONIUM BROMIDE 50 MG: 50 INJECTION INTRAVENOUS at 10:39

## 2020-12-15 RX ADMIN — SUCCINYLCHOLINE CHLORIDE 160 MG: 20 INJECTION, SOLUTION INTRAMUSCULAR; INTRAVENOUS at 10:30

## 2020-12-15 RX ADMIN — FERROUS SULFATE TAB 325 MG (65 MG ELEMENTAL FE) 325 MG: 325 (65 FE) TAB at 17:15

## 2020-12-15 RX ADMIN — CEFAZOLIN SODIUM 2 G: 2 SOLUTION INTRAVENOUS at 10:35

## 2020-12-15 RX ADMIN — Medication 200 MCG: at 11:29

## 2020-12-15 RX ADMIN — Medication 100 MCG: at 11:56

## 2020-12-15 RX ADMIN — CELECOXIB 400 MG: 400 CAPSULE ORAL at 08:23

## 2020-12-15 RX ADMIN — DEXAMETHASONE SODIUM PHOSPHATE 8 MG: 4 INJECTION, SOLUTION INTRAMUSCULAR; INTRAVENOUS at 10:37

## 2020-12-15 RX ADMIN — PREGABALIN 25 MG: 25 CAPSULE ORAL at 17:16

## 2020-12-15 RX ADMIN — ACETAMINOPHEN 1000 MG: 500 TABLET ORAL at 08:23

## 2020-12-15 RX ADMIN — DOCUSATE SODIUM 50 MG AND SENNOSIDES 8.6 MG 1 TABLET: 8.6; 5 TABLET, FILM COATED ORAL at 17:16

## 2020-12-15 RX ADMIN — HYDROMORPHONE HYDROCHLORIDE 0.2 MG: 2 INJECTION, SOLUTION INTRAMUSCULAR; INTRAVENOUS; SUBCUTANEOUS at 11:49

## 2020-12-15 RX ADMIN — ACETAMINOPHEN 1000 MG: 500 TABLET ORAL at 17:15

## 2020-12-15 RX ADMIN — Medication 5 MG: at 11:44

## 2020-12-15 RX ADMIN — FENTANYL CITRATE 100 MCG: 50 INJECTION, SOLUTION INTRAMUSCULAR; INTRAVENOUS at 10:06

## 2020-12-15 RX ADMIN — CEFAZOLIN SODIUM 2 G: 2 SOLUTION INTRAVENOUS at 17:16

## 2020-12-15 RX ADMIN — TRANEXAMIC ACID 1 G: 100 INJECTION, SOLUTION INTRAVENOUS at 10:42

## 2020-12-15 RX ADMIN — Medication 200 MCG: at 11:23

## 2020-12-15 RX ADMIN — Medication 100 MCG: at 11:12

## 2020-12-15 RX ADMIN — LIDOCAINE HYDROCHLORIDE 80 MG: 20 INJECTION, SOLUTION EPIDURAL; INFILTRATION; INTRACAUDAL; PERINEURAL at 10:30

## 2020-12-15 RX ADMIN — Medication 100 MCG: at 10:58

## 2020-12-15 RX ADMIN — FAMOTIDINE 20 MG: 20 TABLET, FILM COATED ORAL at 08:23

## 2020-12-15 RX ADMIN — SODIUM CHLORIDE, SODIUM LACTATE, POTASSIUM CHLORIDE, AND CALCIUM CHLORIDE: 600; 310; 30; 20 INJECTION, SOLUTION INTRAVENOUS at 10:20

## 2020-12-15 RX ADMIN — SODIUM CHLORIDE, SODIUM LACTATE, POTASSIUM CHLORIDE, AND CALCIUM CHLORIDE 25 ML/HR: 600; 310; 30; 20 INJECTION, SOLUTION INTRAVENOUS at 08:23

## 2020-12-15 RX ADMIN — LIDOCAINE HYDROCHLORIDE 0.1 ML: 10 INJECTION, SOLUTION EPIDURAL; INFILTRATION; INTRACAUDAL; PERINEURAL at 10:06

## 2020-12-15 RX ADMIN — KETAMINE HYDROCHLORIDE 25 MG: 100 INJECTION, SOLUTION, CONCENTRATE INTRAMUSCULAR; INTRAVENOUS at 10:44

## 2020-12-15 RX ADMIN — ACETAMINOPHEN 1000 MG: 500 TABLET ORAL at 21:52

## 2020-12-15 RX ADMIN — Medication 10 ML: at 21:59

## 2020-12-15 RX ADMIN — FENTANYL CITRATE 100 MCG: 50 INJECTION, SOLUTION INTRAMUSCULAR; INTRAVENOUS at 10:29

## 2020-12-15 RX ADMIN — Medication 100 MCG: at 11:44

## 2020-12-15 RX ADMIN — PROPOFOL 100 MG: 10 INJECTION, EMULSION INTRAVENOUS at 10:30

## 2020-12-15 RX ADMIN — ROPIVACAINE HYDROCHLORIDE 60 MG: 2 INJECTION, SOLUTION EPIDURAL; INFILTRATION at 10:05

## 2020-12-15 RX ADMIN — Medication 100 MCG: at 11:19

## 2020-12-15 NOTE — TELEPHONE ENCOUNTER
[facility] called stating per their guidelines the dosing for Percocet needs to be adjusted. Please call to verify.       Raúl Conn [phone] #[phone] direct to pharmacy

## 2020-12-15 NOTE — INTERVAL H&P NOTE
Update History & Physical 
 
The Patient's History and Physical of December 15, It was reviewed with the patient and I examined the patient. There was no change. The surgical site was confirmed by the patient and me. Plan:  The risk, benefits, expected outcome, and alternative to the recommended procedure have been discussed with the patient. Patient understands and wants to proceed with the procedure.  
 
Electronically signed by Sheng Sethi MD on 12/15/2020 at 9:38 AM

## 2020-12-15 NOTE — ANESTHESIA PROCEDURE NOTES
Peripheral Block    Start time: 12/15/2020 9:55 AM  End time: 12/15/2020 10:06 AM  Performed by: Chantel Ruano MD  Authorized by: Chantel Ruano MD       Pre-procedure: Indications: at surgeon's request and post-op pain management    Timeout Time: 09:55          Block Type:   Block Type:  Lumbar plexus  Laterality:  Left    Assessment:    Injection Assessment:     Single Shot Nerve Block Procedure Note    Patient: Alverto Mckee MRN: [id number]  SSN: [id number]   YOB: 1970  Age: 53 y.o. Sex: female      Cardiovascular Function/Vital Signs  There were no vitals taken for this visit. Blocks LP  Referring physician:   : Domonique Bedolla MD    Indication: Post-operative analgesia per surgeon's request  Location: Preoperative Holding area. Sedation: Midazolam 2mg, fentanyl 100mcg  Time out performed, correct patient, side, site, and procedure verified. Patient placed in lateral position. Monitors/oxygen applied; left LB marked and prepped with chloraprep. Target nerves identified by nerve stimulator, 30 ml's 0.2% Ropivicaine injected in divided doses with negative aspiration through 21 gauge 10 cm Stimuplex insulated needle. Nerve stimulator set up 1.5 mA, 0.1 mS, 2 HZ. quard twitch of LP nerve. Twitch lost at 0.9 mA. Depth of needle at time of injection 8.5 cm. Block Notes:   Incremental injection    Blood aspirated:  NO    Persistent Pain with injection:  NO    Resistance to injection:  NO    Events:  None - Easy and well-tolerated:  YES       Difficult:  NO   Ultrasound guidance used for needle placement  Nerves and surrounding structures ID'd  Perineural injection   No IV injection  Patient tolerated procedure well, vital signs stable throughout, with no apparent complications.   12/15/2020  10:07 AM  Domonique Bedolla MD

## 2020-12-15 NOTE — PROGRESS NOTES
Orthopedic Coordinator Rounding Note    December 15, 2020  Admit Date: 12/15/2020  Admit Diagnosis: Osteoarthritis of left hip, unspecified osteoarthritis type [M16.12]  Hip arthritis [M16.10]  Procedure: Procedure(s):  LEFT TOTAL HIP ARTHROPLASTY LATERAL APPROACH  Post Op day: Day of Surgery    Vital Signs:    Blood pressure 105/62, pulse (!) 54, temperature 98.3 °F (36.8 °C), resp. rate 12, height 5' 3\" (1.6 m), weight 73.5 kg (162 lb), last menstrual period 10/08/2012, SpO2 98 %. Temp (24hrs), Av.7 °F (35.9 °C), Min:95 °F (35 °C), Max:98.3 °F (36.8 °C)    PAIN RELIEF: some relief of pain with current prescribed medications. I/O  12/15 0701 - 12/15 1900  In: 1500 [I.V.:1500]  Out: 250   No intake/output data recorded. Surgical Wound:  Wound Hip Left (Active)   Wound Etiology Surgical 12/15/20 1228   Dressing Status Clean;Dry; Intact 12/15/20 1228   Drainage Amount None 12/15/20 1228   Number of days: 0         Discharge To: HOME   Patient resting in bed at the time of my visit. Ice water provided. surgery specific patient education guidebook provided.        Keyana Mike, PARULN, RN, [address] [place] [address]  Orthopedic

## 2020-12-15 NOTE — ANESTHESIA POSTPROCEDURE EVALUATION
Procedure(s):  LEFT TOTAL HIP ARTHROPLASTY LATERAL APPROACH.     general    Anesthesia Post Evaluation      Multimodal analgesia: multimodal analgesia used between 6 hours prior to anesthesia start to PACU discharge  Patient location during evaluation: bedside  Patient participation: complete - patient participated  Level of consciousness: awake  Pain management: adequate  Airway patency: patent  Anesthetic complications: no  Cardiovascular status: stable  Respiratory status: acceptable  Hydration status: acceptable  Post anesthesia nausea and vomiting:  controlled      INITIAL Post-op Vital signs:   Vitals Value Taken Time   /62 12/15/2020  2:07 PM   Temp 36.3 °C (97.4 °F) 12/15/2020  2:05 PM   Pulse 54 12/15/2020  2:13 PM   Resp 12 12/15/2020  2:13 PM   SpO2 98 % 12/15/2020  2:13 PM

## 2020-12-15 NOTE — PERIOP NOTES
Dr. Olivia Rosenbaum at bedside. Patient is cleared to go to the floor with vital signs. Patient is pain free.

## 2020-12-15 NOTE — PROGRESS NOTES
Problem: Mobility Impaired (Adult and Pediatric)  Goal: *Acute Goals and Plan of Care (Insert Text)  Description: Physical Therapy Goals  Initiated 12/15/2020 and to be accomplished within 7 day(s)  1. Patient will move from supine to sit and sit to supine , scoot up and down, and roll side to side in bed with modified independence. 2.  Patient will transfer from bed to chair and chair to bed with modified independence using the least restrictive device. 3.  Patient will perform sit to stand with modified independence. 4.  Patient will ambulate with supervision/set-up for 150 feet with the least restrictive device. 5.  Patient will ascend/descend 4 stairs with 1 handrail(s) with supervision/set-up. PLOF: Pt was ind PTA, lives in a two story home with her  with 3-4 ISSA, bedroom on the second floor, used a cane and also has a walker for post surgery, stated she needed assist with shoes/socks. Outcome: Progressing Towards Goal     PHYSICAL THERAPY EVALUATION    Patient: Petra Buenrostro (49 y.o. female)  Date: 12/15/2020  Primary Diagnosis: Osteoarthritis of left hip, unspecified osteoarthritis type [M16.12]  Hip arthritis [M16.10]  Procedure(s) (LRB):  LEFT TOTAL HIP ARTHROPLASTY LATERAL APPROACH (Left) Day of Surgery   Precautions:      WBAT, total hip precautions  PLOF: see above    ASSESSMENT :  Patient is 54 yo F admitted to hospital for L NOREEN and presents today POD 0 and agreeable to therapy. Patient was educated on weight bearing status, hip precautions, and role of therapy. Patient transferred to sitting EOB for objective assessment.  Once sitting up first attempt with min A, pt became anxious, reporting feeling of dizziness and like she was going to pass out, PT provided cues on pursed lip breathing as well as calming techniques to help her work through it however pt unable to tolerate sitting forward and heavily requested to lie back down despite attempt to remain sitting as this is common after surgery. Pt given min A for LLE management back into supine for rest break, BP taken and found to be 91/68 (at start of session BP was 95/60 so no orthostatic hypotension noted). Pt agreeable to try to sit up again and this time she was able to tolerate 1-2 min longer that the first session as she stated she was trying to work through her feelings of dizziness but then stated the room was spinning and that she could not do anything else at this time, again requesting to return to supine. At conclusion of session patient was left resting with call bell by the side, snack of kinjal crackers and peanut butter provided, and SCDs donned. Patient instructed to call for assistance if they needed to get up for any reason and denied need for further assistance. Patient demonstrates decreased strength, mobility, and endurance and  will continue to be followed in the acute setting. Recommend return home with [facility] PT, pt already has her own walker. Patient will benefit from skilled intervention to address the above impairments.   Patient's rehabilitation potential is considered to be Good  Factors which may influence rehabilitation potential include:   [x]         None noted  []         Mental ability/status  []         Medical condition  []         Home/family situation and support systems  []         Safety awareness  []         Pain tolerance/management  []         Other:      PLAN :  Recommendations and Planned Interventions:   [x]           Bed Mobility Training             [x]    Neuromuscular Re-Education  [x]           Transfer Training                   []    Orthotic/Prosthetic Training  [x]           Gait Training                          []    Modalities  [x]           Therapeutic Exercises           []    Edema Management/Control  [x]           Therapeutic Activities            [x]    Family Training/Education  [x]           Patient Education  []           Other (comment):    Frequency/Duration: Patient will be followed by physical therapy 1-2 times daily to address goals. Discharge Recommendations: Home Health  Further Equipment Recommendations for Discharge: N/A     SUBJECTIVE:   Patient stated I am going to pass out.     OBJECTIVE DATA SUMMARY:     Past Medical History:   Diagnosis Date    Allergic rhinitis, seasonal     spring    Anemia NEC     iron deficiency associated with heavy menstruation and fibroids noted 2008 with pregnancy    Dysmenorrhea     increasing past year    Fibroid, uterine     Goiter     Headache(784.0)     Hypertension     Ill-defined condition     Chronic knee pain    Menorrhagia     worsening past year    Neutropenia [facility])      Past Surgical History:   Procedure Laterality Date    HX HYSTERECTOMY  04/2014    [facility] [place] [place]    HX KNEE ARTHROSCOPY Right      Barriers to Learning/Limitations: None  Compensate with: N/A  Home Situation:  Home Situation  Home Environment: Private residence  # Steps to Enter: 3  One/Two Story Residence: Two story  # of Interior Steps: 21  Interior Rails: Both  Living Alone: No  Support Systems: Spouse/Significant Other/Partner  Patient Expects to be Discharged to[de-identified] Private residence  Current DME Used/Available at Home: Garrett Casillas, straight, Walker, rolling  Critical Behavior:  Neurologic State: Alert  Orientation Level: Oriented X4  Cognition: Follows commands  Safety/Judgement: Fall prevention  Psychosocial  Patient Behaviors: Calm; Cooperative; Anxious                 Strength:    Strength: Generally decreased, functional(RLE WFL, LLE generally decreased 2/2 NOREEN)    Tone & Sensation:   Tone: Normal    Sensation: Intact    Range Of Motion:  AROM: Generally decreased, functional(RLE WFL, LLE generally decreased 2/2 NOREEN)    Functional Mobility:  Bed Mobility:     Supine to Sit: Minimum assistance  Sit to Supine: Minimum assistance  Scooting: Contact guard assistance  Transfers:  Sit to Stand: (unable to progress to this at this time )    Balance: Sitting: Intact    Therapeutic Exercises:   Pt educated on heel slides x10 per hour to increase blood flow and facilitate blood clot prevention. Pain:  Pain level pre-treatment: 0/10   Pain level post-treatment: 6/10   Pain Intervention(s) : Medication (see MAR); Rest, Repositioning  Response to intervention: Nurse notified    Activity Tolerance:   Fair    Please refer to the flowsheet for vital signs taken during this treatment. After treatment:   []         Patient left in no apparent distress sitting up in chair  [x]         Patient left in no apparent distress in bed  [x]         Call bell left within reach  [x]         Nursing notified  [x]         Caregiver present  []         Bed alarm activated  []         SCDs applied    COMMUNICATION/EDUCATION:   [x]         Role of Physical Therapy in the acute care setting. [x]         Fall prevention education was provided and the patient/caregiver indicated understanding. [x]         Patient/family have participated as able in goal setting and plan of care. [x]         Patient/family agree to work toward stated goals and plan of care. []         Patient understands intent and goals of therapy, but is neutral about his/her participation. []         Patient is unable to participate in goal setting/plan of care: ongoing with therapy staff.  []         Other:     Thank you for this referral.  Pebbles Zhou   Time Calculation: 25 mins      Eval Complexity: History: MEDIUM  Complexity : 1-2 comorbidities / personal factors will impact the outcome/ POC Exam:MEDIUM Complexity : 3 Standardized tests and measures addressing body structure, function, activity limitation and / or participation in recreation  Presentation: LOW Complexity : Stable, uncomplicated  Clinical Decision Making:Low Complexity    Overall Complexity:LOW

## 2020-12-15 NOTE — ANESTHESIA PREPROCEDURE EVALUATION
Relevant Problems   No relevant active problems       Anesthetic History   No history of anesthetic complications            Review of Systems / Medical History  Patient summary reviewed and pertinent labs reviewed    Pulmonary  Within defined limits                 Neuro/Psych   Within defined limits           Cardiovascular    Hypertension                   GI/Hepatic/Renal                Endo/Other      Hypothyroidism       Other Findings              Physical Exam    Airway  Mallampati: II  TM Distance: 4 - 6 cm  Neck ROM: normal range of motion   Mouth opening: Diminished (comment)     Cardiovascular    Rhythm: regular  Rate: normal         Dental    Dentition: Poor dentition     Pulmonary  Breath sounds clear to auscultation               Abdominal  GI exam deferred       Other Findings            Anesthetic Plan    ASA: 2  Anesthesia type: general          Induction: Intravenous  Anesthetic plan and risks discussed with: Patient

## 2020-12-15 NOTE — DISCHARGE SUMMARY
12/15/2020  7:39 AM    12/16/2020, 12:26 PM    Primary Dx:left Orthopedic / Rheumatologic: Total Hip Replacement  Secondary Dx: Etiological Diagnoses: none    HPI:  Pt has end stage OA and had failed conservative treatment. Due to the current findings and affected activity of daily living surgical intervention is indicated.   The alternatives, risks, complications as well as expected outcome were discussed, the patient understands and wishes to proceed with surgery    Past Medical History:   Diagnosis Date    Allergic rhinitis, seasonal     spring    Anemia NEC     iron deficiency associated with heavy menstruation and fibroids noted 2008 with pregnancy    Dysmenorrhea     increasing past year    Fibroid, uterine     Goiter     Headache(784.0)     Hypertension     Ill-defined condition     Chronic knee pain    Menorrhagia     worsening past year    Neutropenia ([facility] [address]          Current Facility-Administered Medications:     lidocaine (PF) (XYLOCAINE) 10 mg/mL (1 %) injection 0.1 mL, 0.1 mL, SubCUTAneous, PRN, Nelta Pendleton, CRNA, 0.1 mL at 12/15/20 1006    lactated Ringers infusion, 25 mL/hr, IntraVENous, CONTINUOUS, Nelta Pendleton, CRNA, Last Rate: 25 mL/hr at 12/15/20 0823, 25 mL/hr at 12/15/20 0823    sodium chloride (NS) flush 5-40 mL, 5-40 mL, IntraVENous, Q8H, Nelta Bret, CRNA    sodium chloride (NS) flush 5-40 mL, 5-40 mL, IntraVENous, PRN, Nelta Pendleton, CRNA    bupivacaine liposome (PF) susp (EXPAREL) infiltration 266 mg, 20 mL, Infiltration, ONCE, Cliff Moreno PA-C    dexamethasone (DECADRON) 4 mg/mL injection 8 mg, 8 mg, IntraVENous, ON CALL TO OR, Cliff Moreno PA-C    bupivacaine (PF) 0.5 % (5 mg/mL) 25 mL, EPINEPHrine HCl (PF) 0.5 mg, ketorolac 30 mg, bupivacaine liposome (PF) susp 20 mL in 0.9% sodium chloride 50 mL iv solution, , , PRN, Venu Blackwood MD, 97.5 mL at 12/15/20 1153    vancomycin (VANCOCIN) injection, , , PRN, Venu Blackwood MD, 3 g at 12/15/20 1101    sodium chloride 0.9 % bolus infusion, , , CONTINUOUS, Power Greer MD, 500 mL at 12/15/20 1101    polymyxin B 500,000 unit injection, , , PRN, Ge Javed MD, 750,000 Units at 12/15/20 1102    povidone-iodine (BETADINE) 10 % topical solution, , , PRN, Ge Javed MD, 17.5 mL at 12/15/20 1102    Facility-Administered Medications Ordered in Other Encounters:     fentaNYL citrate (PF) injection, , IntraVENous, PRN, Karina Yang, CRNA, 100 mcg at 12/15/20 1029    lidocaine (PF) (XYLOCAINE) 20 mg/mL (2 %) injection, , IntraVENous, PRN, Glor Verna, Karina Backbrittney, CRNA, 80 mg at 12/15/20 1030    propofoL (DIPRIVAN) 10 mg/mL injection, , IntraVENous, PRN, Karina Yang, CRNA, 100 mg at 12/15/20 1030    succinylcholine (ANECTINE) injection, , IntraVENous, PRN, Karina Yang Backers, CRNA, 160 mg at 12/15/20 1030    rocuronium injection, , IntraVENous, PRN, Glor Verna, Karina Backers, CRNA, 50 mg at 12/15/20 1039    dexamethasone (DECADRON) 4 mg/mL injection, , , PRN, Feli Chapa, Karina Backers, CRNA, 8 mg at 12/15/20 1037    ondansetron (ZOFRAN) injection, , IntraVENous, PRN, Karina Yang Backers, CRNA, 4 mg at 12/15/20 1037    lactated Ringers infusion, , IntraVENous, CONTINUOUS, Glor Verna, Karina Backers, CRNA    ketamine (KETALAR) injection, , IntraVENous, PRN, Karina Yang Backers, CRNA, 25 mg at 12/15/20 1044    glycopyrrolate (ROBINUL) injection, , IntraVENous, PRN, Feli Chapa, Karina Backers, CRNA, 0.1 mg at 12/15/20 1048    ePHEDrine in NS (PF) injection, , IntraVENous, PRN, Glor Verna, Karina Backers, CRNA, 5 mg at 12/15/20 1144    PHENYLephrine (SHAWNA-SYNEPHRINE) 100 mcg/mL in NS syringe, , IntraVENous, PRN, Karina Yang CRNA, 100 mcg at 12/15/20 1156    HYDROmorphone (PF) (DILAUDID) injection, , , PRN, Karina Yang CRNA, 0.2 mg at 12/15/20 1149    Patient has no known allergies.     Physical Exam:  General A&O x3 NAD, well developed, well nourished, normal affect  Heart: S1-S2, RRR  Lungs: CTA Bilat  Abd: soft NT, ND  Ext: n/v intact    Hospital Course:    Pt. Had leftOrthopedic / Rheumatologic: Total Hip Replacement    Post -op Course: The patient tolerated the procedure well. They were followed by internal medicine for help with medical management. Pt. Was place on Abx pre and post-op for prophylaxis against infection as well as coumadin pre and post-op for prophylaxis against DVT. Vitals signs remained stable, remained af. The wound wasclean, dry, no drainage. Pain was well controlled. Pt. Had negative calf tenderness or swelling, no evidence for DVT. Patient had PT/OT consult for evaluation and treatment. CBC  Lab Results   Component Value Date/Time    WBC 3.3 (L) 12/01/2020 09:58 AM    RBC 4.43 12/01/2020 09:58 AM    HCT 39.2 12/01/2020 09:58 AM    MCV 88.5 12/01/2020 09:58 AM    MCH 29.8 12/01/2020 09:58 AM    MCHC 33.7 12/01/2020 09:58 AM    RDW 13.4 12/01/2020 09:58 AM     Coagulation  Lab Results   Component Value Date    INR 1.0 12/01/2020    APTT 29.4 12/01/2020      Basic Metabolic Profile  Lab Results   Component Value Date     12/01/2020    CO2 30 12/01/2020    BUN 11 12/01/2020       Discharge Meds:  Current Discharge Medication List      START taking these medications    Details   oxyCODONE-acetaminophen (Percocet) 7.5-325 mg per tablet Take 1-2 Tabs by mouth every eight (8) hours as needed for Pain for up to 7 days. Max Daily Amount: 6 Tabs. Qty: 42 Tab, Refills: 0    Associated Diagnoses: Hip arthritis      aspirin (ASPIRIN) 325 mg tablet Take 1 Tab by mouth two (2) times a day. Qty: 60 Tab, Refills: 0    Associated Diagnoses: Hip arthritis      cephALEXin (Keflex) 500 mg capsule Take 1 Cap by mouth four (4) times daily.   Qty: 12 Cap, Refills: 0    Associated Diagnoses: Hip arthritis         CONTINUE these medications which have CHANGED    Details   celecoxib (CeleBREX) 200 mg capsule Take 1 Cap by mouth two (2) times a day for 90 days. Qty: 60 Cap, Refills: 2    Associated Diagnoses: Hip arthritis         CONTINUE these medications which have NOT CHANGED    Details   Blood Pressure Monitor kit Once a day  Qty: 1 Kit, Refills: 0    Associated Diagnoses: Essential hypertension      amLODIPine (NORVASC) 5 mg tablet Take 1 Tab by mouth daily. Qty: 90 Tab, Refills: 1    Associated Diagnoses: Essential hypertension with goal blood pressure less than 130/80             Discharge Plan:  The patient will be d/c'd to home, total hip protocol, WBAT. She will have [facility] PT and nursing. Total joint protocol. Pt safe for homebound transfer, sp Total joint replacement. A walker, bedside commode, and shower chair will be utilized for ADL's. Follow up with Dr. Johanna Rao in 10-12 days. Call with any questions or concerns.

## 2020-12-15 NOTE — BRIEF OP NOTE
Brief Postoperative Note    Patient: Arelis Buenrostro  YOB: 1970  MRN: [id number]    Date of Procedure: 12/15/2020     Pre-Op Diagnosis: Osteoarthritis of left hip, unspecified osteoarthritis type with protrusio, osteophytosis, ankylosis, incarceration [M16.12]    Post-Op Diagnosis: Same as preoperative diagnosis. Procedure(s):  LEFT TOTAL HIP ARTHROPLASTY LATERAL APPROACH difficult  With Bone grafting  Surgeon(s):  Gaurang Goddard MD    Surgical Assistant: Physician Assistant: Nishant Vegas PA-C  Surg Asst-1: Darshan Luis    Anesthesia: General     Estimated Blood Loss (mL): 109     Complications: None    Specimens:   ID Type Source Tests Collected by Time Destination   1 : Left Femoral Head Preservative Hip, left  Gaurang Goddard MD 12/15/2020 4402 Pathology        Implants:   Implant Name Type Inv.  Item Serial No.  Lot No. LRB No. Used Action   6.5MM LOW PROFILE HEX SCREW    CHANEL JUAN LUIS 2NRD Left 1 Implanted   SCR HEX 6.5X25MM -- TRIDENT II - [id number]  SCR HEX 6.5X25MM -- TRIDENT II  [employer] HOW_WD [id number] Left 1 Implanted   HEX DOME HOLE PLUG    [employer] [id number] Left 1 Implanted   GRAFT BONE PASTE DEMINERLIZED BONE MTRX 3CC ALLOFUSE + - [id number]  GRAFT BONE PASTE DEMINERLIZED BONE MTRX 3CC ALLOFUSE +  ALLOSOURCE_[id number] Left 1 Implanted   BONE CHIP CANC [address] 1-8MM 30ML --  - [id number]  BONE CHIP CANC CRSH 1-8MM 30ML --  [id number] [facility]  Left 1 Implanted   SHELL ACET CLUS H 54E TRTANIUM -- TRIDENT II - [id number]  SHELL ACET CLUS H 54E TRTANIUM -- TRIDENT II  [employer] HOW_WD [id number] Left 1 Implanted   INSERT ACET 0DEG 36MM E X3 -- TRIDENT - [id number]  INSERT ACET 0DEG 36MM E X3 -- TRIDENT  [employer] HOW_WD [id number] Left 1 Implanted   STEM FEM SZ 3 L102MM NK L30MM 38MM OFFSET 127DEG HIP TI - [id number]  STEM FEM SZ 3 L102MM NK L30MM 38MM OFFSET 127DEG HIP TI  [employer] HOWM_WD [id number] Left 1 Implanted   HEAD FEM DELT V40 -5MM NK 36MM -- V40 BIOLOX - [id number]  HEAD FEM DELT V40 -5MM NK 36MM -- V40 BIOLOX  [employer] HOW_ [id number] Left 1 Implanted       Drains: * No LDAs found *    Findings: same    Electronically Signed by Thierry Dean MD on 12/15/2020 at 12:01 PM

## 2020-12-16 ENCOUNTER — HOME HEALTH ADMISSION (OUTPATIENT)
Dept: HOME HEALTH SERVICES | Facility: HOME HEALTH | Age: 50
End: 2020-12-16
Payer: OTHER GOVERNMENT

## 2020-12-16 VITALS
HEART RATE: 68 BPM | BODY MASS INDEX: 28.7 KG/M2 | DIASTOLIC BLOOD PRESSURE: 76 MMHG | OXYGEN SATURATION: 99 % | TEMPERATURE: 98.4 F | SYSTOLIC BLOOD PRESSURE: 132 MMHG | RESPIRATION RATE: 16 BRPM | HEIGHT: 63 IN | WEIGHT: 162 LBS

## 2020-12-16 PROCEDURE — 74011250637 HC RX REV CODE- 250/637: Performed by: ORTHOPAEDIC SURGERY

## 2020-12-16 PROCEDURE — 74011250636 HC RX REV CODE- 250/636: Performed by: ORTHOPAEDIC SURGERY

## 2020-12-16 PROCEDURE — 97166 OT EVAL MOD COMPLEX 45 MIN: CPT

## 2020-12-16 PROCEDURE — 97116 GAIT TRAINING THERAPY: CPT

## 2020-12-16 PROCEDURE — 2709999900 HC NON-CHARGEABLE SUPPLY

## 2020-12-16 PROCEDURE — 97535 SELF CARE MNGMENT TRAINING: CPT

## 2020-12-16 RX ADMIN — DOCUSATE SODIUM 50 MG AND SENNOSIDES 8.6 MG 1 TABLET: 8.6; 5 TABLET, FILM COATED ORAL at 08:10

## 2020-12-16 RX ADMIN — CEFAZOLIN SODIUM 2 G: 2 SOLUTION INTRAVENOUS at 01:30

## 2020-12-16 RX ADMIN — AMLODIPINE BESYLATE 5 MG: 5 TABLET ORAL at 08:09

## 2020-12-16 RX ADMIN — CEFAZOLIN SODIUM 2 G: 2 SOLUTION INTRAVENOUS at 09:25

## 2020-12-16 RX ADMIN — Medication 10 ML: at 05:46

## 2020-12-16 RX ADMIN — CELECOXIB 200 MG: 100 CAPSULE ORAL at 08:10

## 2020-12-16 RX ADMIN — PREGABALIN 25 MG: 25 CAPSULE ORAL at 08:10

## 2020-12-16 RX ADMIN — ACETAMINOPHEN 1000 MG: 500 TABLET ORAL at 09:23

## 2020-12-16 RX ADMIN — ACETAMINOPHEN 1000 MG: 500 TABLET ORAL at 05:46

## 2020-12-16 RX ADMIN — KETOROLAC TROMETHAMINE 15 MG: 15 INJECTION, SOLUTION INTRAMUSCULAR; INTRAVENOUS at 05:46

## 2020-12-16 RX ADMIN — FERROUS SULFATE TAB 325 MG (65 MG ELEMENTAL FE) 325 MG: 325 (65 FE) TAB at 08:10

## 2020-12-16 RX ADMIN — OXYCODONE HYDROCHLORIDE 10 MG: 5 TABLET ORAL at 08:09

## 2020-12-16 RX ADMIN — KETOROLAC TROMETHAMINE 15 MG: 15 INJECTION, SOLUTION INTRAMUSCULAR; INTRAVENOUS at 00:31

## 2020-12-16 RX ADMIN — ASPIRIN 325 MG: 325 TABLET, COATED ORAL at 08:10

## 2020-12-16 NOTE — PROGRESS NOTES
PT treatment completed. Pt cleared for discharge home from PT stand point, recommend Home Health with family assist as needed. Will keep on caseload for continued therapy if unable to discharge from medical standpoint. Full note to follow.     Thank you for this referral     Kang Fisher PT DPT

## 2020-12-16 NOTE — PROGRESS NOTES
Reason for Admission:  Osteoarthritis of left hip, unspecified osteoarthritis type [M16.12]  Hip arthritis [M16.10]                 RUR Score:    5%            Plan for utilizing home health:    Yes, FOC verbal consent given for [facility]. Likelihood of Readmission:   LOW                         Transition of Care Plan:              Initial assessment completed with patient at the bedside. Cognitive status of patient: oriented to time, place, person and situation. Face sheet information confirmed:  yes. The patient designates her  Rhenda Klinefelter [phone] to participate in her discharge plan and to receive any needed information. This patient lives in a single family home with her , with 3 steps to enter. Patient is able to navigate steps as needed. Prior to hospitalization, patient was considered to be independent with ADLs/IADLS : yes . Patient has a current ACP document on file: no. The patient's  will be available to transport patient home upon discharge. The patient already has Shower chair, BSC, [employer],  medical equipment available in the home. Patient is not currently active with home health. Patient has not stayed in a skilled nursing facility or rehab. This patient is on dialysis :no    List of available Home Health agencies were provided and reviewed with the patient prior to discharge. [place] of choice verbal consent given: yes, for [facility]. Currently, the discharge plan is Home with [address] [place] [address] Misael Bernardo. The patient states that she can obtain her medications from the pharmacy, and take her medications as directed. Patient's current insurance is [employer]. Care Management Interventions  PCP Verified by CM:  Yes  Last Visit to PCP: 12/02/20  Mode of Transport at Discharge: Self(Patient's  will be transporting patient home at time of discharge today.)  Transition of Care Consult (CM Consult): [address]: Yes  Discharge Durable Medical Equipment: No  Physical Therapy Consult: Yes  Occupational Therapy Consult: Yes  Speech Therapy Consult: No  Current Support Network: Lives with Spouse  Confirm Follow Up Transport: Family  The Plan for Transition of Care is Related to the Following Treatment Goals : Home with [address] [place] [address] Misael Bernardo  The Patient and/or Patient Representative was Provided with a Choice of Provider and Agrees with the Discharge Plan?: Yes  Freedom of Choice List was Provided with Basic Dialogue that Supports the Patient's Individualized Plan of Care/Goals, Treatment Preferences and Shares the Quality Data Associated with the Providers?: Yes  Discharge Location  Discharge Placement: Home with home health        Radha Grossman RN  Case Management [phone]

## 2020-12-16 NOTE — HOME CARE
Discharge noted for today. Received home health referral for [facility] PT. Spoke with patient via phone, explained services and answered all questions. Demographic verified, confirmed address: [address] [place] [address]. Patient lives with spouse. Patient reported daughter Lisa Brewer will be helping when discharge to home. Patient has the following DME: cane, walker, BSC and SC. Orders noted and arranged.     Tremayne Stahl RN  Home Health Intake/Liaison

## 2020-12-16 NOTE — PROGRESS NOTES
TRANSFER - IN REPORT:    Verbal report received from [facility] RN(name) on Carlos Buenrostro  being received from PACU(unit) for routine post - op      Report consisted of patients Situation, Background, Assessment and   Recommendations(SBAR). Information from the following report(s) SBAR, Procedure Summary, Intake/Output and MAR was reviewed with the receiving nurse. Opportunity for questions and clarification was provided. Assessment completed upon patients arrival to unit and care assumed.

## 2020-12-16 NOTE — PERIOP NOTES
TRANSFER - OUT REPORT:    Verbal report given to Ana(name) on Carlos Buenrostro  being transferred to 2 Surgical(unit) for routine post - op       Report consisted of patients Situation, Background, Assessment and   Recommendations(SBAR). Information from the following report(s) SBAR and OR Summary was reviewed with the receiving nurse. Lines:   Peripheral IV 12/15/20 Right Hand (Active)   Site Assessment Clean, dry, & intact 12/15/20 1521   Phlebitis Assessment 0 12/15/20 1521   Infiltration Assessment 0 12/15/20 1521   Dressing Status Clean, dry, & intact 12/15/20 1521   Dressing Type Transparent 12/15/20 1521   Hub Color/Line Status Infusing 12/15/20 0810   Alcohol Cap Used No 12/15/20 0810        Opportunity for questions and clarification was provided.       Patient transported with:   3nder

## 2020-12-16 NOTE — PROGRESS NOTES
CM called #[phone] and spoke to [facility] with [facility] and let her know that this patient was discharging today. ROBBIE put patient in the queue for [facility].       Bryson Patel RN  Case Management [phone]

## 2020-12-16 NOTE — PROGRESS NOTES
Problem: Patient Education: Go to Patient Education Activity  Goal: Patient/Family Education  Outcome: Progressing Towards Goal     Problem: Patient Education: Go to Patient Education Activity  Goal: Patient/Family Education  Outcome: Progressing Towards Goal     Problem: Hip Replacement: Day of Surgery/Unit  Goal: Off Pathway (Use only if patient is Off Pathway)  Outcome: Progressing Towards Goal  Goal: Activity/Safety  Outcome: Progressing Towards Goal  Goal: Consults, if ordered  Outcome: Progressing Towards Goal  Goal: Diagnostic Test/Procedures  Outcome: Progressing Towards Goal  Goal: Nutrition/Diet  Outcome: Progressing Towards Goal  Goal: Medications  Outcome: Progressing Towards Goal  Goal: Respiratory  Outcome: Progressing Towards Goal  Goal: Treatments/Interventions/Procedures  Outcome: Progressing Towards Goal  Goal: Psychosocial  Outcome: Progressing Towards Goal  Goal: *Initiate mobility  Outcome: Progressing Towards Goal  Goal: *Optimal pain control at patient's stated goal  Outcome: Progressing Towards Goal  Goal: *Hemodynamically stable  Outcome: Progressing Towards Goal     Problem: Hip Replacement: Post Op Day 1  Goal: Off Pathway (Use only if patient is Off Pathway)  Outcome: Progressing Towards Goal  Goal: Activity/Safety  Outcome: Progressing Towards Goal  Goal: Diagnostic Test/Procedures  Outcome: Progressing Towards Goal  Goal: Nutrition/Diet  Outcome: Progressing Towards Goal  Goal: Medications  Outcome: Progressing Towards Goal  Goal: Respiratory  Outcome: Progressing Towards Goal  Goal: Treatments/Interventions/Procedures  Outcome: Progressing Towards Goal  Goal: Psychosocial  Outcome: Progressing Towards Goal  Goal: Discharge Planning  Outcome: Progressing Towards Goal  Goal: *Demonstrates progressive activity  Outcome: Progressing Towards Goal  Goal: *Optimal pain control at patient's stated goal  Outcome: Progressing Towards Goal  Goal: *Hemodynamically stable  Outcome: Progressing Towards Goal  Goal: *Discharge plan identified  Outcome: Progressing Towards Goal     Problem: Hip Replacement: Post-Op Day 2  Goal: Off Pathway (Use only if patient is Off Pathway)  Outcome: Progressing Towards Goal  Goal: Activity/Safety  Outcome: Progressing Towards Goal  Goal: Diagnostic Test/Procedures  Outcome: Progressing Towards Goal  Goal: Nutrition/Diet  Outcome: Progressing Towards Goal  Goal: Medications  Outcome: Progressing Towards Goal  Goal: Respiratory  Outcome: Progressing Towards Goal  Goal: Treatments/Interventions/Procedures  Outcome: Progressing Towards Goal  Goal: Psychosocial  Outcome: Progressing Towards Goal  Goal: *Met physical therapy criteria for discharge to the next level of care  Outcome: Progressing Towards Goal  Goal: *Optimal pain control with oral analgesia  Outcome: Progressing Towards Goal  Goal: *Hemodynamically stable  Outcome: Progressing Towards Goal  Goal: *Tolerating diet  Outcome: Progressing Towards Goal  Goal: *Verbalizes understanding of any indicated hip precautions  Outcome: Progressing Towards Goal  Goal: *Patient verbalizes understanding of discharge instructions  Outcome: Progressing Towards Goal     Problem: Falls - Risk of  Goal: *Absence of Falls  Description: Document Rosa Fall Risk and appropriate interventions in the flowsheet.   Outcome: Progressing Towards Goal  Note: Fall Risk Interventions:  Mobility Interventions: Assess mobility with egress test, Bed/chair exit alarm, Communicate number of staff needed for ambulation/transfer, OT consult for ADLs, Patient to call before getting OOB, PT Consult for mobility concerns, PT Consult for assist device competence, Strengthening exercises (ROM-active/passive), Utilize walker, cane, or other assistive device         Medication Interventions: Assess postural VS orthostatic hypotension, Bed/chair exit alarm, Evaluate medications/consider consulting pharmacy, Patient to call before getting OOB, Teach patient to arise slowly    Elimination Interventions: Bed/chair exit alarm, Call light in reach, Elevated toilet seat, Patient to call for help with toileting needs, Stay With Me (per policy), Toilet paper/wipes in reach, Toileting schedule/hourly rounds              Problem: Patient Education: Go to Patient Education Activity  Goal: Patient/Family Education  Outcome: Progressing Towards Goal

## 2020-12-16 NOTE — PROGRESS NOTES
Problem: Self Care Deficits Care Plan (Adult)  Goal: *Acute Goals and Plan of Care (Insert Text)  Outcome: Resolved/Met       OCCUPATIONAL THERAPY EVALUATION/DISCHARGE    Patient: Jennifer Casillas (49 y.o. female)  Date: 12/16/2020  Primary Diagnosis: Osteoarthritis of left hip, unspecified osteoarthritis type [M16.12]  Hip arthritis [M16.10]  Procedure(s) (LRB):  LEFT TOTAL HIP ARTHROPLASTY LATERAL APPROACH (Left) 1 Day Post-Op   Precautions: Fall, Total hip, WBAT  PLOF: Patient was independent with self-care and used a RW for functional mobility PTA. ASSESSMENT AND RECOMMENDATIONS:  Upon entering the room, the patient was semi-reclined in bed, alert, and agreeable to participate in OT evaluation. Patient educated on the role of OT, WB status, hip precautions, adaptive equipment (reacher, sock aid, long handled sponge, long handled shoe horn, raised toilet seat) with patient demonstrating good understanding. Patient issued adaptive equipment to utilize while following hip precautions, and used during evaluation to get dressed. Patient educated on sleeping with pillow between knees to maintain post-op precautions and provided with a booklet of precautions along with techniques/strategies to follow in order to perform ADL/IADL tasks safely and efficiently. Patient practiced bed mobility in preparation for self-care this session with bed simulated to home environment, min assist. Patient reports  will be able to assist at home. Patient feeling dizzy this session and educated on energy conservation techniques, pursed lip breathing, and self pacing during daily activities. RN notified. Vitals: 132/76 BP, HR 68 bpm, O2 99%. Based on the objective data described below, the patient is able to perform basic self care tasks independently - supervision, using AE given after demonstration while seated. Pt is motivated to return home and has a supportive family at home to assist prn.  No further skilled OT needed at this level of care. OT to d/c from caseload. Skilled occupational therapy is not indicated at this time. Discharge Recommendations: Home Health  Further Equipment Recommendations for Discharge: Patient has all DME      SUBJECTIVE:   Patient stated I feel like I did yesterday, dizzy and light headed    OBJECTIVE DATA SUMMARY:     Past Medical History:   Diagnosis Date    Allergic rhinitis, seasonal     spring    Anemia NEC     iron deficiency associated with heavy menstruation and fibroids noted 2008 with pregnancy    Dysmenorrhea     increasing past year    Fibroid, uterine     Goiter     Headache(784.0)     Hypertension     Ill-defined condition     Chronic knee pain    Menorrhagia     worsening past year    Neutropenia [facility])      Past Surgical History:   Procedure Laterality Date    HX HYSTERECTOMY  04/2014    [address]    HX KNEE ARTHROSCOPY Right      Barriers to Learning/Limitations: None  Compensate with: visual, verbal, tactile, kinesthetic cues/model    Home Situation:   Home Situation  Home Environment: Private residence  # Steps to Enter: 3  One/Two Story Residence: Two story  # of Interior Steps: 21  Interior Rails: Both  Living Alone: No  Support Systems: Spouse/Significant Other/Partner  Patient Expects to be Discharged to[de-identified] Private residence  Current DME Used/Available at Home: Cane, straight, Walker, rolling  Tub or Shower Type: Tub/Shower combination  [x]     Right hand dominant   []     Left hand dominant    Cognitive/Behavioral Status:  Neurologic State: Alert  Orientation Level: Oriented X4  Cognition: Follows commands  Safety/Judgement: Fall prevention    Skin: Intact  Edema: None noted    Vision/Perceptual:    Acuity: Within Defined Limits      Coordination: BUE  Fine Motor Skills-Upper: Left Intact; Right Intact    Gross Motor Skills-Upper: Left Intact; Right Intact    Balance:  Sitting: Intact  Standing: Impaired; With support  Standing - Static: Good  Standing - Dynamic : Fair    Strength: BUE  Strength: Generally decreased, functional    Tone & Sensation: BUE  Tone: Normal  Sensation: Intact    Range of Motion: BUE  AROM: Within functional limits        Functional Mobility and Transfers for ADLs:  Bed Mobility:  Supine to Sit: Minimum assistance(practiced leg lifting via sheet, unsuccessful)  Scooting: Contact guard assistance    Transfers:  Sit to Stand: Contact guard assistance  Stand to Sit: Contact guard assistance   Toilet Transfer : Contact guard assistance      ADL Assessment:  Feeding: Independent    Oral Facial Hygiene/Grooming: Independent    Bathing: Supervision; Adaptive equipment    Upper Body Dressing: Independent    Lower Body Dressing: Supervision; Adaptive equipment; Additional time    Toileting: Supervision    ADL Intervention:  Feeding  Feeding Assistance: Independent  Drink to Mouth:  Independent    Grooming  Grooming Assistance: Independent  Position Performed: Standing  Washing Hands: Independent    Upper Body Bathing  Bathing Assistance: Independent  Position Performed: Seated in chair(seated on toilet )    Lower Body Bathing  Bathing Assistance: Supervision  Perineal  : Supervision  Position Performed: Seated in chair  Cues: Verbal cues provided  Adaptive Equipment: Long handled sponge    Upper Body Dressing Assistance  Dressing Assistance: Independent  Pullover Shirt: Independent    Lower Body Dressing Assistance  Dressing Assistance: Supervision  Underpants: Supervision  Pants With Elastic Waist: Supervision  Socks: Supervision  Shoes with Cloth Laces: Supervision(additional time needed with LLE)  Leg Crossed Method Used: No(per precautions)  Position Performed: Seated in chair;Standing  Adaptive Equipment Used: Reacher;Sock aid    Toileting  Toileting Assistance: Supervision  Bladder Hygiene: Supervision    Cognitive Retraining  Safety/Judgement: Fall prevention    Pain:  Pain level pre-treatment: 4/10 , LLE  Pain level post-treatment: 6/10 , LLE  Pain Intervention(s): Medication (see MAR); Response to intervention: Nurse notified, See doc flow    Activity Tolerance:   Fair+    Please refer to the flowsheet for vital signs taken during this treatment. After treatment:   [x]  Patient left in no apparent distress sitting up in chair  []  Patient left in no apparent distress in bed  [x]  Call bell left within reach  [x]  Nursing notified  []  Caregiver present  []  Bed alarm activated    COMMUNICATION/EDUCATION:   [x]      Role of Occupational Therapy in the acute care setting  [x]      Home safety education was provided and the patient/caregiver indicated understanding. [x]      Patient/family have participated as able and agree with findings and recommendations. []      Patient is unable to participate in plan of care at this time. Thank you for this referral.  Nicanor Soler, OTR/L  Time Calculation: 53 mins      Eval Complexity: History: LOW Complexity : Brief history review ; Examination: LOW Complexity : 1-3 performance deficits relating to physical, cognitive , or psychosocial skils that result in activity limitations and / or participation restrictions ;    Decision Making:LOW Complexity : No comorbidities that affect functional and no verbal or physical assistance needed to complete eval tasks

## 2020-12-16 NOTE — PROGRESS NOTES
Problem: Mobility Impaired (Adult and Pediatric)  Goal: *Acute Goals and Plan of Care (Insert Text)  Description: Physical Therapy Goals  Initiated 12/15/2020 and to be accomplished within 7 day(s)  1. Patient will move from supine to sit and sit to supine , scoot up and down, and roll side to side in bed with modified independence. 2.  Patient will transfer from bed to chair and chair to bed with modified independence using the least restrictive device. 3.  Patient will perform sit to stand with modified independence. 4.  Patient will ambulate with supervision/set-up for 150 feet with the least restrictive device. 5.  Patient will ascend/descend 4 stairs with 1 handrail(s) with supervision/set-up. PLOF: Pt was ind PTA, lives in a two story home with her  with 3-4 ISSA, bedroom on the second floor, used a cane and also has a walker for post surgery, stated she needed assist with shoes/socks. Outcome: Progressing Towards Goal    PHYSICAL THERAPY TREATMENT    Patient: Arnoldo Buenrostro (49 y.o. female)  Date: 12/16/2020  Diagnosis: Osteoarthritis of left hip, unspecified osteoarthritis type [M16.12]  Hip arthritis [M16.10] <principal problem not specified>  Procedure(s) (LRB):  LEFT TOTAL HIP ARTHROPLASTY LATERAL APPROACH (Left) 1 Day Post-Op  Precautions: Fall, Total hip, WBAT  PLOF: see above     ASSESSMENT:  Pt cleared to participate in PT session by RN. Pt received in recliner and agreeable. Sit to stand to RW with SBA, ambulating x15 feet in room, RW too low for height of pt. Pt sitting on EOB with SBA, RW adjusted for pt height. Pt then standing from bed with supervision, ambulating x150 feet with transport chair following with rehab tech in case of dizziness. Pt with 0 reports of dizziness during ambulation and stair negotiation. Pt educated on proper stair technique, ascending with L handrail and R handhold x4 steps with CGA for support due to 1 handrail on stairs.  Then completing with 2 handrails and supervision. Pt returned to recliner with all needs met and call bell in reach. Pt with no further questions and reporting comfortable with ambulation and stair negotiation at home. Pt [facility]. Progression toward goals:   [x]      Improving appropriately and progressing toward goals  []      Improving slowly and progressing toward goals  []      Not making progress toward goals and plan of care will be adjusted     PLAN:  Patient continues to benefit from skilled intervention to address the above impairments. Continue treatment per established plan of care. Discharge Recommendations:  Home Health with support from family. Further Equipment Recommendations for Discharge:  has RW     SUBJECTIVE:   Patient stated I did a lot better than I thought I would.     OBJECTIVE DATA SUMMARY:   Critical Behavior:  Neurologic State: Alert  Orientation Level: Oriented X4  Cognition: Follows commands  Safety/Judgement: Fall prevention  Functional Mobility Training:  Bed Mobility:     Supine to Sit: Minimum assistance(practiced leg lifting via sheet, unsuccessful)     Scooting: Supervision    Transfers:  Sit to Stand: Stand-by assistance  Stand to Sit: Stand-by assistance    Balance:  Sitting: Intact  Standing: Intact; With support  Standing - Static: Good  Standing - Dynamic : Good   Range Of Motion:   AROM: Within functional limits     Posture:   Posture (WDL): Within defined limits     Ambulation/Gait Training:  Distance (ft): 150 Feet (ft)  Assistive Device: Walker, rolling  Ambulation - Level of Assistance: Stand-by assistance     Gait Description (WDL): Exceptions to WDL  Gait Abnormalities: Decreased step clearance; Antalgic; Step to gait    Base of Support: Center of gravity altered;Shift to right;Widened  Stance: Left decreased  Speed/Katherine: Slow  Step Length: Right shortened;Left shortened    Stairs:  Number of Stairs Trained: 8  Stairs - Level of Assistance: Contact guard assistance  Rail Use: Left     Pain:  Pain level pre-treatment: 5/10  Pain level post-treatment: 5/10   Pain Intervention(s): Medication (see MAR); Rest, Ice, Repositioning  Response to intervention: Nurse notified, See doc flow    Activity Tolerance:   Good activity tolerance, completing ambulation and stair negotiation. Please refer to the flowsheet for vital signs taken during this treatment. After treatment:   [x] Patient left in no apparent distress sitting up in chair  [] Patient left in no apparent distress in bed  [x] Call bell left within reach  [x] Nursing notified  [] Caregiver present  [] Bed alarm activated  [] SCDs applied      COMMUNICATION/EDUCATION:   [x]         Role of Physical Therapy in the acute care setting. [x]         Fall prevention education was provided and the patient/caregiver indicated understanding. [x]         Patient/family have participated as able in working toward goals and plan of care. [x]         Patient/family agree to work toward stated goals and plan of care. []         Patient understands intent and goals of therapy, but is neutral about his/her participation.   []         Patient is unable to participate in stated goals/plan of care: ongoing with therapy staff.  []         Other:        Darryn Ward, PT   Time Calculation: 16 mins

## 2020-12-16 NOTE — TELEPHONE ENCOUNTER
Spoke with [facility] pharmacist, notified them per provider ok to adjust sig as needed to fill Rx. She states it will be max daily of 4 tabs and a quantity of 28 tabs.

## 2020-12-16 NOTE — OP NOTES
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 PROCEDURES PERFORMED:  1. Difficult left total hip replacement using the Urbanna Accolade II system with a 54 mm Trident II Tritanium acetabular shell, neutral 36 liner, a size 3 Accolade  high-offset femoral component, and a 36 minus 5 ceramic femoral head. 2.  Takedown of ankylosing osteophytosis. 3.  Subtotal capsulectomy and capsular releases. 4.  Release of osteophytosis for incarceration prior to dislocation. 5.  Impaction allografting for the protrusio deformity and acetabular reconstruction. SURGEON:  Adriana Newman. Joshua Klein MD    FIRST ASSISTANT:  Darylene March. SECOND ASSISTANT:  Dami Sprague. ANESTHETIST:  Dr. Joesphine Merlin. ANESTHESIA:  Preoperative nerve block with light general.    COMPLICATIONS:  None. SPECIMENS REMOVED:  Femoral head. IMPLANTS:  As above mentioned. ESTIMATED BLOOD LOSS:  300 mL. Darylene March was the first assistant who assisted with all phases of surgery commencing with patient positioning, patient prep, patient drape, leg positioning during surgery, retracting, assisting with the surgery itself, closure, dressing placement, and transfer. DESCRIPTION OF PROCEDURE:  After anesthetic was successfully induced, it was confirmed the patient did receive her antibiotics. Very careful preoperative templating was performed to plan our acetabular reconstruction and also with regards to her femoral neck and offsets as well. Leg lengths determined.   The patient placed in lateral decubitus position taking great care to pad all sensitive areas and ensured the pelvis was square, antibiotics confirmed given, and time-out performed. Lateral approach. The hip was quite stiff and tight. IT band delineated and incised sharply. Sciatic nerve identified, protected, and avoided, and a Charnley retractor introduced. Bursa released using the capsular scissors. Gluteus medius somewhat attenuated due to disuse, but otherwise intact. Minimus and capsule divided directly over femoral neck carrying back to the tip of the trochanter anteriorly along down the vastus lateralis. Whole cuff of tissue was taken in one contiguous layer, and the lesser trochanter identified. Pin placed in the superior iliac crest for offset leg length measurement, and we had to remove some incarcerating osteophytes superiorly prior to dislocation and also released some further capsule, and we did record offset leg lengths with the pin in the superior iliac crest.  The hip was then dislocated uneventfully and according to the preoperative templating using a relatively small fingerbreadth over the lesser femoral neck, divided while protecting the soft tissue structures. A fair bit of osteophyte removed from around the proximal femur and posterior capsule quite adherent reflected with electrocautery and Lynch elevator. We then instrumented around the acetabular area. Instrumented around the cup while protecting neurovascular structures. Identified the medial wall for the foveal notch, and she was already protrused. We did a meticulous debridement and some osteophyte removal.  The anatomy was quite abnormal given all the osteophytes and with excessive anteversion on the osteophytes. The cuff itself looked good, and so we scuffed up the medial wall and after meticulous debridement and then went for a rim fit and reamed up to accommodate with the 54 line-to-line. We then trialed this, and I could gauge the amount of impaction allograft.   We then formulated our bone graft with a DBX and reverse reamed impacted allograft the appropriate amount. We then implanted a definitive shell, and it should be noted that prior to this, we had to open up the introitus with the initial reamings as she was quite protrused and incarcerated. We implanted definitive shell with appropriate inclination and anteversion, and augmented with a couple of screws and then went on and removed the bunch of osteophyte in a horseshoe pattern while protecting neurovascular structures using Aquamantys and Exparel cocktail. Definitive liner, made sure it was fully seated and protected with a Ray-Selina gauze later be accounted for. With the leg in a sterile leg bag, she had some subtrochanteric narrowing. We therefore started slightly more posterior and lateralized appropriately with a box osteotome and Leksell and then broached our way up to accommodate a size 3. We had a high-pitched ting with the 3, very very tight, made sure that 2 was nicely countersunk and then the 3 was very nicely fitting. Calcar planer and reduced the hip and with the minus 5, we had anatomic restoration of offset and leg lengths, and I was very pleased with this. We had no impingement, excellent combined anteversion as well. A very nice acetabular reconstruction. We referred back to our offset and leg length guide. Implanted the definitive components and re-trialed and happiest with the minus 5, cleaned the trunnion, impacted on again reducing the hip. Routine closure, closure of the skin. At the end of the case, instrument, sponge, and needle count was correct. No complications. The patient tolerated the procedure well. An excellent outcome to a difficult case with protrusio, ankylosis, incarceration, Joby's pelvis, coxa breva, and necessitating very careful reaming techniques, avoidance of making the protrusio worse, impaction allograft, and acetabular reconstruction, and also coxa breva.   We had to also be very careful with leg lengths and offset. Overall, an excellent outcome to a difficult case with no complications, delighted with the results.         César Dodge MD [id number]  D:  12/15/2020 12:21  T:  12/16/2020 0:01  JOB #:  [id number]

## 2020-12-16 NOTE — PROGRESS NOTES
Discharge order noted for today. Pt has been accepted to [facility] agency. Met with patient and she is  agreeable to the transition plan today. Transport has been arranged through patient's . Patient's discharge summary and home health  orders have been forwarded to [facility] home health  agency via [address]   Discharge information has been documented on the AVS.       Camryn Laughlin RN  Case Management [phone]

## 2020-12-16 NOTE — ROUTINE PROCESS
I have reviewed discharge  instruction with the patient. The patient verbalized understanding. Discharged medications reviewed with patient and appropriate educational materials and adverse effects of medications teaching were provided. Patient's VS was stable and Pt is cleared by OT/PT. Patient armband was removed and shredded. Patient is cleared for discharge. All belonging sent with patient.

## 2020-12-16 NOTE — PROGRESS NOTES
Problem: Patient Education: Go to Patient Education Activity  Goal: Patient/Family Education  Outcome: Progressing Towards Goal     Problem: Patient Education: Go to Patient Education Activity  Goal: Patient/Family Education  Outcome: Progressing Towards Goal     Problem: Hip Replacement: Day of Surgery/Unit  Goal: Off Pathway (Use only if patient is Off Pathway)  Outcome: Progressing Towards Goal  Goal: Activity/Safety  Outcome: Progressing Towards Goal  Goal: Consults, if ordered  Outcome: Progressing Towards Goal  Goal: Diagnostic Test/Procedures  Outcome: Progressing Towards Goal  Goal: Nutrition/Diet  Outcome: Progressing Towards Goal  Goal: Medications  Outcome: Progressing Towards Goal  Goal: Respiratory  Outcome: Progressing Towards Goal  Goal: Treatments/Interventions/Procedures  Outcome: Progressing Towards Goal  Goal: Psychosocial  Outcome: Progressing Towards Goal  Goal: *Initiate mobility  Outcome: Progressing Towards Goal  Goal: *Optimal pain control at patient's stated goal  Outcome: Progressing Towards Goal  Goal: *Hemodynamically stable  Outcome: Progressing Towards Goal     Problem: Hip Replacement: Post Op Day 1  Goal: Off Pathway (Use only if patient is Off Pathway)  Outcome: Progressing Towards Goal  Goal: Activity/Safety  Outcome: Progressing Towards Goal  Goal: Diagnostic Test/Procedures  Outcome: Progressing Towards Goal  Goal: Nutrition/Diet  Outcome: Progressing Towards Goal  Goal: Medications  Outcome: Progressing Towards Goal  Goal: Respiratory  Outcome: Progressing Towards Goal  Goal: Treatments/Interventions/Procedures  Outcome: Progressing Towards Goal  Goal: Psychosocial  Outcome: Progressing Towards Goal  Goal: Discharge Planning  Outcome: Progressing Towards Goal  Goal: *Demonstrates progressive activity  Outcome: Progressing Towards Goal  Goal: *Optimal pain control at patient's stated goal  Outcome: Progressing Towards Goal  Goal: *Hemodynamically stable  Outcome: Progressing Towards Goal  Goal: *Discharge plan identified  Outcome: Progressing Towards Goal     Problem: Hip Replacement: Post-Op Day 2  Goal: Off Pathway (Use only if patient is Off Pathway)  Outcome: Progressing Towards Goal  Goal: Activity/Safety  Outcome: Progressing Towards Goal  Goal: Diagnostic Test/Procedures  Outcome: Progressing Towards Goal  Goal: Nutrition/Diet  Outcome: Progressing Towards Goal  Goal: Medications  Outcome: Progressing Towards Goal  Goal: Respiratory  Outcome: Progressing Towards Goal  Goal: Treatments/Interventions/Procedures  Outcome: Progressing Towards Goal  Goal: Psychosocial  Outcome: Progressing Towards Goal  Goal: *Met physical therapy criteria for discharge to the next level of care  Outcome: Progressing Towards Goal  Goal: *Optimal pain control with oral analgesia  Outcome: Progressing Towards Goal  Goal: *Hemodynamically stable  Outcome: Progressing Towards Goal  Goal: *Tolerating diet  Outcome: Progressing Towards Goal  Goal: *Verbalizes understanding of any indicated hip precautions  Outcome: Progressing Towards Goal  Goal: *Patient verbalizes understanding of discharge instructions  Outcome: Progressing Towards Goal     Problem: Falls - Risk of  Goal: *Absence of Falls  Description: Document Rosa Fall Risk and appropriate interventions in the flowsheet.   Outcome: Progressing Towards Goal  Note: Fall Risk Interventions:  Mobility Interventions: Patient to call before getting OOB, Utilize walker, cane, or other assistive device         Medication Interventions: Patient to call before getting OOB, Teach patient to arise slowly    Elimination Interventions: Call light in reach, Patient to call for help with toileting needs              Problem: Patient Education: Go to Patient Education Activity  Goal: Patient/Family Education  Outcome: Progressing Towards Goal     Problem: Patient Education: Go to Patient Education Activity  Goal: Patient/Family Education  Outcome: Progressing Towards Goal

## 2020-12-16 NOTE — ROUTINE PROCESS
Received report from [facility]. Pt AAOx3, NAD, breathing non labored, on room air, HOB up. IV site clean, dry and intact. IVF going per order. Bed at the lowest level on lock position, call bell w/i reach. Bedside and Verbal shift change report given to [facility] (oncoming nurse) by me (offgoing nurse). Report included the following information SBAR, Kardex, Procedure Summary, Intake/Output, MAR and Recent Results.

## 2020-12-16 NOTE — PROGRESS NOTES
End of Shift Note     Bedside and verbal shift change report given to Susie Fleming (On coming nurse) by Ishmael Davis RN (Off going nurse).   Report included the following information:      --Procedure Summary     --MAR,     --Recent Results     --Med Rec Status

## 2020-12-16 NOTE — PROGRESS NOTES
Problem: Patient Education: Go to Patient Education Activity  Goal: Patient/Family Education  Outcome: Progressing Towards Goal     Problem: Patient Education: Go to Patient Education Activity  Goal: Patient/Family Education  Outcome: Progressing Towards Goal     Problem: Hip Replacement: Day of Surgery/Unit  Goal: Off Pathway (Use only if patient is Off Pathway)  Outcome: Progressing Towards Goal  Goal: Activity/Safety  Outcome: Progressing Towards Goal  Goal: Consults, if ordered  Outcome: Progressing Towards Goal  Goal: Diagnostic Test/Procedures  Outcome: Progressing Towards Goal  Goal: Nutrition/Diet  Outcome: Progressing Towards Goal  Goal: Medications  Outcome: Progressing Towards Goal  Goal: Respiratory  Outcome: Progressing Towards Goal  Goal: Treatments/Interventions/Procedures  Outcome: Progressing Towards Goal  Goal: Psychosocial  Outcome: Progressing Towards Goal  Goal: *Initiate mobility  Outcome: Progressing Towards Goal  Goal: *Optimal pain control at patient's stated goal  Outcome: Progressing Towards Goal  Goal: *Hemodynamically stable  Outcome: Progressing Towards Goal     Problem: Hip Replacement: Post Op Day 1  Goal: Off Pathway (Use only if patient is Off Pathway)  Outcome: Progressing Towards Goal  Goal: Activity/Safety  Outcome: Progressing Towards Goal  Goal: Diagnostic Test/Procedures  Outcome: Progressing Towards Goal  Goal: Nutrition/Diet  Outcome: Progressing Towards Goal  Goal: Medications  Outcome: Progressing Towards Goal  Goal: Respiratory  Outcome: Progressing Towards Goal  Goal: Treatments/Interventions/Procedures  Outcome: Progressing Towards Goal  Goal: Psychosocial  Outcome: Progressing Towards Goal  Goal: Discharge Planning  Outcome: Progressing Towards Goal  Goal: *Demonstrates progressive activity  Outcome: Progressing Towards Goal  Goal: *Optimal pain control at patient's stated goal  Outcome: Progressing Towards Goal  Goal: *Hemodynamically stable  Outcome: Progressing Towards Goal  Goal: *Discharge plan identified  Outcome: Progressing Towards Goal     Problem: Hip Replacement: Post-Op Day 2  Goal: Off Pathway (Use only if patient is Off Pathway)  Outcome: Progressing Towards Goal  Goal: Activity/Safety  Outcome: Progressing Towards Goal  Goal: Diagnostic Test/Procedures  Outcome: Progressing Towards Goal  Goal: Nutrition/Diet  Outcome: Progressing Towards Goal  Goal: Medications  Outcome: Progressing Towards Goal  Goal: Respiratory  Outcome: Progressing Towards Goal  Goal: Treatments/Interventions/Procedures  Outcome: Progressing Towards Goal  Goal: Psychosocial  Outcome: Progressing Towards Goal  Goal: *Met physical therapy criteria for discharge to the next level of care  Outcome: Progressing Towards Goal  Goal: *Optimal pain control with oral analgesia  Outcome: Progressing Towards Goal  Goal: *Hemodynamically stable  Outcome: Progressing Towards Goal  Goal: *Tolerating diet  Outcome: Progressing Towards Goal  Goal: *Verbalizes understanding of any indicated hip precautions  Outcome: Progressing Towards Goal  Goal: *Patient verbalizes understanding of discharge instructions  Outcome: Progressing Towards Goal     Problem: Falls - Risk of  Goal: *Absence of Falls  Description: Document Rosa Fall Risk and appropriate interventions in the flowsheet.   Outcome: Progressing Towards Goal  Note: Fall Risk Interventions:  Mobility Interventions: Patient to call before getting OOB, PT Consult for mobility concerns         Medication Interventions: Patient to call before getting OOB, Teach patient to arise slowly    Elimination Interventions: Call light in reach, Patient to call for help with toileting needs              Problem: Patient Education: Go to Patient Education Activity  Goal: Patient/Family Education  Outcome: Progressing Towards Goal

## 2020-12-16 NOTE — DISCHARGE INSTRUCTIONS
Discharge Instructions for Total Hip Replacement Patients    · The dressing on your hip will be changed by the Home Health professional at the appropriate time. Keep your incision clean and dry. Do not apply any ointments to the incision. · You may shower as long as you keep you incision dry. When showering, leave your dressing on. The dressing is waterproof as long as the edges are sealed. · Notify your surgeon if:  · Your temperature is greater than 100.5  · You have pain not controlled by your pain medication  · You have increased drainage from your incision  · You have increased redness or swelling in your leg  · You have chest pain, shortness of breath, or any other problems    · Do your exercises as instructed by the home physical therapist.    · Follow your total hip precautions:  · Do not cross your legs or feet  · Do not turn your toes inward  · Do not bed at your waist more than 90 degrees    · Keep a firm pillow between your knees when sleeping or lying down. · You may use ice to your hip as needed. Do not apply the ice pack directly to your skin. Use a barrier such as your pant leg or a thin towel. · No heat to your hip. · Walk once an hour during normal walking hours. · If you have SHANELL hose (the white support stockings), remove them at bedtime and re-apply the hose in the morning for the next 2 weeks. Take your blood clot prevention medication the same time every day. Medication Name*******    Next Time you take it is ******    Take your pain medication as needed. Medication Name ******    Next dose can be taken at ******    You can take Tylenol for pain. Do not take more than 4000 mg every 24 hours. Tylenol ***** mg    Next Dose can be taken at ******    Take a laxative the day you get home and every day you are on a narcotic. Best of luck with your new hip and Danny Shirley for choosing the [address]!

## 2020-12-16 NOTE — PROGRESS NOTES
Ortho    Pt. Seen and evaluated. Doing well, pain well controlled, progressing well with PT  Denies cp, sob, abd pain    Visit Vitals  /74 (BP 1 Location: Right arm, BP Patient Position: At rest)   Pulse 62   Temp 98.1 °F (36.7 °C)   Resp 17   Ht 5' 3\" (1.6 m)   Wt 162 lb (73.5 kg)   LMP 10/08/2012   SpO2 97%   BMI 28.70 kg/m²       left total hip replacement  left hip Woundclean, dry, no drainage  Sensory intact to LT  Motor intact  nv intact  Neg calf tenderness. Labs. CBC  @  CBC:   Lab Results   Component Value Date/Time    WBC 3.3 (L) 12/01/2020 09:58 AM    RBC 4.43 12/01/2020 09:58 AM    HGB 13.2 12/01/2020 09:58 AM    HCT 39.2 12/01/2020 09:58 AM    PLATELET 528 74/52/4075 09:58 AM    and BMP:   Lab Results   Component Value Date/Time    Glucose 87 12/01/2020 09:58 AM    Sodium 144 12/01/2020 09:58 AM    Potassium 3.5 12/01/2020 09:58 AM    Chloride 110 12/01/2020 09:58 AM    CO2 30 12/01/2020 09:58 AM    BUN 11 12/01/2020 09:58 AM    Creatinine 0.63 12/01/2020 09:58 AM    Calcium 8.7 12/01/2020 09:58 AM   @  Coagulation  Lab Results   Component Value Date    INR 1.0 12/01/2020    APTT 29.4 12/01/2020      Basic Metabolic Profile  Lab Results   Component Value Date     12/01/2020    CO2 30 12/01/2020    BUN 11 12/01/2020         Assesment:leftOrthopedic / Rheumatologic: Total Hip Replacement  Past Medical History:   Diagnosis Date    Allergic rhinitis, seasonal     spring    Anemia NEC     iron deficiency associated with heavy menstruation and fibroids noted 2008 with pregnancy    Dysmenorrhea     increasing past year    Fibroid, uterine     Goiter     Headache(784.0)     Hypertension     Ill-defined condition     Chronic knee pain    Menorrhagia     worsening past year    Neutropenia (HCC)      ASA: 2    Pt is status post joint replacement and at risk for bleeding, blood clots, and infection. Plan:  aspirin, PT, DC to home if cleared by PT and ok with medicine.

## 2020-12-16 NOTE — PROGRESS NOTES
conducted an initial consultation and Spiritual Assessment for Carlos Buenrostro, who is a 53 y.o.,female. Patients Primary Language is: [place]. According to the patients EMR Muslim Affiliation is: [facility].     The reason the Patient came to the hospital is:   Patient Active Problem List    Diagnosis Date Noted    Hip arthritis 12/15/2020    Chronic pain of both knees/ following ortho 06/27/2016    Positive ALY (antinuclear antibody)/ test done by hemotology and requested referral by PCP 05/02/2016    Abnormal CBC/ seen hematology 03/22/2016    Goiter/ seen endocrinology/ observe 12/27/2015    Essential hypertension 12/09/2015    Migraine without aura and without status migrainosus, not intractable 12/09/2015    S/P hysterectomy/ due to menorrhagea  12/09/2015    Allergic rhinitis, seasonal     Anemia NEC         The  provided the following Interventions:  Initiated a relationship of care and support. Explored issues of ruslan, belief, spirituality and Quaker/ritual needs while hospitalized. Listened empathically. Provided information about Spiritual Care Services. Offered prayer and assurance of continued prayers on patient's behalf. Chart reviewed. The following outcomes where achieved:  Patient shared limited information about both their medical narrative and spiritual journey/beliefs.  confirmed Patient's Muslim Affiliation. Patient processed feeling about current hospitalization. Patient expressed gratitude for 's visit. Assessment:  Patient does not have any Quaker/cultural needs that will affect patients preferences in health care. There are no spiritual or Quaker issues which require intervention at this time. Plan:  Chaplains will continue to follow and will provide pastoral care on an as needed/requested basis.    recommends bedside caregivers page  on duty if patient shows signs of acute spiritual or emotional distress.       [address] [facility], [place]   [phone]

## 2020-12-17 ENCOUNTER — DOCUMENTATION ONLY (OUTPATIENT)
Dept: ORTHOPEDIC SURGERY | Age: 50
End: 2020-12-17

## 2020-12-17 ENCOUNTER — HOME CARE VISIT (OUTPATIENT)
Dept: SCHEDULING | Facility: HOME HEALTH | Age: 50
End: 2020-12-17
Payer: OTHER GOVERNMENT

## 2020-12-17 ENCOUNTER — HOME CARE VISIT (OUTPATIENT)
Dept: HOME HEALTH SERVICES | Facility: HOME HEALTH | Age: 50
End: 2020-12-17

## 2020-12-17 VITALS
RESPIRATION RATE: 16 BRPM | SYSTOLIC BLOOD PRESSURE: 118 MMHG | OXYGEN SATURATION: 96 % | TEMPERATURE: 97.8 F | DIASTOLIC BLOOD PRESSURE: 72 MMHG | HEART RATE: 69 BPM

## 2020-12-17 PROCEDURE — 3331090002 HH PPS REVENUE DEBIT

## 2020-12-17 PROCEDURE — G0151 HHCP-SERV OF PT,EA 15 MIN: HCPCS

## 2020-12-17 PROCEDURE — 400013 HH SOC

## 2020-12-17 PROCEDURE — A6213 FOAM DRG >16<=48 SQ IN W/BDR: HCPCS

## 2020-12-17 PROCEDURE — 3331090001 HH PPS REVENUE CREDIT

## 2020-12-17 PROCEDURE — G0299 HHS/HOSPICE OF RN EA 15 MIN: HCPCS

## 2020-12-17 NOTE — PROGRESS NOTES
The [address], Attending Physician's Statement, was received via fax and placed in the forms bin at .

## 2020-12-18 ENCOUNTER — HOME CARE VISIT (OUTPATIENT)
Dept: SCHEDULING | Facility: HOME HEALTH | Age: 50
End: 2020-12-18
Payer: OTHER GOVERNMENT

## 2020-12-18 ENCOUNTER — HOME CARE VISIT (OUTPATIENT)
Dept: HOME HEALTH SERVICES | Facility: HOME HEALTH | Age: 50
End: 2020-12-18
Payer: OTHER GOVERNMENT

## 2020-12-18 VITALS
DIASTOLIC BLOOD PRESSURE: 80 MMHG | TEMPERATURE: 98.2 F | OXYGEN SATURATION: 99 % | SYSTOLIC BLOOD PRESSURE: 118 MMHG | HEART RATE: 74 BPM

## 2020-12-18 PROCEDURE — 3331090002 HH PPS REVENUE DEBIT

## 2020-12-18 PROCEDURE — 3331090001 HH PPS REVENUE CREDIT

## 2020-12-18 PROCEDURE — G0157 HHC PT ASSISTANT EA 15: HCPCS

## 2020-12-19 ENCOUNTER — HOME CARE VISIT (OUTPATIENT)
Dept: SCHEDULING | Facility: HOME HEALTH | Age: 50
End: 2020-12-19
Payer: OTHER GOVERNMENT

## 2020-12-19 VITALS
OXYGEN SATURATION: 98 % | HEART RATE: 66 BPM | RESPIRATION RATE: 16 BRPM | DIASTOLIC BLOOD PRESSURE: 98 MMHG | TEMPERATURE: 97.9 F | SYSTOLIC BLOOD PRESSURE: 140 MMHG

## 2020-12-19 PROCEDURE — G0157 HHC PT ASSISTANT EA 15: HCPCS

## 2020-12-19 PROCEDURE — 3331090001 HH PPS REVENUE CREDIT

## 2020-12-19 PROCEDURE — 3331090002 HH PPS REVENUE DEBIT

## 2020-12-20 ENCOUNTER — HOME CARE VISIT (OUTPATIENT)
Dept: SCHEDULING | Facility: HOME HEALTH | Age: 50
End: 2020-12-20
Payer: OTHER GOVERNMENT

## 2020-12-20 PROCEDURE — 3331090001 HH PPS REVENUE CREDIT

## 2020-12-20 PROCEDURE — G0157 HHC PT ASSISTANT EA 15: HCPCS

## 2020-12-20 PROCEDURE — 3331090002 HH PPS REVENUE DEBIT

## 2020-12-20 NOTE — PROGRESS NOTES
Patient has been evaluated by 1 Eva Mccallum PT s/p THR. Castillo for PT: d x 14.   Thank you for your referral.

## 2020-12-21 ENCOUNTER — HOME CARE VISIT (OUTPATIENT)
Dept: SCHEDULING | Facility: HOME HEALTH | Age: 50
End: 2020-12-21
Payer: OTHER GOVERNMENT

## 2020-12-21 VITALS
TEMPERATURE: 97.4 F | DIASTOLIC BLOOD PRESSURE: 86 MMHG | TEMPERATURE: 98.9 F | OXYGEN SATURATION: 97 % | HEART RATE: 68 BPM | SYSTOLIC BLOOD PRESSURE: 122 MMHG | SYSTOLIC BLOOD PRESSURE: 122 MMHG | OXYGEN SATURATION: 97 % | RESPIRATION RATE: 13 BRPM | HEART RATE: 67 BPM | DIASTOLIC BLOOD PRESSURE: 76 MMHG | RESPIRATION RATE: 13 BRPM

## 2020-12-21 PROCEDURE — G0157 HHC PT ASSISTANT EA 15: HCPCS

## 2020-12-21 PROCEDURE — 3331090001 HH PPS REVENUE CREDIT

## 2020-12-21 PROCEDURE — 3331090002 HH PPS REVENUE DEBIT

## 2020-12-22 ENCOUNTER — HOME CARE VISIT (OUTPATIENT)
Dept: SCHEDULING | Facility: HOME HEALTH | Age: 50
End: 2020-12-22
Payer: OTHER GOVERNMENT

## 2020-12-22 ENCOUNTER — DOCUMENTATION ONLY (OUTPATIENT)
Dept: ORTHOPEDIC SURGERY | Age: 50
End: 2020-12-22

## 2020-12-22 VITALS
OXYGEN SATURATION: 98 % | SYSTOLIC BLOOD PRESSURE: 132 MMHG | DIASTOLIC BLOOD PRESSURE: 86 MMHG | TEMPERATURE: 98.3 F | HEART RATE: 61 BPM

## 2020-12-22 VITALS
OXYGEN SATURATION: 100 % | SYSTOLIC BLOOD PRESSURE: 124 MMHG | DIASTOLIC BLOOD PRESSURE: 80 MMHG | HEART RATE: 75 BPM | TEMPERATURE: 98.5 F

## 2020-12-22 PROCEDURE — 3331090002 HH PPS REVENUE DEBIT

## 2020-12-22 PROCEDURE — 3331090001 HH PPS REVENUE CREDIT

## 2020-12-22 PROCEDURE — G0157 HHC PT ASSISTANT EA 15: HCPCS

## 2020-12-22 PROCEDURE — G0300 HHS/HOSPICE OF LPN EA 15 MIN: HCPCS

## 2020-12-23 ENCOUNTER — HOME CARE VISIT (OUTPATIENT)
Dept: SCHEDULING | Facility: HOME HEALTH | Age: 50
End: 2020-12-23
Payer: OTHER GOVERNMENT

## 2020-12-23 PROCEDURE — 3331090001 HH PPS REVENUE CREDIT

## 2020-12-23 PROCEDURE — 3331090002 HH PPS REVENUE DEBIT

## 2020-12-23 PROCEDURE — G0157 HHC PT ASSISTANT EA 15: HCPCS

## 2020-12-24 ENCOUNTER — HOME CARE VISIT (OUTPATIENT)
Dept: SCHEDULING | Facility: HOME HEALTH | Age: 50
End: 2020-12-24
Payer: OTHER GOVERNMENT

## 2020-12-24 PROCEDURE — G0157 HHC PT ASSISTANT EA 15: HCPCS

## 2020-12-24 PROCEDURE — 3331090002 HH PPS REVENUE DEBIT

## 2020-12-24 PROCEDURE — 3331090001 HH PPS REVENUE CREDIT

## 2020-12-25 ENCOUNTER — HOME CARE VISIT (OUTPATIENT)
Dept: HOME HEALTH SERVICES | Facility: HOME HEALTH | Age: 50
End: 2020-12-25
Payer: OTHER GOVERNMENT

## 2020-12-25 PROCEDURE — 3331090002 HH PPS REVENUE DEBIT

## 2020-12-25 PROCEDURE — 3331090001 HH PPS REVENUE CREDIT

## 2020-12-26 ENCOUNTER — HOME CARE VISIT (OUTPATIENT)
Dept: SCHEDULING | Facility: HOME HEALTH | Age: 50
End: 2020-12-26
Payer: OTHER GOVERNMENT

## 2020-12-26 VITALS
SYSTOLIC BLOOD PRESSURE: 112 MMHG | DIASTOLIC BLOOD PRESSURE: 58 MMHG | HEART RATE: 66 BPM | OXYGEN SATURATION: 100 % | TEMPERATURE: 97.6 F | HEART RATE: 70 BPM | DIASTOLIC BLOOD PRESSURE: 80 MMHG | SYSTOLIC BLOOD PRESSURE: 122 MMHG | OXYGEN SATURATION: 100 % | TEMPERATURE: 98.2 F

## 2020-12-26 VITALS
TEMPERATURE: 97.5 F | HEART RATE: 73 BPM | DIASTOLIC BLOOD PRESSURE: 74 MMHG | RESPIRATION RATE: 13 BRPM | SYSTOLIC BLOOD PRESSURE: 122 MMHG | OXYGEN SATURATION: 99 %

## 2020-12-26 PROCEDURE — 3331090001 HH PPS REVENUE CREDIT

## 2020-12-26 PROCEDURE — G0157 HHC PT ASSISTANT EA 15: HCPCS

## 2020-12-26 PROCEDURE — 3331090002 HH PPS REVENUE DEBIT

## 2020-12-27 ENCOUNTER — HOME CARE VISIT (OUTPATIENT)
Dept: SCHEDULING | Facility: HOME HEALTH | Age: 50
End: 2020-12-27
Payer: OTHER GOVERNMENT

## 2020-12-27 VITALS
OXYGEN SATURATION: 98 % | TEMPERATURE: 98 F | DIASTOLIC BLOOD PRESSURE: 65 MMHG | HEART RATE: 82 BPM | SYSTOLIC BLOOD PRESSURE: 116 MMHG

## 2020-12-27 PROCEDURE — 3331090001 HH PPS REVENUE CREDIT

## 2020-12-27 PROCEDURE — 3331090002 HH PPS REVENUE DEBIT

## 2020-12-27 PROCEDURE — G0157 HHC PT ASSISTANT EA 15: HCPCS

## 2020-12-28 ENCOUNTER — HOME CARE VISIT (OUTPATIENT)
Dept: SCHEDULING | Facility: HOME HEALTH | Age: 50
End: 2020-12-28
Payer: OTHER GOVERNMENT

## 2020-12-28 VITALS
HEART RATE: 70 BPM | SYSTOLIC BLOOD PRESSURE: 118 MMHG | OXYGEN SATURATION: 99 % | TEMPERATURE: 98.4 F | RESPIRATION RATE: 14 BRPM | DIASTOLIC BLOOD PRESSURE: 78 MMHG

## 2020-12-28 VITALS
TEMPERATURE: 97.8 F | DIASTOLIC BLOOD PRESSURE: 84 MMHG | SYSTOLIC BLOOD PRESSURE: 132 MMHG | RESPIRATION RATE: 18 BRPM | OXYGEN SATURATION: 98 % | HEART RATE: 87 BPM

## 2020-12-28 PROCEDURE — G0151 HHCP-SERV OF PT,EA 15 MIN: HCPCS

## 2020-12-28 PROCEDURE — 3331090002 HH PPS REVENUE DEBIT

## 2020-12-28 PROCEDURE — 3331090001 HH PPS REVENUE CREDIT

## 2020-12-29 ENCOUNTER — HOME CARE VISIT (OUTPATIENT)
Dept: SCHEDULING | Facility: HOME HEALTH | Age: 50
End: 2020-12-29
Payer: OTHER GOVERNMENT

## 2020-12-29 VITALS — OXYGEN SATURATION: 98 %

## 2020-12-29 PROCEDURE — G0300 HHS/HOSPICE OF LPN EA 15 MIN: HCPCS

## 2020-12-29 PROCEDURE — 3331090002 HH PPS REVENUE DEBIT

## 2020-12-29 PROCEDURE — G0157 HHC PT ASSISTANT EA 15: HCPCS

## 2020-12-29 PROCEDURE — 3331090001 HH PPS REVENUE CREDIT

## 2020-12-30 ENCOUNTER — OFFICE VISIT (OUTPATIENT)
Dept: ORTHOPEDIC SURGERY | Age: 50
End: 2020-12-30
Payer: OTHER GOVERNMENT

## 2020-12-30 VITALS
DIASTOLIC BLOOD PRESSURE: 86 MMHG | HEART RATE: 82 BPM | OXYGEN SATURATION: 98 % | SYSTOLIC BLOOD PRESSURE: 126 MMHG | TEMPERATURE: 98.3 F

## 2020-12-30 VITALS
WEIGHT: 167 LBS | RESPIRATION RATE: 16 BRPM | TEMPERATURE: 97.1 F | HEART RATE: 67 BPM | HEIGHT: 63 IN | OXYGEN SATURATION: 100 % | SYSTOLIC BLOOD PRESSURE: 134 MMHG | DIASTOLIC BLOOD PRESSURE: 82 MMHG | BODY MASS INDEX: 29.59 KG/M2

## 2020-12-30 DIAGNOSIS — M16.12 PRIMARY OSTEOARTHRITIS OF LEFT HIP: Primary | ICD-10-CM

## 2020-12-30 PROCEDURE — 3331090001 HH PPS REVENUE CREDIT

## 2020-12-30 PROCEDURE — 99024 POSTOP FOLLOW-UP VISIT: CPT | Performed by: PHYSICIAN ASSISTANT

## 2020-12-30 PROCEDURE — 3331090002 HH PPS REVENUE DEBIT

## 2020-12-30 NOTE — PROGRESS NOTES
Carlos Buenrostro  1970     HISTORY OF PRESENT ILLNESS  Carlos Lo is a 53 y.o. female who presents today for evaluation s/p left total hip arthroplasty on 12/15/2020. Pt rates pain as 2/10 today. Pt presents today ambulating with a cane. Pt is currently taking Aspirin. Has finished with Home Health PT. Patient denies any fever, chills, chest pain, shortness of breath or calf pain. The remainder of the review of systems is negative. There are no new illness or injuries to report since last seen in the office. There are no changes to medications, allergies, family or social history. Pain Assessment  12/30/2020   Location of Pain Hip   Location Modifiers Left   Severity of Pain 2   Quality of Pain Aching   Quality of Pain Comment discomfort   Duration of Pain Persistent   Frequency of Pain Constant   Date Pain First Started -   Aggravating Factors -   Aggravating Factors Comment -   Limiting Behavior No   Relieving Factors NSAID   Relieving Factors Comment -   Result of Injury No   Work-Related Injury -   Type of Injury -       PHYSICAL EXAM:   Visit Vitals  /82 (BP 1 Location: Left arm, BP Patient Position: Sitting)   Pulse 67   Temp 97.1 °F (36.2 °C) (Temporal)   Resp 16   Ht 5' 3\" (1.6 m)   Wt 167 lb (75.8 kg)   LMP 10/08/2012   SpO2 100%   BMI 29.58 kg/m²      The patient is a well-developed, well-nourished female in no acute distress. The patient is alert and oriented times three. The patient appears to be well groomed. Mood and affect are normal.  ORTHOPEDIC EXAM of left hip:  Inspection: No swelling, no bruising,  Incision, clean, dry, intact, sutures in place  Range of motion: able to flex hip  ttp minimal around incision  Stability: Stable  Strength: 5/5      IMPRESSION:  S/P left total hip arthroplasty         ICD-10-CM ICD-9-CM    1.  Primary osteoarthritis of left hip  M16.12 715.15 REFERRAL TO PHYSICAL THERAPY          PLAN: Pt presents s/p left total hip arthroplasty on 12/15/2020. Surgery was discussed today. Incisions inspected and cleaned. Staples removed and steri-strips applied. Continue taking Aspirin. Will set up with outpatient PT. Will follow up with HELEN George in 2-3 weeks.       RTC 2-3 weeks with HELEN George      Scribed by Diamond Gates[address] [place] [address] [address]) as dictated by TAM Comer PA-C [facility] [phone] and Spine Specialist

## 2020-12-31 ENCOUNTER — HOME CARE VISIT (OUTPATIENT)
Dept: SCHEDULING | Facility: HOME HEALTH | Age: 50
End: 2020-12-31
Payer: OTHER GOVERNMENT

## 2020-12-31 VITALS
HEART RATE: 77 BPM | SYSTOLIC BLOOD PRESSURE: 122 MMHG | OXYGEN SATURATION: 98 % | DIASTOLIC BLOOD PRESSURE: 90 MMHG | TEMPERATURE: 97.3 F | RESPIRATION RATE: 24 BRPM

## 2020-12-31 PROCEDURE — 3331090002 HH PPS REVENUE DEBIT

## 2020-12-31 PROCEDURE — 3331090001 HH PPS REVENUE CREDIT

## 2020-12-31 PROCEDURE — G0151 HHCP-SERV OF PT,EA 15 MIN: HCPCS

## 2021-01-01 NOTE — PROGRESS NOTES
Patient has been discharged from [address] [place] [address], goals met. She is waiting on [phone] for OPPT and plans to begin next week. Lorraine Cantu summary is available for review.   Thank you for your reerral.

## 2021-01-07 ENCOUNTER — HOSPITAL ENCOUNTER (OUTPATIENT)
Dept: PHYSICAL THERAPY | Age: 51
Discharge: HOME | End: 2021-01-07
Payer: OTHER GOVERNMENT

## 2021-01-07 PROCEDURE — 97535 SELF CARE MNGMENT TRAINING: CPT

## 2021-01-07 PROCEDURE — 97162 PT EVAL MOD COMPLEX 30 MIN: CPT

## 2021-01-07 PROCEDURE — 97110 THERAPEUTIC EXERCISES: CPT

## 2021-01-07 NOTE — PROGRESS NOTES
PT DAILY TREATMENT NOTE     Patient Name: Amor Buenrostro  Date:2021  : 1970  [x]  Patient  Verified  Payor: CLAIR / Plan: Zohaib Turner 74 / Product Type: Juan Vargas /    In [id number]  Out time:0950  Total Treatment Time (min): 35  Visit #: 1 of 8      Treatment Area: Left hip pain [M25.552]    SUBJECTIVE  Pain Level (0-10 scale): 4  Any medication changes, allergies to medications, adverse drug reactions, diagnosis change, or new procedure performed?: [x] No    [] Yes (see summary sheet for update)  Subjective functional status/changes:   [] No changes reported       OBJECTIVE    Modality rationale:    Min Type Additional Details    [] Estim:  []Unatt       []IFC  []Premod                        []Other:  []w/ice   []w/heat  Position:  Location:    [] Estim: []Att    []TENS instruct  []NMES                    []Other:  []w/US   []w/ice   []w/heat  Position:  Location:    []  Traction: [] Cervical       []Lumbar                       [] Prone          []Supine                       []Intermittent   []Continuous Lbs:  [] before manual  [] after manual    []  Ultrasound: []Continuous   [] Pulsed                           []1MHz   []3MHz W/cm2:  Location:    []  Iontophoresis with dexamethasone         Location: [] Take home patch   [] In clinic    []  Ice     []  heat  []  Ice massage  []  Laser   []  Anodyne Position:  Location:    []  Laser with stim  []  Other:  Position:  Location:    []  Vasopneumatic Device Pressure:       [] lo [] med [] hi   Temperature: [] lo [] med [] hi   [] Skin assessment post-treatment:  []intact []redness- no adverse reaction    []redness  adverse reaction:     15 min [x]Eval                  []Re-Eval       10 min Therapeutic Exercise:  [] See flow sheet : HEP   Rationale: increase ROM and increase strength to improve the patients ability to perform ADL    10 min Self Care/Home Management: NOREEN precautions reviewed, precautions with use of stationary bike at home, cane use/adjustment   Rationale: appropriate hip precautions  to improve the patients ability to perform daily activities without increased risk of dislocation. With   [] TE   [] TA   [] neuro   [] other: Patient Education: [x] Review HEP    [] Progressed/Changed HEP based on:   [] positioning   [] body mechanics   [] transfers   [] heat/ice application    [] other:      Other Objective/Functional Measures:       Pain Level (0-10 scale) post treatment: 4    ASSESSMENT/Changes in Function:      Patient will continue to benefit from skilled PT services to modify and progress therapeutic interventions, address functional mobility deficits, address ROM deficits, address strength deficits, analyze and address soft tissue restrictions and analyze and cue movement patterns to attain remaining goals. [x]  See Plan of Care  []  See progress note/recertification  []  See Discharge Summary         Progress towards goals / Updated goals:    Short Term Goals: To be accomplished in 1 weeks: 1. The pt will be I and compliant with HEP   IE- issued HEP  Long Term Goals: To be accomplished in 4 weeks:   1. Improve FOTO score to predicted outcome to improve ability for daily tasks   IE- 47   2. The pt will demonstrate 90 degrees of left hip flexion PROM to improve ability for daily tasks   IE- 60 degrees PROM    3. Improve left hip abduction to 3/5 for improved gait ability. IE- 2-/5   4. The pt will demonstrate ability to perform stairs with alternating pattern. IE- step to pattern   5. The pt will report a little difficulty with walking a mile. IE- quite a bit of difficulty.     PLAN  []  Upgrade activities as tolerated     [x]  Continue plan of care  []  Update interventions per flow sheet       []  Discharge due to:_  []  Other:_      Renee Soares, PT 1/7/2021  9:10 AM    Future Appointments   Date Time Provider Kingsley Stout   1/7/2021  9:15 AM Juanis Campbell, PT [id number] 1/8/2021  1:15 PM MD ARNAUD Bojorquez BS AMB   1/28/2021 10:30 AM Luis Manuel Ku PA-C [facility] BS AMB   3/2/2021 10:00 AM MD ARNAUD Bojorquez BS AMB

## 2021-01-07 NOTE — PROGRESS NOTES
[facility]  [address]  [place], [address]  [phone] fax    Plan of Care/ Statement of Necessity for Physical Therapy Services    Patient name: Mercedez Perez Start of Care: 2021   Referral source: Keeley Yan MD : 1970    Medical Diagnosis: Left hip pain [M25.552]  Payor:  / Plan: Zohaib Turner 74 / Product Type:  /  Onset Date:12-15-20    Treatment Diagnosis: Left hip pain / s/p left NOREEN(lateral approach)   Prior Hospitalization: see medical history Provider#: [id number]   Medications: Verified on Patient summary List    Comorbidities: OA   Prior Level of Function: functionally I with daily tasks. The Plan of Care and following information is based on the information from the initial evaluation. Assessment/ key information: 52 y/o female presents s/p left NOREEN lateral approach as noted above. The pt reports receiving HHPT up to this time. The pt is ambulating with use of a cane with trendelenburg and right lateral lean. The pt demonstrates diminished hip AROM flexion and abduction, limited strength throughout the left hip complex, + pain rating 4/10 currently, + leg length discrepancy,  and myofascial restrictions. The pt will benefit from PT to address the aforementioned impairment. Evaluation Complexity History MEDIUM  Complexity : 1-2 comorbidities / personal factors will impact the outcome/ POC ; Examination MEDIUM Complexity : 3 Standardized tests and measures addressing body structure, function, activity limitation and / or participation in recreation  ;Presentation MEDIUM Complexity : Evolving with changing characteristics  ; Clinical Decision Making MEDIUM Complexity : FOTO score of 26-74  Overall Complexity Rating: MEDIUM  Problem List: pain affecting function, decrease ROM, decrease strength, decrease ADL/ functional abilitiies, decrease activity tolerance and decrease flexibility/ joint mobility   Treatment Plan may include any combination of the following: Therapeutic exercise, Therapeutic activities, Neuromuscular re-education, Physical agent/modality, Manual therapy, Aquatic therapy, Patient education and Self Care training  Patient / Family readiness to learn indicated by: asking questions and trying to perform skills  Persons(s) to be included in education: patient (P)  Barriers to Learning/Limitations: None  Patient Goal (s): To strengthen  Patient Self Reported Health Status: good  Rehabilitation Potential: good    Short Term Goals: To be accomplished in 1 weeks: 1. The pt will be I and compliant with HEP     Long Term Goals: To be accomplished in 4 weeks:   1. Improve FOTO score to predicted outcome to improve ability for daily tasks   2. The pt will demonstrate 90 degrees of left hip flexion PROM to improve ability for daily tasks   3. Improve left hip abduction to 3/5 for improved gait ability. 4. The pt will demonstrate ability to perform stairs with alternating pattern. 5. The pt will report a little difficulty with walking a mile. Frequency / Duration: Patient to be seen 2 times per week for 4 weeks. Patient/ Caregiver education and instruction: Diagnosis, prognosis, self care, activity modification and exercises   [x]  Plan of care has been reviewed with KARYN Askew, PT 1/7/2021 9:11 AM    ________________________________________________________________________    I certify that the above Therapy Services are being furnished while the patient is under my care. I agree with the treatment plan and certify that this therapy is necessary.     [de-identified] Signature:____________________  Date:____________Time: _________    Please sign and return to [facility] [address], [address]  [phone] fax

## 2021-01-08 ENCOUNTER — OFFICE VISIT (OUTPATIENT)
Dept: FAMILY MEDICINE CLINIC | Age: 51
End: 2021-01-08
Payer: OTHER GOVERNMENT

## 2021-01-08 VITALS
TEMPERATURE: 98.1 F | WEIGHT: 160 LBS | HEIGHT: 63 IN | SYSTOLIC BLOOD PRESSURE: 112 MMHG | OXYGEN SATURATION: 97 % | HEART RATE: 80 BPM | RESPIRATION RATE: 16 BRPM | DIASTOLIC BLOOD PRESSURE: 80 MMHG | BODY MASS INDEX: 28.35 KG/M2

## 2021-01-08 DIAGNOSIS — G43.009 MIGRAINE WITHOUT AURA AND WITHOUT STATUS MIGRAINOSUS, NOT INTRACTABLE: ICD-10-CM

## 2021-01-08 DIAGNOSIS — Z96.642 STATUS POST HIP REPLACEMENT, LEFT: ICD-10-CM

## 2021-01-08 DIAGNOSIS — R79.89 ABNORMAL CBC: Primary | ICD-10-CM

## 2021-01-08 DIAGNOSIS — Z90.710 S/P HYSTERECTOMY: ICD-10-CM

## 2021-01-08 DIAGNOSIS — E04.9 GOITER: ICD-10-CM

## 2021-01-08 DIAGNOSIS — R76.8 POSITIVE ANA (ANTINUCLEAR ANTIBODY): ICD-10-CM

## 2021-01-08 DIAGNOSIS — I10 ESSENTIAL HYPERTENSION: ICD-10-CM

## 2021-01-08 PROCEDURE — 99214 OFFICE O/P EST MOD 30 MIN: CPT | Performed by: FAMILY MEDICINE

## 2021-01-08 NOTE — PROGRESS NOTES
HISTORY OF PRESENT ILLNESS  Carlos Dobbins is a 53 y.o. female. HPI: Here for post left hip replacement. Uncomplicated procedure. Currently feeling much better. With physical therapy. No concern. No leg swelling. Still on aspirin. Now planning in near future about the right hip replacement. She has a prior ALY positive and multiple side joint pain. She is also following closely rheumatologist for further management. During the visit she was sitting comfortable and did not appear in any acute distress. Denies any headache, dizziness, no chest pain or trouble breathing, no arm or leg weakness. No nausea or vomiting, no weight or appetite changes, no mood changes . No urine or bowel complains, no palpitation, no diaphoresis. No abdominal pain. No cold or cough. No leg swelling. No fever. No sleep trouble. Visit Vitals  /80 (BP 1 Location: Left arm, BP Patient Position: Sitting)   Pulse 80   Temp 98.1 °F (36.7 °C) (Oral)   Resp 16   Ht 5' 3\" (1.6 m)   Wt 160 lb (72.6 kg)   SpO2 97%   BMI 28.34 kg/m²     Review medication list, vitals, problem list,allergies. Lab Results   Component Value Date/Time    WBC 3.3 (L) 12/01/2020 09:58 AM    HGB 13.2 12/01/2020 09:58 AM    HCT 39.2 12/01/2020 09:58 AM    PLATELET 721 99/55/1537 09:58 AM    MCV 88.5 12/01/2020 09:58 AM     Lab Results   Component Value Date/Time    Sodium 144 12/01/2020 09:58 AM    Potassium 3.5 12/01/2020 09:58 AM    Chloride 110 12/01/2020 09:58 AM    CO2 30 12/01/2020 09:58 AM    Anion gap 4 12/01/2020 09:58 AM    Glucose 87 12/01/2020 09:58 AM    BUN 11 12/01/2020 09:58 AM    Creatinine 0.63 12/01/2020 09:58 AM    BUN/Creatinine ratio 17 12/01/2020 09:58 AM    GFR est AA >60 12/01/2020 09:58 AM    GFR est non-AA >60 12/01/2020 09:58 AM    Calcium 8.7 12/01/2020 09:58 AM    Bilirubin, total 0.5 10/06/2020 09:11 AM    Alk.  phosphatase 96 10/06/2020 09:11 AM    Protein, total 7.1 10/06/2020 09:11 AM    Albumin 4.0 12/01/2020 09:58 AM Globulin 3.4 10/06/2020 09:11 AM    A-G Ratio 1.1 10/06/2020 09:11 AM    ALT (SGPT) 15 10/06/2020 09:11 AM    AST (SGOT) 13 10/06/2020 09:11 AM     Lab Results   Component Value Date/Time    Cholesterol, total 198 05/04/2018 10:07 AM    HDL Cholesterol 72 (H) 05/04/2018 10:07 AM    LDL, calculated 111 (H) 05/04/2018 10:07 AM    VLDL, calculated 15 05/04/2018 10:07 AM    Triglyceride 75 05/04/2018 10:07 AM    CHOL/HDL Ratio 2.8 05/04/2018 10:07 AM     Lab Results   Component Value Date/Time    TSH 1.04 12/05/2020 08:52 AM     Lab Results   Component Value Date/Time    Hemoglobin A1c 5.4 12/01/2020 09:58 AM     Lab Results   Component Value Date/Time    Vitamin D 25-Hydroxy 14.0 (L) 01/14/2020 04:13 PM       Has history of goiter. Has lost to follow-up. Done a new referral and she has now coming up appointment with endocrinology. Denies any change in voice or any trouble swallowing. No weight or appetite changes. History of migraine headache which is fairly stable. No concern from patient at this time. History of hypertension. Fairly stable vitals. Continue current plan. She is on vitamin D supplement and also following hematology regarding her low WBC       ROS:Denies any headache, dizziness, no chest pain or trouble breathing, no arm or leg weakness. No nausea or vomiting, no weight or appetite changes, no mood changes . No urine or bowel complains, no palpitation, no diaphoresis. No abdominal pain. No cold or cough. No leg swelling. No fever. No sleep trouble. Physical Exam  Constitutional:       General: She is not in acute distress. Cardiovascular:      Rate and Rhythm: Normal rate and regular rhythm. Heart sounds: Normal heart sounds. Abdominal:      General: Bowel sounds are normal.      Palpations: Abdomen is soft. Tenderness: There is no abdominal tenderness. Musculoskeletal:         General: No swelling.    Neurological:      Mental Status: She is oriented to person, place, and time. Psychiatric:         Behavior: Behavior normal.         ASSESSMENT and PLAN    ICD-10-CM ICD-9-CM    1. Abnormal CBC/ seen hematology: Following hematology R79.89 790.6    2. Goiter/ seen endocrinology/ observe: Has coming up appointment with endocrinology. E04.9 240.9    3. Essential hypertension :well controlled. Continue current dose of medication and low salt diet. Exercise as tolerated. I10 401.9    4. Migraine without aura and without status migrainosus, not intractable: Fairly stable G43.009 346.10    5. S/P hysterectomy/ due to menorrhagea   Z90.710 V88.01    6. Status post hip replacement, left  Z96.642 V43.64    7. Positive ALY (antinuclear antibody)/ test done by hemotology and requested referral by PCP . Currently following rheumatology  R76.8 795.79    Patient understood and agreed with the plan  Follow-up and Dispositions    · Return in about 4 months (around 5/8/2021).

## 2021-01-08 NOTE — PATIENT INSTRUCTIONS
A Healthy Lifestyle: Care Instructions Your Care Instructions A healthy lifestyle can help you feel good, stay at a healthy weight, and have plenty of energy for both work and play. A healthy lifestyle is something you can share with your whole family. A healthy lifestyle also can lower your risk for serious health problems, such as high blood pressure, heart disease, and diabetes. You can follow a few steps listed below to improve your health and the health of your family. Follow-up care is a key part of your treatment and safety. Be sure to make and go to all appointments, and call your doctor if you are having problems. It's also a good idea to know your test results and keep a list of the medicines you take. How can you care for yourself at home? · Do not eat too much sugar, fat, or fast foods. You can still have dessert and treats now and then. The goal is moderation. · Start small to improve your eating habits. Pay attention to portion sizes, drink less juice and soda pop, and eat more fruits and vegetables. ? Eat a healthy amount of food. A 3-ounce serving of meat, for example, is about the size of a deck of cards. Fill the rest of your plate with vegetables and whole grains. ? Limit the amount of soda and sports drinks you have every day. Drink more water when you are thirsty. ? Eat at least 5 servings of fruits and vegetables every day. It may seem like a lot, but it is not hard to reach this goal. A serving or helping is 1 piece of fruit, 1 cup of vegetables, or 2 cups of leafy, raw vegetables. Have an apple or some carrot sticks as an afternoon snack instead of a candy bar.  Try to have fruits and/or vegetables at every meal. 
 · Make exercise part of your daily routine. You may want to start with simple activities, such as walking, bicycling, or slow swimming. Try to be active 30 to 60 minutes every day. You do not need to do all 30 to 60 minutes all at once. For example, you can exercise 3 times a day for 10 or 20 minutes. Moderate exercise is safe for most people, but it is always a good idea to talk to your doctor before starting an exercise program. 
· Keep moving. Susana Broaden the lawn, work in the garden, or [employer]. Take the stairs instead of the elevator at work. · If you smoke, quit. People who smoke have an increased risk for heart attack, stroke, cancer, and other lung illnesses. Quitting is hard, but there are ways to boost your chance of quitting tobacco for good. ? Use nicotine gum, patches, or lozenges. ? Ask your doctor about stop-smoking programs and medicines. ? Keep trying. In addition to reducing your risk of diseases in the future, you will notice some benefits soon after you stop using tobacco. If you have shortness of breath or asthma symptoms, they will likely get better within a few weeks after you quit. · Limit how much alcohol you drink. Moderate amounts of alcohol (up to 2 drinks a day for men, 1 drink a day for women) are okay. But drinking too much can lead to liver problems, high blood pressure, and other health problems. Family health If you have a family, there are many things you can do together to improve your health. · Eat meals together as a family as often as possible. · Eat healthy foods. This includes fruits, vegetables, lean meats and dairy, and whole grains. · Include your family in your fitness plan. Most people think of activities such as jogging or tennis as the way to fitness, but there are many ways you and your family can be more active. Anything that makes you breathe hard and gets your heart pumping is exercise. Here are some tips: ? Walk to do errands or to take your child to school or the bus. 
? Go for a family bike ride after dinner instead of watching TV. Where can you learn more? Go to http://www.gray.com/ Enter T710 in the search box to learn more about \"A Healthy Lifestyle: Care Instructions. \" Current as of: January 31, 2020               Content Version: 12.6 © 2006-2020 [employer]. Care instructions adapted under license by [employer] (which disclaims liability or warranty for this information). If you have questions about a medical condition or this instruction, always ask your healthcare professional. [address] any warranty or liability for your use of this information.

## 2021-01-08 NOTE — PROGRESS NOTES
Chief Complaint   Patient presents with    Surgical Follow-up     Left THR     1. Have you been to the ER, urgent care clinic since your last visit? Hospitalized since your last visit? 12/15/20- L THR    2. Have you seen or consulted any other health care providers outside of the [address] [place] [address] since your last visit? Include any pap smears or colon screening. [facility] sent by Dr. Jabier Nesbitt- check for psoriatic arthritis but was negative.     Reports to doing well post -op

## 2021-01-12 ENCOUNTER — HOSPITAL ENCOUNTER (OUTPATIENT)
Dept: PHYSICAL THERAPY | Age: 51
Discharge: HOME | End: 2021-01-12
Payer: OTHER GOVERNMENT

## 2021-01-12 PROCEDURE — 97140 MANUAL THERAPY 1/> REGIONS: CPT

## 2021-01-12 PROCEDURE — 97110 THERAPEUTIC EXERCISES: CPT

## 2021-01-12 NOTE — PROGRESS NOTES
PT DAILY TREATMENT NOTE     Patient Name: Bianca Romero  Date:2021  : 1970  [x]  Patient  Verified  Payor: CLAIR / Plan: Zohaib Turner 74 / Product Type:  /    In time:945  Out time:1030  Total Treatment Time (min): 45  Visit #: 2 of 8    Treatment Area: Left hip pain [M25.552]    SUBJECTIVE  Pain Level (0-10 scale): 3  Any medication changes, allergies to medications, adverse drug reactions, diagnosis change, or new procedure performed?: [x] No    [] Yes (see summary sheet for update)  Subjective functional status/changes:   [] No changes reported  \"Getting better everyday. \" Reports HEP compliance but some trouble remains with SLR due to psoas pains. OBJECTIVE    37 min Therapeutic Exercise:  [x] See flow sheet :   Rationale: increase ROM, increase strength and improve coordination to improve the patients ability to manage ADLs. 8 min Manual Therapy:  Right s/L with pillow between knees; scar tissue massage; DTM to piriformis and psoas   The manual therapy interventions were performed at a separate and distinct time from the therapeutic activities interventions. Rationale: decrease pain, increase ROM, increase tissue extensibility and decrease trigger points to improve ease of self care. With   [] TE   [] TA   [] neuro   [] other: Patient Education: [x] Review HEP    [] Progressed/Changed HEP based on:   [] positioning   [] body mechanics   [] transfers   [] heat/ice application    [] other:      Other Objective/Functional Measures: exercises initiated per flowsheet for first f/u     Pain Level (0-10 scale) post treatment: 3    ASSESSMENT/Changes in Function: Pt puts forth good effort for all therapeutic exercises as instructed, stating she may have some limitations due to right sided hip pain. Left psoas tightness and pain limits standing SLR ROM and ease. No pain increase post therapy. Continue to progress per POC.      Patient will continue to benefit from skilled PT services to modify and progress therapeutic interventions, address functional mobility deficits, address ROM deficits, address strength deficits, analyze and address soft tissue restrictions, analyze and cue movement patterns, analyze and modify body mechanics/ergonomics, assess and modify postural abnormalities, address imbalance/dizziness and instruct in home and community integration to attain remaining goals. [x]  See Plan of Care  []  See progress note/recertification  []  See Discharge Summary         Progress towards goals / Updated goals:  Short Term Goals: To be accomplished in 1 weeks: 1. The pt will be I and compliant with HEP              IE- issued HEP   Current: met, reports compliance (1/12/2021)  Long Term Goals: To be accomplished in 4 weeks:  1. Improve FOTO score to predicted outcome to improve ability for daily tasks              IE- 47  2. The pt will demonstrate 90 degrees of left hip flexion PROM to improve ability for daily tasks              IE- 60 degrees PROM   3. Improve left hip abduction to 3/5 for improved gait ability. IE- 2-/5  4. The pt will demonstrate ability to perform stairs with alternating pattern. IE- step to pattern  5. The pt will report a little difficulty with walking a mile. IE- quite a bit of difficulty.     PLAN  []  Upgrade activities as tolerated     [x]  Continue plan of care  []  Update interventions per flow sheet       []  Discharge due to:_  []  Other:_      Mary Alice Pineda, PT 1/12/2021  9:47 AM    Future Appointments   Date Time Provider Kingsley Stout   1/13/2021  9:25 AM Roberto Christiansen PA-C [facility]   1/14/2021  9:15 AM Will Chamberlaino, PTA [facility]   1/19/2021  9:00 AM Juanita Simmons, PT [facility]   1/21/2021  8:30 AM Will Carl, PTA [facility]   1/26/2021  8:30 AM Mallika Saul, PT [facility]   1/28/2021  9:15 AM Inman Carl, PTA [facility] 2/2/2021  9:00 AM Millicent Hinkle, PT [facility]   5/14/2021  1:30 PM Spenser Barker MD [facility]

## 2021-01-13 ENCOUNTER — OFFICE VISIT (OUTPATIENT)
Dept: ORTHOPEDIC SURGERY | Age: 51
End: 2021-01-13
Payer: OTHER GOVERNMENT

## 2021-01-13 VITALS
HEART RATE: 68 BPM | SYSTOLIC BLOOD PRESSURE: 144 MMHG | HEIGHT: 63 IN | BODY MASS INDEX: 29.06 KG/M2 | DIASTOLIC BLOOD PRESSURE: 90 MMHG | WEIGHT: 164 LBS | TEMPERATURE: 98 F | OXYGEN SATURATION: 99 % | RESPIRATION RATE: 16 BRPM

## 2021-01-13 DIAGNOSIS — M25.552 LEFT HIP PAIN: ICD-10-CM

## 2021-01-13 DIAGNOSIS — Z96.642 STATUS POST LEFT HIP REPLACEMENT: ICD-10-CM

## 2021-01-13 DIAGNOSIS — M16.11 PRIMARY OSTEOARTHRITIS OF RIGHT HIP: Primary | ICD-10-CM

## 2021-01-13 PROCEDURE — 99024 POSTOP FOLLOW-UP VISIT: CPT | Performed by: PHYSICIAN ASSISTANT

## 2021-01-13 NOTE — PROGRESS NOTES
[address] [address]  [phone]           Patient: Mari Pinzon                MRN: [id number]       SSN: [id number]  YOB: 1970        AGE: 53 y.o. SEX: female  Body mass index is 29.05 kg/m². PCP: Kyrie Patel MD  01/13/21      This office note has been dictated. REVIEW OF SYSTEMS:  Constitutional: Negative for fever, chills, weight loss and malaise/fatigue. HENT: Negative. Eyes: Negative. Respiratory: Negative. Cardiovascular: Negative. Gastrointestinal: No bowel incontinence or constipation. Genitourinary: No bladder incontinence or saddle anesthesia. Skin: Negative. Neurological: Negative. Endo/Heme/Allergies: Negative. Psychiatric/Behavioral: Negative. Musculoskeletal: As per HPI above. Past Medical History:   Diagnosis Date    Allergic rhinitis, seasonal     spring    Anemia NEC     iron deficiency associated with heavy menstruation and fibroids noted 2008 with pregnancy    Dysmenorrhea     increasing past year    Fibroid, uterine     Goiter     Headache(784.0)     Hypertension     Ill-defined condition     Chronic knee pain    Menorrhagia     worsening past year    Neutropenia ([facility] [address]          Current Outpatient Medications:     celecoxib (CeleBREX) 200 mg capsule, Take 1 Cap by mouth two (2) times a day for 90 days. , Disp: 60 Cap, Rfl: 2    aspirin (ASPIRIN) 325 mg tablet, Take 1 Tab by mouth two (2) times a day., Disp: 60 Tab, Rfl: 0    Blood Pressure Monitor kit, Once a day, Disp: 1 Kit, Rfl: 0    amLODIPine (NORVASC) 5 mg tablet, Take 1 Tab by mouth daily. , Disp: 90 Tab, Rfl: 1    docusate sodium 100 mg tab, Take 1 Tab by mouth daily. , Disp: , Rfl:     acetaminophen (TYLENOL) 500 mg tablet, Take 1,000 mg by mouth every six (6) hours as needed for Pain.  Pain, Disp: , Rfl:     No Known Allergies    Social History     Socioeconomic History    Marital status:      Spouse name: Not on file    Number of children: Not on file    Years of education: Not on file    Highest education level: Not on file   Occupational History    Not on file   Social Needs    Financial resource strain: Not on file    Food insecurity     Worry: Not on file     Inability: Not on file    Transportation needs     Medical: Not on file     Non-medical: Not on file   Tobacco Use    Smoking status: Never Smoker    Smokeless tobacco: Never Used   Substance and Sexual Activity    Alcohol use: Yes     Alcohol/week: 0.0 standard drinks     Comment: occass: 2/month    Drug use: No    Sexual activity: Yes     Partners: Male     Birth control/protection: Surgical     Comment: hysterectomy   Lifestyle    Physical activity     Days per week: Not on file     Minutes per session: Not on file    Stress: Not on file   Relationships    Social connections     Talks on phone: Not on file     Gets together: Not on file     Attends Jain service: Not on file     Active member of club or organization: Not on file     Attends meetings of clubs or organizations: Not on file     Relationship status: Not on file    Intimate partner violence     Fear of current or ex partner: Not on file     Emotionally abused: Not on file     Physically abused: Not on file     Forced sexual activity: Not on file   Other Topics Concern    Not on file   Social History Narrative    Not on file       Past Surgical History:   Procedure Laterality Date   Antoniette Quest HYSTERECTOMY  04/2014    [facility]    HX KNEE ARTHROSCOPY Right          Patient seen and evaluated today for her left hip. She is now 4 weeks status post left total hip with surgery. She is doing well. Does continue physical therapy without complications. She has had no troubles the wound. Pain is well controlled. She is looking forward to getting her right hip fixed.     Patient denies recent fevers, chills, chest pain, SOB, or injuries. No recent systemic changes noted. A 12-point review of systems is performed today. Pertinent positives are noted. All other systems reviewed and otherwise are negative. Physical exam: General: Alert and oriented x3, nad.  well-developed, well nourished. normal affect, AF. NC/AT, EOMI, neck supple, trachea midline, no JVD present. Breathing is non-labored. Examination of the left hip reveals skin intact. The surgical wound is healed nicely. There is no erythema, ecchymosis, warmth. There are no signs of infection or cellulitis present. There is no pain with rotation hip. Negative calf tenderness. Negative Homans. No signs of DVT present. Assessment: Status post left total hip replacement    Plan: At this point, she will continue with outpatient physical therapy. She denies need for analgesics. We will get her scheduled for a right total hip replacement. We will see her back in the office in 2 to 3 weeks time for evaluation x-ray of the left hip.               JR Josh MARTIN, TAM, ATC

## 2021-01-14 ENCOUNTER — HOSPITAL ENCOUNTER (OUTPATIENT)
Dept: PHYSICAL THERAPY | Age: 51
Discharge: HOME | End: 2021-01-14
Payer: OTHER GOVERNMENT

## 2021-01-14 PROCEDURE — 97140 MANUAL THERAPY 1/> REGIONS: CPT

## 2021-01-14 PROCEDURE — 97110 THERAPEUTIC EXERCISES: CPT

## 2021-01-14 NOTE — PROGRESS NOTES
PT DAILY TREATMENT NOTE     Patient Name: Lorena Buenrostro  Date:2021  : 1970  [x]  Patient  Verified  Payor: CLAIR / Plan: Zohaib Turner 74 / Product Type:  /    In time:9:21  Out time:9:59  Total Treatment Time (min): 38  Visit #: 3 of 8    Treatment Area: Left hip pain [M25.552]    SUBJECTIVE  Pain Level (0-10 scale): 3/10  Any medication changes, allergies to medications, adverse drug reactions, diagnosis change, or new procedure performed?: [x] No    [] Yes (see summary sheet for update)  Subjective functional status/changes:   [] No changes reported  \"It's coming along. \"    OBJECTIVE      30 min Therapeutic Exercise:  [x] See flow sheet :   Rationale: increase ROM, increase strength and increase proprioception to improve the patients ability to perform ADL's.    8 min Manual Therapy:  Gentle scar massage, hip PROM per protocol. Pt supine. The manual therapy interventions were performed at a separate and distinct time from the therapeutic activities interventions. Rationale: decrease pain, increase ROM and increase tissue extensibility to improve ease of ambulation and ADL's. With   [x] TE   [] TA   [] neuro   [] other: Patient Education: [x] Review HEP    [] Progressed/Changed HEP based on:   [] positioning   [] body mechanics   [] transfers   [] heat/ice application    [] other:      Other Objective/Functional Measures: Presents with trendelenburg gait pattern. Challenged with abd exercises. Min(A) with side-lying FH. Pain Level (0-10 scale) post treatment: 3/10    ASSESSMENT/Changes in Function: Pt fully participated in treatment and is highly motivated to improve, pt states \"I'm ready to get back to my life. \" Continue PT to increase strength/stability to improve ease of performing functional activities.     Patient will continue to benefit from skilled PT services to modify and progress therapeutic interventions, address functional mobility deficits, address ROM deficits, address strength deficits, analyze and address soft tissue restrictions and analyze and modify body mechanics/ergonomics to attain remaining goals. [x]  See Plan of Care  []  See progress note/recertification  []  See Discharge Summary         Progress towards goals / Updated goals:  Short Term Goals: To be accomplished in 1 weeks: 1. The pt will be I and compliant with HEP              IE- issued HEP              Current: met, reports compliance (1/12/2021)  Long Term Goals: To be accomplished in 4 weeks:  1. Improve FOTO score to predicted outcome to improve ability for daily tasks              IE- 47  2. The pt will demonstrate 90 degrees of left hip flexion PROM to improve ability for daily tasks              IE- 60 degrees PROM   3. Improve left hip abduction to 3/5 for improved gait ability.             IO- 2-/5  4. The pt will demonstrate ability to perform stairs with alternating pattern.              IE- step to pattern  5. The pt will report a little difficulty with walking a mile.              IE- quite a bit of difficulty.     PLAN  []  Upgrade activities as tolerated     [x]  Continue plan of care  []  Update interventions per flow sheet       []  Discharge due to:_  []  Other:_      Nicanor Brizuela, PTA 1/14/2021  9:23 AM    Future Appointments   Date Time Provider Kingsley Stout   1/19/2021  9:00 AM Michaela Miranda, PT [facility]   1/21/2021  8:30 AM Irma Noel, PTA [facility]   1/26/2021  8:30 AM Cindy Gustafson, PT [facility]   1/28/2021  9:15 AM Irma Noel, KARYN [facility]   2/2/2021  9:00 AM Michaela Miranda, PT [facility]   2/4/2021  4:00 PM Antonio Estes PA-C [facility] AMB   5/14/2021  1:30 PM Kyrie Patel MD [facility] AM[facility]

## 2021-01-19 ENCOUNTER — HOSPITAL ENCOUNTER (OUTPATIENT)
Dept: PHYSICAL THERAPY | Age: 51
Discharge: HOME | End: 2021-01-19
Payer: OTHER GOVERNMENT

## 2021-01-19 PROCEDURE — 97110 THERAPEUTIC EXERCISES: CPT

## 2021-01-19 PROCEDURE — 97112 NEUROMUSCULAR REEDUCATION: CPT

## 2021-01-19 NOTE — PROGRESS NOTES
PT DAILY TREATMENT NOTE     Patient Name: Shu Buenrostro  Date:2021  : 1970  [x]  Patient  Verified  Payor:  / Plan: Zohaib Turner 74 / Product Type:  /    In time:907  Out time:948  Total Treatment Time (min): 41  Visit #: 4 of 8     Treatment Area: Left hip pain [M25.552]    SUBJECTIVE  Pain Level (0-10 scale): 0 left hip  Any medication changes, allergies to medications, adverse drug reactions, diagnosis change, or new procedure performed?: [x] No    [] Yes (see summary sheet for update)  Subjective functional status/changes:   [] No changes reported  Reports has been doing well since last visit, keeping up with HEP aside from one missed day due to soreness from house work. OBJECTIVE    27 min Therapeutic Exercise:  [x] See flow sheet :   Rationale: increase ROM, increase strength and improve coordination to improve the patients ability to manage ADLs with improved ease. 10 min Neuromuscular Re-education:  [x]  See flow sheet : core align glute max/med stability; barrel 1/2 prone glute max recruitment   Rationale: increase strength, improve coordination and increase proprioception  to improve the patients ability to manage functional activities with improved hip stability and reduced compensatory mechanisms. 4 min Manual Therapy:  Pt s/l with pillow abduction -- scar tissue massage and brief piriformis STM   The manual therapy interventions were performed at a separate and distinct time from the therapeutic activities interventions. Rationale: decrease pain, increase ROM and increase tissue extensibility to improve ease of ADL management.            With   [] TE   [] TA   [] neuro   [] other: Patient Education: [x] Review HEP    [] Progressed/Changed HEP based on:   [] positioning   [] body mechanics   [] transfers   [] heat/ice application    [] other:      Other Objective/Functional Measures: added core align therEx     Pain Level (0-10 scale) post treatment: 0    ASSESSMENT/Changes in Function: Demonstrates weakness of the hip flexors and quadriceps with against gravity straight leg raises in supine, with slight quad lag. Will continue to work on hip stability to progress per POC. Patient will continue to benefit from skilled PT services to modify and progress therapeutic interventions, address functional mobility deficits, address ROM deficits, address strength deficits, analyze and address soft tissue restrictions, analyze and cue movement patterns, analyze and modify body mechanics/ergonomics, assess and modify postural abnormalities, address imbalance/dizziness and instruct in home and community integration to attain remaining goals. [x]  See Plan of Care  []  See progress note/recertification  []  See Discharge Summary         Progress towards goals / Updated goals:  Short Term Goals: To be accomplished in 1 weeks: 1. The pt will be I and compliant with HEP              IE- issued HEP              MDOYVAC: met, reports compliance (1/12/2021)  Long Term Goals: To be accomplished in 4 weeks:  1. Improve FOTO score to predicted outcome to improve ability for daily tasks              IE- 47  2. The pt will demonstrate 90 degrees of left hip flexion PROM to improve ability for daily tasks              IE- 60 degrees PROM    Current: met, able to PROM and AROM to 90 degrees with therapeutic exercises (1/19/2021)  3. Improve left hip abduction to 3/5 for improved gait ability.             HI- 2-/5  4. The pt will demonstrate ability to perform stairs with alternating pattern.              IE- step to pattern   Current: progressing, occasional use of alternating feet, mostly step to with right LE leading (1/19/2021)  5.  The pt will report a little difficulty with walking a mile.              IE- quite a bit of difficulty.       PLAN  []  Upgrade activities as tolerated     [x]  Continue plan of care  []  Update interventions per flow sheet       [] Discharge due to:_  []  Other:_      Kiera Parikh, PT 1/19/2021  9:10 AM    Future Appointments   Date Time Provider Kingsley Argelia   1/22/2021 10:45 AM Wiley Kim, PTA [facility]   1/26/2021  8:30 AM Naomy Beaver, PT [facility]   1/29/2021 10:00 AM Wiley Kim, PTA [facility]   2/2/2021  9:00 AM Ian Quinones, PT [facility]   2/4/2021  4:00 PM Mira Mason PA-C [facility]   5/14/2021  1:30 PM Lucia Rees MD [facility]

## 2021-01-21 ENCOUNTER — APPOINTMENT (OUTPATIENT)
Dept: PHYSICAL THERAPY | Age: 51
End: 2021-01-21
Payer: OTHER GOVERNMENT

## 2021-01-22 ENCOUNTER — HOSPITAL ENCOUNTER (OUTPATIENT)
Dept: PHYSICAL THERAPY | Age: 51
Discharge: HOME | End: 2021-01-22
Payer: OTHER GOVERNMENT

## 2021-01-22 PROCEDURE — 97110 THERAPEUTIC EXERCISES: CPT

## 2021-01-22 PROCEDURE — 97112 NEUROMUSCULAR REEDUCATION: CPT

## 2021-01-22 NOTE — PROGRESS NOTES
PT DAILY TREATMENT NOTE     Patient Name: Peña Buenrostro  Date:2021  : 1970  [x]  Patient  Verified    Payor:  / Plan: Zohaib Turner 74 / Product Type:  /    In time:10:47  Out time:11:27  Total Treatment Time (min): 40  Visit #: 5 of 8    Treatment Area: Left hip pain [M25.552]    SUBJECTIVE  Pain Level (0-10 scale): 0/10  Any medication changes, allergies to medications, adverse drug reactions, diagnosis change, or new procedure performed?: [x] No    [] Yes (see summary sheet for update)  Subjective functional status/changes:   [] No changes reported  \"Left hip is okay, Right hip has some pain. \"    OBJECTIVE    30 min Therapeutic Exercise:  [x] See flow sheet :   Rationale: increase ROM and increase strength to improve the patients ability to perform ADL's. 10 min Neuromuscular Re-education:  [x]  See flow sheet : Corealign, hip 3-way for balance/stability. Rationale: increase strength, improve balance and increase proprioception  to improve the patients ability to perform functional activities. With   [x] TE   [] TA   [] neuro   [] other: Patient Education: [x] Review HEP    [] Progressed/Changed HEP based on:   [] positioning   [] body mechanics   [] transfers   [] heat/ice application    [] other:      Other Objective/Functional Measures: Increased reps/resistance for several exercises per flow sheet. Pain Level (0-10 scale) post treatment: 0/10    ASSESSMENT/Changes in Function: Challenged with side-lying FH and supine flex series secondary to decreased strength/stability. Pt fully participated in treatment and is highly motivated to improve. Continue PT to increase LE strength/stability to improve ease of performing functional activities.     Patient will continue to benefit from skilled PT services to modify and progress therapeutic interventions, address functional mobility deficits, address ROM deficits, address strength deficits, analyze and cue movement patterns and analyze and modify body mechanics/ergonomics to attain remaining goals. [x]  See Plan of Care  []  See progress note/recertification  []  See Discharge Summary         Progress towards goals / Updated goals:  Short Term Goals: To be accomplished in 1 weeks: 1. The pt will be I and compliant with HEP              IE- issued HEP              TLJHJHR: met, reports compliance (1/12/2021)  Long Term Goals: To be accomplished in 4 weeks:  1. Improve FOTO score to predicted outcome to improve ability for daily tasks              IE- 47  2. The pt will demonstrate 90 degrees of left hip flexion PROM to improve ability for daily tasks              IE- 60 degrees PROM               Current: met, able to PROM and AROM to 90 degrees with therapeutic exercises (1/19/2021)  3. Improve left hip abduction to 3/5 for improved gait ability.             IH- 2-/5  4. The pt will demonstrate ability to perform stairs with alternating pattern.              IE- step to pattern              Current: progressing, occasional use of alternating feet, mostly step to with right LE leading (1/19/2021)  5. The pt will report a little difficulty with walking a mile.              IE- quite a bit of difficulty.     PLAN  []  Upgrade activities as tolerated     [x]  Continue plan of care  []  Update interventions per flow sheet       []  Discharge due to:_  []  Other:_      Kendra Tejeda, PTA 1/22/2021  10:36 AM    Future Appointments   Date Time Provider Kingsley Stout   1/22/2021 10:45 AM David Bolds, PTA [facility]   1/26/2021  8:30 AM Deepthi Freitas, PT [facility]   1/29/2021 10:00 AM David Bolds, PTA [facility]   2/2/2021  9:00 AM Mitzi Khan, PT [facility]   2/4/2021  4:00 PM Prince Brand PA-C [facility]   5/14/2021  1:30 PM Leonardo Klein MD [facility]

## 2021-01-25 ENCOUNTER — TELEPHONE (OUTPATIENT)
Dept: ORTHOPEDIC SURGERY | Age: 51
End: 2021-01-25

## 2021-01-25 NOTE — TELEPHONE ENCOUNTER
Patient states her handicap decal will  on 3/2/21 and needs the paperwork to get another one. She is scheduled for [facility] in May.     She may be reached at [phone]

## 2021-01-25 NOTE — TELEPHONE ENCOUNTER
/Dr. Cece Frost will not be in the [facility] office until Thursday afternoon after 1:00pm to sign a form. Will contact patient once ready for pickup.

## 2021-01-26 ENCOUNTER — APPOINTMENT (OUTPATIENT)
Dept: PHYSICAL THERAPY | Age: 51
End: 2021-01-26
Payer: OTHER GOVERNMENT

## 2021-01-28 ENCOUNTER — APPOINTMENT (OUTPATIENT)
Dept: PHYSICAL THERAPY | Age: 51
End: 2021-01-28
Payer: OTHER GOVERNMENT

## 2021-01-29 ENCOUNTER — HOSPITAL ENCOUNTER (OUTPATIENT)
Dept: PHYSICAL THERAPY | Age: 51
Discharge: HOME | End: 2021-01-29
Payer: OTHER GOVERNMENT

## 2021-01-29 PROCEDURE — 97112 NEUROMUSCULAR REEDUCATION: CPT

## 2021-01-29 PROCEDURE — 97110 THERAPEUTIC EXERCISES: CPT

## 2021-01-29 NOTE — TELEPHONE ENCOUNTER
Patient notified handicap placard form is ready to be picked up at the [facility] office, patient verbalized understanding. Form placed up front at the [facility] office in the completed forms bin.

## 2021-01-29 NOTE — PROGRESS NOTES
PT DAILY TREATMENT NOTE     Patient Name: Shu Buenrostro  Date:2021  : 1970  [x]  Patient  Verified  Payor:  / Plan: Zohaib Turner 74 / Product Type:  /    In time:10:01  Out time:10:45  Total Treatment Time (min): 44  Visit #: 6 of 8    Treatment Area: Left hip pain [M25.552]    SUBJECTIVE  Pain Level (0-10 scale): 0/10  Any medication changes, allergies to medications, adverse drug reactions, diagnosis change, or new procedure performed?: [x] No    [] Yes (see summary sheet for update)  Subjective functional status/changes:   [] No changes reported  \"Feels like I'm getting more mobility each day. \"    OBJECTIVE    34 min Therapeutic Exercise:  [x] See flow sheet :   Rationale: increase ROM and increase strength to improve the patients ability to perform ADL's. 10 min Neuromuscular Re-education:  [x]  See flow sheet : CoreAlign, hip 3-way for balance/stability, forward/retro/lateral gait. Rationale: increase strength, improve coordination, improve balance and increase proprioception  to improve the patients ability to perform functional activities. With   [x] TE   [] TA   [] neuro   [] other: Patient Education: [x] Review HEP    [] Progressed/Changed HEP based on:   [] positioning   [] body mechanics   [] transfers   [] heat/ice application    [] other:      Other Objective/Functional Measures: Updated HR's to single leg. Demonstrates improved squat mechanics, able to perform without handrail support. PD modalities post-treatment. Pain Level (0-10 scale) post treatment: 0/10    ASSESSMENT/Changes in Function: Pt is making good progress, demonstrates improved ease with exercises and exercises mechanics. Challenged with keeping Left LE neutral for SLR's, presents with mild ER. Continue PT to further increase Left LE strength/stability to improve ease of performing functional activities.     Patient will continue to benefit from skilled PT services to modify and progress therapeutic interventions, address functional mobility deficits, address ROM deficits, address strength deficits, analyze and address soft tissue restrictions and analyze and modify body mechanics/ergonomics to attain remaining goals. [x]  See Plan of Care  []  See progress note/recertification  []  See Discharge Summary         Progress towards goals / Updated goals:  Short Term Goals: To be accomplished in 1 weeks: 1. The pt will be I and compliant with HEP              IE- issued HEP              VMUQWNX: met, reports compliance (1/12/2021)  Long Term Goals: To be accomplished in 4 weeks:  1. Improve FOTO score to predicted outcome to improve ability for daily tasks              IE- 47  2. The pt will demonstrate 90 degrees of left hip flexion PROM to improve ability for daily tasks              IE- 60 degrees PROM               Current: met, able to PROM and AROM to 90 degrees with therapeutic exercises (1/19/2021)  3. Improve left hip abduction to 3/5 for improved gait ability.             DQ- 2-/5  4. The pt will demonstrate ability to perform stairs with alternating pattern.              IE- step to pattern              Current: progressing, occasional use of alternating feet, mostly step to with right LE leading (1/19/2021)  5. The pt will report a little difficulty with walking a mile.              IE- quite a bit of difficulty.     PLAN  []  Upgrade activities as tolerated     [x]  Continue plan of care  []  Update interventions per flow sheet       []  Discharge due to:_  []  Other:_      Froylan Joel, PTA 1/29/2021  10:13 AM    Future Appointments   Date Time Provider Kingsley Stout   2/2/2021  9:00 AM Millicent Hinkle, PT [facility]   2/4/2021  4:00 PM Claudetta Kindred, PA-C [facility]   5/14/2021  1:30 PM Spenser Barker MD [facility] AM[facility]

## 2021-02-02 ENCOUNTER — HOSPITAL ENCOUNTER (OUTPATIENT)
Dept: PHYSICAL THERAPY | Age: 51
Discharge: HOME | End: 2021-02-02
Payer: OTHER GOVERNMENT

## 2021-02-02 PROCEDURE — 97112 NEUROMUSCULAR REEDUCATION: CPT

## 2021-02-02 PROCEDURE — 97110 THERAPEUTIC EXERCISES: CPT

## 2021-02-02 NOTE — PROGRESS NOTES
[facility]  [address]  [place], [address]  [phone] fax    Physical Therapy Progress Note  Patient name: Brandon Cornejo Start of Care: 2021   Referral source: Yakov Driscoll MD : 1970   Medical/Treatment Diagnosis: Left hip pain [M25.552]  Payor:  / Plan: Zohaib Turner 74 / Product Type:  /  Onset Date:12/15/2020     Prior Hospitalization: see medical history Provider#: [id number]   Medications: Verified on Patient Summary List    Comorbidities: OA  Prior Level of Function:functionally I with daily tasks  Visits from Start of Care: 7    Missed Visits: 0      Established Goals:        Excellent         Good         Limited            None  [x] Increased ROM   []  [x]  []  []  [x] Increased Strength  []  [x]  []  []  [x] Increased Mobility  []  [x]  []  []   [x] Decreased Pain   [x]  []  []  []  [x] Decreased Swelling  []  [x]  []  []    Key Functional Changes: able to walk 2 miles without difficulty. FOTO 68%    Updated Goals: to be achieved in 4 weeks:  1. Improve FOTO score to predicted outcome to improve ability for daily tasks              PN: progressing, 68% (goal 70%)  2. Improve left hip abduction to 3/5 for improved gait ability. PN: progressing, 2+/5   3. The pt will demonstrate ability to perform stairs with alternating pattern.              PN: progressing, is able to alternate UE and must use HR, must consciously think about it   4. Pt will perform 10 hip dip exercises B with correct form, indicating improved functional strength of the B hip abductors for ambulation. PN: initiated exercise; small movements at the hip, some quadricep compensation    ASSESSMENT/RECOMMENDATIONS:  Pt is continuing to make steady progress towards left hip strength and stability goals. Continues to have left hip abductor weakness, affecting her gait mechanics with B hip drop.  She will continue to benefit from therapeutic services to work on B hip abductor strengthening and hip stability. [x]Continue therapy per initial plan/protocol at a frequency of  2 x per week for 4 weeks  []Continue therapy with the following recommended changes:_____________________      _____________________________________________________________________  []Discontinue therapy progressing towards or have reached established goals  []Discontinue therapy due to lack of appreciable progress towards goals  []Discontinue therapy due to lack of attendance or compliance  []Await Physician's recommendations/decisions regarding therapy  []Other:________________________________________________________________    Thank you for this referral.    Marty Laura, PT 2/2/2021 10:25 AM  NOTE TO PHYSICIAN:  [phone]  TO [facility]: [phone]  If you are unable to process this request in 24 hours please contact our office: [phone]    ? I have read the above report and request that my patient continue as recommended. ? I have read the above report and request that my patient continue therapy with the following changes/special instructions:__________________________________________________________  ? I have read the above report and request that my patient be discharged from therapy.     Physicians signature: ______________________________Date: ______Time:______

## 2021-02-02 NOTE — PROGRESS NOTES
PT DAILY TREATMENT NOTE     Patient Name: Carlos Buenrostro  Date:2021  : 1970  [x]  Patient  Verified  Payor:  / Plan: [facility]  [facility] / Product Type:  /    In time:904  Out time:946  Total Treatment Time (min): 42  Visit #: 7 of 8     Treatment Area: Left hip pain [M25.552]    SUBJECTIVE  Pain Level (0-10 scale): 0 left hip  Any medication changes, allergies to medications, adverse drug reactions, diagnosis change, or new procedure performed?: [x] No    [] Yes (see summary sheet for update)  Subjective functional status/changes:   [] No changes reported  Reports no issues with the left hip currently.     OBJECTIVE    30 min Therapeutic Exercise:  [x] See flow sheet :   Rationale: increase ROM and increase strength to improve the patient’s ability to manage ADLs with reduced compensations.      12 min Neuromuscular Re-education:  [x]  See flow sheet : CoreAlign, hip 3-way for balance/stability, forward/retro/lateral gait.   Rationale: increase strength, improve coordination, improve balance and increase proprioception  to improve the patient’s ability to perform functional activities with improved hip stability.            With   [] TE   [] TA   [] neuro   [] other: Patient Education: [x] Review HEP    [] Progressed/Changed HEP based on:   [] positioning   [] body mechanics   [] transfers   [] heat/ice application    [] other:      Other Objective/Functional Measures: see PN     Pain Level (0-10 scale) post treatment: 0 left hip    ASSESSMENT/Changes in Function: Pt is continuing to make steady progress towards left hip strength and stability goals. Continues to have left hip abductor weakness, affecting her gait mechanics with B hip drop. She will continue to benefit from therapeutic services to work on B hip abductor strengthening and hip stability.     Patient will continue to benefit from skilled PT services to modify and progress therapeutic interventions, address  functional mobility deficits, address ROM deficits, address strength deficits, analyze and address soft tissue restrictions, analyze and cue movement patterns, analyze and modify body mechanics/ergonomics, assess and modify postural abnormalities, address imbalance/dizziness and instruct in home and community integration to attain remaining goals. [x]  See Plan of Care  []  See progress note/recertification  []  See Discharge Summary         Progress towards goals / Updated goals:  Short Term Goals: To be accomplished in 1 weeks: 1. The pt will be I and compliant with HEP              IE- issued HEP              WZFRDKY: met, reports compliance (1/12/2021)  Long Term Goals: To be accomplished in 4 weeks:  1. Improve FOTO score to predicted outcome to improve ability for daily tasks              IE- 47   Current: progressing, 68% (goal 70%) (2/2/2021)  2. The pt will demonstrate 90 degrees of left hip flexion PROM to improve ability for daily tasks              IE- 60 degrees PROM               Current: met, able to PROM and AROM to 90 degrees with therapeutic exercises (1/19/2021)  3. Improve left hip abduction to 3/5 for improved gait ability.             FY- 2-/5   Current: progressing, 2+/5 (2/2/2021)   4. The pt will demonstrate ability to perform stairs with alternating pattern.              IE- step to pattern              Current: progressing, is able to alternate UE and must use HR, must consciously think about it (2/2/2021)  5. The pt will report a little difficulty with walking a mile.              IE- quite a bit of difficulty.     Current: met, reports no difficulty; has pushed to two miles (2/2/2021)    PLAN  []  Upgrade activities as tolerated     [x]  Continue plan of care  []  Update interventions per flow sheet       []  Discharge due to:_  []  Other:_      Norman Morales, PT 2/2/2021  9:07 AM    Future Appointments   Date Time Provider Kingsley Stout   2/4/2021  4:00 PM Urban Parsons [facility]   5/14/2021  1:30 PM MD SANDOVAL Aguila [facility] AMB

## 2021-02-04 ENCOUNTER — OFFICE VISIT (OUTPATIENT)
Dept: ORTHOPEDIC SURGERY | Age: 51
End: 2021-02-04
Payer: OTHER GOVERNMENT

## 2021-02-04 ENCOUNTER — TELEPHONE (OUTPATIENT)
Dept: FAMILY MEDICINE CLINIC | Age: 51
End: 2021-02-04

## 2021-02-04 VITALS
RESPIRATION RATE: 15 BRPM | BODY MASS INDEX: 29.38 KG/M2 | WEIGHT: 165.8 LBS | HEIGHT: 63 IN | OXYGEN SATURATION: 98 % | HEART RATE: 77 BPM | DIASTOLIC BLOOD PRESSURE: 77 MMHG | TEMPERATURE: 97.5 F | SYSTOLIC BLOOD PRESSURE: 129 MMHG

## 2021-02-04 DIAGNOSIS — Z96.642 STATUS POST LEFT HIP REPLACEMENT: Primary | ICD-10-CM

## 2021-02-04 DIAGNOSIS — M16.11 PRIMARY OSTEOARTHRITIS OF RIGHT HIP: ICD-10-CM

## 2021-02-04 DIAGNOSIS — M25.552 LEFT HIP PAIN: ICD-10-CM

## 2021-02-04 DIAGNOSIS — Z01.818 PREOPERATIVE TESTING: Primary | ICD-10-CM

## 2021-02-04 PROCEDURE — 99024 POSTOP FOLLOW-UP VISIT: CPT | Performed by: PHYSICIAN ASSISTANT

## 2021-02-04 PROCEDURE — 73502 X-RAY EXAM HIP UNI 2-3 VIEWS: CPT | Performed by: PHYSICIAN ASSISTANT

## 2021-02-04 RX ORDER — HYDROCODONE BITARTRATE AND ACETAMINOPHEN 7.5; 325 MG/1; MG/1
1-2 TABLET ORAL
Qty: 42 TAB | Refills: 0 | Status: SHIPPED | OUTPATIENT
Start: 2021-02-04 | End: 2021-02-11

## 2021-02-04 NOTE — TELEPHONE ENCOUNTER
Right total hip replacement surgery on 3/2/2021  Dr Corbin Ro [phone]    Arlin Itz would like an appt on 2/18 or 2/19 if possible since pt is getting pre op testing and labs done on 2/15. Please advise.

## 2021-02-04 NOTE — PROGRESS NOTES
[address] [id number]  [phone]           Patient: Delmy Coello                MRN: [id number]       SSN: [id number]  YOB: 1970        AGE: 53 y.o. SEX: female  Body mass index is 29.37 kg/m². PCP: Anna Lugo MD  02/04/21      This office note has been dictated. REVIEW OF SYSTEMS:  Constitutional: Negative for fever, chills, weight loss and malaise/fatigue. HENT: Negative. Eyes: Negative. Respiratory: Negative. Cardiovascular: Negative. Gastrointestinal: No bowel incontinence or constipation. Genitourinary: No bladder incontinence or saddle anesthesia. Skin: Negative. Neurological: Negative. Endo/Heme/Allergies: Negative. Psychiatric/Behavioral: Negative. Musculoskeletal: As per HPI above. Past Medical History:   Diagnosis Date    Allergic rhinitis, seasonal     spring    Anemia NEC     iron deficiency associated with heavy menstruation and fibroids noted 2008 with pregnancy    Dysmenorrhea     increasing past year    Fibroid, uterine     Goiter     Headache(784.0)     Hypertension     Ill-defined condition     Chronic knee pain    Menorrhagia     worsening past year    Neutropenia ([facility] [address]          Current Outpatient Medications:     docusate sodium 100 mg tab, Take 1 Tab by mouth daily. , Disp: , Rfl:     acetaminophen (TYLENOL) 500 mg tablet, Take 1,000 mg by mouth every six (6) hours as needed for Pain. Pain, Disp: , Rfl:     celecoxib (CeleBREX) 200 mg capsule, Take 1 Cap by mouth two (2) times a day for 90 days. , Disp: 60 Cap, Rfl: 2    aspirin (ASPIRIN) 325 mg tablet, Take 1 Tab by mouth two (2) times a day., Disp: 60 Tab, Rfl: 0    Blood Pressure Monitor kit, Once a day, Disp: 1 Kit, Rfl: 0    amLODIPine (NORVASC) 5 mg tablet, Take 1 Tab by mouth daily. , Disp: 90 Tab, Rfl: 1    No Known Allergies    Social History     Socioeconomic History    Marital status:      Spouse name: Not on file    Number of children: Not on file    Years of education: Not on file    Highest education level: Not on file   Occupational History    Not on file   Social Needs    Financial resource strain: Not on file    Food insecurity     Worry: Not on file     Inability: Not on file    Transportation needs     Medical: Not on file     Non-medical: Not on file   Tobacco Use    Smoking status: Never Smoker    Smokeless tobacco: Never Used   Substance and Sexual Activity    Alcohol use: Yes     Alcohol/week: 0.0 standard drinks     Comment: occass: 2/month    Drug use: No    Sexual activity: Yes     Partners: Male     Birth control/protection: Surgical     Comment: hysterectomy   Lifestyle    Physical activity     Days per week: Not on file     Minutes per session: Not on file    Stress: Not on file   Relationships    Social connections     Talks on phone: Not on file     Gets together: Not on file     Attends Confucianist service: Not on file     Active member of club or organization: Not on file     Attends meetings of clubs or organizations: Not on file     Relationship status: Not on file    Intimate partner violence     Fear of current or ex partner: Not on file     Emotionally abused: Not on file     Physically abused: Not on file     Forced sexual activity: Not on file   Other Topics Concern    Not on file   Social History Narrative    Not on file       Past Surgical History:   Procedure Laterality Date   Lesly Runner HYSTERECTOMY  04/2014    [facility]    HX KNEE ARTHROSCOPY Right            Patient seen and evaluated today for her left total hip replacement. She is now proximate 7-week status post surgery progressing well. She is quite happy the results of the hip replacement. She does continue physical therapy. She is having some night discomfort. She does continue on Celebrex.     Patient denies recent fevers, chills, chest pain, SOB, or injuries. No recent systemic changes noted. A 12-point review of systems is performed today. Pertinent positives are noted. All other systems reviewed and otherwise are negative. Physical exam: General: Alert and oriented x3, nad.  well-developed, well nourished. normal affect, AF. NC/AT, EOMI, neck supple, trachea midline, no JVD present. Breathing is non-labored. Examination of the left hip reveals skin intact. Surgical wounds healed nicely. There is no pain to palpation the trochanteric bursa. She has no pain with rotation hip. Negative straight leg raise. Negative calf tenderness. Negative Homans. No signs of DVT present. Abduction strength is 4+ on the left and 5 - on the right. Radiographs obtained the office today 2/4/2021 at the [facility] location include AP pelvis, AP and crosstable lateral of the left hip shows a total hip components to be well fixed without evidence of loosening or fracture noted. Assessment: Status post left total hip replacement    Plan: At this point, the patient will continue and finish up her physical therapy.   She will continue with a home exercise program.  She is scheduled for her right hip replacement upcoming in March and we will see her back then for preoperative history and physical.            Pat Guillen PA-C, ATC

## 2021-02-05 ENCOUNTER — HOSPITAL ENCOUNTER (OUTPATIENT)
Dept: PHYSICAL THERAPY | Age: 51
Discharge: HOME | End: 2021-02-05
Payer: OTHER GOVERNMENT

## 2021-02-05 PROCEDURE — 97112 NEUROMUSCULAR REEDUCATION: CPT

## 2021-02-05 PROCEDURE — 97110 THERAPEUTIC EXERCISES: CPT

## 2021-02-05 NOTE — PROGRESS NOTES
PT DAILY TREATMENT NOTE     Patient Name: So Yoo  Date:2021  : 1970  [x]  Patient  Verified  Payor:  / Plan: Shruti Islas / Product Type:  /    In time:10:46  Out time:11:24  Total Treatment Time (min): 38  Visit #: 1 of 8    Treatment Area: Left hip pain [M25.552]    SUBJECTIVE  Pain Level (0-10 scale): 0/10  Any medication changes, allergies to medications, adverse drug reactions, diagnosis change, or new procedure performed?: [x] No    [] Yes (see summary sheet for update)  Subjective functional status/changes:   [] No changes reported  \"No pain in my left hip, I have a surgery date for the right hip. \"    OBJECTIVE    30 min Therapeutic Exercise:  [x] See flow sheet :   Rationale: increase ROM and increase strength to improve the patients ability to perform ADL's.     8 min Neuromuscular Re-education:  [x]  See flow sheet : Lateral band-walks, Corealign   Rationale: improve coordination, improve balance and increase proprioception  to improve the patients ability to perform functional activities. With   [x] TE   [] TA   [] neuro   [] other: Patient Education: [x] Review HEP    [] Progressed/Changed HEP based on:   [] positioning   [] body mechanics   [] transfers   [] heat/ice application    [] other:      Other Objective/Functional Measures: Changed lateral walks from 2# to peach resistance band. Added side-lying hip abd 10 reps with mod(A). Pain Level (0-10 scale) post treatment: 0/10    ASSESSMENT/Changes in Function: Pt fully participates in treatment and demonstrates improved stability with exercises. Abd strength gradually progressing however challenged with hip hikes. Continues PT to further increase strength/stability.     Patient will continue to benefit from skilled PT services to modify and progress therapeutic interventions, address functional mobility deficits, address ROM deficits, address strength deficits, analyze and cue movement patterns and analyze and modify body mechanics/ergonomics to attain remaining goals. [x]  See Plan of Care  []  See progress note/recertification  []  See Discharge Summary         Progress towards goals / Updated goals:  Updated Goals: to be achieved in 4 weeks:  1. Improve FOTO score to predicted outcome to improve ability for daily tasks              PN: progressing, 68% (goal 70%)  2. Improve left hip abduction to 3/5 for improved gait ability.             ZS: progressing, 2+/5   3. The pt will demonstrate ability to perform stairs with alternating pattern.              PN: progressing, is able to alternate UE and must use HR, must consciously think about it   4. Pt will perform 10 hip dip exercises B with correct form, indicating improved functional strength of the B hip abductors for ambulation.               PN: initiated exercise; small movements at the hip, some quadricep compensation    PLAN  []  Upgrade activities as tolerated     [x]  Continue plan of care  []  Update interventions per flow sheet       []  Discharge due to:_  []  Other:_      Gallo Ward, KARYN 2/5/2021  10:47 AM    Future Appointments   Date Time Provider Kingsley Stout   2/9/2021  5:15 PM Adriana Rosales PTA [facility]   2/12/2021  4:30 PM [facility], PT [facility]   2/16/2021  4:30 PM Adriana Rosales, PTA [facility]   2/19/2021 11:30 AM Harper Cornell MD P[facility]   2/19/2021  4:30 PM [facility], PT [facility]   2/23/2021  4:30 PM Adriana Rosales, PTA [facility]   2/24/2021  9:00 AM TAM Gutiérrez BS AMB   3/19/2021 10:00 AM TAM Gutiérrez BS AMB   5/14/2021  1:30 PM Harper Cornell MD PABLO BS AMB

## 2021-02-09 ENCOUNTER — HOSPITAL ENCOUNTER (OUTPATIENT)
Dept: PHYSICAL THERAPY | Age: 51
Discharge: HOME | End: 2021-02-09
Payer: OTHER GOVERNMENT

## 2021-02-09 PROCEDURE — 97110 THERAPEUTIC EXERCISES: CPT

## 2021-02-09 PROCEDURE — 97112 NEUROMUSCULAR REEDUCATION: CPT

## 2021-02-09 NOTE — PROGRESS NOTES
PT DAILY TREATMENT NOTE 11    Patient Name: Brandon Buenrostro  Date:2021  : 1970  [x]  Patient  Verified  Payor:  / Plan: Zohaib Turner [address] / Product Type:  /    In time:5:17  Out time:6:05  Total Treatment Time (min): 48  Visit #: 2 of 8    Treatment Area: Left hip pain [M25.552]    SUBJECTIVE  Pain Level (0-10 scale): 0/10  Any medication changes, allergies to medications, adverse drug reactions, diagnosis change, or new procedure performed?: [x] No    [] Yes (see summary sheet for update)  Subjective functional status/changes:   [] No changes reported  \"I'm back to work. \"    OBJECTIVE    38 min Therapeutic Exercise:  [x] See flow sheet :   Rationale: increase ROM and increase strength to improve the patients ability to perform ADL's. 10 min Neuromuscular Re-education:  [x]  See flow sheet : Lateral band-walks, Corealign, hip hikes. Rationale: increase strength, improve coordination and increase proprioception  to improve the patients ability to perform functional activities. With   [x] TE   [] TA   [] neuro   [] other: Patient Education: [x] Review HEP    [] Progressed/Changed HEP based on:   [] positioning   [] body mechanics   [] transfers   [] heat/ice application    [] other:      Other Objective/Functional Measures: Mod(A) with side-lying hip abd. Pain Level (0-10 scale) post treatment: 0/10    ASSESSMENT/Changes in Function: Pt fully participates in treatment and is motivated. Demonstrates improved balance with side-steps. Limited hip abd strength. Continue PT to increase Left hip stability/strength to normalize gait and to improve ease of performing functional activities.     Patient will continue to benefit from skilled PT services to modify and progress therapeutic interventions, address functional mobility deficits, address ROM deficits, address strength deficits, analyze and cue movement patterns and analyze and modify body mechanics/ergonomics to attain remaining goals. [x]  See Plan of Care  []  See progress note/recertification  []  See Discharge Summary         Progress towards goals / Updated goals:  Updated Goals: to be achieved in 4 weeks:  1. Improve FOTO score to predicted outcome to improve ability for daily tasks              PN: progressing, 68% (goal 70%)  2. Improve left hip abduction to 3/5 for improved gait ability.             FT: progressing, 2+/5   3.  The pt will demonstrate ability to perform stairs with alternating pattern.              PN: progressing, is able to alternate UE and must use HR, must consciously think about it   4. Pt will perform 10 hip dip exercises B with correct form, indicating improved functional strength of the B hip abductors for ambulation.              PN: initiated exercise; small movements at the hip, some quadricep compensation    PLAN  []  Upgrade activities as tolerated     [x]  Continue plan of care  []  Update interventions per flow sheet       []  Discharge due to:_  []  Other:_      Arch Anna, PTA 2/9/2021  5:21 PM    Future Appointments   Date Time Provider Kingsley Stout   2/12/2021  4:30 PM Shannon Vidal, PT [facility]   2/16/2021  4:30 PM Ana Tejeda, PTA [facility]   2/19/2021 11:30 AM MD ARNAUD Arizmendi BS AMB   2/19/2021  4:30 PM Shannon Vidal, PT [facility]   2/23/2021  4:30 PM Ana Tejeda, PTA [facility]   2/24/2021  9:00 AM TAM Montgomery BS AMB   3/19/2021 10:00 AM TAM Montgomery BS AMB   5/14/2021  1:30 PM MD ARNAUD Arizmendi BS AMB

## 2021-02-12 ENCOUNTER — APPOINTMENT (OUTPATIENT)
Dept: PHYSICAL THERAPY | Age: 51
End: 2021-02-12
Payer: OTHER GOVERNMENT

## 2021-02-12 ENCOUNTER — DOCUMENTATION ONLY (OUTPATIENT)
Dept: ORTHOPEDIC SURGERY | Age: 51
End: 2021-02-12

## 2021-02-12 NOTE — PROGRESS NOTES
Patient emailed a Health Care Provider Medical Certification to the surgery scheduler who brought it to the  at [facility]. The patient has been notified of the form fee and 7-10 business day form policy. Please advise patient at [phone] when completed.  Forms placed in forms bin at [facility]

## 2021-02-16 ENCOUNTER — APPOINTMENT (OUTPATIENT)
Dept: PHYSICAL THERAPY | Age: 51
End: 2021-02-16
Payer: OTHER GOVERNMENT

## 2021-02-17 ENCOUNTER — HOSPITAL ENCOUNTER (OUTPATIENT)
Dept: PREADMISSION TESTING | Age: 51
Discharge: HOME | End: 2021-02-17
Payer: OTHER GOVERNMENT

## 2021-02-17 DIAGNOSIS — M16.11 PRIMARY OSTEOARTHRITIS OF RIGHT HIP: ICD-10-CM

## 2021-02-17 DIAGNOSIS — Z01.818 PREOPERATIVE TESTING: ICD-10-CM

## 2021-02-17 LAB
ABO + RH BLD: NORMAL
ALBUMIN SERPL-MCNC: 4 G/DL (ref 3.4–5)
ANION GAP SERPL CALC-SCNC: 4 MMOL/L (ref 3–18)
APPEARANCE UR: CLEAR
APTT PPP: 28.7 SEC (ref 23–36.4)
BASOPHILS # BLD: 0 K/UL (ref 0–0.1)
BASOPHILS NFR BLD: 0 % (ref 0–2)
BILIRUB UR QL: NEGATIVE
BLOOD GROUP ANTIBODIES SERPL: NORMAL
BUN SERPL-MCNC: 16 MG/DL (ref 7–18)
BUN/CREAT SERPL: 30 (ref 12–20)
CALCIUM SERPL-MCNC: 8.7 MG/DL (ref 8.5–10.1)
CHLORIDE SERPL-SCNC: 105 MMOL/L (ref 100–111)
CO2 SERPL-SCNC: 30 MMOL/L (ref 21–32)
COLOR UR: YELLOW
CREAT SERPL-MCNC: 0.54 MG/DL (ref 0.6–1.3)
DIFFERENTIAL METHOD BLD: ABNORMAL
EOSINOPHIL # BLD: 0 K/UL (ref 0–0.4)
EOSINOPHIL NFR BLD: 1 % (ref 0–5)
ERYTHROCYTE [DISTWIDTH] IN BLOOD BY AUTOMATED COUNT: 12.5 % (ref 11.6–14.5)
EST. AVERAGE GLUCOSE BLD GHB EST-MCNC: 100 MG/DL
GLUCOSE SERPL-MCNC: 79 MG/DL (ref 74–99)
GLUCOSE UR STRIP.AUTO-MCNC: NEGATIVE MG/DL
HBA1C MFR BLD: 5.1 % (ref 4.2–5.6)
HCT VFR BLD AUTO: 37.3 % (ref 35–45)
HGB BLD-MCNC: 12.4 G/DL (ref 12–16)
HGB UR QL STRIP: NEGATIVE
INR PPP: 1 (ref 0.8–1.2)
KETONES UR QL STRIP.AUTO: NEGATIVE MG/DL
LEUKOCYTE ESTERASE UR QL STRIP.AUTO: NEGATIVE
LYMPHOCYTES # BLD: 1.5 K/UL (ref 0.9–3.6)
LYMPHOCYTES NFR BLD: 41 % (ref 21–52)
MCH RBC QN AUTO: 29.8 PG (ref 24–34)
MCHC RBC AUTO-ENTMCNC: 33.2 G/DL (ref 31–37)
MCV RBC AUTO: 89.7 FL (ref 74–97)
MONOCYTES # BLD: 0.3 K/UL (ref 0.05–1.2)
MONOCYTES NFR BLD: 8 % (ref 3–10)
NEUTS SEG # BLD: 1.8 K/UL (ref 1.8–8)
NEUTS SEG NFR BLD: 50 % (ref 40–73)
NITRITE UR QL STRIP.AUTO: NEGATIVE
PH UR STRIP: 6 [PH] (ref 5–8)
PLATELET # BLD AUTO: 228 K/UL (ref 135–420)
PMV BLD AUTO: 10.6 FL (ref 9.2–11.8)
POTASSIUM SERPL-SCNC: 3.5 MMOL/L (ref 3.5–5.5)
PROT UR STRIP-MCNC: NEGATIVE MG/DL
PROTHROMBIN TIME: 13.4 SEC (ref 11.5–15.2)
RBC # BLD AUTO: 4.16 M/UL (ref 4.2–5.3)
SODIUM SERPL-SCNC: 139 MMOL/L (ref 136–145)
SP GR UR REFRACTOMETRY: 1.02 (ref 1–1.03)
SPECIMEN EXP DATE BLD: NORMAL
UROBILINOGEN UR QL STRIP.AUTO: 1 EU/DL (ref 0.2–1)
WBC # BLD AUTO: 3.6 K/UL (ref 4.6–13.2)

## 2021-02-17 PROCEDURE — 83036 HEMOGLOBIN GLYCOSYLATED A1C: CPT

## 2021-02-17 PROCEDURE — 36415 COLL VENOUS BLD VENIPUNCTURE: CPT

## 2021-02-17 PROCEDURE — 86901 BLOOD TYPING SEROLOGIC RH(D): CPT

## 2021-02-17 PROCEDURE — 85730 THROMBOPLASTIN TIME PARTIAL: CPT

## 2021-02-17 PROCEDURE — 82040 ASSAY OF SERUM ALBUMIN: CPT

## 2021-02-17 PROCEDURE — 85025 COMPLETE CBC W/AUTO DIFF WBC: CPT

## 2021-02-17 PROCEDURE — 81003 URINALYSIS AUTO W/O SCOPE: CPT

## 2021-02-17 PROCEDURE — 85610 PROTHROMBIN TIME: CPT

## 2021-02-17 PROCEDURE — 80048 BASIC METABOLIC PNL TOTAL CA: CPT

## 2021-02-17 PROCEDURE — 87086 URINE CULTURE/COLONY COUNT: CPT

## 2021-02-18 LAB
BACTERIA SPEC CULT: NORMAL
SERVICE CMNT-IMP: NORMAL

## 2021-02-19 ENCOUNTER — APPOINTMENT (OUTPATIENT)
Dept: PHYSICAL THERAPY | Age: 51
End: 2021-02-19
Payer: OTHER GOVERNMENT

## 2021-02-19 ENCOUNTER — OFFICE VISIT (OUTPATIENT)
Dept: FAMILY MEDICINE CLINIC | Age: 51
End: 2021-02-19
Payer: OTHER GOVERNMENT

## 2021-02-19 VITALS
TEMPERATURE: 97.3 F | HEART RATE: 80 BPM | SYSTOLIC BLOOD PRESSURE: 118 MMHG | WEIGHT: 164 LBS | RESPIRATION RATE: 16 BRPM | BODY MASS INDEX: 29.06 KG/M2 | DIASTOLIC BLOOD PRESSURE: 80 MMHG | HEIGHT: 63 IN | OXYGEN SATURATION: 98 %

## 2021-02-19 DIAGNOSIS — E04.9 GOITER: ICD-10-CM

## 2021-02-19 DIAGNOSIS — Z90.710 S/P HYSTERECTOMY: ICD-10-CM

## 2021-02-19 DIAGNOSIS — Z01.818 PRE-OPERATIVE CLEARANCE: Primary | ICD-10-CM

## 2021-02-19 DIAGNOSIS — I10 ESSENTIAL HYPERTENSION: ICD-10-CM

## 2021-02-19 DIAGNOSIS — G43.009 MIGRAINE WITHOUT AURA AND WITHOUT STATUS MIGRAINOSUS, NOT INTRACTABLE: ICD-10-CM

## 2021-02-19 DIAGNOSIS — D72.819 LEUKOPENIA, UNSPECIFIED TYPE: ICD-10-CM

## 2021-02-19 PROCEDURE — 99214 OFFICE O/P EST MOD 30 MIN: CPT | Performed by: FAMILY MEDICINE

## 2021-02-19 RX ORDER — HYDROXYCHLOROQUINE SULFATE 200 MG/1
TABLET, FILM COATED ORAL
COMMUNITY
Start: 2021-02-01 | End: 2021-03-03

## 2021-02-19 RX ORDER — OXYCODONE AND ACETAMINOPHEN 7.5; 325 MG/1; MG/1
TABLET ORAL
COMMUNITY
End: 2021-03-03

## 2021-02-19 NOTE — PROGRESS NOTES
HISTORY OF PRESENT ILLNESS  Carlos Fay is a 53 y.o. female. HPI: Here for preop clearance for total hip replacement on March 2, 2021 with Dr. Rosey Walton. Denies any new concern. No complications from prior surgery or anesthesia. No recent antibiotic or oral steroid. Sitting comfortable and did not appear in any acute distress. Denies any headache, dizziness, no chest pain or trouble breathing, no arm or leg weakness. No nausea or vomiting, no weight or appetite changes, no mood changes . No urine or bowel complains, no palpitation, no diaphoresis. No abdominal pain. No cold or cough. No leg swelling. No fever. No sleep trouble. Visit Vitals  /80 (BP 1 Location: Left arm, BP Patient Position: Sitting, BP Cuff Size: Adult)   Pulse 80   Temp 97.3 °F (36.3 °C) (Temporal)   Resp 16   Ht 5' 3\" (1.6 m)   Wt 164 lb (74.4 kg)   SpO2 98%   BMI 29.05 kg/m²     History of hypertension. Vitals been stable. Taking medication with compliance and no side effects. History of goiter. No trouble swallowing or change in voice. Recent thyroid ultrasound in December showed unremarkable thyroid gland. History of migraines which are stable. On and off for multiple joint pain. At this time fairly stable.   Lab Results   Component Value Date/Time    WBC 3.6 (L) 02/17/2021 12:48 PM    HGB 12.4 02/17/2021 12:48 PM    HCT 37.3 02/17/2021 12:48 PM    PLATELET 112 89/87/6752 12:48 PM    MCV 89.7 02/17/2021 12:48 PM     Lab Results   Component Value Date/Time    Sodium 139 02/17/2021 12:48 PM    Potassium 3.5 02/17/2021 12:48 PM    Chloride 105 02/17/2021 12:48 PM    CO2 30 02/17/2021 12:48 PM    Anion gap 4 02/17/2021 12:48 PM    Glucose 79 02/17/2021 12:48 PM    BUN 16 02/17/2021 12:48 PM    Creatinine 0.54 (L) 02/17/2021 12:48 PM    BUN/Creatinine ratio 30 (H) 02/17/2021 12:48 PM    GFR est AA >60 02/17/2021 12:48 PM    GFR est non-AA >60 02/17/2021 12:48 PM    Calcium 8.7 02/17/2021 12:48 PM    Bilirubin, total 0.5 10/06/2020 09:11 AM    Alk. phosphatase 96 10/06/2020 09:11 AM    Protein, total 7.1 10/06/2020 09:11 AM    Albumin 4.0 02/17/2021 12:48 PM    Globulin 3.4 10/06/2020 09:11 AM    A-G Ratio 1.1 10/06/2020 09:11 AM    ALT (SGPT) 15 10/06/2020 09:11 AM    AST (SGOT) 13 10/06/2020 09:11 AM     Lab Results   Component Value Date/Time    Cholesterol, total 198 05/04/2018 10:07 AM    HDL Cholesterol 72 (H) 05/04/2018 10:07 AM    LDL, calculated 111 (H) 05/04/2018 10:07 AM    VLDL, calculated 15 05/04/2018 10:07 AM    Triglyceride 75 05/04/2018 10:07 AM    CHOL/HDL Ratio 2.8 05/04/2018 10:07 AM     Lab Results   Component Value Date/Time    TSH 1.04 12/05/2020 08:52 AM     Lab Results   Component Value Date/Time    Hemoglobin A1c 5.1 02/17/2021 12:48 PM     Lab Results   Component Value Date/Time    Vitamin D 25-Hydroxy 14.0 (L) 01/14/2020 04:13 PM       Reviewed preop labs. PT /PTT within normal limit. Urine culture negative. ROS: see HPI     Physical Exam  Constitutional:       General: She is not in acute distress. Cardiovascular:      Rate and Rhythm: Normal rate and regular rhythm. Heart sounds: Normal heart sounds. Abdominal:      General: Bowel sounds are normal.      Palpations: Abdomen is soft. Tenderness: There is no abdominal tenderness. Musculoskeletal:         General: No swelling. Lymphadenopathy:      Cervical: No cervical adenopathy. Neurological:      Mental Status: She is oriented to person, place, and time. Psychiatric:         Behavior: Behavior normal.         ASSESSMENT and PLAN    ICD-10-CM ICD-9-CM    1. Pre-operative clearance: Cleared for current scheduled surgery. Stop aspirin 7 days before the procedure. Z01.818 V72.84    2. Essential hypertension :well controlled. Continue current dose of medication and low salt diet. Exercise as tolerated. I10 401.9    3. Migraine without aura and without status migrainosus, not intractable: Fairly stable.   Will observe B46.181 346.10    4. Goiter/ seen endocrinology/ observe: Recent ultrasound showed unremarkable thyroid. Recent TSH within normal limit. Asymptomatic. No trouble swallowing or change in voice. Will observe E04.9 240.9    5. S/P hysterectomy/ due to menorrhagea   Z90.710 V88.01    6. Leukopenia, unspecified type: Has seen the hematology. No complications on her recent surgery. Will observe and cleared her for surgery. Will obtain the hematology notes D72.819 288.50    Patient understood and agreed with the plan    Please note that this dictation was completed with [email or web address], the [email or web address] voice recognition software. Quite often unanticipated grammatical, syntax, homophones, and other interpretive errors are inadvertently transcribed by the computer software. Please disregard these errors. Please excuse any errors that have escaped final proofreading. Follow-up and Dispositions    · Return in about 3 months (around 5/19/2021).

## 2021-02-19 NOTE — PATIENT INSTRUCTIONS
Stop aspirin 7 days before procedure. Hip Replacement Surgery (Anterior): What to Expect at [facility] Your Recovery Anterior hip replacement surgery is done through a small incision in the front (anterior) of the hip. This causes less damage to muscles and other soft tissues than the more common type of surgery. There is also a lower risk of the new ball on the thighbone slipping out of the hip socket (dislocation). As a result, healing is usually faster, and there are fewer limits on activity. After surgery, you will use crutches or a walker. You will need someone to help you at home for a few days or weeks or until you have more energy and can move around better. If you need more extensive rehab, you may go to a specialized rehab center for more treatment. You will go home with a bandage and stitches, staples, tissue glue, or tape strips. You can remove the bandage when your doctor tells you to. If you have stitches or staples, your doctor will remove them 10 to 14 days after your surgery. Glue or tape strips will fall off on their own over time. You may have some mild pain and swelling after surgery. Your doctor may give you medicine for the pain. You will keep doing the physical therapy you started in the hospital. The better you do with your exercises, the sooner you will get your strength and movement back. Your doctor will tell you when you can go back to work or other activities. This will depend on what type of work and activities you do. This care sheet gives you a general idea about how long it will take for you to recover. But each person recovers at a different pace. Follow the steps below to get better as quickly as possible. How can you care for yourself at home? Activity 
  · For the first few months, your doctor may want you to avoid things that could dislocate your hip. If so, your therapist may suggest ideas such as: ? Do not turn your leg too far out to the side. Keep your toes pointing forward or slightly in. 
? Do not step backwards or bend backwards. ? Do not cross your legs.  
  · Go slowly when you climb stairs. Make sure the lights are on, and have someone watch you, if possible. When you climb stairs: 
? Step up first with your unaffected leg. Then bring the affected leg up to the same step. Bring your crutches or cane up. ? To go down stairs, reverse the order. First, put your crutches or cane on the lower step. Then bring the affected leg down to that step. Finally, step down with the unaffected leg.  
  · Rest when you feel tired. You may take a nap, but don't stay in bed all day.  
  · You may use crutches or a walker for a couple of weeks, and then switch to a cane.  
  · Try not to sit for too long at one time. You will feel less stiff if you take a short walk about every hour.  
  · Ask your doctor when you can drive again.  
  · Ask your doctor when it is okay for you to have sex. Diet 
  · By the time you leave the hospital, you will probably be eating your normal diet. If your stomach is upset, try bland, low-fat foods like plain rice, broiled chicken, toast, and yogurt. Your doctor may recommend that you take iron and vitamin supplements.  
  · Eat healthy foods, and watch your portion sizes. Try to stay at your ideal weight. Controlling your weight will help your new hip joint last longer.  
  · If your bowel movements are not regular right after surgery, try to avoid constipation and straining. Drink plenty of water. Your doctor may suggest fiber, a stool softener, or a mild laxative. Medicines 
  · Be safe with medicines. Read and follow all instructions on the label. ? If the doctor gave you a prescription medicine for pain, take it as prescribed. ? If you are not taking a prescription pain medicine, ask your doctor if you can take an over-the-counter medicine.   · If your doctor prescribed antibiotics, take them as directed. Do not stop taking them just because you feel better. You need to take the full course of antibiotics.  
  · Your doctor will tell you if and when you can restart your medicines. He or she will also give you instructions about taking any new medicines.  
  · If you take aspirin or some other blood thinner, be sure to talk to your doctor. He or she will tell you if and when to start taking this medicine again. Make sure that you understand exactly what your doctor wants you to do.  
  · Your doctor may give you a blood-thinning medicine to prevent blood clots for a few weeks after surgery. Be sure you get instructions about how to take your medicine safely. Blood thinners can cause serious bleeding problems. This medicine could be in pill form or as a shot (injection). If a shot is necessary, your doctor will tell you how to do this. Incision care 
  · If your doctor told you how to care for your cut (incision), follow your doctor's instructions. You will have a dressing over the cut. A dressing helps the incision heal and protects it. Your doctor will tell you how to take care of this.  
  · If you did not get instructions, follow this general advice: ? If you have strips of tape on the cut the doctor made, leave the tape on for a week or until it falls off. 
? If you have stitches or staples, your doctor will tell you when to come back to have them removed. ? If you have skin adhesive on the cut, leave it on until it falls off. Skin adhesive is also called glue or liquid stitches. ? Change the bandage every day. ? Wash the area daily with warm water, and pat it dry. Don't use hydrogen peroxide or alcohol. They can slow healing. ? You may cover the area with a gauze bandage if it oozes fluid or rubs against clothing. ? You may shower 24 to 48 hours after surgery. Pat the incision dry. Don't swim or take a bath for the first 2 weeks, or until your doctor tells you it is okay. Exercise 
  · Your physical therapist will teach you exercises to do at home. Always do them as your therapist tells you.  
  · Your doctor may advise you to stay away from activities that put stress on the joint. This includes sports such as tennis, football, and jogging.  
  · Avoid activities where you might fall. These include motorcycle or horseback riding, water-skiing, and mountain biking. Ice and elevation 
  · For pain, put ice or a cold pack on the area for 10 to 20 minutes at a time. Put a thin cloth between the ice and your skin.  
  · Your ankle may swell for a few weeks after hip surgery. Prop up your ankle when you ice your hip or anytime you sit or lie down. Try to keep it above the level of your heart. This will help reduce swelling. Other instructions 
  · Keep wearing your support stockings. These help to prevent blood clots. Your doctor will tell you how long you need to keep wearing them.  
  · Try to prevent falls. To avoid falling: ? Arrange furniture so that you will not trip on it. ? Get rid of throw rugs, and move electrical cords out of the way. ? Put grab bars in showers and bathtubs. ? Wear shoes with sturdy, flat soles. ? Walk only in areas with plenty of light. ? Avoid icy or snowy sidewalks. Follow-up care is a key part of your treatment and safety. Be sure to make and go to all appointments, and call your doctor if you are having problems. It's also a good idea to know your test results and keep a list of the medicines you take. When should you call for help? Call 911 anytime you think you may need emergency care. For example, call if: 
  · You passed out (lost consciousness).  
  · You have severe trouble breathing.  
  · You have sudden chest pain and shortness of breath, or you cough up blood. Call your doctor now or seek immediate medical care if: 
  · You have symptoms of a blood clot in your leg (called a deep vein thrombosis), such as: 
? Pain in your calf, back of the knee, thigh, or groin. ? Redness and swelling in your leg or groin.  
  · You have signs of infection, such as: 
? Increased pain, swelling, warmth, or redness. ? Red streaks leading from the incision. ? Pus draining from the incision. ? A fever.  
  · Your leg or foot turns cold or changes color.  
  · You have tingling, weakness, or numbness in your leg or foot.  
  · You have signs that your hip may be dislocated, including: 
? Severe pain and not being able to stand. ? A crooked leg that looks like your hip is out of position. ? Not being able to bend or straighten your leg.  
  · You have pain that does not get better after you take pain medicine.  
  · You have loose stitches, or your incision comes open. Watch closely for changes in your health, and be sure to contact your doctor if: 
  · You do not have a bowel movement after taking a laxative.  
  · You do not get better as expected. Where can you learn more? Go to http://martin-ifeanyi.info/ Enter [phone] in the search box to learn more about \"Hip Replacement Surgery (Anterior): What to Expect at Home. \" Current as of: March 2, 2020               Content Version: 12.6 © 1800-9769 [employer]. Care instructions adapted under license by [employer] (which disclaims liability or warranty for this information). If you have questions about a medical condition or this instruction, always ask your healthcare professional. [place] [address] any warranty or liability for your use of this information. Low Sodium Diet (2,000 Milligram): Care Instructions Your Care Instructions Too much sodium causes your body to hold on to extra water. This can raise your blood pressure and force your heart and kidneys to work harder. In very serious cases, this could cause you to be put in the hospital. It might even be life-threatening. By limiting sodium, you will feel better and lower your risk of serious problems. The most common source of sodium is salt. People get most of the salt in their diet from canned, prepared, and packaged foods. Fast food and restaurant meals also are very high in sodium. Your doctor will probably limit your sodium to less than 2,000 milligrams (mg) a day. This limit counts all the sodium in prepared and packaged foods and any salt you add to your food. Follow-up care is a key part of your treatment and safety. Be sure to make and go to all appointments, and call your doctor if you are having problems. It's also a good idea to know your test results and keep a list of the medicines you take. How can you care for yourself at home? Read food labels · Read labels on cans and food packages. The labels tell you how much sodium is in each serving. Make sure that you look at the serving size. If you eat more than the serving size, you have eaten more sodium. · Food labels also tell you the Percent Daily Value for sodium. Choose products with low Percent Daily Values for sodium. · Be aware that sodium can come in forms other than salt, including monosodium glutamate (MSG), sodium citrate, and sodium bicarbonate (baking soda). MSG is often added to Asian food. When you eat out, you can sometimes ask for food without MSG or added salt. Buy low-sodium foods · Buy foods that are labeled \"unsalted\" (no salt added), \"sodium-free\" (less than 5 mg of sodium per serving), or \"low-sodium\" (less than 140 mg of sodium per serving). Foods labeled \"reduced-sodium\" and \"light sodium\" may still have too much sodium. Be sure to read the label to see how much sodium you are getting. · Buy fresh vegetables, or frozen vegetables without added sauces. Buy low-sodium versions of canned vegetables, soups, and other canned goods. Prepare low-sodium meals · Cut back on the amount of salt you use in cooking. This will help you adjust to the taste. Do not add salt after cooking. One teaspoon of salt has about 2,300 mg of sodium. · Take the salt shaker off the table. · Flavor your food with garlic, lemon juice, onion, vinegar, herbs, and spices. Do not use soy sauce, lite soy sauce, steak sauce, onion salt, garlic salt, celery salt, mustard, or ketchup on your food. · Use low-sodium salad dressings, sauces, and ketchup. Or make your own salad dressings and sauces without adding salt. · Use less salt (or none) when recipes call for it. You can often use half the salt a recipe calls for without losing flavor. Other foods such as rice, pasta, and grains do not need added salt. · Rinse canned vegetables, and cook them in fresh water. This removes somebut not allof the salt. · Avoid water that is naturally high in sodium or that has been treated with water softeners, which add sodium. Call your local water company to find out the sodium content of your water supply. If you buy bottled water, read the label and choose a sodium-free brand. Avoid high-sodium foods · Avoid eating: 
? Smoked, cured, salted, and canned meat, fish, and poultry. ? Ham, leon, hot dogs, and luncheon meats. ? Regular, hard, and processed cheese and regular peanut butter. ? Crackers with salted tops, and other salted snack foods such as pretzels, chips, and salted popcorn. ? Frozen prepared meals, unless labeled low-sodium. ? Canned and dried soups, broths, and bouillon, unless labeled sodium-free or low-sodium. ? Canned vegetables, unless labeled sodium-free or low-sodium. ? [employer] fries, pizza, tacos, and other fast foods. ? Pickles, olives, ketchup, and other condiments, especially soy sauce, unless labeled sodium-free or low-sodium. Where can you learn more? Go to http://www.gray.com/ Enter M417 in the search box to learn more about \"Low Sodium Diet (2,000 Milligram): Care Instructions. \" Current as of: August 22, 2019               Content Version: 12.6 © 6846-2747 [employer]. Care instructions adapted under license by [employer] (which disclaims liability or warranty for this information). If you have questions about a medical condition or this instruction, always ask your healthcare professional. [address] any warranty or liability for your use of this information.

## 2021-02-19 NOTE — PROGRESS NOTES
Chief Complaint   Patient presents with    Pre-op Exam     R THR 3/2/21 Dr. Lara Lugo     1. Have you been to the ER, urgent care clinic since your last visit? Hospitalized since your last visit? No    2. Have you seen or consulted any other health care providers outside of the [address] [place] [address] since your last visit? Include any pap smears or colon screening.  Dr. Jean Marie Reed

## 2021-02-22 NOTE — PROGRESS NOTES
Carlos Buenrostro attended the Joint Replacement Pre-Operative class. Topics discussed included surgery preparation, what to expect the day of surgery, medications, nutrition, glycemic control, physical and occupational therapy, and prevention of post-operative complications.     Ian Chisholm, PARULN, RN, [address] [place] [address]  Orthopedic

## 2021-02-23 ENCOUNTER — APPOINTMENT (OUTPATIENT)
Dept: PHYSICAL THERAPY | Age: 51
End: 2021-02-23
Payer: OTHER GOVERNMENT

## 2021-02-23 DIAGNOSIS — Z01.818 PREOPERATIVE TESTING: Primary | ICD-10-CM

## 2021-02-24 ENCOUNTER — OFFICE VISIT (OUTPATIENT)
Dept: ORTHOPEDIC SURGERY | Age: 51
End: 2021-02-24
Payer: OTHER GOVERNMENT

## 2021-02-24 VITALS
BODY MASS INDEX: 29.06 KG/M2 | HEIGHT: 63 IN | RESPIRATION RATE: 20 BRPM | SYSTOLIC BLOOD PRESSURE: 129 MMHG | WEIGHT: 164 LBS | OXYGEN SATURATION: 99 % | TEMPERATURE: 97.8 F | HEART RATE: 79 BPM | DIASTOLIC BLOOD PRESSURE: 81 MMHG

## 2021-02-24 DIAGNOSIS — M16.11 PRIMARY OSTEOARTHRITIS OF RIGHT HIP: Primary | ICD-10-CM

## 2021-02-24 DIAGNOSIS — Z96.642 STATUS POST LEFT HIP REPLACEMENT: ICD-10-CM

## 2021-02-24 DIAGNOSIS — Z01.818 PRE-OPERATIVE EXAM: ICD-10-CM

## 2021-02-24 PROCEDURE — 90000 NO LOS: CPT | Performed by: PHYSICIAN ASSISTANT

## 2021-02-24 PROCEDURE — 73502 X-RAY EXAM HIP UNI 2-3 VIEWS: CPT | Performed by: PHYSICIAN ASSISTANT

## 2021-02-24 RX ORDER — HYDROCODONE BITARTRATE AND ACETAMINOPHEN 7.5; 325 MG/15ML; MG/15ML
7.5-325 SOLUTION ORAL ONCE
COMMUNITY
Start: 2021-02-04 | End: 2021-02-24

## 2021-02-24 RX ORDER — ACETAMINOPHEN 325 MG/1
1000 TABLET ORAL ONCE
Status: CANCELLED | OUTPATIENT
Start: 2021-02-24 | End: 2021-02-24

## 2021-02-24 RX ORDER — HYDROCODONE BITARTRATE AND ACETAMINOPHEN 5; 325 MG/1; MG/1
TABLET ORAL
COMMUNITY
End: 2021-02-24

## 2021-02-24 RX ORDER — DEXAMETHASONE SODIUM PHOSPHATE 4 MG/ML
8 INJECTION, SOLUTION INTRA-ARTICULAR; INTRALESIONAL; INTRAMUSCULAR; INTRAVENOUS; SOFT TISSUE
Status: CANCELLED | OUTPATIENT
Start: 2021-02-24 | End: 2021-02-24

## 2021-02-24 RX ORDER — PREGABALIN 25 MG/1
75 CAPSULE ORAL ONCE
Status: CANCELLED | OUTPATIENT
Start: 2021-02-24 | End: 2021-02-24

## 2021-02-24 RX ORDER — CELECOXIB 100 MG/1
400 CAPSULE ORAL ONCE
Status: CANCELLED | OUTPATIENT
Start: 2021-02-24 | End: 2021-02-24

## 2021-02-24 NOTE — H&P (VIEW-ONLY)
[address], [address] [place] [address] 
[phone] HISTORY & PHYSICAL Patient: Tam Wood                MRN: [id number]       SSN: [id number] YOB: 1970        AGE: 53 y.o. SEX: female Body mass index is 29.05 kg/m². PCP: Julius Armstrong MD 
02/24/21 CC: right hip end stage OA Problem List Items Addressed This Visit None Visit Diagnoses Primary osteoarthritis of right hip    -  Primary Relevant Orders AMB POC X-RAY RADEX HIP UNI WITH PELVIS 2-3 VIEWS (Completed) Pre-operative exam      
 Relevant Orders AMB POC X-RAY RADEX HIP UNI WITH PELVIS 2-3 VIEWS (Completed) Status post left hip replacement HPI:  The patient is a pleasant 53 y.o. whom has end stage OA of their Right hip and has failed conservative treatment including but not limited to NSAIDS, cortisone injections, viscosupplementation, PT, and pain medicine. Due to the current findings and affected activities of daily living, surgical intervention is indicated. The alternatives, risks, complications, as well as expected outcome were discussed. These include but are not limited to infection, blood loss, need for blood transfusion, neurovascular damage, DVT, PE,  post-op stiffness and pain, leg length discrepancy, dislocation, anesthetic complications, prothesis longevity, need for more surgery, MI, stroke, and even death. The patient understands and wishes to proceed with surgery. Past Medical History:  
Diagnosis Date  Allergic rhinitis, seasonal   
 spring  Anemia NEC   
 iron deficiency associated with heavy menstruation and fibroids noted 2008 with pregnancy  Dysmenorrhea   
 increasing past year  Fibroid, uterine  Goiter  Headache(784.0)  Hypertension  Ill-defined condition Chronic knee pain  Menorrhagia   
 worsening past year  Neutropenia ([facility] [address] Current Outpatient Medications:  
  hydrOXYchloroQUINE (PLAQUENIL) 200 mg tablet, TAKE 1 TABLET BY MOUTH TWICE DAILY WITH FOOD OR MILK, Disp: , Rfl:  
  celecoxib (CeleBREX) 200 mg capsule, Take 1 Cap by mouth two (2) times a day for 90 days. , Disp: 60 Cap, Rfl: 2   Blood Pressure Monitor kit, Once a day, Disp: 1 Kit, Rfl: 0 
  oxyCODONE-acetaminophen (PERCOCET 7.5) 7.5-325 mg per tablet, Take  by mouth., Disp: , Rfl:  
  docusate sodium 100 mg tab, Take 1 Tab by mouth daily. , Disp: , Rfl:  
  acetaminophen (TYLENOL) 500 mg tablet, Take 1,000 mg by mouth every six (6) hours as needed for Pain. Pain, Disp: , Rfl:  
  aspirin (ASPIRIN) 325 mg tablet, Take 1 Tab by mouth two (2) times a day., Disp: 60 Tab, Rfl: 0 
  amLODIPine (NORVASC) 5 mg tablet, Take 1 Tab by mouth daily. , Disp: 90 Tab, Rfl: 1 No Known Allergies Social History Socioeconomic History  Marital status:  Spouse name: Not on file  Number of children: Not on file  Years of education: Not on file  Highest education level: Not on file Occupational History  Not on file Social Needs  Financial resource strain: Not on file  Food insecurity Worry: Not on file Inability: Not on file  Transportation needs Medical: Not on file Non-medical: Not on file Tobacco Use  Smoking status: Never Smoker  Smokeless tobacco: Never Used Substance and Sexual Activity  Alcohol use: Yes Alcohol/week: 0.0 standard drinks Comment: occass: 2/month  Drug use: No  
 Sexual activity: Yes  
  Partners: Male Birth control/protection: Surgical  
  Comment: hysterectomy Lifestyle  Physical activity Days per week: Not on file Minutes per session: Not on file  Stress: Not on file Relationships  Social connections Talks on phone: Not on file Gets together: Not on file Attends L[employer]-day service: Not on file Active member of club or organization: Not on file Attends meetings of clubs or organizations: Not on file Relationship status: Not on file  Intimate partner violence Fear of current or ex partner: Not on file Emotionally abused: Not on file Physically abused: Not on file Forced sexual activity: Not on file Other Topics Concern  Not on file Social History Narrative  Not on file Past Surgical History:  
Procedure Laterality Date  HX HYSTERECTOMY  04/2014 [address] HX KNEE ARTHROSCOPY Right Family History:  Non-contributory. PE: 
Visit Vitals /81 (BP 1 Location: Left upper arm, BP Patient Position: Sitting, BP Cuff Size: Large adult) Pulse 79 Temp 97.8 °F (36.6 °C) (Temporal) Resp 20 Ht 5' 3\" (1.6 m) Wt 164 lb (74.4 kg) LMP 10/08/2012 SpO2 99% BMI 29.05 kg/m² A&O X3, NAD, well develop, well nourished Heart: S1-S2, rrr 
Lungs: CTA bilat Abd: soft, nt, nt, + bs in all quadrants Ext:  Pos distal pulses to DP, PT Leg lengths show the right LE to be shorter grossly sitting in the chair X-ray: Radiographs obtained in the office today 2/24/2021 [address] including AP pelvis, AP and crosstable lateral the right hip as well as an AP of the contralateral hip shows end-stage arthritis of bone-on-bone eburnation. The left total hip components are well fixed. Labs: labs were reviewed and wnl.  ua neg A:  Right  hip end stage OA 
 
P:  At this point we will move forward with surgery. Again, the alternatives, risks, complications, as well as expected outcome were discussed and the patient wishes to proceed with surgery. Pt has been instructed to stop aspirin, nsaids, rheumatologic medications and blood thinners. They have also been instructed to continue on any heart and bp meds and to take them the morning of surgery with sips of water. Lateral approach The patient will require in-patient admission. Admission as an in-patient is reasonable and necessary due to increased risk of surgery due to the factors indicated as well as the possible need for prolonged in-hospital or skilled post-acute care in order to improve this patient's functional ability. The patient was counseled at length about the risks of mounika Covid-19 during their perioperative period and any recovery window from their procedure. The patient was made aware that mounika Covid-19  may worsen their prognosis for recovering from their procedure and lend to a higher morbidity and/or mortality risk. All material risks, benefits, and reasonable alternatives including postponing the procedure were discussed. The patient does  wish to proceed with the procedure at this time. Lauren Arthur

## 2021-02-24 NOTE — H&P
[address], [address] [facility]  [phone]           HISTORY & PHYSICAL      Patient: Ady Hernández                MRN: [id number]       SSN: [id number]  YOB: 1970        AGE: 53 y.o. SEX: female  Body mass index is 29.05 kg/m². PCP: Jose Carter MD  02/24/21      CC: right hip end stage OA  Problem List Items Addressed This Visit     None      Visit Diagnoses     Primary osteoarthritis of right hip    -  Primary    Relevant Orders    AMB POC X-RAY RADEX HIP UNI WITH PELVIS 2-3 VIEWS (Completed)    Pre-operative exam        Relevant Orders    AMB POC X-RAY RADEX HIP UNI WITH PELVIS 2-3 VIEWS (Completed)    Status post left hip replacement                HPI:  The patient is a pleasant 53 y.o. whom has end stage OA of their Right hip and has failed conservative treatment including but not limited to NSAIDS, cortisone injections, viscosupplementation, PT, and pain medicine. Due to the current findings and affected activities of daily living, surgical intervention is indicated. The alternatives, risks, complications, as well as expected outcome were discussed. These include but are not limited to infection, blood loss, need for blood transfusion, neurovascular damage, DVT, PE,  post-op stiffness and pain, leg length discrepancy, dislocation, anesthetic complications, prothesis longevity, need for more surgery, MI, stroke, and even death. The patient understands and wishes to proceed with surgery.       Past Medical History:   Diagnosis Date    Allergic rhinitis, seasonal     spring    Anemia NEC     iron deficiency associated with heavy menstruation and fibroids noted 2008 with pregnancy    Dysmenorrhea     increasing past year    Fibroid, uterine     Goiter     Headache(784.0)     Hypertension     Ill-defined condition     Chronic knee pain    Menorrhagia     worsening past year    Neutropenia ([facility] [address] Current Outpatient Medications:     hydrOXYchloroQUINE (PLAQUENIL) 200 mg tablet, TAKE 1 TABLET BY MOUTH TWICE DAILY WITH FOOD OR MILK, Disp: , Rfl:     celecoxib (CeleBREX) 200 mg capsule, Take 1 Cap by mouth two (2) times a day for 90 days. , Disp: 60 Cap, Rfl: 2    Blood Pressure Monitor kit, Once a day, Disp: 1 Kit, Rfl: 0    oxyCODONE-acetaminophen (PERCOCET 7.5) 7.5-325 mg per tablet, Take  by mouth., Disp: , Rfl:     docusate sodium 100 mg tab, Take 1 Tab by mouth daily. , Disp: , Rfl:     acetaminophen (TYLENOL) 500 mg tablet, Take 1,000 mg by mouth every six (6) hours as needed for Pain. Pain, Disp: , Rfl:     aspirin (ASPIRIN) 325 mg tablet, Take 1 Tab by mouth two (2) times a day., Disp: 60 Tab, Rfl: 0    amLODIPine (NORVASC) 5 mg tablet, Take 1 Tab by mouth daily. , Disp: 90 Tab, Rfl: 1    No Known Allergies    Social History     Socioeconomic History    Marital status:      Spouse name: Not on file    Number of children: Not on file    Years of education: Not on file    Highest education level: Not on file   Occupational History    Not on file   Social Needs    Financial resource strain: Not on file    Food insecurity     Worry: Not on file     Inability: Not on file    Transportation needs     Medical: Not on file     Non-medical: Not on file   Tobacco Use    Smoking status: Never Smoker    Smokeless tobacco: Never Used   Substance and Sexual Activity    Alcohol use:  Yes     Alcohol/week: 0.0 standard drinks     Comment: occass: 2/month    Drug use: No    Sexual activity: Yes     Partners: Male     Birth control/protection: Surgical     Comment: hysterectomy   Lifestyle    Physical activity     Days per week: Not on file     Minutes per session: Not on file    Stress: Not on file   Relationships    Social connections     Talks on phone: Not on file     Gets together: Not on file     Attends [employer] service: Not on file     Active member of club or organization: Not on file     Attends meetings of clubs or organizations: Not on file     Relationship status: Not on file    Intimate partner violence     Fear of current or ex partner: Not on file     Emotionally abused: Not on file     Physically abused: Not on file     Forced sexual activity: Not on file   Other Topics Concern    Not on file   Social History Narrative    Not on file       Past Surgical History:   Procedure Laterality Date   Dean Ward HYSTERECTOMY  04/2014    [facility]    HX KNEE ARTHROSCOPY Right        Family History:  Non-contributory. PE:  Visit Vitals  /81 (BP 1 Location: Left upper arm, BP Patient Position: Sitting, BP Cuff Size: Large adult)   Pulse 79   Temp 97.8 °F (36.6 °C) (Temporal)   Resp 20   Ht 5' 3\" (1.6 m)   Wt 164 lb (74.4 kg)   LMP 10/08/2012   SpO2 99%   BMI 29.05 kg/m²     A&O X3, NAD, well develop, well nourished  Heart: S1-S2, rrr  Lungs: CTA bilat  Abd: soft, nt, nt, + bs in all quadrants  Ext:  Pos distal pulses to DP, PT  Leg lengths show the right LE to be shorter grossly sitting in the chair    X-ray: Radiographs obtained in the office today 2/24/2021 at the high [place] location including AP pelvis, AP and crosstable lateral the right hip as well as an AP of the contralateral hip shows end-stage arthritis of bone-on-bone eburnation. The left total hip components are well fixed. Labs: labs were reviewed and wnl.  ua neg    A:  Right  hip end stage OA    P:  At this point we will move forward with surgery. Again, the alternatives, risks, complications, as well as expected outcome were discussed and the patient wishes to proceed with surgery. Pt has been instructed to stop aspirin, nsaids, rheumatologic medications and blood thinners. They have also been instructed to continue on any heart and bp meds and to take them the morning of surgery with sips of water. Lateral approach  The patient will require in-patient admission.   Admission as an in-patient is reasonable and necessary due to increased risk of surgery due to the factors indicated as well as the possible need for prolonged in-hospital or skilled post-acute care in order to improve this patient's functional ability. The patient was counseled at length about the risks of mounika Covid-19 during their perioperative period and any recovery window from their procedure. The patient was made aware that mounika Covid-19  may worsen their prognosis for recovering from their procedure and lend to a higher morbidity and/or mortality risk. All material risks, benefits, and reasonable alternatives including postponing the procedure were discussed. The patient does  wish to proceed with the procedure at this time.           Gail Somers

## 2021-02-25 ENCOUNTER — HOSPITAL ENCOUNTER (OUTPATIENT)
Dept: PREADMISSION TESTING | Age: 51
Discharge: HOME | End: 2021-02-25
Payer: OTHER GOVERNMENT

## 2021-02-25 DIAGNOSIS — Z01.818 PREOPERATIVE TESTING: ICD-10-CM

## 2021-02-25 PROCEDURE — U0005 INFEC AGEN DETEC AMPLI PROBE: HCPCS

## 2021-02-26 LAB — SARS-COV-2, COV2NT: NOT DETECTED

## 2021-03-01 ENCOUNTER — ANESTHESIA EVENT (OUTPATIENT)
Dept: SURGERY | Age: 51
End: 2021-03-01
Payer: OTHER GOVERNMENT

## 2021-03-02 ENCOUNTER — HOSPITAL ENCOUNTER (OUTPATIENT)
Age: 51
Setting detail: OBSERVATION
Discharge: HOME HEALTH | End: 2021-03-03
Attending: ORTHOPAEDIC SURGERY | Admitting: ORTHOPAEDIC SURGERY
Payer: OTHER GOVERNMENT

## 2021-03-02 ENCOUNTER — ANESTHESIA (OUTPATIENT)
Dept: SURGERY | Age: 51
End: 2021-03-02
Payer: OTHER GOVERNMENT

## 2021-03-02 ENCOUNTER — APPOINTMENT (OUTPATIENT)
Dept: GENERAL RADIOLOGY | Age: 51
End: 2021-03-02
Attending: PHYSICIAN ASSISTANT
Payer: OTHER GOVERNMENT

## 2021-03-02 DIAGNOSIS — M24.651 ANKYLOSIS, RIGHT HIP: ICD-10-CM

## 2021-03-02 DIAGNOSIS — M16.10 HIP ARTHRITIS: ICD-10-CM

## 2021-03-02 DIAGNOSIS — M24.7 PROTRUSIO ACETABULI: ICD-10-CM

## 2021-03-02 PROCEDURE — 76010000131 HC OR TIME 2 TO 2.5 HR: Performed by: ORTHOPAEDIC SURGERY

## 2021-03-02 PROCEDURE — C1776 JOINT DEVICE (IMPLANTABLE): HCPCS | Performed by: ORTHOPAEDIC SURGERY

## 2021-03-02 PROCEDURE — 27130 TOTAL HIP ARTHROPLASTY: CPT | Performed by: PHYSICIAN ASSISTANT

## 2021-03-02 PROCEDURE — 74011250636 HC RX REV CODE- 250/636: Performed by: NURSE ANESTHETIST, CERTIFIED REGISTERED

## 2021-03-02 PROCEDURE — 99218 HC RM OBSERVATION: CPT

## 2021-03-02 PROCEDURE — 74011250636 HC RX REV CODE- 250/636: Performed by: PHYSICIAN ASSISTANT

## 2021-03-02 PROCEDURE — 77030027138 HC INCENT SPIROMETER -A: Performed by: ORTHOPAEDIC SURGERY

## 2021-03-02 PROCEDURE — 88304 TISSUE EXAM BY PATHOLOGIST: CPT

## 2021-03-02 PROCEDURE — 74011250637 HC RX REV CODE- 250/637: Performed by: ORTHOPAEDIC SURGERY

## 2021-03-02 PROCEDURE — 88311 DECALCIFY TISSUE: CPT

## 2021-03-02 PROCEDURE — 73502 X-RAY EXAM HIP UNI 2-3 VIEWS: CPT

## 2021-03-02 PROCEDURE — 88305 TISSUE EXAM BY PATHOLOGIST: CPT

## 2021-03-02 PROCEDURE — 76060000035 HC ANESTHESIA 2 TO 2.5 HR: Performed by: ORTHOPAEDIC SURGERY

## 2021-03-02 PROCEDURE — 74011000258 HC RX REV CODE- 258: Performed by: PHYSICIAN ASSISTANT

## 2021-03-02 PROCEDURE — 2709999900 HC NON-CHARGEABLE SUPPLY: Performed by: ORTHOPAEDIC SURGERY

## 2021-03-02 PROCEDURE — 74011000250 HC RX REV CODE- 250: Performed by: ORTHOPAEDIC SURGERY

## 2021-03-02 PROCEDURE — 76210000016 HC OR PH I REC 1 TO 1.5 HR: Performed by: ORTHOPAEDIC SURGERY

## 2021-03-02 PROCEDURE — 64520 N BLOCK LUMBAR/THORACIC: CPT | Performed by: ANESTHESIOLOGY

## 2021-03-02 PROCEDURE — 65270000029 HC RM PRIVATE

## 2021-03-02 PROCEDURE — C9290 INJ, BUPIVACAINE LIPOSOME: HCPCS | Performed by: ORTHOPAEDIC SURGERY

## 2021-03-02 PROCEDURE — 77030003028 HC SUT VCRL J&J -A: Performed by: ORTHOPAEDIC SURGERY

## 2021-03-02 PROCEDURE — 74011250637 HC RX REV CODE- 250/637: Performed by: PHYSICIAN ASSISTANT

## 2021-03-02 PROCEDURE — 77030020294 HC ABD PLLW HIP DERY -B: Performed by: ORTHOPAEDIC SURGERY

## 2021-03-02 PROCEDURE — 97162 PT EVAL MOD COMPLEX 30 MIN: CPT

## 2021-03-02 PROCEDURE — 74011250637 HC RX REV CODE- 250/637: Performed by: NURSE ANESTHETIST, CERTIFIED REGISTERED

## 2021-03-02 PROCEDURE — 74011000258 HC RX REV CODE- 258: Performed by: ORTHOPAEDIC SURGERY

## 2021-03-02 PROCEDURE — 76942 ECHO GUIDE FOR BIOPSY: CPT | Performed by: ANESTHESIOLOGY

## 2021-03-02 PROCEDURE — 77030003666 HC NDL SPINAL BD -A: Performed by: ORTHOPAEDIC SURGERY

## 2021-03-02 PROCEDURE — 77030040922 HC BLNKT HYPOTHRM STRY -A: Performed by: ORTHOPAEDIC SURGERY

## 2021-03-02 PROCEDURE — 74011000250 HC RX REV CODE- 250: Performed by: NURSE ANESTHETIST, CERTIFIED REGISTERED

## 2021-03-02 PROCEDURE — 27130 TOTAL HIP ARTHROPLASTY: CPT | Performed by: ORTHOPAEDIC SURGERY

## 2021-03-02 PROCEDURE — 77030008683 HC TU ET CUF COVD -A: Performed by: ANESTHESIOLOGY

## 2021-03-02 PROCEDURE — 77030031139 HC SUT VCRL2 J&J -A: Performed by: ORTHOPAEDIC SURGERY

## 2021-03-02 PROCEDURE — 77030002933 HC SUT MCRYL J&J -A: Performed by: ORTHOPAEDIC SURGERY

## 2021-03-02 PROCEDURE — 77030013079 HC BLNKT BAIR HGGR 3M -A: Performed by: ANESTHESIOLOGY

## 2021-03-02 PROCEDURE — 77030019557 HC ELECTRD VES SEAL MEDT -F: Performed by: ORTHOPAEDIC SURGERY

## 2021-03-02 PROCEDURE — 77030012935 HC DRSG AQUACEL BMS -B: Performed by: ORTHOPAEDIC SURGERY

## 2021-03-02 PROCEDURE — 74011000250 HC RX REV CODE- 250: Performed by: PHYSICIAN ASSISTANT

## 2021-03-02 PROCEDURE — 2709999900 HC NON-CHARGEABLE SUPPLY

## 2021-03-02 PROCEDURE — 77030008462 HC STPLR SKN PROX J&J -A: Performed by: ORTHOPAEDIC SURGERY

## 2021-03-02 PROCEDURE — 01214 ANES OPEN PX TOT HIP ARTHRP: CPT | Performed by: ANESTHESIOLOGY

## 2021-03-02 PROCEDURE — 77030013708 HC HNDPC SUC IRR PULS STRY –B: Performed by: ORTHOPAEDIC SURGERY

## 2021-03-02 PROCEDURE — 74011250636 HC RX REV CODE- 250/636: Performed by: ORTHOPAEDIC SURGERY

## 2021-03-02 PROCEDURE — 77030006835 HC BLD SAW SAG STRY -B: Performed by: ORTHOPAEDIC SURGERY

## 2021-03-02 PROCEDURE — 77030012890: Performed by: ORTHOPAEDIC SURGERY

## 2021-03-02 PROCEDURE — 01214 ANES OPEN PX TOT HIP ARTHRP: CPT | Performed by: NURSE ANESTHETIST, CERTIFIED REGISTERED

## 2021-03-02 PROCEDURE — 74011250636 HC RX REV CODE- 250/636: Performed by: ANESTHESIOLOGY

## 2021-03-02 DEVICE — COMPONENT TOT HIP CAPPED LNR POLYETH [H2STRYKER] [STRYKER CORP]: Type: IMPLANTABLE DEVICE | Site: HIP | Status: FUNCTIONAL

## 2021-03-02 DEVICE — HEAD FEM DELT V40 -2.5MM 36MM -- V40 BIOLOX: Type: IMPLANTABLE DEVICE | Site: HIP | Status: FUNCTIONAL

## 2021-03-02 DEVICE — SHELL ACET CLUS H 54E TRTANIUM -- TRIDENT II: Type: IMPLANTABLE DEVICE | Site: HIP | Status: FUNCTIONAL

## 2021-03-02 DEVICE — SCREW BNE L20MM DIA65MM LO PROF HEX TRIDENT LL: Type: IMPLANTABLE DEVICE | Site: HIP | Status: FUNCTIONAL

## 2021-03-02 DEVICE — SCR HEX 6.5X25MM -- TRIDENT II: Type: IMPLANTABLE DEVICE | Site: HIP | Status: FUNCTIONAL

## 2021-03-02 DEVICE — INSERT ACET 0DEG 36MM E X3 -- TRIDENT: Type: IMPLANTABLE DEVICE | Site: HIP | Status: FUNCTIONAL

## 2021-03-02 DEVICE — STEM FEM SZ 3 127D 30X102MM -- ACCOLADE II V40: Type: IMPLANTABLE DEVICE | Site: HIP | Status: FUNCTIONAL

## 2021-03-02 RX ORDER — PROPOFOL 10 MG/ML
INJECTION, EMULSION INTRAVENOUS AS NEEDED
Status: DISCONTINUED | OUTPATIENT
Start: 2021-03-02 | End: 2021-03-02 | Stop reason: HOSPADM

## 2021-03-02 RX ORDER — NEOSTIGMINE METHYLSULFATE 1 MG/ML
INJECTION INTRAVENOUS AS NEEDED
Status: DISCONTINUED | OUTPATIENT
Start: 2021-03-02 | End: 2021-03-02 | Stop reason: HOSPADM

## 2021-03-02 RX ORDER — SODIUM CHLORIDE 0.9 % (FLUSH) 0.9 %
5-40 SYRINGE (ML) INJECTION AS NEEDED
Status: DISCONTINUED | OUTPATIENT
Start: 2021-03-02 | End: 2021-03-03 | Stop reason: HOSPADM

## 2021-03-02 RX ORDER — ASPIRIN 325 MG
325 TABLET ORAL 2 TIMES DAILY
Qty: 60 TAB | Refills: 0 | Status: SHIPPED | OUTPATIENT
Start: 2021-03-02 | End: 2022-01-28

## 2021-03-02 RX ORDER — CELECOXIB 400 MG/1
400 CAPSULE ORAL ONCE
Status: COMPLETED | OUTPATIENT
Start: 2021-03-02 | End: 2021-03-02

## 2021-03-02 RX ORDER — ASPIRIN 325 MG
325 TABLET, DELAYED RELEASE (ENTERIC COATED) ORAL 2 TIMES DAILY
Status: DISCONTINUED | OUTPATIENT
Start: 2021-03-03 | End: 2021-03-03 | Stop reason: HOSPADM

## 2021-03-02 RX ORDER — SODIUM CHLORIDE, SODIUM LACTATE, POTASSIUM CHLORIDE, CALCIUM CHLORIDE 600; 310; 30; 20 MG/100ML; MG/100ML; MG/100ML; MG/100ML
75 INJECTION, SOLUTION INTRAVENOUS CONTINUOUS
Status: DISCONTINUED | OUTPATIENT
Start: 2021-03-02 | End: 2021-03-02 | Stop reason: HOSPADM

## 2021-03-02 RX ORDER — NALOXONE HYDROCHLORIDE 0.4 MG/ML
0.4 INJECTION, SOLUTION INTRAMUSCULAR; INTRAVENOUS; SUBCUTANEOUS AS NEEDED
Status: DISCONTINUED | OUTPATIENT
Start: 2021-03-02 | End: 2021-03-03 | Stop reason: HOSPADM

## 2021-03-02 RX ORDER — LIDOCAINE HYDROCHLORIDE 10 MG/ML
0.1 INJECTION, SOLUTION EPIDURAL; INFILTRATION; INTRACAUDAL; PERINEURAL AS NEEDED
Status: DISCONTINUED | OUTPATIENT
Start: 2021-03-02 | End: 2021-03-02 | Stop reason: HOSPADM

## 2021-03-02 RX ORDER — ROPIVACAINE HYDROCHLORIDE 5 MG/ML
INJECTION, SOLUTION EPIDURAL; INFILTRATION; PERINEURAL
Status: COMPLETED | OUTPATIENT
Start: 2021-03-02 | End: 2021-03-02

## 2021-03-02 RX ORDER — ROCURONIUM BROMIDE 10 MG/ML
INJECTION, SOLUTION INTRAVENOUS AS NEEDED
Status: DISCONTINUED | OUTPATIENT
Start: 2021-03-02 | End: 2021-03-02 | Stop reason: HOSPADM

## 2021-03-02 RX ORDER — INSULIN LISPRO 100 [IU]/ML
INJECTION, SOLUTION INTRAVENOUS; SUBCUTANEOUS ONCE
Status: DISCONTINUED | OUTPATIENT
Start: 2021-03-02 | End: 2021-03-02 | Stop reason: HOSPADM

## 2021-03-02 RX ORDER — PROMETHAZINE HYDROCHLORIDE 25 MG/ML
INJECTION, SOLUTION INTRAMUSCULAR; INTRAVENOUS AS NEEDED
Status: DISCONTINUED | OUTPATIENT
Start: 2021-03-02 | End: 2021-03-02 | Stop reason: HOSPADM

## 2021-03-02 RX ORDER — DEXAMETHASONE SODIUM PHOSPHATE 4 MG/ML
INJECTION, SOLUTION INTRA-ARTICULAR; INTRALESIONAL; INTRAMUSCULAR; INTRAVENOUS; SOFT TISSUE AS NEEDED
Status: DISCONTINUED | OUTPATIENT
Start: 2021-03-02 | End: 2021-03-02 | Stop reason: HOSPADM

## 2021-03-02 RX ORDER — CEPHALEXIN 500 MG/1
500 CAPSULE ORAL 4 TIMES DAILY
Qty: 12 CAP | Refills: 0 | Status: SHIPPED | OUTPATIENT
Start: 2021-03-02 | End: 2022-01-28 | Stop reason: ALTCHOICE

## 2021-03-02 RX ORDER — DEXAMETHASONE SODIUM PHOSPHATE 4 MG/ML
8 INJECTION, SOLUTION INTRA-ARTICULAR; INTRALESIONAL; INTRAMUSCULAR; INTRAVENOUS; SOFT TISSUE
Status: COMPLETED | OUTPATIENT
Start: 2021-03-02 | End: 2021-03-02

## 2021-03-02 RX ORDER — DOCUSATE SODIUM 100 MG/1
100 CAPSULE, LIQUID FILLED ORAL 2 TIMES DAILY
Qty: 60 CAP | Refills: 2 | Status: SHIPPED | OUTPATIENT
Start: 2021-03-02 | End: 2021-03-05 | Stop reason: ALTCHOICE

## 2021-03-02 RX ORDER — ACETAMINOPHEN 500 MG
1000 TABLET ORAL ONCE
Status: COMPLETED | OUTPATIENT
Start: 2021-03-02 | End: 2021-03-02

## 2021-03-02 RX ORDER — SUCCINYLCHOLINE CHLORIDE 20 MG/ML
INJECTION INTRAMUSCULAR; INTRAVENOUS AS NEEDED
Status: DISCONTINUED | OUTPATIENT
Start: 2021-03-02 | End: 2021-03-02 | Stop reason: HOSPADM

## 2021-03-02 RX ORDER — ROPIVACAINE HYDROCHLORIDE 2 MG/ML
30 INJECTION, SOLUTION EPIDURAL; INFILTRATION; PERINEURAL
Status: COMPLETED | OUTPATIENT
Start: 2021-03-02 | End: 2021-03-02

## 2021-03-02 RX ORDER — HYDROMORPHONE HYDROCHLORIDE 2 MG/ML
INJECTION, SOLUTION INTRAMUSCULAR; INTRAVENOUS; SUBCUTANEOUS AS NEEDED
Status: DISCONTINUED | OUTPATIENT
Start: 2021-03-02 | End: 2021-03-02 | Stop reason: HOSPADM

## 2021-03-02 RX ORDER — SODIUM CHLORIDE 0.9 % (FLUSH) 0.9 %
5-40 SYRINGE (ML) INJECTION EVERY 8 HOURS
Status: DISCONTINUED | OUTPATIENT
Start: 2021-03-02 | End: 2021-03-02 | Stop reason: HOSPADM

## 2021-03-02 RX ORDER — MIDAZOLAM HYDROCHLORIDE 1 MG/ML
2 INJECTION, SOLUTION INTRAMUSCULAR; INTRAVENOUS ONCE
Status: COMPLETED | OUTPATIENT
Start: 2021-03-02 | End: 2021-03-02

## 2021-03-02 RX ORDER — ZOLPIDEM TARTRATE 5 MG/1
5 TABLET ORAL
Status: DISCONTINUED | OUTPATIENT
Start: 2021-03-02 | End: 2021-03-03 | Stop reason: HOSPADM

## 2021-03-02 RX ORDER — FAMOTIDINE 20 MG/1
20 TABLET, FILM COATED ORAL ONCE
Status: COMPLETED | OUTPATIENT
Start: 2021-03-02 | End: 2021-03-02

## 2021-03-02 RX ORDER — KETOROLAC TROMETHAMINE 15 MG/ML
15 INJECTION, SOLUTION INTRAMUSCULAR; INTRAVENOUS EVERY 6 HOURS
Status: DISCONTINUED | OUTPATIENT
Start: 2021-03-02 | End: 2021-03-03 | Stop reason: HOSPADM

## 2021-03-02 RX ORDER — OXYCODONE HYDROCHLORIDE 5 MG/1
10-15 TABLET ORAL
Status: DISCONTINUED | OUTPATIENT
Start: 2021-03-02 | End: 2021-03-03 | Stop reason: HOSPADM

## 2021-03-02 RX ORDER — FENTANYL CITRATE 50 UG/ML
100 INJECTION, SOLUTION INTRAMUSCULAR; INTRAVENOUS ONCE
Status: COMPLETED | OUTPATIENT
Start: 2021-03-02 | End: 2021-03-02

## 2021-03-02 RX ORDER — AMLODIPINE BESYLATE 5 MG/1
5 TABLET ORAL DAILY
Status: DISCONTINUED | OUTPATIENT
Start: 2021-03-02 | End: 2021-03-03 | Stop reason: HOSPADM

## 2021-03-02 RX ORDER — FENTANYL CITRATE 50 UG/ML
INJECTION, SOLUTION INTRAMUSCULAR; INTRAVENOUS AS NEEDED
Status: DISCONTINUED | OUTPATIENT
Start: 2021-03-02 | End: 2021-03-02 | Stop reason: HOSPADM

## 2021-03-02 RX ORDER — ACETAMINOPHEN 500 MG
1000 TABLET ORAL EVERY 6 HOURS
Status: DISCONTINUED | OUTPATIENT
Start: 2021-03-02 | End: 2021-03-03 | Stop reason: HOSPADM

## 2021-03-02 RX ORDER — FENTANYL CITRATE 50 UG/ML
INJECTION, SOLUTION INTRAMUSCULAR; INTRAVENOUS
Status: COMPLETED | OUTPATIENT
Start: 2021-03-02 | End: 2021-03-02

## 2021-03-02 RX ORDER — CEFAZOLIN SODIUM 2 G/50ML
2 SOLUTION INTRAVENOUS EVERY 8 HOURS
Status: COMPLETED | OUTPATIENT
Start: 2021-03-02 | End: 2021-03-03

## 2021-03-02 RX ORDER — CELECOXIB 200 MG/1
200 CAPSULE ORAL 2 TIMES DAILY
Qty: 60 CAP | Refills: 2 | Status: SHIPPED | OUTPATIENT
Start: 2021-03-02 | End: 2021-05-31

## 2021-03-02 RX ORDER — LIDOCAINE HYDROCHLORIDE 20 MG/ML
INJECTION, SOLUTION EPIDURAL; INFILTRATION; INTRACAUDAL; PERINEURAL AS NEEDED
Status: DISCONTINUED | OUTPATIENT
Start: 2021-03-02 | End: 2021-03-02 | Stop reason: HOSPADM

## 2021-03-02 RX ORDER — FLUMAZENIL 0.1 MG/ML
0.2 INJECTION INTRAVENOUS AS NEEDED
Status: DISCONTINUED | OUTPATIENT
Start: 2021-03-02 | End: 2021-03-03 | Stop reason: HOSPADM

## 2021-03-02 RX ORDER — SODIUM CHLORIDE 0.9 % (FLUSH) 0.9 %
5-40 SYRINGE (ML) INJECTION AS NEEDED
Status: DISCONTINUED | OUTPATIENT
Start: 2021-03-02 | End: 2021-03-02 | Stop reason: HOSPADM

## 2021-03-02 RX ORDER — GLYCOPYRROLATE 0.2 MG/ML
INJECTION INTRAMUSCULAR; INTRAVENOUS AS NEEDED
Status: DISCONTINUED | OUTPATIENT
Start: 2021-03-02 | End: 2021-03-02 | Stop reason: HOSPADM

## 2021-03-02 RX ORDER — PREGABALIN 25 MG/1
25 CAPSULE ORAL 2 TIMES DAILY
Status: DISCONTINUED | OUTPATIENT
Start: 2021-03-02 | End: 2021-03-03 | Stop reason: HOSPADM

## 2021-03-02 RX ORDER — LANOLIN ALCOHOL/MO/W.PET/CERES
1 CREAM (GRAM) TOPICAL 2 TIMES DAILY WITH MEALS
Status: DISCONTINUED | OUTPATIENT
Start: 2021-03-02 | End: 2021-03-03 | Stop reason: HOSPADM

## 2021-03-02 RX ORDER — SODIUM CHLORIDE 9 MG/ML
100 INJECTION, SOLUTION INTRAVENOUS CONTINUOUS
Status: DISCONTINUED | OUTPATIENT
Start: 2021-03-02 | End: 2021-03-03 | Stop reason: HOSPADM

## 2021-03-02 RX ORDER — MIDAZOLAM HYDROCHLORIDE 1 MG/ML
INJECTION, SOLUTION INTRAMUSCULAR; INTRAVENOUS
Status: COMPLETED | OUTPATIENT
Start: 2021-03-02 | End: 2021-03-02

## 2021-03-02 RX ORDER — OXYCODONE HYDROCHLORIDE 5 MG/1
20 TABLET ORAL
Status: DISCONTINUED | OUTPATIENT
Start: 2021-03-02 | End: 2021-03-03 | Stop reason: HOSPADM

## 2021-03-02 RX ORDER — SODIUM CHLORIDE 0.9 % (FLUSH) 0.9 %
5-40 SYRINGE (ML) INJECTION EVERY 8 HOURS
Status: DISCONTINUED | OUTPATIENT
Start: 2021-03-02 | End: 2021-03-03 | Stop reason: HOSPADM

## 2021-03-02 RX ORDER — POVIDONE-IODINE 10 %
SOLUTION, NON-ORAL TOPICAL AS NEEDED
Status: DISCONTINUED | OUTPATIENT
Start: 2021-03-02 | End: 2021-03-02 | Stop reason: HOSPADM

## 2021-03-02 RX ORDER — VANCOMYCIN HYDROCHLORIDE 1 G/20ML
INJECTION, POWDER, LYOPHILIZED, FOR SOLUTION INTRAVENOUS AS NEEDED
Status: DISCONTINUED | OUTPATIENT
Start: 2021-03-02 | End: 2021-03-02 | Stop reason: HOSPADM

## 2021-03-02 RX ORDER — ONDANSETRON 2 MG/ML
INJECTION INTRAMUSCULAR; INTRAVENOUS AS NEEDED
Status: DISCONTINUED | OUTPATIENT
Start: 2021-03-02 | End: 2021-03-02 | Stop reason: HOSPADM

## 2021-03-02 RX ORDER — CELECOXIB 100 MG/1
200 CAPSULE ORAL 2 TIMES DAILY
Status: DISCONTINUED | OUTPATIENT
Start: 2021-03-02 | End: 2021-03-03 | Stop reason: HOSPADM

## 2021-03-02 RX ORDER — CEFAZOLIN SODIUM 2 G/50ML
2 SOLUTION INTRAVENOUS
Status: COMPLETED | OUTPATIENT
Start: 2021-03-02 | End: 2021-03-02

## 2021-03-02 RX ORDER — ONDANSETRON 2 MG/ML
4 INJECTION INTRAMUSCULAR; INTRAVENOUS AS NEEDED
Status: DISCONTINUED | OUTPATIENT
Start: 2021-03-02 | End: 2021-03-02 | Stop reason: HOSPADM

## 2021-03-02 RX ORDER — AMOXICILLIN 250 MG
1 CAPSULE ORAL 2 TIMES DAILY
Status: DISCONTINUED | OUTPATIENT
Start: 2021-03-02 | End: 2021-03-03 | Stop reason: HOSPADM

## 2021-03-02 RX ORDER — PREGABALIN 75 MG/1
75 CAPSULE ORAL ONCE
Status: COMPLETED | OUTPATIENT
Start: 2021-03-02 | End: 2021-03-02

## 2021-03-02 RX ORDER — POLYMYXIN B 500000 [USP'U]/1
INJECTION, POWDER, LYOPHILIZED, FOR SOLUTION INTRAMUSCULAR; INTRATHECAL; INTRAVENOUS; OPHTHALMIC AS NEEDED
Status: DISCONTINUED | OUTPATIENT
Start: 2021-03-02 | End: 2021-03-02 | Stop reason: HOSPADM

## 2021-03-02 RX ORDER — ONDANSETRON 2 MG/ML
4 INJECTION INTRAMUSCULAR; INTRAVENOUS
Status: DISCONTINUED | OUTPATIENT
Start: 2021-03-02 | End: 2021-03-03 | Stop reason: HOSPADM

## 2021-03-02 RX ORDER — OXYCODONE AND ACETAMINOPHEN 10; 325 MG/1; MG/1
1-2 TABLET ORAL
Qty: 42 TAB | Refills: 0 | Status: SHIPPED | OUTPATIENT
Start: 2021-03-02 | End: 2021-03-09

## 2021-03-02 RX ORDER — HYDROMORPHONE HYDROCHLORIDE 2 MG/ML
0.5 INJECTION, SOLUTION INTRAMUSCULAR; INTRAVENOUS; SUBCUTANEOUS
Status: DISCONTINUED | OUTPATIENT
Start: 2021-03-02 | End: 2021-03-02 | Stop reason: HOSPADM

## 2021-03-02 RX ADMIN — ROCURONIUM BROMIDE 40 MG: 50 INJECTION INTRAVENOUS at 07:39

## 2021-03-02 RX ADMIN — PROPOFOL 50 MG: 10 INJECTION, EMULSION INTRAVENOUS at 07:54

## 2021-03-02 RX ADMIN — ROPIVACAINE HYDROCHLORIDE 60 MG: 2 INJECTION, SOLUTION EPIDURAL; INFILTRATION at 07:02

## 2021-03-02 RX ADMIN — ACETAMINOPHEN 1000 MG: 500 TABLET ORAL at 23:47

## 2021-03-02 RX ADMIN — CEFAZOLIN SODIUM 2 G: 2 SOLUTION INTRAVENOUS at 07:39

## 2021-03-02 RX ADMIN — CELECOXIB 200 MG: 100 CAPSULE ORAL at 17:15

## 2021-03-02 RX ADMIN — ROPIVACAINE HYDROCHLORIDE 30 ML: 5 INJECTION, SOLUTION EPIDURAL; INFILTRATION; PERINEURAL at 07:03

## 2021-03-02 RX ADMIN — SODIUM CHLORIDE, SODIUM LACTATE, POTASSIUM CHLORIDE, AND CALCIUM CHLORIDE 75 ML/HR: 600; 310; 30; 20 INJECTION, SOLUTION INTRAVENOUS at 06:26

## 2021-03-02 RX ADMIN — FENTANYL CITRATE 50 MCG: 50 INJECTION, SOLUTION INTRAMUSCULAR; INTRAVENOUS at 07:03

## 2021-03-02 RX ADMIN — SODIUM CHLORIDE 100 ML/HR: 900 INJECTION, SOLUTION INTRAVENOUS at 12:00

## 2021-03-02 RX ADMIN — FAMOTIDINE 20 MG: 20 TABLET ORAL at 06:29

## 2021-03-02 RX ADMIN — ACETAMINOPHEN 1000 MG: 500 TABLET ORAL at 12:54

## 2021-03-02 RX ADMIN — FENTANYL CITRATE 100 MCG: 50 INJECTION, SOLUTION INTRAMUSCULAR; INTRAVENOUS at 07:32

## 2021-03-02 RX ADMIN — ACETAMINOPHEN 1000 MG: 500 TABLET ORAL at 17:15

## 2021-03-02 RX ADMIN — GLYCOPYRROLATE 0.4 MG: 0.2 INJECTION INTRAMUSCULAR; INTRAVENOUS at 09:26

## 2021-03-02 RX ADMIN — PROPOFOL 150 MG: 10 INJECTION, EMULSION INTRAVENOUS at 07:32

## 2021-03-02 RX ADMIN — Medication 10 ML: at 15:15

## 2021-03-02 RX ADMIN — CEFAZOLIN SODIUM 2 G: 2 SOLUTION INTRAVENOUS at 17:16

## 2021-03-02 RX ADMIN — KETOROLAC TROMETHAMINE 15 MG: 15 INJECTION, SOLUTION INTRAMUSCULAR; INTRAVENOUS at 23:47

## 2021-03-02 RX ADMIN — SODIUM CHLORIDE, SODIUM LACTATE, POTASSIUM CHLORIDE, AND CALCIUM CHLORIDE: 600; 310; 30; 20 INJECTION, SOLUTION INTRAVENOUS at 08:46

## 2021-03-02 RX ADMIN — ONDANSETRON 4 MG: 2 INJECTION INTRAMUSCULAR; INTRAVENOUS at 07:30

## 2021-03-02 RX ADMIN — NEOSTIGMINE METHYLSULFATE 3 MG: 1 INJECTION, SOLUTION INTRAVENOUS at 09:02

## 2021-03-02 RX ADMIN — GLYCOPYRROLATE 0.2 MG: 0.2 INJECTION INTRAMUSCULAR; INTRAVENOUS at 08:14

## 2021-03-02 RX ADMIN — KETOROLAC TROMETHAMINE 15 MG: 15 INJECTION, SOLUTION INTRAMUSCULAR; INTRAVENOUS at 12:49

## 2021-03-02 RX ADMIN — PREGABALIN 25 MG: 25 CAPSULE ORAL at 17:15

## 2021-03-02 RX ADMIN — CEFAZOLIN SODIUM 2 G: 2 SOLUTION INTRAVENOUS at 23:47

## 2021-03-02 RX ADMIN — MIDAZOLAM HYDROCHLORIDE 2 MG: 2 INJECTION, SOLUTION INTRAMUSCULAR; INTRAVENOUS at 07:03

## 2021-03-02 RX ADMIN — PREGABALIN 75 MG: 75 CAPSULE ORAL at 06:28

## 2021-03-02 RX ADMIN — DOCUSATE SODIUM 50MG AND SENNOSIDES 8.6MG 1 TABLET: 8.6; 5 TABLET, FILM COATED ORAL at 12:54

## 2021-03-02 RX ADMIN — ONDANSETRON 4 MG: 2 INJECTION INTRAMUSCULAR; INTRAVENOUS at 12:48

## 2021-03-02 RX ADMIN — ROCURONIUM BROMIDE 5 MG: 50 INJECTION INTRAVENOUS at 08:32

## 2021-03-02 RX ADMIN — TRANEXAMIC ACID 1 G: 100 INJECTION, SOLUTION INTRAVENOUS at 08:54

## 2021-03-02 RX ADMIN — FENTANYL CITRATE 50 MCG: 50 INJECTION, SOLUTION INTRAMUSCULAR; INTRAVENOUS at 06:57

## 2021-03-02 RX ADMIN — DEXAMETHASONE SODIUM PHOSPHATE 8 MG: 4 INJECTION, SOLUTION INTRA-ARTICULAR; INTRALESIONAL; INTRAMUSCULAR; INTRAVENOUS; SOFT TISSUE at 06:29

## 2021-03-02 RX ADMIN — FERROUS SULFATE TAB 325 MG (65 MG ELEMENTAL FE) 325 MG: 325 (65 FE) TAB at 17:16

## 2021-03-02 RX ADMIN — KETOROLAC TROMETHAMINE 15 MG: 15 INJECTION, SOLUTION INTRAMUSCULAR; INTRAVENOUS at 17:16

## 2021-03-02 RX ADMIN — HYDROMORPHONE HYDROCHLORIDE 0.5 MG: 2 INJECTION, SOLUTION INTRAMUSCULAR; INTRAVENOUS; SUBCUTANEOUS at 07:55

## 2021-03-02 RX ADMIN — TRANEXAMIC ACID 1 G: 100 INJECTION, SOLUTION INTRAVENOUS at 07:47

## 2021-03-02 RX ADMIN — NEOSTIGMINE METHYLSULFATE 2 MG: 1 INJECTION, SOLUTION INTRAVENOUS at 09:26

## 2021-03-02 RX ADMIN — GLYCOPYRROLATE 0.4 MG: 0.2 INJECTION INTRAMUSCULAR; INTRAVENOUS at 09:02

## 2021-03-02 RX ADMIN — PROMETHAZINE HYDROCHLORIDE 6.25 MG: 25 INJECTION INTRAMUSCULAR; INTRAVENOUS at 07:51

## 2021-03-02 RX ADMIN — DEXAMETHASONE SODIUM PHOSPHATE 4 MG: 4 INJECTION, SOLUTION INTRA-ARTICULAR; INTRALESIONAL; INTRAMUSCULAR; INTRAVENOUS; SOFT TISSUE at 07:38

## 2021-03-02 RX ADMIN — DOCUSATE SODIUM 50MG AND SENNOSIDES 8.6MG 1 TABLET: 8.6; 5 TABLET, FILM COATED ORAL at 17:16

## 2021-03-02 RX ADMIN — ACETAMINOPHEN 1000 MG: 500 TABLET ORAL at 06:28

## 2021-03-02 RX ADMIN — LIDOCAINE HYDROCHLORIDE 2 ML: 10 INJECTION, SOLUTION EPIDURAL; INFILTRATION; INTRACAUDAL; PERINEURAL at 07:59

## 2021-03-02 RX ADMIN — SUCCINYLCHOLINE CHLORIDE 120 MG: 20 INJECTION, SOLUTION INTRAMUSCULAR; INTRAVENOUS at 07:32

## 2021-03-02 RX ADMIN — CELECOXIB 400 MG: 400 CAPSULE ORAL at 06:28

## 2021-03-02 RX ADMIN — MIDAZOLAM 2 MG: 1 INJECTION INTRAMUSCULAR; INTRAVENOUS at 06:57

## 2021-03-02 RX ADMIN — LIDOCAINE HYDROCHLORIDE 20 MG: 20 INJECTION, SOLUTION EPIDURAL; INFILTRATION; INTRACAUDAL; PERINEURAL at 07:54

## 2021-03-02 RX ADMIN — LIDOCAINE HYDROCHLORIDE 80 MG: 20 INJECTION, SOLUTION EPIDURAL; INFILTRATION; INTRACAUDAL; PERINEURAL at 07:32

## 2021-03-02 NOTE — PROGRESS NOTES
Problem: Mobility Impaired (Adult and Pediatric)  Goal: *Acute Goals and Plan of Care (Insert Text)  Description: Physical Therapy Goals  Initiated 3/2/2021 and to be accomplished within 7 day(s)  1. Patient will move from supine to sit and sit to supine  in bed with modified independence. 2.  Patient will transfer from bed to chair and chair to bed with modified independence using the least restrictive device. 3.  Patient will perform sit to stand with modified independence. 4.  Patient will ambulate with modified independence for 300 feet with the least restrictive device. 5.  Patient will ascend/descend 12 stairs with B handrail(s) with modified independence. PLOF: Pt lives in a 2-story home with 12 steps. She lives with her  and children and was indep with all mobility PTA, mainly using a SPC. Outcome: Progressing Towards Goal     PHYSICAL THERAPY EVALUATION    Patient: Ruel Lopez (49 y.o. female)  Date: 3/2/2021  Primary Diagnosis: Osteoarthritis of right hip, unspecified osteoarthritis type [M16.11]  Hip arthritis [M16.10]  Procedure(s) (LRB):  RIGHT TOTAL HIP ARTHROPLASTY LATERAL APPROACH (Right) Day of Surgery   Precautions:  Fall, Total hip(R NOREEN)  WBAT    ASSESSMENT :  Patient is 54 yo female admitted to hospital for R NOREEN and presents today POD 0 and agreeable to therapy. Patient was educated on weight bearing status, hip precautions, and role of therapy. Patient transferred to sitting EOB for objective assessment in a very slow manner with modA 2/2 pain. Once sitting, pt only able to tolerate ~5 seconds before stating she is at the verge of fainting. Pt was assisted back laying down with modA in which she stated she felt better. At conclusion of session patient was left supine in bed with HOB elevated and was left resting with call bell by the side and SCDs donned.  Patient instructed to call for assistance if they needed to get up for any reason and denied need for further assistance. RN made aware of patient reporting increased pain and nausea/lightheadedness. Patient demonstrates decreased strength, mobility, and endurance. Patient is not safe for discharge at this time as she has not demonstrated safety with all mobility. Recommend d/c home with [place] once ready. She has a RW. Patient will benefit from skilled intervention to address the above impairments. Patient's rehabilitation potential is considered to be Good  Factors which may influence rehabilitation potential include:   []         None noted  []         Mental ability/status  [x]         Medical condition  []         Home/family situation and support systems  [x]         Safety awareness  [x]         Pain tolerance/management  []         Other:      PLAN :  Recommendations and Planned Interventions:   [x]           Bed Mobility Training             [x]    Neuromuscular Re-Education  [x]           Transfer Training                   []    Orthotic/Prosthetic Training  [x]           Gait Training                          []    Modalities  [x]           Therapeutic Exercises           []    Edema Management/Control  [x]           Therapeutic Activities            [x]    Family Training/Education  [x]           Patient Education  []           Other (comment):    Frequency/Duration: Patient will be followed by physical therapy 1-2 times per day/4-7 days per week to address goals. Discharge Recommendations: Home Health  Further Equipment Recommendations for Discharge: N/A     SUBJECTIVE:   Patient stated this happened to me when I got my other hip done (lightheadedness).     OBJECTIVE DATA SUMMARY:     Past Medical History:   Diagnosis Date    Adverse effect of anesthesia     Hypotension    Allergic rhinitis, seasonal     spring    Anemia NEC     iron deficiency associated with heavy menstruation and fibroids noted 2008 with pregnancy    Dysmenorrhea     increasing past year    Fibroid, uterine     Goiter Bilateral    Headache(784.0)     Hypertension     Ill-defined condition     Chronic knee pain    Menorrhagia     worsening past year    Neutropenia [facility])      Past Surgical History:   Procedure Laterality Date    HX HYSTERECTOMY  04/2014    [facility]    HX KNEE ARTHROSCOPY Right     AK TOTAL HIP ARTHROPLASTY Left 12/13/2020     Barriers to Learning/Limitations: None  Compensate with: N/A  Home Situation:  Home Situation  Home Environment: Private residence  # Steps to Enter: 4  One/Two Story Residence: Two story  # of Interior Steps: 12  Height of Each Step (in): 0 inches  Interior Rails: Left  Lift Chair Available: No  Living Alone: No  Support Systems: Spouse/Significant Other/Partner, Child(suhail)  Patient Expects to be Discharged to[de-identified] Private residence  Current DME Used/Available at Home: jabari Garcia Augustin Sinning, straight  Critical Behavior:  Neurologic State: Alert;Drowsy  Orientation Level: Oriented X4  Cognition: Appropriate decision making  Safety/Judgement: Fall prevention  Psychosocial  Purposeful Interaction: Yes  Pt Identified Daily Priority: Clinical issues (comment)  Gina Process: Teaching/learning  Caring Interventions: Reassure  Skin Condition/Temp: Warm     Skin Integrity: Incision (comment)  Skin Integumentary  Skin Color: Appropriate for ethnicity  Skin Condition/Temp: Warm  Skin Integrity: Incision (comment)  Turgor: Non-tenting     Strength:    Strength: Generally decreased, functional    Tone & Sensation:   Tone: Normal    Sensation: Intact    Range Of Motion:  AROM: Generally decreased, functional    PROM: Generally decreased, functional    Functional Mobility:  Bed Mobility:     Supine to Sit: Moderate assistance; Additional time;Assist x1  Sit to Supine: Moderate assistance; Additional time;Assist x1     Transfers:  Sit to Stand: (deffered 2/2 lightheadedness)    Balance:   Sitting: Impaired; With support  Sitting - Static: Fair (occasional)  Sitting - Dynamic: Fair (occasional)    Pain:  Pain level pre-treatment: 8/10   Pain level post-treatment: 8/10   Pain Intervention(s) : Medication (see MAR); Rest, Ice, Repositioning  Response to intervention: Nurse notified, See doc flow    Activity Tolerance:   Pt tolerated mobility fair, limited by feeling of passing out  Please refer to the flowsheet for vital signs taken during this treatment. After treatment:   []         Patient left in no apparent distress sitting up in chair  [x]         Patient left in no apparent distress in bed  [x]         Call bell left within reach  [x]         Nursing notified  []         Caregiver present  []         Bed alarm activated  [x]         SCDs applied    COMMUNICATION/EDUCATION:   [x]         Role of Physical Therapy in the acute care setting. [x]         Fall prevention education was provided and the patient/caregiver indicated understanding. [x]         Patient/family have participated as able in goal setting and plan of care. []         Patient/family agree to work toward stated goals and plan of care. []         Patient understands intent and goals of therapy, but is neutral about his/her participation. []         Patient is unable to participate in goal setting/plan of care: ongoing with therapy staff.  []         Other:     Thank you for this referral.  Annel Marie   Time Calculation: 16 mins      Eval Complexity: History: MEDIUM  Complexity : 1-2 comorbidities / personal factors will impact the outcome/ POC Exam:MEDIUM Complexity : 3 Standardized tests and measures addressing body structure, function, activity limitation and / or participation in recreation  Presentation: MEDIUM Complexity : Evolving with changing characteristics  Clinical Decision Making:Medium Complexity    Overall Complexity:MEDIUM

## 2021-03-02 NOTE — INTERVAL H&P NOTE
Update History & Physical 
 
The Patient's History and Physical of March 2, It was reviewed with the patient and I examined the patient. There was no change. The surgical site was confirmed by the patient and me. Plan:  The risk, benefits, expected outcome, and alternative to the recommended procedure have been discussed with the patient. Patient understands and wants to proceed with the procedure.  
 
Electronically signed by Kala Stoddard MD on 3/2/2021 at 6:51 AM

## 2021-03-02 NOTE — BRIEF OP NOTE
Brief Postoperative Note    Patient: Martine Buenrostro  YOB: 1970  MRN: [id number]    Date of Procedure: 3/2/2021     Pre-Op Diagnosis: Osteoarthritis of right hip, unspecified osteoarthritis type [M16.11]with incarceration , ankylosis    Post-Op Diagnosis: Same as preoperative diagnosis. Procedure(s):  RIGHT TOTAL HIP ARTHROPLASTY LATERAL APPROACH- difficult    Surgeon(s):  Treasure Amaya MD    Surgical Assistant: Physician Assistant: Mayte He PA-C  Surg Asst-1: Simi Fu    Anesthesia: General     Estimated Blood Loss (mL): 285     Complications: None    Specimens:   ID Type Source Tests Collected by Time Destination   1 : Right Femoral Head Preservative Hip, right  Treasure Amaya MD 3/2/2021 6170 Pathology        Implants:   Implant Name Type Inv. Item Serial No.  Lot No. LRB No. Used Action   SCREW BNE L20MM [id number] LO PROF HEX TRIDENT LL - [id number]  SCREW BNE L20MM [id number] LO PROF HEX TRIDENT LL  [employer]_WD [id number] Right 1 Implanted   SCREW BNE L25MM DIA6. 5MM HEX LO PROF TRIDENT II - [id number]  SCREW BNE L25MM DIA6. 5MM HEX LO PROF TRIDENT II  [facility] [id number] Right 1 Implanted   HEX DOME HOLE PLUG    [employer] [id number] Right 1 Implanted   SHELL ACET CLUS H 54E TRTANIUM -- TRIDENT II - [id number]  SHELL ACET CLUS H 54E TRTANIUM -- Gwendalyn [employer]ose [facility] [id number] Right 1 Implanted   LINER ACET SZ E ID36MM THK5. 9MM 0DEG HIP X3 ROSALIND RNG FOR - [id number]  LINER ACET SZ E ID36MM THK5. 9MM 0DEG HIP X3 ROSALIND RNG FOR  [facility] [facility] [id number] Right 1 Implanted   STEM FEM SZ 3 [id number] -- ACCOLADE II V40 - [id number]  STEM FEM SZ 3 [id number] -- ACCOLADE II V40  [facility] [facility] [id number] Right 1 Implanted   HEAD FEM [id number] -2.5MM OFFSET HIP BIOLOX DELT CERAMIC TAPR - [id number]  HEAD FEM [id number] -2.5MM OFFSET HIP BIOLOX DELT CERAMIC TAPR  [employer] HOWM[employer]WD [id number] Right 1 Implanted Drains: * No LDAs found *    Findings: same    Electronically Signed by Qiana Paz MD on 3/2/2021 at 9:00 AM

## 2021-03-02 NOTE — ANESTHESIA PROCEDURE NOTES
Peripheral Block    Start time: 3/2/2021 6:56 AM  End time: 3/2/2021 7:06 AM  Performed by: Sun Denton MD  Authorized by: Sun Denton MD       Pre-procedure:    Indications: at surgeon's request and post-op pain management    Preanesthetic Checklist: patient identified, risks and benefits discussed, site marked, timeout performed, anesthesia consent given and patient being monitored    Timeout Time: 06:56          Block Type:   Block Type:  Lumbar plexus  Laterality:  Right  Monitoring:  Oxygen, continuous pulse ox, frequent vital sign checks, heart rate, standard ASA monitoring and responsive to questions  Injection Technique:  Single shot  Procedures: ultrasound guided    Patient Position: left lateral decubitus  Prep: chlorhexidine    Needle Type:  Stimuplex  Needle Gauge:  21 G  Needle Localization:  Nerve stimulator  Motor Response comment:   Motor Response: minimal motor response >0.4 mA   Medication Injected:  FentaNYL citrate (PF) injection sedation initial, 50 mcg  midazolam (VERSED) injection, 2 mg  ropivacaine (PF) (NAROPIN)(0.5%) 5 mg/mL injection, 30 mL  Med Admin Time: 3/2/2021 7:03 AM    Assessment:  Number of attempts:  1  Injection Assessment:  No paresthesia, negative aspiration for CSF, incremental injection every 5 mL, local visualized surrounding nerve on ultrasound, negative aspiration for blood, no intravascular symptoms and low pressure verified by pressure monitor  Patient tolerance:  Patient tolerated the procedure well with no immediate complications

## 2021-03-02 NOTE — PROGRESS NOTES
Orthopedic Coordinator Rounding Note    2021  Admit Date: 3/2/2021  Admit Diagnosis: Osteoarthritis of right hip, unspecified osteoarthritis type [M16.11]  Hip arthritis [M16.10]  Procedure: Procedure(s):  RIGHT TOTAL HIP ARTHROPLASTY LATERAL APPROACH  Post Op day: Day of Surgery    Vital Signs:    Blood pressure 112/73, pulse (!) 58, temperature 97.9 °F (36.6 °C), resp. rate 18, height 5' 3\" (1.6 m), weight 74 kg (163 lb 4 oz), last menstrual period 10/08/2012, SpO2 97 %, not currently breastfeeding. Temp (24hrs), Av.7 °F (36.5 °C), Min:97.4 °F (36.3 °C), Max:97.9 °F (36.6 °C)    PAIN RELIEF: some relief of pain with current prescribed medications. I/O   0701 -  1900  In: 7274 [P.O.:240; I.V.:2800]  Out: 1600 [Urine:1600]  No intake/output data recorded. Surgical Wound:  Wound Hip Left (Active)   Number of days: 77       Incision 21 Hip Right (Active)   Dressing Status Clean;Dry; Intact 21 1151   Number of days: 0         PT/OT:   PATIENT MOBILITY    Bed Mobility  Supine to Sit: Moderate assistance, Additional time, Assist x1  Sit to Supine: Moderate assistance, Additional time, Assist x1  Transfers  Sit to Stand: (deffered 2/2 lightheadedness)                   Discharge To: HOME with home health. Patient resting in bed at the time of my visit. Complaining of dizziness. Appropriate educational materials and side effects teaching were provided along with surgery specific patient education guidebook. Reviewed activity orders and incentive spirometry with return demonstration. Opportunity for questions and clarification provided.     PARUL CarlN, RN, [address] [place] [address]  Orthopedic

## 2021-03-02 NOTE — PERIOP NOTES
TRANSFER - OUT REPORT:    Verbal report given to Ana(name) on Carlos Buenrostro  being transferred to 2 Surg (unit) for routine post - op       Report consisted of patients Situation, Background, Assessment and   Recommendations(SBAR). Information from the following report(s) SBAR and Procedure Summary was reviewed with the receiving nurse. Lines:   Peripheral IV 03/02/21 Left Hand (Active)   Site Assessment Clean, dry, & intact 03/02/21 0929   Phlebitis Assessment 0 03/02/21 0929   Infiltration Assessment 0 03/02/21 0929   Dressing Status Clean, dry, & intact 03/02/21 0929   Dressing Type Transparent;Tape 03/02/21 0929   Hub Color/Line Status Pink; Infusing 03/02/21 1135        Opportunity for questions and clarification was provided.       Patient transported with:   Tech     Pts spouse, Marta Sabillon was notified of patients inpatient room number and provided the number to the nurses station on 2 Surgical

## 2021-03-02 NOTE — OP NOTES
[address] [place] [address]   OPERATIVE REPORT    Name:  Yony Lares  MR#:   [id number]  :  1970  ACCOUNT #:  [de-identified]  DATE OF SERVICE:  2021    PREOPERATIVE DIAGNOSES:  End-staged arthritis of the right hip with incarceration, ankylosis, osteophytosis, protrusio, end-staged arthritis of the right hip. POSTOPERATIVE DIAGNOSES:  End-staged arthritis of the right hip with incarceration, ankylosis, osteophytosis, protrusio, end-staged arthritis of the right hip. PROCEDURES PERFORMED:  1. Difficult right hip replacement using the Jaunt Accolade II system with a size 54-mm Trident II Tritanium acetabular shell with a neutral 36 liner, a size 3 high-offset 127 femoral component and a 36 -2.5 ceramic femoral head. 2.  Removal of incarcerating osteophytes prior to dislocation. 3.  Acetabular reconstruction with foveal impaction allograft, autogenous. 4.  Takedown of adhesive capsulitis. 5.  Rebalancing of hip. SURGEON:  Mitesh Hobbs MD    FIRST ASSISTANT:  Dino Wakefield. SECOND ASSISTANT:  Jovanna Kessler. ANESTHESIOLOGIST:  Dr. Gigi Maldonado. ANESTHESIA:  Preoperative lumbar plexus block with light general.    COMPLICATIONS:  No complications. SPECIMENS REMOVED:  Femoral head. IMPLANTS:  As above mentioned. ESTIMATED BLOOD LOSS:  300 mL. Dino Wakefield was the first assistant who assisted with all phases of the surgery commencing with patient positioning, patient prep, patient drape, leg positioning during surgery, retracting, assisting with the surgery itself, closure, dressing placement, and transfer. CLINICAL NOTE:  The patient has inflammatory arthritis. We reconstructed her opposite hip about a year ago and did extremely well with it. She is delighted and she had a very similar hip on this side with an incarcerated hip with osteophytosis and she proceeded with surgery. Antibiotics confirmed given. Leg lengths determined.   The patient was placed in lateral decubitus position to ensure the pelvis was square and all areas were properly padded, antibiotics confirmed given, and a time-out performed.  Anterolateral approach, good hemostasis achieved.  IT band delineated and incised sharply.  Sciatic nerve identified, protected, and avoided and Charnley retractor introduced.  Bursa released using the capsular scissors and a partial bursectomy performed.  Gluteus medius identified, somewhat attenuated due to disuse, but otherwise in good condition.  Minimus and capsule divided directly over the femoral neck carrying back to the tip of the trochanter and along down the vastus lateralis.  Whole cuff of tissue was taken in one contiguous layer and the lesser trochanter identified.  Pin placed in the superior iliac crest for offset and leg length management.  We then did some further capsular releases and also the osteotome resected some incarcerating osteophytes and then we were able to dislocate the hip uneventfully using an appropriate fingerbreadth above the lesser trochanter.  Small femoral neck divided while protecting the soft tissues.  Posterior capsule was quite adherent and reflected with a combination of electrocautery and Lynch elevator.  We then instrumented around the acetabulum, divided the transverse acetabular ligament, identified the medial wall which was slightly protrused through the fovea.  First, we had to open up the introitus with the reamers and then progressively engaged and then medialized appropriately but not too much with the plan of the foveal autogenous bone graft.    After appropriate medialization and appropriate inclination and anteversion, reamed up to accommodate the cup.  Trialed this and gave us a gauge for the bone grafting.  We then used autogenous bone grafting on reverse for the foveal area and slightly medially as well.  Then placed a definitive acetabular shell at the appropriate inclination and anteversion to make sure it was fully seated and augmented  with a couple of screws. Then, we spent an extra 10-15 minutes removing the osteophytes in a horseshoe-type pattern, Aquamantys and Exparel cocktail. Initially with a trial liner, later go back and place the definitive liner, made sure it was fully seated and protected with a Ray-Selina gauze, later to be accounted for. With the leg in a sterile leg bag, we lateralized with a Leksell and box osteotome and broached our way up to accommodate the size 3 and made sure we were nicely seated and then trialed this and with the -2.5, we had anatomic restoration of offset and leg lengths with no impingement, excellent combined anteversion. The hip was extremely stable. We implanted the definitive component, re-trialed, and happiest with the -2.5, cleaned the trunnion, impacted on again reducing the hip. Routine closure. At the end of the case, instrument, sponge, and needle counts were correct. No complications. The patient tolerated the procedure well. An excellent outcome to a difficult case that was incarcerated necessitating osteophyte removal prior to dislocation, capsular releases, rebalancing of the hip, autogenous bone grafting to the acetabulum for the protrusio acetabuli and rebalancing of the hip in terms of offset and leg length. Overall, an excellent outcome to a very difficult case. I was delighted with the result. No complications.       Juan Miguel Mobley MD [id number]  D:  03/02/2021 9:17  T:  03/02/2021 14:22  JOB #:  [id number]

## 2021-03-02 NOTE — PROGRESS NOTES
TRANSFER - IN REPORT:    Verbal report received from [address] Tucker Pate RN(name) on Carlos Buenrostro  being received from PACU(unit) for routine post - op      Report consisted of patients Situation, Background, Assessment and   Recommendations(SBAR). Information from the following report(s) SBAR, OR Summary, Procedure Summary and MAR was reviewed with the receiving nurse. Opportunity for questions and clarification was provided. Assessment completed upon patients arrival to unit and care assumed.

## 2021-03-02 NOTE — PROGRESS NOTES
Problem: Pain  Goal: *Control of Pain  Outcome: Progressing Towards Goal  Goal: *PALLIATIVE CARE:  Alleviation of Pain  Outcome: Progressing Towards Goal     Problem: Patient Education: Go to Patient Education Activity  Goal: Patient/Family Education  Outcome: Progressing Towards Goal     Problem: Patient Education: Go to Patient Education Activity  Goal: Patient/Family Education  Outcome: Progressing Towards Goal     Problem: Hip Replacement: Day of Surgery/Unit  Goal: Off Pathway (Use only if patient is Off Pathway)  Outcome: Progressing Towards Goal  Goal: Activity/Safety  Outcome: Progressing Towards Goal  Goal: Consults, if ordered  Outcome: Progressing Towards Goal  Goal: Diagnostic Test/Procedures  Outcome: Progressing Towards Goal  Goal: Nutrition/Diet  Outcome: Progressing Towards Goal  Goal: Medications  Outcome: Progressing Towards Goal  Goal: Respiratory  Outcome: Progressing Towards Goal  Goal: Treatments/Interventions/Procedures  Outcome: Progressing Towards Goal  Goal: Psychosocial  Outcome: Progressing Towards Goal  Goal: *Initiate mobility  Outcome: Progressing Towards Goal  Goal: *Optimal pain control at patient's stated goal  Outcome: Progressing Towards Goal  Goal: *Hemodynamically stable  Outcome: Progressing Towards Goal     Problem: Hip Replacement: Post Op Day 1  Goal: Off Pathway (Use only if patient is Off Pathway)  Outcome: Progressing Towards Goal  Goal: Activity/Safety  Outcome: Progressing Towards Goal  Goal: Diagnostic Test/Procedures  Outcome: Progressing Towards Goal  Goal: Nutrition/Diet  Outcome: Progressing Towards Goal  Goal: Medications  Outcome: Progressing Towards Goal  Goal: Respiratory  Outcome: Progressing Towards Goal  Goal: Treatments/Interventions/Procedures  Outcome: Progressing Towards Goal  Goal: Psychosocial  Outcome: Progressing Towards Goal  Goal: Discharge Planning  Outcome: Progressing Towards Goal  Goal: *Demonstrates progressive activity  Outcome: Progressing Towards Goal  Goal: *Optimal pain control at patient's stated goal  Outcome: Progressing Towards Goal  Goal: *Hemodynamically stable  Outcome: Progressing Towards Goal  Goal: *Discharge plan identified  Outcome: Progressing Towards Goal     Problem: Hip Replacement: Post-Op Day 2  Goal: Off Pathway (Use only if patient is Off Pathway)  Outcome: Progressing Towards Goal  Goal: Activity/Safety  Outcome: Progressing Towards Goal  Goal: Diagnostic Test/Procedures  Outcome: Progressing Towards Goal  Goal: Nutrition/Diet  Outcome: Progressing Towards Goal  Goal: Medications  Outcome: Progressing Towards Goal  Goal: Respiratory  Outcome: Progressing Towards Goal  Goal: Treatments/Interventions/Procedures  Outcome: Progressing Towards Goal  Goal: Psychosocial  Outcome: Progressing Towards Goal  Goal: *Met physical therapy criteria for discharge to the next level of care  Outcome: Progressing Towards Goal  Goal: *Optimal pain control with oral analgesia  Outcome: Progressing Towards Goal  Goal: *Hemodynamically stable  Outcome: Progressing Towards Goal  Goal: *Tolerating diet  Outcome: Progressing Towards Goal  Goal: *Verbalizes understanding of any indicated hip precautions  Outcome: Progressing Towards Goal  Goal: *Patient verbalizes understanding of discharge instructions  Outcome: Progressing Towards Goal

## 2021-03-02 NOTE — ANESTHESIA PREPROCEDURE EVALUATION
Relevant Problems   No relevant active problems       Anesthetic History   No history of anesthetic complications            Review of Systems / Medical History  Patient summary reviewed and pertinent labs reviewed    Pulmonary  Within defined limits                 Neuro/Psych   Within defined limits           Cardiovascular    Hypertension: well controlled              Exercise tolerance: >4 METS     GI/Hepatic/Renal                Endo/Other      Hypothyroidism: well controlled  Arthritis and anemia     Other Findings              Physical Exam    Airway  Mallampati: III  TM Distance: 4 - 6 cm  Neck ROM: decreased range of motion   Mouth opening: Normal     Cardiovascular    Rhythm: regular  Rate: normal         Dental  No notable dental hx       Pulmonary  Breath sounds clear to auscultation               Abdominal  GI exam deferred       Other Findings            Anesthetic Plan    ASA: 2  Anesthesia type: general and regional - lumbar plexus block          Induction: Intravenous  Anesthetic plan and risks discussed with: Patient

## 2021-03-02 NOTE — PROGRESS NOTES
conducted a pre-surgery visit with Carlos Buenrostro, who is a 53 y.o.,female. The  provided the following Interventions:  Initiated a relationship of care and support. Plan:  Chaplains will continue to follow and will provide pastoral care on an as needed/requested basis.  recommends bedside caregivers page  on duty if patient shows signs of acute spiritual or emotional distress.     [address]  [phone]

## 2021-03-02 NOTE — DISCHARGE SUMMARY
3/2/2021  5:43 AM    3/3/2021, 9:27 AM    Primary Dx:right Orthopedic / Rheumatologic: Total Hip Replacement  Secondary Dx: Etiological Diagnoses: none    HPI:  Pt has end stage OA and had failed conservative treatment. Due to the current findings and affected activity of daily living surgical intervention is indicated.   The alternatives, risks, complications as well as expected outcome were discussed, the patient understands and wishes to proceed with surgery    Past Medical History:   Diagnosis Date    Adverse effect of anesthesia     Hypotension    Allergic rhinitis, seasonal     spring    Anemia NEC     iron deficiency associated with heavy menstruation and fibroids noted 2008 with pregnancy    Dysmenorrhea     increasing past year    Fibroid, uterine     Goiter     Bilateral    Headache(784.0)     Hypertension     Ill-defined condition     Chronic knee pain    Menorrhagia     worsening past year    Neutropenia ([facility] [address]          Current Facility-Administered Medications:     lidocaine (PF) (XYLOCAINE) 10 mg/mL (1 %) injection 0.1 mL, 0.1 mL, SubCUTAneous, PRN, Shawna Locket, CRNA, 2 mL at 03/02/21 0759    lactated Ringers infusion, 75 mL/hr, IntraVENous, CONTINUOUS, Shawna Locket, CRNA, Last Rate: 75 mL/hr at 03/02/21 0626, New Bag at 03/02/21 0846    sodium chloride (NS) flush 5-40 mL, 5-40 mL, IntraVENous, Q8H, Shawna Locket, CRNA    sodium chloride (NS) flush 5-40 mL, 5-40 mL, IntraVENous, PRN, Shawna Locket, CRNA    insulin lispro (HUMALOG) injection, , SubCUTAneous, ONCE, Shawna Locket, CRNA    bupivacaine liposome (PF) susp (EXPAREL) infiltration 266 mg, 20 mL, Infiltration, ONCE, Cliff Moreno, PAScarlettC    bupivacaine (PF) 0.5 % (5 mg/mL) 25 mL, EPINEPHrine HCl (PF) 0.5 mg, ketorolac 30 mg, bupivacaine liposome (PF) susp 20 mL in 0.9% sodium chloride 50 mL iv solution, , , PRN, Vivi Bosch MD, 97.5 mL at 03/02/21 0855    sodium chloride 0.9 % bolus infusion, , , CONTINUOUS, Selena Greer, MD, 500 mL at 03/02/21 0807    polymyxin B 500,000 unit injection, , , PRN, Leroy Neal MD, 750,000 Units at 03/02/21 0809    povidone-iodine (BETADINE) 10 % topical solution, , , PRN, Leroy Neal MD, 17.5 mL at 03/02/21 0809    vancomycin (VANCOCIN) injection, , , PRN, Leroy Neal MD, 3 g at 03/02/21 0810    Facility-Administered Medications Ordered in Other Encounters:     ondansetron (ZOFRAN) injection, , IntraVENous, PRN, Hux, Doris L, CRNA, 4 mg at 03/02/21 0730    fentaNYL citrate (PF) injection, , IntraVENous, PRN, Hux, Doris L, CRNA, 100 mcg at 03/02/21 0732    dexamethasone (DECADRON) 4 mg/mL injection, , , PRN, Hux, Doris L, CRNA, 4 mg at 03/02/21 0738    rocuronium injection, , IntraVENous, PRN, Hux, Doris L, CRNA, 5 mg at 03/02/21 0832    lidocaine (PF) (XYLOCAINE) 20 mg/mL (2 %) injection, , IntraVENous, PRN, Hux, Doris L, CRNA, 20 mg at 03/02/21 0754    propofoL (DIPRIVAN) 10 mg/mL injection, , IntraVENous, PRN, Hux, Doris L, CRNA, 50 mg at 03/02/21 0754    succinylcholine (ANECTINE) injection, , IntraVENous, PRN, Hux, Doris L, CRNA, 120 mg at 03/02/21 0732    promethazine (PHENERGAN) injection, , , PRN, Hux, Doris L, CRNA, 6.25 mg at 03/02/21 0751    HYDROmorphone (PF) (DILAUDID) injection, , IntraVENous, PRN, Hux, Doris L, CRNA, 0.5 mg at 03/02/21 0755    glycopyrrolate (ROBINUL) injection, , IntraVENous, PRN, Hux, Doris L, CRNA, 0.4 mg at 03/02/21 0926    neostigmine (PROSTIGMINE) injection, , IntraVENous, PRN, Hux, Doris L, CRNA, 2 mg at 03/02/21 3564    Patient has no known allergies. Physical Exam:  General A&O x3 NAD, well developed, well nourished, normal affect  Heart: S1-S2, RRR  Lungs: CTA Bilat  Abd: soft NT, ND  Ext: n/v intact    Hospital Course:    Pt. Had rightOrthopedic / Rheumatologic: Total Hip Replacement    Post -op Course: The patient tolerated the procedure well. They were followed by internal medicine for help with medical management. Pt.  Was place on Abx pre and post-op for prophylaxis against infection as well as coumadin pre and post-op for prophylaxis against DVT. Vitals signs remained stable, remained af. The wound wasclean, dry, no drainage. Pain was well controlled. Pt. Had negative calf tenderness or swelling, no evidence for DVT. Patient had PT/OT consult for evaluation and treatment. CBC  Lab Results   Component Value Date/Time    WBC 3.6 (L) 02/17/2021 12:48 PM    RBC 4.16 (L) 02/17/2021 12:48 PM    HCT 37.3 02/17/2021 12:48 PM    MCV 89.7 02/17/2021 12:48 PM    MCH 29.8 02/17/2021 12:48 PM    MCHC 33.2 02/17/2021 12:48 PM    RDW 12.5 02/17/2021 12:48 PM     Coagulation  Lab Results   Component Value Date    INR 1.0 02/17/2021    APTT 28.7 02/17/2021      Basic Metabolic Profile  Lab Results   Component Value Date     02/17/2021    CO2 30 02/17/2021    BUN 16 02/17/2021       Discharge Meds:  Current Discharge Medication List      START taking these medications    Details   oxyCODONE-acetaminophen (Percocet)  mg per tablet Take 1-2 Tabs by mouth every eight (8) hours as needed for Pain for up to 7 days. Max Daily Amount: 6 Tabs. Qty: 42 Tab, Refills: 0    Associated Diagnoses: Hip arthritis      docusate sodium (Colace) 100 mg capsule Take 1 Cap by mouth two (2) times a day for 90 days. Qty: 60 Cap, Refills: 2    Associated Diagnoses: Hip arthritis      cephALEXin (Keflex) 500 mg capsule Take 1 Cap by mouth four (4) times daily. Qty: 12 Cap, Refills: 0    Associated Diagnoses: Hip arthritis         CONTINUE these medications which have CHANGED    Details   aspirin (ASPIRIN) 325 mg tablet Take 1 Tab by mouth two (2) times a day. Qty: 60 Tab, Refills: 0    Associated Diagnoses: Hip arthritis      celecoxib (CeleBREX) 200 mg capsule Take 1 Cap by mouth two (2) times a day for 90 days.   Qty: 60 Cap, Refills: 2    Associated Diagnoses: Hip arthritis         CONTINUE these medications which have NOT CHANGED    Details docusate sodium 100 mg tab Take 1 Tab by mouth daily. acetaminophen (TYLENOL) 500 mg tablet Take 1,000 mg by mouth every six (6) hours as needed for Pain. Pain      Blood Pressure Monitor kit Once a day  Qty: 1 Kit, Refills: 0    Associated Diagnoses: Essential hypertension      amLODIPine (NORVASC) 5 mg tablet Take 1 Tab by mouth daily. Qty: 90 Tab, Refills: 1    Associated Diagnoses: Essential hypertension with goal blood pressure less than 130/80         STOP taking these medications       hydrocodone/acetaminophen (NORCO PO) Comments:   Reason for Stopping:         oxyCODONE-acetaminophen (PERCOCET 7.5) 7.5-325 mg per tablet Comments:   Reason for Stopping:         hydrOXYchloroQUINE (PLAQUENIL) 200 mg tablet Comments:   Reason for Stopping:               Discharge Plan:  The patient will be d/c'd to home, total hip protocol, WBAT. She will have [facility] PT and nursing. Total joint protocol. Pt safe for homebound transfer, sp Total joint replacement. A walker, bedside commode, and shower chair will be utilized for ADL's. Follow up with Dr. Elizabeth Dubon in 10-12 days. Call with any questions or concerns.

## 2021-03-03 ENCOUNTER — HOME HEALTH ADMISSION (OUTPATIENT)
Dept: HOME HEALTH SERVICES | Facility: HOME HEALTH | Age: 51
End: 2021-03-03
Payer: OTHER GOVERNMENT

## 2021-03-03 VITALS
HEIGHT: 63 IN | RESPIRATION RATE: 16 BRPM | OXYGEN SATURATION: 100 % | SYSTOLIC BLOOD PRESSURE: 124 MMHG | WEIGHT: 163.25 LBS | TEMPERATURE: 97 F | HEART RATE: 62 BPM | DIASTOLIC BLOOD PRESSURE: 72 MMHG | BODY MASS INDEX: 28.93 KG/M2

## 2021-03-03 PROCEDURE — 97116 GAIT TRAINING THERAPY: CPT

## 2021-03-03 PROCEDURE — 97165 OT EVAL LOW COMPLEX 30 MIN: CPT

## 2021-03-03 PROCEDURE — 97535 SELF CARE MNGMENT TRAINING: CPT

## 2021-03-03 PROCEDURE — 99218 HC RM OBSERVATION: CPT

## 2021-03-03 PROCEDURE — 74011250636 HC RX REV CODE- 250/636: Performed by: ORTHOPAEDIC SURGERY

## 2021-03-03 PROCEDURE — 74011250637 HC RX REV CODE- 250/637: Performed by: ORTHOPAEDIC SURGERY

## 2021-03-03 RX ADMIN — CELECOXIB 200 MG: 100 CAPSULE ORAL at 08:03

## 2021-03-03 RX ADMIN — KETOROLAC TROMETHAMINE 15 MG: 15 INJECTION, SOLUTION INTRAMUSCULAR; INTRAVENOUS at 06:27

## 2021-03-03 RX ADMIN — ASPIRIN 325 MG: 325 TABLET, COATED ORAL at 08:05

## 2021-03-03 RX ADMIN — AMLODIPINE BESYLATE 5 MG: 5 TABLET ORAL at 08:03

## 2021-03-03 RX ADMIN — ACETAMINOPHEN 1000 MG: 500 TABLET ORAL at 06:27

## 2021-03-03 RX ADMIN — DOCUSATE SODIUM 50MG AND SENNOSIDES 8.6MG 1 TABLET: 8.6; 5 TABLET, FILM COATED ORAL at 08:03

## 2021-03-03 RX ADMIN — PREGABALIN 25 MG: 25 CAPSULE ORAL at 08:03

## 2021-03-03 RX ADMIN — CEFAZOLIN SODIUM 2 G: 2 SOLUTION INTRAVENOUS at 08:04

## 2021-03-03 RX ADMIN — FERROUS SULFATE TAB 325 MG (65 MG ELEMENTAL FE) 325 MG: 325 (65 FE) TAB at 08:03

## 2021-03-03 NOTE — ANESTHESIA POSTPROCEDURE EVALUATION
Procedure(s):  RIGHT TOTAL HIP ARTHROPLASTY LATERAL APPROACH. general, regional    Anesthesia Post Evaluation      Multimodal analgesia: multimodal analgesia used between 6 hours prior to anesthesia start to PACU discharge  Patient location during evaluation: PACU  Patient participation: complete - patient participated  Level of consciousness: awake and alert  Pain management: adequate  Airway patency: patent  Anesthetic complications: no  Cardiovascular status: acceptable  Respiratory status: acceptable  Hydration status: acceptable  Post anesthesia nausea and vomiting:  controlled  Final Post Anesthesia Temperature Assessment:  Normothermia (36.0-37.5 degrees C)      INITIAL Post-op Vital signs:   Vitals Value Taken Time   /71 03/02/21 1031   Temp 36.5 °C (97.7 °F) 03/02/21 0929   Pulse 53 03/02/21 1034   Resp 13 03/02/21 1034   SpO2 100 % 03/02/21 1034   Vitals shown include unvalidated device data.

## 2021-03-03 NOTE — PROGRESS NOTES
Problem: Mobility Impaired (Adult and Pediatric)  Goal: *Acute Goals and Plan of Care (Insert Text)  Description: Physical Therapy Goals  Initiated 3/2/2021 and to be accomplished within 7 day(s)  1. Patient will move from supine to sit and sit to supine  in bed with modified independence. 2.  Patient will transfer from bed to chair and chair to bed with modified independence using the least restrictive device. 3.  Patient will perform sit to stand with modified independence. 4.  Patient will ambulate with modified independence for 300 feet with the least restrictive device. 5.  Patient will ascend/descend 12 stairs with B handrail(s) with modified independence. PLOF: Pt lives in a 2-story home with 12 steps. She lives with her  and children and was indep with all mobility PTA, mainly using a SPC. Outcome: Progressing Towards Goal   PHYSICAL THERAPY TREATMENT    Patient: Shar Buenrostro (49 y.o. female)  Date: 3/3/2021  Diagnosis: Osteoarthritis of right hip, unspecified osteoarthritis type [M16.11]  Hip arthritis [M16.10] <principal problem not specified>  Procedure(s) (LRB):  RIGHT TOTAL HIP ARTHROPLASTY LATERAL APPROACH (Right) 1 Day Post-Op  Precautions: Fall, Total hip(R NOREEN)    ASSESSMENT:  Patient is cleared by nursing for PT, and patient consents to therapy. Pt moving around in her room with her walker upon arrival. Pt educated on safety, mobility, therex, total hip precautions (recalling 3/3), and OOB at least 3-5 times. Sit to stands modified independent. Pt denies any issues with bed mobility. Gait 200 feet with rolling walker modified independent/supervision. Pt has decreased tammie and shorter step length on R LE. Pt practiced stairs 4 steps x2 with B HRA. Cues to keep LE forward with descending and no turning inward/twisting. Pt ended therapy sitting in recliner with all needs met. Pt is cleared by PT for safe d/c home, nursing informed.  Pt continues to benefit from skilled inpatient PT for increased in functional independence, exercises for ROM/strengthening, and progression toward goals. Progression toward goals:    [x]      Improving appropriately and progressing toward goals  []      Improving slowly and progressing toward goals  []      Not making progress toward goals and plan of care will be adjusted     PLAN:  Patient continues to benefit from skilled intervention to address the above impairments. Continue treatment per established plan of care. Discharge Recommendations:  Home Health  Further Equipment Recommendations for Discharge:  N/A     SUBJECTIVE:   Patient stated Much better.     OBJECTIVE DATA SUMMARY:   Critical Behavior:  Neurologic State: Alert  Orientation Level: Oriented X4  Cognition: Follows commands  Safety/Judgement: Fall prevention  Functional Mobility Training:  Bed Mobility:     Supine to Sit: (pt reports she has no issues)               Transfers:  Sit to Stand: Modified independent  Stand to Sit: Modified independent             Balance:  Sitting: Intact  Sitting - Static: Good (unsupported)  Sitting - Dynamic: Good (unsupported)  Standing: Intact  Standing - Static: Good  Standing - Dynamic : Fair(+)     Ambulation/Gait Training:  Distance (ft): 200 Feet (ft)  Assistive Device: Walker, rolling  Ambulation - Level of Assistance: Modified independent  Gait Abnormalities: Decreased step clearance  Right Side Weight Bearing: As tolerated  Left Side Weight Bearing: Full  Base of Support: Center of gravity altered  Speed/Katherine: Pace decreased (<100 feet/min)       Stairs:  Number of Stairs Trained: (4 steps x2)  Stairs - Level of Assistance: Stand-by assistance;Supervision  Rail Use: Both    Therapeutic Exercises:   Reviewed and performed ankle pumps to increase blood flow and circulation. Pain:  Pain level pre-treatment: 4/10 R hip  Pain level post-treatment: 5/10 R hip  Pain Intervention(s): Medication (see MAR);  Rest, Ice, Repositioning   Response to intervention: Nurse notified, See doc flow    Activity Tolerance:   good  Please refer to the flowsheet for vital signs taken during this treatment. After treatment:   [x] Patient left in no apparent distress sitting up in chair  [] Patient left in no apparent distress in bed  [x] Call bell left within reach  [x] Nursing notified Khushbu Barefoot  [x] Personal items in reach  [] Caregiver present  [] Bed/chair alarm activated  [] SCDs applied      COMMUNICATION/EDUCATION:   [x]         Role of Physical Therapy in the acute care setting. [x]         Fall prevention education was provided and the patient/caregiver indicated understanding. [x]         Patient/family have participated as able in working toward goals and plan of care. [x]         Patient/family agree to work toward stated goals and plan of care. []         Patient understands intent and goals of therapy, but is neutral about his/her participation. []         Patient is unable to participate in stated goals/plan of care: ongoing with therapy staff. [x]         Out of bed at least 3-5 times a day with nursing assistance as needed.    []         Other:        Norma Sargent, PT, DPT   Time Calculation: 10 mins

## 2021-03-03 NOTE — HOME CARE
Received [facility] referral for SN,PT,[place] Hip protocol ; Discharge order noted for today , spoke to patient ,Verified demographics and insurance, explained [facility] services and answered all questions ,patient request the same PT \"Jimi\" she had last time , information added to [facility] referral; patient states she has RW,shower chair and elevated toilets ; [facility] referral processed to [facility] central Intake. AYO SCHROEDER.

## 2021-03-03 NOTE — PROGRESS NOTES
Problem: Self Care Deficits Care Plan (Adult)  Goal: *Acute Goals and Plan of Care (Insert Text)  Outcome: Resolved/Met   OCCUPATIONAL THERAPY EVALUATION/DISCHARGE    Patient: Heydi Maddox (49 y.o. female)  Date: 3/3/2021  Primary Diagnosis: Osteoarthritis of right hip, unspecified osteoarthritis type [M16.11]  Hip arthritis [M16.10]  Procedure(s) (LRB):  RIGHT TOTAL HIP ARTHROPLASTY LATERAL APPROACH (Right) 1 Day Post-Op   Precautions:   Fall, Total hip(R NOREEN)  PLOF: Pt reports she was Mod(I) with basic self-care/ADLs PTA. Pt lives with her , 15 y/o daughter, and 7 y/o grandchild. Pt has a rolling walker, walk-in shower, equipped with a shower chair and \"chair height\" toilet seat at home. Pt with recent L NOREEN 12/2020, with pt reporting she still has AE home, including reacher, sock aid, and long handled shoe horn. ASSESSMENT AND RECOMMENDATIONS:  Upon entering room, pt received in recliner, alert, and agreeable to therapy session. Based on the objective data described below, the patient presents she is motivated for d/c home. Pt Mod(I) with UB ADLs and is able to complete LB ADLs/toileting at a SBA level. Pt able to recall 3/3 NOREEN precautions with no cueing needed. Pt with recent L NOREEN 12/2020, with pt reporting she still has AE at home from previous surgery, including a reacher to doff socks/don LB clothing, sock aid to don socks,  and long handled shoe horn to don shoes with back; pt able to verbalize how to utilize equipment. Pt issued and educated on use of a long handled sponge for LB bathing tasks when appropriate. Pt Mod(I) with UB bathing using wipes and dressing. Pt completes LB bathing in sitting and standing with SBA for posterior pericare. As pt did not have AE present during session, this therapists provided min assist to thread RLE through LB clothing in sitting, with pt able to don over hips in standing SBA.  Pt presenting with no LOB during toilet transfers and grooming at sink level. Will defer to PT for functional balance, functional mobility, and stair training tasks. Pt educated on fall prevention in hospital/home setting and cold modalities wear (ice pack) with pt demo good understanding The pt reports she has supportive family at home to provide assist PRN. Pt is safe for d/c home when medically stable/cleared by PT. At the end of the session, pt resting sitting in chair, call bell in reach, with all needs met. Skilled occupational therapy is not indicated at this time. Discharge Recommendations: None  Further Equipment Recommendations for Discharge: N/A; Pt has all recommended equipment to safely return home. SUBJECTIVE:   Patient stated i'm renewing my 80 days referring to her hip precautions.     OBJECTIVE DATA SUMMARY:     Past Medical History:   Diagnosis Date    Adverse effect of anesthesia     Hypotension    Allergic rhinitis, seasonal     spring    Anemia NEC     iron deficiency associated with heavy menstruation and fibroids noted 2008 with pregnancy    Dysmenorrhea     increasing past year    Fibroid, uterine     Goiter     Bilateral    Headache(784.0)     Hypertension     Ill-defined condition     Chronic knee pain    Menorrhagia     worsening past year    Neutropenia [facility])      Past Surgical History:   Procedure Laterality Date    HX HYSTERECTOMY  04/2014    [address]    HX KNEE ARTHROSCOPY Right     [place] TOTAL HIP ARTHROPLASTY Left 12/13/2020     Barriers to Learning/Limitations: None  Compensate with: visual, verbal, tactile, kinesthetic cues/model    Home Situation:   Home Situation  Home Environment: Private residence  # Steps to Enter: 4  One/Two Story Residence: Two story  # of Interior Steps: 12  Height of Each Step (in): 0 inches  Interior Rails: Left  Lift Chair Available: No  Living Alone: No  Support Systems: Spouse/Significant Other/Partner, Child(suhail)  Patient Expects to be Discharged to[de-identified] Private residence  Current DME Used/Available at Home: Walker, rolling, Cane, straight  Tub or Shower Type: Shower  []     Right hand dominant   []     Left hand dominant    Cognitive/Behavioral Status:  Neurologic State: Alert  Orientation Level: Oriented X4  Cognition: Follows commands  Safety/Judgement: Fall prevention    Skin: Visible skin appeared intact  Edema: None noted      Coordination: BUE  Coordination: Within functional limits  Fine Motor Skills-Upper: Left Intact; Right Intact    Gross Motor Skills-Upper: Left Intact; Right Intact    Balance:  Sitting: Intact  Standing: Intact  Standing - Static: Good  Standing - Dynamic : Fair(+)    Strength: BUE  Strength:  Within functional limits    Tone & Sensation: BUE  Tone: Normal  Sensation: Intact    Range of Motion: BUE  AROM: Within functional limits      Functional Mobility and Transfers for ADLs:  Bed Mobility:  Pt sitting in recliner upon arrival.  Transfers:  Sit to Stand: Stand-by assistance  Stand to Sit: Stand-by assistance   Toilet Transfer : Stand-by assistance       ADL Assessment:  Feeding: Modified independent    Oral Facial Hygiene/Grooming: Modified Independent(in sitting; SBA in standing)    Bathing: Stand-by assistance    Upper Body Dressing: Modified independent    Lower Body Dressing: Stand-by assistance    Toileting: Stand by assistance      ADL Intervention:  Grooming  Grooming Assistance: Stand-by assistance  Position Performed: Standing(at sink)  Washing Hands: Stand-by assistance    Upper Body Bathing  Bathing Assistance: Modified independent  Position Performed: Seated in chair    Lower Body Bathing  Perineal  : Contact guard assistance  Position Performed: Standing  Lower Body : Stand-by assistance(to wash B thighs in sitting)    Upper Body Dressing Assistance  Bra: Modified independent  Pullover Shirt: Modified independent    Pt reporting she has a reacher to doff socks/don LB clothing, sock aid to don socks, and long handled shoe horn to don shoes; AE not present in room, but pt was able to verbalize how to utilize AE at home. Lower Body Dressing Assistance  Pants With Elastic Waist: Minimum assistance(without AD)  Leg Crossed Method Used: No  Position Performed: Seated in chair;Standing    Toilet transfers: SBA for transfer with RW support, no LOB noted. Cognitive Retraining  Safety/Judgement: Fall prevention      Pain:  Pain level pre-treatment: \"minimal\"10 (R hip)  Pain level post-treatment: 3/10   Pain Intervention(s): Medication (see MAR); Rest, Ice, Repositioning  Response to intervention: Nurse notified, See doc flow    Activity Tolerance:   Good    Please refer to the flowsheet for vital signs taken during this treatment. After treatment:   [x]  Patient left in no apparent distress sitting up in chair  []  Patient left in no apparent distress in bed  [x]  Call bell left within reach  [x]  Nursing notified  []  Caregiver present  []  Bed alarm activated    COMMUNICATION/EDUCATION:   [x]      Role of Occupational Therapy in the acute care setting  [x]      Home safety education was provided and the patient/caregiver indicated understanding. [x]      Patient/family have participated as able and agree with findings and recommendations. []      Patient is unable to participate in plan of care at this time. Thank you for this referral.  Lurdes Mlilan MS, OTR/L  Time Calculation: 33 mins      Eval Complexity: History: LOW Complexity : Brief history review ; Examination: LOW Complexity : 1-3 performance deficits relating to physical, cognitive , or psychosocial skils that result in activity limitations and / or participation restrictions ;    Decision Making:LOW Complexity : No comorbidities that affect functional and no verbal or physical assistance needed to complete eval tasks

## 2021-03-03 NOTE — DISCHARGE INSTRUCTIONS
Discharge Instructions for Total Hip Replacement Patients    · The dressing on your hip will be changed by the Home Health professional at the appropriate time. Keep your incision clean and dry. Do not apply any ointments to the incision. · You may shower as long as you keep you incision dry. When showering, leave your dressing on. The dressing is waterproof as long as the edges are sealed. · Notify your surgeon if:  · Your temperature is greater than 100.5  · You have pain not controlled by your pain medication  · You have increased drainage from your incision  · You have increased redness or swelling in your leg  · You have chest pain, shortness of breath, or any other problems    · Do your exercises as instructed by the home physical therapist.    · Follow your total hip precautions:  · Do not cross your legs or feet  · Do not turn your toes inward  · Do not bed at your waist more than 90 degrees    · Keep a firm pillow between your knees when sleeping or lying down. · You may use ice to your hip as needed. Do not apply the ice pack directly to your skin. Use a barrier such as your pant leg or a thin towel. · No heat to your hip. · Walk once an hour during normal walking hours. · If you have SHANELL hose (the white support stockings), remove them at bedtime and re-apply the hose in the morning for the next 2 weeks. Take your blood clot prevention medication the same time every day. Medication Name*******    Next Time you take it is ******    Take your pain medication as needed. Medication Name ******    Next dose can be taken at ******    You can take Tylenol for pain. Do not take more than 3000 mg every 24 hours. Tylenol ***** mg    Next Dose can be taken at ******    Take a laxative the day you get home and every day you are on a narcotic. Best of luck with your new hip and Danny Shirley for choosing the [address]!   Discharge Instructions for Total Hip Replacement Patients    · The dressing on your hip will be changed by the Home Health professional at the appropriate time. Keep your incision clean and dry. Do not apply any ointments to the incision. · You may shower as long as you keep you incision dry. When showering, leave your dressing on. The dressing is waterproof as long as the edges are sealed. · Notify your surgeon if:  · Your temperature is greater than 100.5  · You have pain not controlled by your pain medication  · You have increased drainage from your incision  · You have increased redness or swelling in your leg  · You have chest pain, shortness of breath, or any other problems    · Do your exercises as instructed by the home physical therapist.    · Follow your total hip precautions:  · Do not cross your legs or feet  · Do not turn your toes inward  · Do not bed at your waist more than 90 degrees    · Keep a firm pillow between your knees when sleeping or lying down. · You may use ice to your hip as needed. Do not apply the ice pack directly to your skin. Use a barrier such as your pant leg or a thin towel. · No heat to your hip. · Walk once an hour during normal walking hours. · If you have SHANELL hose (the white support stockings), remove them at bedtime and re-apply the hose in the morning for the next 2 weeks. Take your blood clot prevention medication the same time every day. Medication Name*******    Next Time you take it is ******        Take a laxative the day you get home and every day you are on a narcotic. Best of luck with your new hip and Danny Shirley for choosing the [address]!

## 2021-03-03 NOTE — PROGRESS NOTES
Ortho    Pt. Seen and evaluated. Doing well, pain well controlled, progressing well with PT  Denies cp, sob, abd pain    Visit Vitals  /72 (BP 1 Location: Left upper arm, BP Patient Position: At rest)   Pulse 62   Temp 97 °F (36.1 °C)   Resp 16   Ht 5' 3\" (1.6 m)   Wt 163 lb 4 oz (74 kg)   LMP 10/08/2012   SpO2 100%   Breastfeeding No   BMI 28.92 kg/m²       right total hip replacement  right hip Woundclean, dry, no drainage  Sensory intact to LT  Motor intact  nv intact  Neg calf tenderness. Labs.   CBC  @  CBC:   Lab Results   Component Value Date/Time    WBC 3.6 (L) 02/17/2021 12:48 PM    RBC 4.16 (L) 02/17/2021 12:48 PM    HGB 12.4 02/17/2021 12:48 PM    HCT 37.3 02/17/2021 12:48 PM    PLATELET 825 72/54/5387 12:48 PM    and BMP:   Lab Results   Component Value Date/Time    Glucose 79 02/17/2021 12:48 PM    Sodium 139 02/17/2021 12:48 PM    Potassium 3.5 02/17/2021 12:48 PM    Chloride 105 02/17/2021 12:48 PM    CO2 30 02/17/2021 12:48 PM    BUN 16 02/17/2021 12:48 PM    Creatinine 0.54 (L) 02/17/2021 12:48 PM    Calcium 8.7 02/17/2021 12:48 PM   @  Coagulation  Lab Results   Component Value Date    INR 1.0 02/17/2021    APTT 28.7 02/17/2021      Basic Metabolic Profile  Lab Results   Component Value Date     02/17/2021    CO2 30 02/17/2021    BUN 16 02/17/2021         Assesment:rightOrthopedic / Rheumatologic: Total Hip Replacement  Past Medical History:   Diagnosis Date    Adverse effect of anesthesia     Hypotension    Allergic rhinitis, seasonal     spring    Anemia NEC     iron deficiency associated with heavy menstruation and fibroids noted 2008 with pregnancy    Dysmenorrhea     increasing past year    Fibroid, uterine     Goiter     Bilateral    Headache(784.0)     Hypertension     Ill-defined condition     Chronic knee pain    Menorrhagia     worsening past year    Neutropenia (HCC)      ASA: 2    Pt is status post joint replacement and at risk for bleeding, blood clots, and infection. Plan:  aspirin, PT, DC to home if cleared by PT and ok with medicine.

## 2021-03-03 NOTE — PROGRESS NOTES
Problem: Pain  Goal: *Control of Pain  Outcome: Progressing Towards Goal  Goal: *PALLIATIVE CARE:  Alleviation of Pain  Outcome: Progressing Towards Goal     Problem: Patient Education: Go to Patient Education Activity  Goal: Patient/Family Education  Outcome: Progressing Towards Goal     Problem: Patient Education: Go to Patient Education Activity  Goal: Patient/Family Education  Outcome: Progressing Towards Goal     Problem: Hip Replacement: Day of Surgery/Unit  Goal: Off Pathway (Use only if patient is Off Pathway)  Outcome: Progressing Towards Goal  Goal: Activity/Safety  Outcome: Progressing Towards Goal  Goal: Consults, if ordered  Outcome: Progressing Towards Goal  Goal: Diagnostic Test/Procedures  Outcome: Progressing Towards Goal  Goal: Nutrition/Diet  Outcome: Progressing Towards Goal  Goal: Medications  Outcome: Progressing Towards Goal  Goal: Respiratory  Outcome: Progressing Towards Goal  Goal: Treatments/Interventions/Procedures  Outcome: Progressing Towards Goal  Goal: Psychosocial  Outcome: Progressing Towards Goal  Goal: *Initiate mobility  Outcome: Progressing Towards Goal  Goal: *Optimal pain control at patient's stated goal  Outcome: Progressing Towards Goal  Goal: *Hemodynamically stable  Outcome: Progressing Towards Goal     Problem: Hip Replacement: Post Op Day 1  Goal: Off Pathway (Use only if patient is Off Pathway)  Outcome: Progressing Towards Goal  Goal: Activity/Safety  Outcome: Progressing Towards Goal  Goal: Diagnostic Test/Procedures  Outcome: Progressing Towards Goal  Goal: Nutrition/Diet  Outcome: Progressing Towards Goal  Goal: Medications  Outcome: Progressing Towards Goal  Goal: Respiratory  Outcome: Progressing Towards Goal  Goal: Treatments/Interventions/Procedures  Outcome: Progressing Towards Goal  Goal: Psychosocial  Outcome: Progressing Towards Goal  Goal: Discharge Planning  Outcome: Progressing Towards Goal  Goal: *Demonstrates progressive activity  Outcome: Progressing Towards Goal  Goal: *Optimal pain control at patient's stated goal  Outcome: Progressing Towards Goal  Goal: *Hemodynamically stable  Outcome: Progressing Towards Goal  Goal: *Discharge plan identified  Outcome: Progressing Towards Goal     Problem: Hip Replacement: Post-Op Day 2  Goal: Off Pathway (Use only if patient is Off Pathway)  Outcome: Progressing Towards Goal  Goal: Activity/Safety  Outcome: Progressing Towards Goal  Goal: Diagnostic Test/Procedures  Outcome: Progressing Towards Goal  Goal: Nutrition/Diet  Outcome: Progressing Towards Goal  Goal: Medications  Outcome: Progressing Towards Goal  Goal: Respiratory  Outcome: Progressing Towards Goal  Goal: Treatments/Interventions/Procedures  Outcome: Progressing Towards Goal  Goal: Psychosocial  Outcome: Progressing Towards Goal  Goal: *Met physical therapy criteria for discharge to the next level of care  Outcome: Progressing Towards Goal  Goal: *Optimal pain control with oral analgesia  Outcome: Progressing Towards Goal  Goal: *Hemodynamically stable  Outcome: Progressing Towards Goal  Goal: *Tolerating diet  Outcome: Progressing Towards Goal  Goal: *Verbalizes understanding of any indicated hip precautions  Outcome: Progressing Towards Goal  Goal: *Patient verbalizes understanding of discharge instructions  Outcome: Progressing Towards Goal     Problem: Patient Education: Go to Patient Education Activity  Goal: Patient/Family Education  Outcome: Progressing Towards Goal     Problem: Patient Education: Go to Patient Education Activity  Goal: Patient/Family Education  Outcome: Progressing Towards Goal

## 2021-03-03 NOTE — PROGRESS NOTES
Problem: Pain  Goal: *Control of Pain  3/3/2021 1107 by Rosa Ernst  Outcome: Resolved/Met  3/3/2021 1029 by Rosa Ernst  Outcome: Progressing Towards Goal  Goal: *PALLIATIVE CARE:  Alleviation of Pain  3/3/2021 1107 by Rosa Ernst  Outcome: Resolved/Met  3/3/2021 1029 by Rosa Ernst  Outcome: Progressing Towards Goal     Problem: Patient Education: Go to Patient Education Activity  Goal: Patient/Family Education  3/3/2021 1107 by Rosa Ernst  Outcome: Resolved/Met  3/3/2021 1029 by Rosa Ernst  Outcome: Progressing Towards Goal     Problem: Patient Education: Go to Patient Education Activity  Goal: Patient/Family Education  3/3/2021 1107 by Rosa Ernst  Outcome: Resolved/Met  3/3/2021 1029 by Rosa Ernst  Outcome: Progressing Towards Goal     Problem: Hip Replacement: Day of Surgery/Unit  Goal: Off Pathway (Use only if patient is Off Pathway)  3/3/2021 1107 by Rosa Ernst  Outcome: Resolved/Met  3/3/2021 1029 by Rosa Ernst  Outcome: Progressing Towards Goal  Goal: Activity/Safety  3/3/2021 1107 by Rosa Ernst  Outcome: Resolved/Met  3/3/2021 1029 by Rosa Ernst  Outcome: Progressing Towards Goal  Goal: Consults, if ordered  3/3/2021 1107 by Rosa Ernst  Outcome: Resolved/Met  3/3/2021 1029 by Rosa Ernst  Outcome: Progressing Towards Goal  Goal: Diagnostic Test/Procedures  3/3/2021 1107 by Rosa Ernst  Outcome: Resolved/Met  3/3/2021 1029 by Rosa Ernst  Outcome: Progressing Towards Goal  Goal: Nutrition/Diet  3/3/2021 1107 by Rosa Ernst  Outcome: Resolved/Met  3/3/2021 1029 by Rosa Ernst  Outcome: Progressing Towards Goal  Goal: Medications  3/3/2021 1107 by Rosa Ernst  Outcome: Resolved/Met  3/3/2021 1029 by Rosa Ernst  Outcome: Progressing Towards Goal  Goal: Respiratory  3/3/2021 1107 by Rosa Ernst  Outcome: Resolved/Met  3/3/2021 1029 by Rosa Ernst  Outcome: Progressing Towards Goal  Goal: Treatments/Interventions/Procedures  3/3/2021 1107 by Ari Dear  Outcome: Resolved/Met  3/3/2021 1029 by Ari Dear  Outcome: Progressing Towards Goal  Goal: Psychosocial  3/3/2021 1107 by Ari Dear  Outcome: Resolved/Met  3/3/2021 1029 by Ari Dear  Outcome: Progressing Towards Goal  Goal: *Initiate mobility  3/3/2021 1107 by Miriams Dear  Outcome: Resolved/Met  3/3/2021 1029 by Ari Dear  Outcome: Progressing Towards Goal  Goal: *Optimal pain control at patient's stated goal  3/3/2021 1107 by Ari Dear  Outcome: Resolved/Met  3/3/2021 1029 by Ari Dear  Outcome: Progressing Towards Goal  Goal: *Hemodynamically stable  3/3/2021 1107 by Ari Dear  Outcome: Resolved/Met  3/3/2021 1029 by Ari Dear  Outcome: Progressing Towards Goal     Problem: Hip Replacement: Post Op Day 1  Goal: Off Pathway (Use only if patient is Off Pathway)  3/3/2021 1107 by Ari Dear  Outcome: Resolved/Met  3/3/2021 1029 by Ari Dear  Outcome: Progressing Towards Goal  Goal: Activity/Safety  3/3/2021 1107 by Ari Dear  Outcome: Resolved/Met  3/3/2021 1029 by Ari Dear  Outcome: Progressing Towards Goal  Goal: Diagnostic Test/Procedures  3/3/2021 1107 by Ari Dear  Outcome: Resolved/Met  3/3/2021 1029 by Ari Dear  Outcome: Progressing Towards Goal  Goal: Nutrition/Diet  3/3/2021 1107 by Ari Dear  Outcome: Resolved/Met  3/3/2021 1029 by Ari Dear  Outcome: Progressing Towards Goal  Goal: Medications  3/3/2021 1107 by Ari Dear  Outcome: Resolved/Met  3/3/2021 1029 by Ari Dear  Outcome: Progressing Towards Goal  Goal: Respiratory  3/3/2021 1107 by Ari Dear  Outcome: Resolved/Met  3/3/2021 1029 by Ari Dear  Outcome: Progressing Towards Goal  Goal: Treatments/Interventions/Procedures  3/3/2021 1107 by Ari Rodriguezr  Outcome: Resolved/Met  3/3/2021 1029 by Ari Monahan  Outcome: Progressing Towards Goal  Goal: Psychosocial  3/3/2021 1107 by Wilton Moralez  Outcome: Resolved/Met  3/3/2021 1029 by Wilton Moralez  Outcome: Progressing Towards Goal  Goal: Discharge Planning  3/3/2021 1107 by Wilton Moralez  Outcome: Resolved/Met  3/3/2021 1029 by Wilton Moralez  Outcome: Progressing Towards Goal  Goal: *Demonstrates progressive activity  3/3/2021 1107 by Wilton Moralez  Outcome: Resolved/Met  3/3/2021 1029 by Wilton Moralez  Outcome: Progressing Towards Goal  Goal: *Optimal pain control at patient's stated goal  3/3/2021 1107 by Wilton Moralez  Outcome: Resolved/Met  3/3/2021 1029 by Wilton Moralez  Outcome: Progressing Towards Goal  Goal: *Hemodynamically stable  3/3/2021 1107 by Wilton Moralez  Outcome: Resolved/Met  3/3/2021 1029 by Wilton Moralez  Outcome: Progressing Towards Goal  Goal: *Discharge plan identified  3/3/2021 1107 by Wilton Moralez  Outcome: Resolved/Met  3/3/2021 1029 by Wilton Moralez  Outcome: Progressing Towards Goal     Problem: Hip Replacement: Post-Op Day 2  Goal: Off Pathway (Use only if patient is Off Pathway)  3/3/2021 1107 by Wilton Moralez  Outcome: Resolved/Met  3/3/2021 1029 by Witlon Moralez  Outcome: Progressing Towards Goal  Goal: Activity/Safety  3/3/2021 1107 by Wilton Moralez  Outcome: Resolved/Met  3/3/2021 1029 by Wilton Moralez  Outcome: Progressing Towards Goal  Goal: Diagnostic Test/Procedures  3/3/2021 1107 by Wilton Moralez  Outcome: Resolved/Met  3/3/2021 1029 by Wilton Moralez  Outcome: Progressing Towards Goal  Goal: Nutrition/Diet  3/3/2021 1107 by Wilton Moralez  Outcome: Resolved/Met  3/3/2021 1029 by Wilton Moralez  Outcome: Progressing Towards Goal  Goal: Medications  3/3/2021 1107 by Wilton Moralez  Outcome: Resolved/Met  3/3/2021 1029 by Wilton Moralez  Outcome: Progressing Towards Goal  Goal: Respiratory  3/3/2021 1107 by Wilton Moralez  Outcome: Resolved/Met  3/3/2021 1029 by Wilton Moralez  Outcome: Progressing Towards Goal  Goal: Treatments/Interventions/Procedures  3/3/2021 1107 by Diane Manjarrez  Outcome: Resolved/Met  3/3/2021 1029 by Diane Manjarrez  Outcome: Progressing Towards Goal  Goal: Psychosocial  3/3/2021 1107 by Diane Manjarrez  Outcome: Resolved/Met  3/3/2021 1029 by Diane Manjarrez  Outcome: Progressing Towards Goal  Goal: *Met physical therapy criteria for discharge to the next level of care  3/3/2021 1107 by Diane Manjarrez  Outcome: Resolved/Met  3/3/2021 1029 by Diane Manjarrez  Outcome: Progressing Towards Goal  Goal: *Optimal pain control with oral analgesia  3/3/2021 1107 by Diane Manjarrez  Outcome: Resolved/Met  3/3/2021 1029 by Diane Manjarrez  Outcome: Progressing Towards Goal  Goal: *Hemodynamically stable  3/3/2021 1107 by Diane Manjarrez  Outcome: Resolved/Met  3/3/2021 1029 by Diane Manjarrez  Outcome: Progressing Towards Goal  Goal: *Tolerating diet  3/3/2021 1107 by Diane Manjarrez  Outcome: Resolved/Met  3/3/2021 1029 by Diane Manajrrez  Outcome: Progressing Towards Goal  Goal: *Verbalizes understanding of any indicated hip precautions  3/3/2021 1107 by Diane Manjarrez  Outcome: Resolved/Met  3/3/2021 1029 by Diane Manjarrez  Outcome: Progressing Towards Goal  Goal: *Patient verbalizes understanding of discharge instructions  3/3/2021 1107 by Diane Manjarrez  Outcome: Resolved/Met  3/3/2021 1029 by Diane Manjarrez  Outcome: Progressing Towards Goal     Problem: Patient Education: Go to Patient Education Activity  Goal: Patient/Family Education  3/3/2021 1107 by Diane Manjarrez  Outcome: Resolved/Met  3/3/2021 1029 by Diane Manjarrez  Outcome: Progressing Towards Goal     Problem: Patient Education: Go to Patient Education Activity  Goal: Patient/Family Education  3/3/2021 1107 by Diane Manjarrez  Outcome: Resolved/Met  3/3/2021 1029 by Diane Manjarrez  Outcome: Progressing Towards Goal

## 2021-03-03 NOTE — PROGRESS NOTES
Discharge order noted for today. Pt has been accepted to [facility]. Met with patient  and are agreeable to the transition plan today. Transport has been arranged through spouse. Patient's discharge summary and home health  orders have been forwarded to [facility] health  agency via [address] and phone call to Nano Sena. Updated bedside RN,  to the transition plan.   Discharge information has been documented on the AVS.       Leah Guaman RN BSN  Outcomes Manager    Pager # [phone]

## 2021-03-03 NOTE — PROGRESS NOTES
OT order received and chart reviewed. Patient seen for skilled OT evaluation and is safe for discharge home when medically stable/cleared by PT. Pt has all recommended equipment to safely return home. Full note to follow.       Thank you for the referral.    Patrice Head MS, OTR/L

## 2021-03-03 NOTE — PROGRESS NOTES
Reason for Admission:  Osteoarthritis of right hip, unspecified osteoarthritis type [M16.11]  Hip arthritis [M16.10]                 RUR Score:    6            Plan for utilizing home health:    yes                      Likelihood of Readmission:   LOW                         Transition of Care Plan:              Initial assessment completed with patient. Cognitive status of patient: oriented to time, place, person and situation. Face sheet information confirmed:  yes. The patient designates spouse to participate in her discharge plan and to receive any needed information. This patient lives in a single family home with . Patient is able to navigate steps as needed. Prior to hospitalization, patient was considered to be independent with ADLs/IADLS : yes  Patient has a current ACP document on file: no  The  will be available to transport patient home upon discharge. The patient already has [facility] Al[facility] Rosa, [facility] Marc chair, and  medical equipment available in the home. Patient is not currently active with home health. Patient has not stayed in a skilled nursing facility or rehab. Was  stay within last 60 days : no. This patient is on dialysis :no        List of available Home Health agencies were provided and reviewed with the patient prior to discharge. Freedom of choice signed: yes, for [facility]. Currently, the discharge plan is Home with [address] [place] [address] Misael Bernardo. The patient states that she can obtain her medications from the pharmacy, and take her medications as directed. Patient's current insurance is [employer] [place] [employer] Management Interventions  PCP Verified by CM:  Yes  Mode of Transport at Discharge: Self(spouse)  Transition of Care Consult (CM Consult): [address]: Yes  Current Support Network: Lives with Spouse, Own Home  The Plan for Transition of Care is Related to the Following Treatment Goals : home health  The Patient and/or Patient Representative was Provided with a Choice of Provider and Agrees with the Discharge Plan?: Yes  Freedom of Choice List was Provided with Basic Dialogue that Supports the Patient's Individualized Plan of Care/Goals, Treatment Preferences and Shares the Quality Data Associated with the Providers?: Yes  Discharge Location  Discharge Placement: Home with home health        Sarah Kane ORNELAS BSN  Outcomes Manager    Pager # [phone]

## 2021-03-03 NOTE — PROGRESS NOTES
End of Shift Note     Bedside and verbal shift change report given to iDane RN (On coming nurse) by Jose G Vides RN (Off going nurse).   Report included the following information:      --Procedure Summary     --MAR,     --Recent Results     --Med Rec Status

## 2021-03-04 ENCOUNTER — HOME CARE VISIT (OUTPATIENT)
Dept: SCHEDULING | Facility: HOME HEALTH | Age: 51
End: 2021-03-04
Payer: OTHER GOVERNMENT

## 2021-03-04 ENCOUNTER — TELEPHONE (OUTPATIENT)
Dept: ORTHOPEDIC SURGERY | Age: 51
End: 2021-03-04

## 2021-03-04 VITALS
TEMPERATURE: 97.4 F | WEIGHT: 163 LBS | HEIGHT: 63 IN | SYSTOLIC BLOOD PRESSURE: 104 MMHG | DIASTOLIC BLOOD PRESSURE: 62 MMHG | BODY MASS INDEX: 28.88 KG/M2 | OXYGEN SATURATION: 97 % | HEART RATE: 63 BPM

## 2021-03-04 PROCEDURE — 400013 HH SOC

## 2021-03-04 PROCEDURE — 400018 HH-NO PAY CLAIM PROCEDURE

## 2021-03-04 PROCEDURE — G0299 HHS/HOSPICE OF RN EA 15 MIN: HCPCS

## 2021-03-04 PROCEDURE — 3331090001 HH PPS REVENUE CREDIT

## 2021-03-04 PROCEDURE — G0151 HHCP-SERV OF PT,EA 15 MIN: HCPCS

## 2021-03-04 PROCEDURE — 3331090002 HH PPS REVENUE DEBIT

## 2021-03-04 PROCEDURE — A6231 HYDROGEL DSG<=16 SQ IN: HCPCS

## 2021-03-04 NOTE — TELEPHONE ENCOUNTER
Patient just got a call stating she is being offered the covid vaccine. She is asking if this is advisable having had surgery so recently. Can she proceed with getting this?     Patient [phone]

## 2021-03-04 NOTE — TELEPHONE ENCOUNTER
Patient states that her discharge orders say to discontinue taking the Hydroxychloroquine. However, this is her autoimmune medication and she thinks this could be an error. Please verify.      Patient [phone]

## 2021-03-05 ENCOUNTER — TELEPHONE (OUTPATIENT)
Dept: SURGICAL ICU | Age: 51
End: 2021-03-05

## 2021-03-05 ENCOUNTER — HOME CARE VISIT (OUTPATIENT)
Dept: SCHEDULING | Facility: HOME HEALTH | Age: 51
End: 2021-03-05
Payer: OTHER GOVERNMENT

## 2021-03-05 VITALS
OXYGEN SATURATION: 98 % | RESPIRATION RATE: 18 BRPM | SYSTOLIC BLOOD PRESSURE: 108 MMHG | HEART RATE: 77 BPM | DIASTOLIC BLOOD PRESSURE: 70 MMHG | TEMPERATURE: 97 F

## 2021-03-05 PROCEDURE — 3331090002 HH PPS REVENUE DEBIT

## 2021-03-05 PROCEDURE — 3331090001 HH PPS REVENUE CREDIT

## 2021-03-05 PROCEDURE — G0157 HHC PT ASSISTANT EA 15: HCPCS

## 2021-03-06 ENCOUNTER — HOME CARE VISIT (OUTPATIENT)
Dept: SCHEDULING | Facility: HOME HEALTH | Age: 51
End: 2021-03-06
Payer: OTHER GOVERNMENT

## 2021-03-06 VITALS
OXYGEN SATURATION: 97 % | SYSTOLIC BLOOD PRESSURE: 132 MMHG | HEART RATE: 80 BPM | TEMPERATURE: 98.1 F | DIASTOLIC BLOOD PRESSURE: 88 MMHG

## 2021-03-06 PROCEDURE — 3331090002 HH PPS REVENUE DEBIT

## 2021-03-06 PROCEDURE — G0157 HHC PT ASSISTANT EA 15: HCPCS

## 2021-03-06 PROCEDURE — 3331090001 HH PPS REVENUE CREDIT

## 2021-03-07 ENCOUNTER — HOME CARE VISIT (OUTPATIENT)
Dept: SCHEDULING | Facility: HOME HEALTH | Age: 51
End: 2021-03-07
Payer: OTHER GOVERNMENT

## 2021-03-07 PROCEDURE — G0157 HHC PT ASSISTANT EA 15: HCPCS

## 2021-03-07 PROCEDURE — 3331090002 HH PPS REVENUE DEBIT

## 2021-03-07 PROCEDURE — 3331090001 HH PPS REVENUE CREDIT

## 2021-03-08 ENCOUNTER — HOME CARE VISIT (OUTPATIENT)
Dept: SCHEDULING | Facility: HOME HEALTH | Age: 51
End: 2021-03-08
Payer: OTHER GOVERNMENT

## 2021-03-08 VITALS
OXYGEN SATURATION: 98 % | HEART RATE: 56 BPM | TEMPERATURE: 98.3 F | DIASTOLIC BLOOD PRESSURE: 82 MMHG | SYSTOLIC BLOOD PRESSURE: 124 MMHG | SYSTOLIC BLOOD PRESSURE: 136 MMHG | TEMPERATURE: 97.9 F | DIASTOLIC BLOOD PRESSURE: 76 MMHG | OXYGEN SATURATION: 99 % | RESPIRATION RATE: 13 BRPM | RESPIRATION RATE: 14 BRPM | HEART RATE: 64 BPM

## 2021-03-08 VITALS
OXYGEN SATURATION: 98 % | HEART RATE: 80 BPM | RESPIRATION RATE: 18 BRPM | TEMPERATURE: 97.5 F | SYSTOLIC BLOOD PRESSURE: 122 MMHG | DIASTOLIC BLOOD PRESSURE: 70 MMHG

## 2021-03-08 PROCEDURE — G0157 HHC PT ASSISTANT EA 15: HCPCS

## 2021-03-08 PROCEDURE — A6212 FOAM DRG <=16 SQ IN W/BORDER: HCPCS

## 2021-03-08 PROCEDURE — G0299 HHS/HOSPICE OF RN EA 15 MIN: HCPCS

## 2021-03-08 PROCEDURE — 3331090002 HH PPS REVENUE DEBIT

## 2021-03-08 PROCEDURE — 3331090001 HH PPS REVENUE CREDIT

## 2021-03-09 ENCOUNTER — HOME CARE VISIT (OUTPATIENT)
Dept: HOME HEALTH SERVICES | Facility: HOME HEALTH | Age: 51
End: 2021-03-09
Payer: OTHER GOVERNMENT

## 2021-03-09 VITALS
SYSTOLIC BLOOD PRESSURE: 134 MMHG | OXYGEN SATURATION: 99 % | HEART RATE: 65 BPM | DIASTOLIC BLOOD PRESSURE: 82 MMHG | TEMPERATURE: 97.9 F

## 2021-03-09 PROCEDURE — 3331090002 HH PPS REVENUE DEBIT

## 2021-03-09 PROCEDURE — 3331090001 HH PPS REVENUE CREDIT

## 2021-03-09 NOTE — ADT AUTH CERT NOTES
PREVIOUSLY DENIED FOR INPATIENT DOWNGRADED TO OBSERVATION REF #[id number] PLEASE FAX FORM OR CALL BACK TO NOTIFY IF  AUTHORIZATION FOR OBSERVATION IS OR IS NOT REQUIRED  PHONE # [phone] FAX # [phone]

## 2021-03-10 ENCOUNTER — HOME CARE VISIT (OUTPATIENT)
Dept: SCHEDULING | Facility: HOME HEALTH | Age: 51
End: 2021-03-10
Payer: OTHER GOVERNMENT

## 2021-03-10 VITALS
SYSTOLIC BLOOD PRESSURE: 128 MMHG | HEART RATE: 67 BPM | DIASTOLIC BLOOD PRESSURE: 90 MMHG | OXYGEN SATURATION: 98 % | TEMPERATURE: 97.9 F

## 2021-03-10 PROCEDURE — G0157 HHC PT ASSISTANT EA 15: HCPCS

## 2021-03-10 PROCEDURE — 3331090001 HH PPS REVENUE CREDIT

## 2021-03-10 PROCEDURE — 3331090002 HH PPS REVENUE DEBIT

## 2021-03-11 ENCOUNTER — HOME CARE VISIT (OUTPATIENT)
Dept: SCHEDULING | Facility: HOME HEALTH | Age: 51
End: 2021-03-11
Payer: OTHER GOVERNMENT

## 2021-03-11 VITALS
TEMPERATURE: 97.2 F | RESPIRATION RATE: 18 BRPM | OXYGEN SATURATION: 99 % | DIASTOLIC BLOOD PRESSURE: 90 MMHG | SYSTOLIC BLOOD PRESSURE: 142 MMHG | HEART RATE: 83 BPM

## 2021-03-11 PROCEDURE — 3331090001 HH PPS REVENUE CREDIT

## 2021-03-11 PROCEDURE — G0151 HHCP-SERV OF PT,EA 15 MIN: HCPCS

## 2021-03-11 PROCEDURE — 3331090002 HH PPS REVENUE DEBIT

## 2021-03-12 ENCOUNTER — HOME CARE VISIT (OUTPATIENT)
Dept: SCHEDULING | Facility: HOME HEALTH | Age: 51
End: 2021-03-12
Payer: OTHER GOVERNMENT

## 2021-03-12 VITALS
OXYGEN SATURATION: 97 % | HEART RATE: 117 BPM | DIASTOLIC BLOOD PRESSURE: 98 MMHG | SYSTOLIC BLOOD PRESSURE: 136 MMHG | TEMPERATURE: 98.2 F

## 2021-03-12 PROCEDURE — 3331090001 HH PPS REVENUE CREDIT

## 2021-03-12 PROCEDURE — 3331090002 HH PPS REVENUE DEBIT

## 2021-03-12 PROCEDURE — G0157 HHC PT ASSISTANT EA 15: HCPCS

## 2021-03-13 ENCOUNTER — HOME CARE VISIT (OUTPATIENT)
Dept: HOME HEALTH SERVICES | Facility: HOME HEALTH | Age: 51
End: 2021-03-13
Payer: OTHER GOVERNMENT

## 2021-03-13 PROCEDURE — 3331090002 HH PPS REVENUE DEBIT

## 2021-03-13 PROCEDURE — 3331090001 HH PPS REVENUE CREDIT

## 2021-03-14 ENCOUNTER — HOME CARE VISIT (OUTPATIENT)
Dept: SCHEDULING | Facility: HOME HEALTH | Age: 51
End: 2021-03-14
Payer: OTHER GOVERNMENT

## 2021-03-14 PROCEDURE — 3331090002 HH PPS REVENUE DEBIT

## 2021-03-14 PROCEDURE — G0157 HHC PT ASSISTANT EA 15: HCPCS

## 2021-03-14 PROCEDURE — 3331090001 HH PPS REVENUE CREDIT

## 2021-03-15 ENCOUNTER — HOME CARE VISIT (OUTPATIENT)
Dept: SCHEDULING | Facility: HOME HEALTH | Age: 51
End: 2021-03-15
Payer: OTHER GOVERNMENT

## 2021-03-15 VITALS
HEART RATE: 71 BPM | OXYGEN SATURATION: 99 % | DIASTOLIC BLOOD PRESSURE: 82 MMHG | SYSTOLIC BLOOD PRESSURE: 130 MMHG | RESPIRATION RATE: 14 BRPM | TEMPERATURE: 97.8 F

## 2021-03-15 VITALS
TEMPERATURE: 98.1 F | DIASTOLIC BLOOD PRESSURE: 98 MMHG | HEART RATE: 83 BPM | OXYGEN SATURATION: 98 % | SYSTOLIC BLOOD PRESSURE: 152 MMHG

## 2021-03-15 PROCEDURE — 3331090002 HH PPS REVENUE DEBIT

## 2021-03-15 PROCEDURE — G0157 HHC PT ASSISTANT EA 15: HCPCS

## 2021-03-15 PROCEDURE — 3331090001 HH PPS REVENUE CREDIT

## 2021-03-15 PROCEDURE — G0300 HHS/HOSPICE OF LPN EA 15 MIN: HCPCS

## 2021-03-15 NOTE — Clinical Note
Please encourage her to call her PCP because they might want to increase her BP pill dosage.   Thanks.  ----- Message -----  From: Norman He PTA  Sent: 3/15/2021  10:35 PM EDT  To: Yinka Nichols PT      Pt's recent BP readings:  3/11   sitting 142/90;    3/12    sitting 136/98;     3/14    130/82;    3/15    sitting 156/96,  [id number],   supine 152/98

## 2021-03-15 NOTE — Clinical Note
Pt's BP has been elevated the past few PT session. Pt denies any other symptoms; such as headache, visual distances or naussea.  See below:

## 2021-03-15 NOTE — Clinical Note
Pt's recent BP readings:  3/11   sitting 142/90;    3/12    sitting 136/98;     3/14    130/82;    3/15    sitting 156/96,  [id number],   supine 152/98

## 2021-03-16 ENCOUNTER — TELEPHONE (OUTPATIENT)
Dept: FAMILY MEDICINE CLINIC | Age: 51
End: 2021-03-16

## 2021-03-16 ENCOUNTER — HOME CARE VISIT (OUTPATIENT)
Dept: SCHEDULING | Facility: HOME HEALTH | Age: 51
End: 2021-03-16
Payer: OTHER GOVERNMENT

## 2021-03-16 VITALS
DIASTOLIC BLOOD PRESSURE: 80 MMHG | SYSTOLIC BLOOD PRESSURE: 136 MMHG | HEART RATE: 83 BPM | TEMPERATURE: 97.7 F | OXYGEN SATURATION: 98 %

## 2021-03-16 PROCEDURE — G0157 HHC PT ASSISTANT EA 15: HCPCS

## 2021-03-16 PROCEDURE — 3331090001 HH PPS REVENUE CREDIT

## 2021-03-16 PROCEDURE — 3331090002 HH PPS REVENUE DEBIT

## 2021-03-16 NOTE — TELEPHONE ENCOUNTER
Contacted patient and verified identity using name and date of birth (2- identifiers)  Spoke with patient and she indicated that her pain is pretty well controlled at a 3-4/10. She reports that it was elevated yesterday but today it was 137/80. Advised on no/low salt diet and avoid frozen food. Advised patient that if her pressure does start to get higher that she can take 2 of the 5 mg of Amlodipine and to schedule an appointment after a couple of weeks. She verbalized understanding.

## 2021-03-16 NOTE — TELEPHONE ENCOUNTER
I have also replied to the nurse who has sent message earlier as well. I need to know how is her pain control after surgery. If pain control is good, take low or no salt diet. Avoid frozen food. Also she can try 2 tabs of her amlodipine 5 mg and can schedule virtual visit in couple of weeks and continue keeping blood pressure log.     Fela Liriano

## 2021-03-17 ENCOUNTER — HOME CARE VISIT (OUTPATIENT)
Dept: SCHEDULING | Facility: HOME HEALTH | Age: 51
End: 2021-03-17
Payer: OTHER GOVERNMENT

## 2021-03-17 ENCOUNTER — TRANSCRIBE ORDER (OUTPATIENT)
Dept: SCHEDULING | Age: 51
End: 2021-03-17

## 2021-03-17 VITALS
DIASTOLIC BLOOD PRESSURE: 90 MMHG | OXYGEN SATURATION: 98 % | SYSTOLIC BLOOD PRESSURE: 136 MMHG | TEMPERATURE: 97.5 F | HEART RATE: 76 BPM

## 2021-03-17 DIAGNOSIS — Z12.31 SCREENING MAMMOGRAM, ENCOUNTER FOR: Primary | ICD-10-CM

## 2021-03-17 PROCEDURE — 3331090001 HH PPS REVENUE CREDIT

## 2021-03-17 PROCEDURE — 3331090002 HH PPS REVENUE DEBIT

## 2021-03-17 PROCEDURE — G0157 HHC PT ASSISTANT EA 15: HCPCS

## 2021-03-18 ENCOUNTER — HOME CARE VISIT (OUTPATIENT)
Dept: SCHEDULING | Facility: HOME HEALTH | Age: 51
End: 2021-03-18
Payer: OTHER GOVERNMENT

## 2021-03-18 VITALS
OXYGEN SATURATION: 99 % | HEART RATE: 79 BPM | TEMPERATURE: 97.5 F | DIASTOLIC BLOOD PRESSURE: 78 MMHG | RESPIRATION RATE: 18 BRPM | SYSTOLIC BLOOD PRESSURE: 122 MMHG

## 2021-03-18 PROCEDURE — 3331090002 HH PPS REVENUE DEBIT

## 2021-03-18 PROCEDURE — G0151 HHCP-SERV OF PT,EA 15 MIN: HCPCS

## 2021-03-18 PROCEDURE — 3331090001 HH PPS REVENUE CREDIT

## 2021-03-18 NOTE — PROGRESS NOTES
In [address]  [address]  [place], [address]  [phone] fax    Physical Therapy Discharge Summary  Patient name: Brielle Gregorio Start of Care: 2021   Referral source: Jesus Stoddard MD : 1970   Medical/Treatment Diagnosis: Left hip pain [M25.552]  Payor:  / Plan: Zohaib Turner 74 / Product Type: Izabella Granda /  Onset Date:12/15/2020      Prior Hospitalization: see medical history Provider#: [id number]   Medications: Verified on Patient Summary List     Comorbidities: OA  Prior Level of Function:functionally I with daily tasks  Visits from Start of Care: 9    Missed Visits: 0  Reporting Period : 2021 to     Summary of Care:  Goal: Improve FOTO score to predicted outcome to improve ability for daily tasks  Status at last note/certification: unable to reassess   Status at discharge: not met    Goal: Improve left hip abduction to 3/5 for improved gait ability. Status at last note/certification: unable to reassess   Status at discharge: not met    Goal: The pt will demonstrate ability to perform stairs with alternating pattern. Status at last note/certification: unable to reassess   Status at discharge: not met    Goal: Pt will perform 10 hip dip exercises B with correct form, indicating improved functional strength of the B hip abductors for ambulation. Status at last note/certification: unable to reassess   Status at discharge: not met    ASSESSMENT/RECOMMENDATIONS:  Pt was seen for 9 therapy sessions. The pt's last therapy session was on 2021 and did not return to therapy. Pt is therefore d/c'ed from therapy and unable to reassess goals at this time.    [x]Discontinue therapy: []Patient has reached or is progressing toward set goals      [x]Patient is non-compliant or has abdicated      []Due to lack of appreciable progress towards set goals    Ira Beaver, PT 3/18/2021 4:32 PM

## 2021-03-19 ENCOUNTER — OFFICE VISIT (OUTPATIENT)
Dept: ORTHOPEDIC SURGERY | Age: 51
End: 2021-03-19
Payer: OTHER GOVERNMENT

## 2021-03-19 VITALS
TEMPERATURE: 96 F | OXYGEN SATURATION: 96 % | RESPIRATION RATE: 16 BRPM | HEIGHT: 63 IN | BODY MASS INDEX: 29.59 KG/M2 | WEIGHT: 167 LBS | HEART RATE: 67 BPM

## 2021-03-19 DIAGNOSIS — M17.0 PRIMARY OSTEOARTHRITIS OF BOTH KNEES: ICD-10-CM

## 2021-03-19 DIAGNOSIS — Z96.641 STATUS POST TOTAL REPLACEMENT OF RIGHT HIP: Primary | ICD-10-CM

## 2021-03-19 DIAGNOSIS — M25.551 RIGHT HIP PAIN: ICD-10-CM

## 2021-03-19 PROCEDURE — 99024 POSTOP FOLLOW-UP VISIT: CPT | Performed by: PHYSICIAN ASSISTANT

## 2021-03-19 NOTE — PROGRESS NOTES
[address] [facility] [address] [id number]  [phone]           Patient: Heydi Maddox                MRN: [id number]       SSN: [id number]  YOB: 1970        AGE: 53 y.o. SEX: female  Body mass index is 29.58 kg/m². PCP: Patrizia uRiz MD  03/19/21      This office note has been dictated. REVIEW OF SYSTEMS:  Constitutional: Negative for fever, chills, weight loss and malaise/fatigue. HENT: Negative. Eyes: Negative. Respiratory: Negative. Cardiovascular: Negative. Gastrointestinal: No bowel incontinence or constipation. Genitourinary: No bladder incontinence or saddle anesthesia. Skin: Negative. Neurological: Negative. Endo/Heme/Allergies: Negative. Psychiatric/Behavioral: Negative. Musculoskeletal: As per HPI above. Past Medical History:   Diagnosis Date    Adverse effect of anesthesia     Hypotension    Allergic rhinitis, seasonal     spring    Anemia NEC     iron deficiency associated with heavy menstruation and fibroids noted 2008 with pregnancy    Dysmenorrhea     increasing past year    Fibroid, uterine     Goiter     Bilateral    Headache(784.0)     Hypertension     Ill-defined condition     Chronic knee pain    Menorrhagia     worsening past year    Neutropenia (HCC)          Current Outpatient Medications:     hydrOXYchloroQUINE (PLAQUENIL) 200 mg tablet, Take 200 mg by mouth two (2) times a day., Disp: , Rfl:     oxyCODONE-acetaminophen (Percocet)  mg per tablet, Take 1 Tab by mouth every eight (8) hours as needed for Pain for up to 7 days. Max Daily Amount: 3 Tabs., Disp: 21 Tab, Rfl: 0    ergocalciferol (Vitamin D2) 1,250 mcg (50,000 unit) capsule, Take 50,000 Units by mouth daily. , Disp: , Rfl:     aspirin (ASPIRIN) 325 mg tablet, Take 1 Tab by mouth two (2) times a day., Disp: 60 Tab, Rfl: 0    celecoxib (CeleBREX) 200 mg capsule, Take 1 Cap by mouth two (2) times a day for 90 days. , Disp: 60 Cap, Rfl: 2    cephALEXin (Keflex) 500 mg capsule, Take 1 Cap by mouth four (4) times daily. , Disp: 12 Cap, Rfl: 0    docusate sodium 100 mg tab, Take 1 Tab by mouth daily. , Disp: , Rfl:     acetaminophen (TYLENOL) 500 mg tablet, Take 1,000 mg by mouth every six (6) hours as needed for Pain. Pain, Disp: , Rfl:     Blood Pressure Monitor kit, Once a day, Disp: 1 Kit, Rfl: 0    amLODIPine (NORVASC) 5 mg tablet, Take 1 Tab by mouth daily. , Disp: 90 Tab, Rfl: 1    No Known Allergies    Social History     Socioeconomic History    Marital status:      Spouse name: Not on file    Number of children: Not on file    Years of education: Not on file    Highest education level: Not on file   Occupational History    Not on file   Social Needs    Financial resource strain: Not on file    Food insecurity     Worry: Not on file     Inability: Not on file    Transportation needs     Medical: Not on file     Non-medical: Not on file   Tobacco Use    Smoking status: Never Smoker    Smokeless tobacco: Never Used   Substance and Sexual Activity    Alcohol use:  Yes     Alcohol/week: 0.0 standard drinks     Comment: occass: 2/month    Drug use: No    Sexual activity: Yes     Partners: Male     Birth control/protection: Surgical     Comment: hysterectomy   Lifestyle    Physical activity     Days per week: Not on file     Minutes per session: Not on file    Stress: Not on file   Relationships    Social connections     Talks on phone: Not on file     Gets together: Not on file     Attends [employer]ism service: Not on file     Active member of club or organization: Not on file     Attends meetings of clubs or organizations: Not on file     Relationship status: Not on file    Intimate partner violence     Fear of current or ex partner: Not on file     Emotionally abused: Not on file     Physically abused: Not on file     Forced sexual activity: Not on file   Other Topics Concern    Not on file   Social History Narrative    Not on file       Past Surgical History:   Procedure Laterality Date    [facility]  04/[facility]    HX KNEE ARTHROSCOPY Right     NM TOTAL HIP ARTHROPLASTY Left 12/13/2020         Patient seen and evaluated today for her right total hip replacement. She is now 17 days status post surgery and doing extremely well. Her pain is well controlled. She has had no troubles the wound. She has finished up her home health physical therapy without complications. She ambulates with a cane. Patient denies recent fevers, chills, chest pain, SOB, or injuries. No recent systemic changes noted. A 12-point review of systems is performed today. Pertinent positives are noted. All other systems reviewed and otherwise are negative. Physical exam: General: Alert and oriented x3, nad.  well-developed, well nourished. normal affect, AF. NC/AT, EOMI, neck supple, trachea midline, no JVD present. Breathing is non-labored. Examination of the right hip reveals skin intact. The surgical wound is healed nicely with staples in place. There is no erythema, ecchymosis, warmth. There are no signs of infection or cellulitis present. Leg lengths are perfect. Negative calf tenderness. Negative Homans. No signs of DVT present. Assessment: Status post right total hip replacement    Plan: At this point, the staples were removed and replaced with Steri-Strips without complications. She will be set up with outpatient physical therapy. She will continue activity as tolerated. We will plan on seeing her back in 2 weeks time for evaluation. She will call with any questions or concerns that shall arise.               JR Josh MARTIN, PALUIS, ATC

## 2021-03-22 NOTE — PROGRESS NOTES
Pt meeting all SN goals at this time and is clinically discharged and documentation finalized for completion of discipline discharge. Therapy, please continue with other therapies and complete final DC. Thank you!

## 2021-03-23 ENCOUNTER — HOSPITAL ENCOUNTER (OUTPATIENT)
Dept: PHYSICAL THERAPY | Age: 51
Discharge: HOME | End: 2021-03-23
Payer: OTHER GOVERNMENT

## 2021-03-23 PROCEDURE — 97110 THERAPEUTIC EXERCISES: CPT

## 2021-03-23 PROCEDURE — 97161 PT EVAL LOW COMPLEX 20 MIN: CPT

## 2021-03-23 PROCEDURE — 97535 SELF CARE MNGMENT TRAINING: CPT

## 2021-03-23 NOTE — PROGRESS NOTES
[facility]  [address]  [place], [address]  [phone] fax    Plan of Care/ Statement of Necessity for Physical Therapy Services    Patient name: Lina Ayala Start of Care: 3/23/2021   Referral source: Modesta Castillo : 1970    Medical Diagnosis: Pain in right hip [M25.551]  Payor: CLAIR / Plan: Zohaib Turner 74 / Product Type: Haseeb Natelen /  Onset AYJ:61    Treatment Diagnosis: right hip pain (s/p right NOREEN-lateral approach)   Prior Hospitalization: see medical history Provider#: [id number]   Medications: Verified on Patient summary List    Comorbidities: OA, left NOREEN, right knee pain, right knee arthroscopy, HTN    Prior Level of Function: functionally I with daily tasks, cane with gait       The Plan of Care and following information is based on the information from the initial evaluation. Assessment/ key information: 54 y/o female presents s/p right NOREEN - lateral approach as noted above. She has c/o \"discomfort\" at currently rates pain 3/10. She did receive 2 weeks of HHPT prior to outpatient. The pt is ambulating with use of a cane and Trendelenburg pattern. The pt demonstrates diminished right hip AROM flexion to 80 degrees, abd 23 degrees, seated ER 10 degrees. MMT hip flexion 3-/5, abd 2/5, ext 3-/5. She requires self assistance for right LE movement with bed mobility. She is aware of NOREEN precautions and these were reviewed by PT. The pt will benefit from PT to address the aforementioned impairments.      Evaluation Complexity History MEDIUM  Complexity : 1-2 comorbidities / personal factors will impact the outcome/ POC ; Examination MEDIUM Complexity : 3 Standardized tests and measures addressing body structure, function, activity limitation and / or participation in recreation  ;Presentation LOW Complexity : Stable, uncomplicated  ;Clinical Decision Making MEDIUM Complexity : FOTO score of 26-74  Overall Yes Complexity Rating: MEDIUM  Problem List: pain affecting function, decrease ROM, decrease strength, impaired gait/ balance, decrease ADL/ functional abilitiies, decrease activity tolerance, decrease flexibility/ joint mobility and decrease transfer abilities   Treatment Plan may include any combination of the following: Therapeutic exercise, Therapeutic activities, Neuromuscular re-education, Physical agent/modality, Gait/balance training, Manual therapy, Patient education and Self Care training  Patient / Family readiness to learn indicated by: asking questions, trying to perform skills and interest  Persons(s) to be included in education: patient (P)  Barriers to Learning/Limitations: None  Patient Goal (s): To improve walking and strength  Patient Self Reported Health Status: good  Rehabilitation Potential: good    Short Term Goals: To be accomplished in 1 weeks:   1. The pt will be I and compliant with HEP     Long Term Goals: To be accomplished in 4 weeks:   1. Improve FOTO score to predicted outcome to improve ability for daily tasks   2. Improve right hip AROM flexion to 90 degrees for improved ability for ADL   3. The pt will demonstrate ability for sit to supine transfer without use of UE to assist the right LE. 4. Improve right glute med strength to 3/5 to improve gait   5. The pt will report no difficulty with performing light home activities       Frequency / Duration: Patient to be seen 3 times per week for 4 weeks. Patient/ Caregiver education and instruction: Diagnosis, prognosis, self care, activity modification, exercises and other NOREEN precautions   [x]  Plan of care has been reviewed with KARYN Bueno, PT 3/23/2021 1:18 PM    ________________________________________________________________________    I certify that the above Therapy Services are being furnished while the patient is under my care.  I agree with the treatment plan and certify that this therapy is necessary.     [de-identified] Signature:____________________  Date:____________Time: _________     Antonio Estes PA-C  Please sign and return to [facility] [address]  [address]  [place], [address]  [phone] fax

## 2021-03-23 NOTE — PROGRESS NOTES
PT DAILY TREATMENT NOTE     Patient Name: Joanne Buenrostro  Date:3/23/2021  : 1970  [x]  Patient  Verified  Payor:  / Plan: [facility] [place]  [facility] / Product Type:  /    In time:1:00  Out time:1:38  Total Treatment Time (min): 38  Visit #: 1 of 12    Treatment Area: Pain in right hip [M25.551]    SUBJECTIVE  Pain Level (0-10 scale): 3  Any medication changes, allergies to medications, adverse drug reactions, diagnosis change, or new procedure performed?: [x] No    [] Yes (see summary sheet for update)  Subjective functional status/changes:   [] No changes reported       OBJECTIVE     18 min [x]Eval                  []Re-Eval       12 min Therapeutic Exercise:  [x] See flow sheet :   Rationale: increase ROM and increase strength to improve the patients ability to perform ADL  8 min Self Care/Home Management: NOREEN precautions education, ADLs   Rationale: Adhere to NOREEN precautions  to improve the patients ability to perform ADLs            With   [] TE   [] TA   [] neuro   [] other: Patient Education: [x] Review HEP    [] Progressed/Changed HEP based on:   [] positioning   [] body mechanics   [] transfers   [] heat/ice application    [] other:      Other Objective/Functional Measures:       Pain Level (0-10 scale) post treatment: 3    ASSESSMENT/Changes in Function:      Patient will continue to benefit from skilled PT services to modify and progress therapeutic interventions, address functional mobility deficits, address ROM deficits, address strength deficits, analyze and address soft tissue restrictions and analyze and cue movement patterns to attain remaining goals. [x]  See Plan of Care  []  See progress note/recertification  []  See Discharge Summary         Progress towards goals / Updated goals:  Short Term Goals: To be accomplished in 1 weeks:   1. The pt will be I and compliant with HEP   IE- issued HEP  Long Term Goals: To be accomplished in 4 weeks:   1.  Improve FOTO score to predicted outcome to improve ability for daily tasks   IE- 73   2. Improve right hip AROM flexion to 90 degrees for improved ability for ADL   IE- 80 degrees   3. The pt will demonstrate ability for sit to supine transfer without use of UE to assist the right LE. IE- needs self assistance with right LE   4. Improve right glute med strength to 3/5 to improve gait   IE- 2/5    5.  The pt will report no difficulty with performing light home activities   IE- moderate difficulty    PLAN  []  Upgrade activities as tolerated     [x]  Continue plan of care  []  Update interventions per flow sheet       []  Discharge due to:_  []  Other:_      Macy Brizuela, PT 3/23/2021  1:17 PM    Future Appointments   Date Time Provider Kingsley Stout   4/16/2021 10:40 AM Santana Gonzalez PA-C [facility] AMB   5/14/2021  1:30 PM Danielle Thomas MD FP  AMB   5/19/2021  9:30 AM Danielle Thomas MD [facility] AMB   6/15/2021  4:30 PM [facility] CHRISTOPHER RM 3 3D [facility]

## 2021-03-26 ENCOUNTER — HOSPITAL ENCOUNTER (OUTPATIENT)
Dept: PHYSICAL THERAPY | Age: 51
Discharge: HOME | End: 2021-03-26
Payer: OTHER GOVERNMENT

## 2021-03-26 PROCEDURE — 97112 NEUROMUSCULAR REEDUCATION: CPT

## 2021-03-26 PROCEDURE — 97110 THERAPEUTIC EXERCISES: CPT

## 2021-03-26 NOTE — PROGRESS NOTES
PT DAILY TREATMENT NOTE     Patient Name: Ramesh Buenrostro  Date:3/26/2021  : 1970  [x]  Patient  Verified  Payor: CLAIR / Plan: Zohaib Turner 74 / Product Type:  /    In time:10:45  Out time:11:20  Total Treatment Time (min): 35  Visit #: 2 of 8    Medicare/BCBS Only   Total Timed Codes (min):  35 1:1 Treatment Time:  35       Treatment Area: Pain in right hip [M25.551]    SUBJECTIVE  Pain Level (0-10 scale): 3/10  Any medication changes, allergies to medications, adverse drug reactions, diagnosis change, or new procedure performed?: [x] No    [] Yes (see summary sheet for update)  Subjective functional status/changes:   [] No changes reported  The patient denies new complaints upon arrival.     OBJECTIVE  25 min Therapeutic Exercise:  [x] See flow sheet :   Rationale: increase ROM and increase strength to improve the patients ability to improve ADL ease. 10 min Neuromuscular Re-education:  [x]  See flow sheet : airex intervention, hip x 3. Rationale: increase ROM and increase strength  to improve the patients ability to improve ADL ease. With   [] TE   [] TA   [] neuro   [] other: Patient Education: [x] Review HEP    [] Progressed/Changed HEP based on:   [] positioning   [] body mechanics   [] transfers   [] heat/ice application    [] other:      Other Objective/Functional Measures:   Review precautions with the patient concerning her total hip replacement. She was able to recite them by memory. Gait training performed. The patient assumed SPC on right side and required cuing in order to utilize it on the left side with notable     Pain Level (0-10 scale) post treatment: 3/10    ASSESSMENT/Changes in Function: First follow-up, no increase in pain. Limited somewhat by right knee OA during session, but able to work around it and modify as needed.      Patient will continue to benefit from skilled PT services to modify and progress therapeutic interventions, address functional mobility deficits, address ROM deficits, address strength deficits, analyze and address soft tissue restrictions, analyze and cue movement patterns, analyze and modify body mechanics/ergonomics, assess and modify postural abnormalities, address imbalance/dizziness and instruct in home and community integration to attain remaining goals. []  See Plan of Care  []  See progress note/recertification  []  See Discharge Summary         Progress towards goals / Updated goals:  Short Term Goals: To be accomplished in 1 weeks:              1. The pt will be I and compliant with HEP              IE- issued HEP  Long Term Goals: To be accomplished in 4 weeks:              1. Improve FOTO score to predicted outcome to improve ability for daily tasks              IE- 73              2. Improve right hip AROM flexion to 90 degrees for improved ability for ADL              IE- 80 degrees              3. The pt will demonstrate ability for sit to supine transfer without use of UE to assist the right LE.                IE- needs self assistance with right LE              4. Improve right glute med strength to 3/5 to improve gait              IE- 2/5               5. The pt will report no difficulty with performing light home activities              IE- moderate difficulty    PLAN  []  Upgrade activities as tolerated     [x]  Continue plan of care  []  Update interventions per flow sheet       []  Discharge due to:_  []  Other:_      Shantal Painter, PT 3/26/2021  11:00 AM    Future Appointments   Date Time Provider Kingsley Stout   3/29/2021  8:30 AM Juanita Simmons, PT [facility]   3/31/2021  1:30 PM [facility], PTA [facility]   4/2/2021  1:45 PM [facility], PTA [facility]   4/5/2021 12:45 PM [facility], PTA [facility]   4/7/2021 12:45 PM Juanita Simmons, PT [facility]   4/9/2021 12:45 PM Juanita Simmons, PT [facility]   4/12/2021 12:45 PM Tamar Jeter PTA [facility]   4/14/2021 12:45 PM Mitzi Khan, PT [facility]   4/16/2021 10:40 AM Prince Brand PA-C [facility] AMB   4/16/2021 11:30 AM David Bolds, PTA [facility]   4/19/2021 12:45 PM David Bolds, PTA [facility]   4/21/2021 12:45 PM Mitzi Khan, PT [facility]   4/23/2021 12:45 PM Mitzi Khan, PT [facility]   5/14/2021  1:30 PM Leonardo Klein MD [facility] BS AMB   5/19/2021  9:30 AM Leonardo Klein MD [facility] BS AMB   6/15/2021  4:30 PM [facility] CHRISTOPHER RM 3 3D [facility]

## 2021-03-29 ENCOUNTER — HOSPITAL ENCOUNTER (OUTPATIENT)
Dept: PHYSICAL THERAPY | Age: 51
Discharge: HOME | End: 2021-03-29
Payer: OTHER GOVERNMENT

## 2021-03-29 PROCEDURE — 97110 THERAPEUTIC EXERCISES: CPT

## 2021-03-29 PROCEDURE — 97112 NEUROMUSCULAR REEDUCATION: CPT

## 2021-03-29 NOTE — PROGRESS NOTES
PT DAILY TREATMENT NOTE     Patient Name: Keny Buenrostro  Date:3/29/2021  : 1970  [x]  Patient  Verified  Payor:  / Plan: Toy [employer] / Product Type:  /    In time:905 Out time:945  Total Treatment Time (min): 40  Visit #: 3  of 8    Medicare/BCBS Only   Total Timed Codes (min):  40 1:1 Treatment Time:  40       Treatment Area: Pain in right hip [M25.551]    SUBJECTIVE  Pain Level (0-10 scale): 3/10  Any medication changes, allergies to medications, adverse drug reactions, diagnosis change, or new procedure performed?: [x] No    [] Yes (see summary sheet for update)  Subjective functional status/changes:   [] No changes reported  \" Warmed up today because I walked from home . 7 miles\"    OBJECTIVE    30 min Therapeutic Exercise:  [x] See flow sheet :   Rationale: increase ROM and increase strength to improve the patients ability to improve ADL ease. 10 min Neuromuscular Re-education:  []  See flow sheet : gait, balance and gluet stabilization (left) with single limp support for right. Rationale: increase strength, improve balance and increase proprioception  to improve the patients ability to perform self care and ADL's with increased ease. With   [x] TE   [] TA   [x] neuro   [] other: Patient Education: [x] Review HEP    [] Progressed/Changed HEP based on:   [] positioning   [] body mechanics   [] transfers   [] heat/ice application    [] other:      Other Objective/Functional Measures:  Hip Flexion: 90 degrees    Pain Level (0-10 scale) post treatment: 3/10    ASSESSMENT/Changes in Function: Patient demonstrated good performance of there ex, able to progress weight bearing program cuing needed for correct pelvic stabilization with single limp exercise.      Patient will continue to benefit from skilled PT services to modify and progress therapeutic interventions, address functional mobility deficits, address ROM deficits, address strength deficits, analyze and cue movement patterns and address imbalance/dizziness to attain remaining goals. [x]  See Plan of Care  []  See progress note/recertification  []  See Discharge Summary         Progress towards goals / Updated goals:  Short Term Goals: To be accomplished in 1 weeks:              3. The pt will be I and compliant with HEP              IE- issued HEP  Long Term Goals: To be accomplished in 4 weeks:              1. Improve FOTO score to predicted outcome to improve ability for daily tasks              IE- 73              2. Improve right hip AROM flexion to 90 degrees for improved ability for ADL              IE- 80 degrees   Current: 90 deg 3/29/21              3. The pt will demonstrate ability for sit to supine transfer without use of UE to assist the right LE.               IE- needs self assistance with right LE              4. Improve right glute med strength to 3/5 to improve gait              IE- 2/5               5.  The pt will report no difficulty with performing light home activities              FG- moderate difficulty    PLAN  []  Upgrade activities as tolerated     [x]  Continue plan of care  []  Update interventions per flow sheet       []  Discharge due to:_  []  Other:_      Mae Nelson, PT 3/29/2021  7:16 AM    Future Appointments   Date Time Provider Kingsley Stout   3/29/2021  9:00 AM Dannielle Ramírez, PT [facility]   3/31/2021  1:30 PM Vang Ko, PTA [facility]   4/2/2021  1:45 PM Ciera Ko, PTA [facility]   4/5/2021 12:00 PM Perla Delilah, PT [facility]   4/7/2021  1:45 PM Fritz Raza S, PT [facility]   4/9/2021 12:45 PM Cecilia Melchor, PT [facility]   4/12/2021 12:00 PM Perla Delilah, PT [facility]   4/14/2021 11:30 AM Perla Delilah, PT [facility]   4/16/2021 10:40 AM Manju Laurent PA-C [facility] AMB   4/16/2021 11:30 AM Perla Delilah, PT [facility]   4/19/2021 12:00 PM Fritz Raza S, PT [facility] 4/21/2021 11:30 AM Fina Sherman, PT [facility]   4/23/2021 11:30 AM Ирина Boucher, PT [facility]   5/14/2021  1:30 PM Yong Arceo MD [facility] AMB   5/19/2021  9:30 AM Yong Arceo MD [facility] AMB   6/15/2021  4:30 PM [facility] CHRISTOPHER RM 3 3D [facility]

## 2021-03-31 ENCOUNTER — HOSPITAL ENCOUNTER (OUTPATIENT)
Dept: PHYSICAL THERAPY | Age: 51
Discharge: HOME | End: 2021-03-31
Payer: OTHER GOVERNMENT

## 2021-03-31 PROCEDURE — 97110 THERAPEUTIC EXERCISES: CPT

## 2021-03-31 PROCEDURE — 97112 NEUROMUSCULAR REEDUCATION: CPT

## 2021-03-31 NOTE — PROGRESS NOTES
PT DAILY TREATMENT NOTE     Patient Name: Kareem Buenrostro  Date:3/31/2021  : 1970  [x]  Patient  Verified  Payor:  / Plan: Zohaib Turner 74 / Product Type:  /    In time:1:33  Out time:2:08  Total Treatment Time (min): 35  Visit #: 4 of 8    Treatment Area: Pain in right hip [M25.551]    SUBJECTIVE  Pain Level (0-10 scale): 0/10  Any medication changes, allergies to medications, adverse drug reactions, diagnosis change, or new procedure performed?: [x] No    [] Yes (see summary sheet for update)  Subjective functional status/changes:   [] No changes reported  \"No pain right now. \"    OBJECTIVE    25 min Therapeutic Exercise:  [x] See flow sheet :   Rationale: increase ROM and increase strength to improve the patients ability to perform ADL's. 10 min Neuromuscular Re-education:  [x]  See flow sheet : Hook-lying TA draws with SILVIA espinosa EC on airex, marching on airex, step taps. Rationale: increase strength, improve balance and increase proprioception  to improve the patients ability to perform functional activities. With   [x] TE   [] TA   [] neuro   [] other: Patient Education: [x] Review HEP    [] Progressed/Changed HEP based on:   [] positioning   [] body mechanics   [] transfers   [] heat/ice application    [] other:      Other Objective/Functional Measures: Added lateral 4\" step ups. Pain Level (0-10 scale) post treatment: 0/10    ASSESSMENT/Changes in Function: Mild antalgic gait, latera trunk lean during SLS phase of gait, (B). Pt fully participated in treatment and is highly motivated. Continue PT to increase Right LE strength to improve ease of performing functional activities.     Patient will continue to benefit from skilled PT services to modify and progress therapeutic interventions, address functional mobility deficits, address ROM deficits, address strength deficits, analyze and cue movement patterns and analyze and modify body mechanics/ergonomics to attain remaining goals. [x]  See Plan of Care  []  See progress note/recertification  []  See Discharge Summary         Progress towards goals / Updated goals:  Short Term Goals: To be accomplished in 1 weeks:              5. The pt will be I and compliant with HEP              IE- issued HEP   Current: Met, pt reports compliance. 3/31/2021  Long Term Goals: To be accomplished in 4 weeks:              9. Improve FOTO score to predicted outcome to improve ability for daily tasks              IE- 73              2. Improve right hip AROM flexion to 90 degrees for improved ability for ADL              IE- 80 degrees              Current: 90 deg 3/29/21              3. The pt will demonstrate ability for sit to supine transfer without use of UE to assist the right LE.               IE- needs self assistance with right LE              4. Improve right glute med strength to 3/5 to improve gait              IE- 2/5               5.  The pt will report no difficulty with performing light home activities              IE- moderate difficulty    PLAN  []  Upgrade activities as tolerated     [x]  Continue plan of care  []  Update interventions per flow sheet       []  Discharge due to:_  []  Other:_      Demetria Delgado, PTA 3/31/2021  1:38 PM    Future Appointments   Date Time Provider Kingsley Stout   4/2/2021  1:45 PM Naresh Andrea, PTA [facility]   4/5/2021 12:00 PM Balinda Rounds, PT [facility]   4/7/2021  1:45 PM Balinda Rounds, PT [facility]   4/9/2021 12:45 PM Madelyn Gutiérrez, PT [facility]   4/12/2021 12:00 PM Balinda Rounds, PT [facility]   4/14/2021 11:30 AM Balinda Rounds, PT [facility]   4/16/2021 10:40 AM Jamal Gomez PA-C [facility] AMB   4/16/2021 11:30 AM Balinda Rounds, PT [facility]   4/19/2021 12:00 PM Balinda Rounds, PT [facility]   4/21/2021 11:30 AM Balinda Rounds, PT [facility]   4/23/2021 11:30 AM Gabbi Smith S, PT [facility]   5/14/2021  1:30 PM MD ARNAUD Castellano BS AMB   5/19/2021  9:30 AM MD ARNAUD Castellano BS AMB   6/15/2021  4:30 PM HBV CHRISTOPHER RM 3 3D [facility]

## 2021-04-02 ENCOUNTER — HOSPITAL ENCOUNTER (OUTPATIENT)
Dept: PHYSICAL THERAPY | Age: 51
Discharge: HOME | End: 2021-04-02
Payer: OTHER GOVERNMENT

## 2021-04-02 PROCEDURE — 97110 THERAPEUTIC EXERCISES: CPT

## 2021-04-02 PROCEDURE — 97112 NEUROMUSCULAR REEDUCATION: CPT

## 2021-04-02 NOTE — PROGRESS NOTES
PT DAILY TREATMENT NOTE     Patient Name: Gay Buenrostro  Date:2021  : 1970  [x]  Patient  Verified  Payor:  / Plan: [employer] / Product Type:  /    In time:1:50  Out time:2:24  Total Treatment Time (min): 34  Visit #: 5 of 8    Treatment Area: Pain in right hip [M25.551]    SUBJECTIVE  Pain Level (0-10 scale): 0/10   Any medication changes, allergies to medications, adverse drug reactions, diagnosis change, or new procedure performed?: [x] No    [] Yes (see summary sheet for update)  Subjective functional status/changes:   [x] No changes reported    OBJECTIVE    24 min Therapeutic Exercise:  [x] See flow sheet :   Rationale: increase ROM and increase strength to improve the patients ability to perform ADL's. 10 min Neuromuscular Re-education:  [x]  See flow sheet : Hook-lying TA draws with marches, MSR EC on airex, marching on airex, step taps. Rationale: increase strength, improve coordination, improve balance and increase proprioception  to improve the patients ability to perform functional activities. With   [x] TE   [] TA   [] neuro   [] other: Patient Education: [x] Review HEP    [] Progressed/Changed HEP based on:   [] positioning   [] body mechanics   [] transfers   [] heat/ice application    [] other:      Other Objective/Functional Measures: Added supine hip flexion 10x. Pain Level (0-10 scale) post treatment: 0/10    ASSESSMENT/Changes in Function: Improved ambulation, slight trunk sway however significantly improved since IE. Plan to add dynamic activities next visit to work on balance and stability, pt has tendency to ambulate and move rather methodically. Pt is highly motivated. Continue PT to increase Right hip strength/stability to improve ease of performing daily tasks and to return to recreational activities.     Patient will continue to benefit from skilled PT services to modify and progress therapeutic interventions, address functional mobility deficits, address ROM deficits, address strength deficits, analyze and address soft tissue restrictions and analyze and modify body mechanics/ergonomics to attain remaining goals. [x]  See Plan of Care  []  See progress note/recertification  []  See Discharge Summary         Progress towards goals / Updated goals:  Short Term Goals: To be accomplished in 1 weeks:              6. The pt will be I and compliant with HEP              IE- issued HEP              Current: Met, pt reports compliance. 3/31/2021  Long Term Goals: To be accomplished in 4 weeks:              6. Improve FOTO score to predicted outcome to improve ability for daily tasks              IE- 73              2. Improve right hip AROM flexion to 90 degrees for improved ability for ADL              IE- 80 degrees              Current: 90 deg 3/29/21              3. The pt will demonstrate ability for sit to supine transfer without use of UE to assist the right LE.               IE- needs self assistance with right LE              4. Improve right glute med strength to 3/5 to improve gait              IE- 2/5               5.  The pt will report no difficulty with performing light home activities              YG- moderate difficulty    PLAN  []  Upgrade activities as tolerated     [x]  Continue plan of care  []  Update interventions per flow sheet       []  Discharge due to:_  []  Other:_      Wellington De Leon, PTA 4/2/2021  1:52 PM    Future Appointments   Date Time Provider Kingsley Stout   4/5/2021 12:00 PM Gloria Montes De Oca, PT [facility]   4/7/2021  1:45 PM Gloria Montes De Oca, PT [facility]   4/9/2021 12:45 PM Dandy Farrar, PT [facility]   4/12/2021 12:00 PM Gloria Montes De Oca, PT [facility]   4/14/2021 11:30 AM Gloria Montes De Oca, PT [facility]   4/16/2021 10:40 AM Natalia Jorge PA-C [facility] AM[facility]   4/16/2021 11:30 AM Gloria Montes De Oca, PT [facility]   4/19/2021 12:00 PM Gloria Montes De Oca, PT [facility]   4/21/2021 11:30 AM Marty Sherman, PT [facility]   4/23/2021 11:30 AM Samuel Arciniega, PT [facility]   5/14/2021  1:30 PM MD SANDOVAL McfarlandFP BS AMB   5/19/2021  9:30 AM MD ARNAUD Mcfarland BS AMB   6/15/2021  4:30 PM HBV CH[facility]STOPHER RM 3 3D [facility]

## 2021-04-05 ENCOUNTER — HOSPITAL ENCOUNTER (OUTPATIENT)
Dept: PHYSICAL THERAPY | Age: 51
Discharge: HOME | End: 2021-04-05
Payer: OTHER GOVERNMENT

## 2021-04-05 PROCEDURE — 97110 THERAPEUTIC EXERCISES: CPT

## 2021-04-05 PROCEDURE — 97112 NEUROMUSCULAR REEDUCATION: CPT

## 2021-04-05 NOTE — PROGRESS NOTES
PT DAILY TREATMENT NOTE     Patient Name: Lizbet Flaherty  Date:2021  : 1970  [x]  Patient  Verified  Payor: CLAIR / Plan: Zohaib Turner 74 / Product Type:  /    In time:12:00  Out time:12:35  Total Treatment Time (min): 35  Visit #: 6 of 8    Treatment Area: Pain in right hip [M25.551]    SUBJECTIVE  Pain Level (0-10 scale): 2/10  Any medication changes, allergies to medications, adverse drug reactions, diagnosis change, or new procedure performed?: [x] No    [] Yes (see summary sheet for update)  Subjective functional status/changes:   [] No changes reported  The patient reports that she is a little sore today, but did quite a bit of walking over the weekend. OBJECTIVE  23 min Therapeutic Exercise:  [x] See flow sheet :   Rationale: increase ROM and increase strength to improve the patients ability to improve ADL ease. 12 min Neuromuscular Re-education:  []  See flow sheet :   Rationale: improve coordination, improve balance and increase proprioception  to improve the patients ability to improve ADL ease. With   [] TE   [] TA   [] neuro   [] other: Patient Education: [x] Review HEP    [] Progressed/Changed HEP based on:   [] positioning   [] body mechanics   [] transfers   [] heat/ice application    [] other:      Other Objective/Functional Measures: Active hip flexion right: 90 degrees    Progressed to small jenny negotiation with patient requiring cues to limit volitional trendelenburg which did improve with cues. Pain Level (0-10 scale) post treatment: 2/10    ASSESSMENT/Changes in Function: The patient was able to double sets regarding sidelying right hip clams. She continues to demonstrate requiring gentle AA for hip ABD in sidelying. Progressing well noting improvements of stability through session.      Patient will continue to benefit from skilled PT services to modify and progress therapeutic interventions, address functional mobility deficits, address ROM deficits, address strength deficits, analyze and address soft tissue restrictions, analyze and cue movement patterns, analyze and modify body mechanics/ergonomics, assess and modify postural abnormalities, address imbalance/dizziness and instruct in home and community integration to attain remaining goals. []  See Plan of Care  []  See progress note/recertification  []  See Discharge Summary         Progress towards goals / Updated goals:  Short Term Goals: To be accomplished in 1 weeks:              8. The pt will be I and compliant with HEP              IE- issued HEP              KHHXQTU: Met, pt reports compliance. 3/31/2021  Long Term Goals: To be accomplished in 4 weeks:              6. Improve FOTO score to predicted outcome to improve ability for daily tasks              IE- 73              2. Improve right hip AROM flexion to 90 degrees for improved ability for ADL              IE- 80 degrees              Current: MET 90 degrees 4/05/2021              3. The pt will demonstrate ability for sit to supine transfer without use of UE to assist the right LE.               IE- needs self assistance with right LE              4. Improve right glute med strength to 3/5 to improve gait              IE- 2/5               5.  The pt will report no difficulty with performing light home activities              SV- moderate difficulty    PLAN  []  Upgrade activities as tolerated     [x]  Continue plan of care  []  Update interventions per flow sheet       []  Discharge due to:_  []  Other:_      Jonatan Klein, PT 4/5/2021  12:09 PM    Future Appointments   Date Time Provider Kingsley Stout   4/7/2021  1:45 PM Jose Owens PT [facility]   4/9/2021 12:45 PM Todd Cortes PT [facility]   4/12/2021 12:00 PM Jose Owens PT [facility]   4/14/2021 11:30 AM Jose Owens PT [facility]   4/16/2021 10:40 AM Mary Carlisle PA-C [facility]   4/16/2021 11:30 AM Krista Hernadez, PT [facility]   4/19/2021 12:00 PM Bernadette Sherman, PT [facility]   4/21/2021 11:30 AM Krista Hernadez, PT [facility]   4/23/2021 11:30 AM Krista Hernadez, PT [facility]   5/14/2021  1:30 PM Susan Sutherland MD [facility]   5/19/2021  9:30 AM Susan Sutherland MD [facility]   6/15/2021  4:30 PM [facility] CHRISTOPHER RM 3 3D [facility]

## 2021-04-07 ENCOUNTER — HOSPITAL ENCOUNTER (OUTPATIENT)
Dept: PHYSICAL THERAPY | Age: 51
Discharge: HOME | End: 2021-04-07
Payer: OTHER GOVERNMENT

## 2021-04-07 PROCEDURE — 97110 THERAPEUTIC EXERCISES: CPT

## 2021-04-07 PROCEDURE — 97112 NEUROMUSCULAR REEDUCATION: CPT

## 2021-04-07 NOTE — PROGRESS NOTES
PT DAILY TREATMENT NOTE     Patient Name: Prince Salinas  Date:2021  : 1970  [x]  Patient  Verified  Payor: CLAIR / Plan: Zohaib Turner 74 / Product Type:  /    In time:1:47  Out time:2:30  Total Treatment Time (min): 43  Visit #: 7 of 8    Treatment Area: Pain in right hip [M25.551]    SUBJECTIVE  Pain Level (0-10 scale): 0/10  Any medication changes, allergies to medications, adverse drug reactions, diagnosis change, or new procedure performed?: [x] No    [] Yes (see summary sheet for update)  Subjective functional status/changes:   [] No changes reported  The patient denies pain upon arrival. She indicates she feels her right knee has actually improved since the knee surgery. The patient indicates there is one small area at the bottom of her incision that is slow to heal. She denies drainage or pain through this region. OBJECTIVE  28 min Therapeutic Exercise:  [x] See flow sheet :   Rationale: increase ROM, increase strength and improve coordination to improve the patients ability to improve ADL ease. 15 min Neuromuscular Re-education:  [x]  See flow sheet :   Rationale: improve coordination, improve balance and increase proprioception  to improve the patients ability to improve ADL ease. With   [] TE   [] TA   [] neuro   [] other: Patient Education: [x] Review HEP    [] Progressed/Changed HEP based on:   [] positioning   [] body mechanics   [] transfers   [] heat/ice application    [] other:      Other Objective/Functional Measures:   Able to perform 1 sit to stand without UE use. Notable ability to perform supine to sit without use of UE to assist right LE during supine to sit transfer    Pain Level (0-10 scale) post treatment: 0/10    ASSESSMENT/Changes in Function: The patient demonstrated sit to stands without UE and abiding by hip flexion of no greater than 90 precautions , upon second rep indicated lateral thigh was \"sore\".  PT opted to hold further reps and had patient perform from the elevated table through comfortable non-painful ranges. The patient is progressing with glute med strength, noting improvements in sidelying hip ABD with gradual improvement. She was able to negotiate large hurdles during session today. Patient will continue to benefit from skilled PT services to modify and progress therapeutic interventions, address functional mobility deficits, address ROM deficits, address strength deficits, analyze and address soft tissue restrictions, analyze and cue movement patterns, analyze and modify body mechanics/ergonomics, assess and modify postural abnormalities, address imbalance/dizziness and instruct in home and community integration to attain remaining goals. []  See Plan of Care  []  See progress note/recertification  []  See Discharge Summary         Progress towards goals / Updated goals:  Short Term Goals: To be accomplished in 1 weeks:              2. The pt will be I and compliant with HEP              IE- issued HEP              HPTGBBT: Met, pt reports compliance. 3/31/2021  Long Term Goals: To be accomplished in 4 weeks:              3. Improve FOTO score to predicted outcome to improve ability for daily tasks              IE- 73              2. Improve right hip AROM flexion to 90 degrees for improved ability for ADL              IE- 80 degrees              Current: MET 90 degrees 4/05/2021              3. The pt will demonstrate ability for sit to supine transfer without use of UE to assist the right LE.               IE- needs self assistance with right LE   Current: Met able to perform transfer without use of UE. 4/07/2021              4. Improve right glute med strength to 3/5 to improve gait              IE- 2/5               5.  The pt will report no difficulty with performing light home activities              IE- moderate difficulty    PLAN  []  Upgrade activities as tolerated     [x]  Continue plan of care  [] Update interventions per flow sheet       []  Discharge due to:_  []  Other:_      Kiarra Drain, PT 4/7/2021  2:00 PM    Future Appointments   Date Time Provider Kingsley Argelia   4/9/2021 12:45 PM Cristy Woodward, PT [facility]   4/12/2021 12:00 PM Daphne Savory, PT [facility]   4/14/2021 11:30 AM Daphne Savory, PT [facility]   4/16/2021 10:40 AM Shaw Velázquez PA-C [facility]   4/16/2021 11:30 AM Daphne Savory, PT [facility]   4/19/2021 12:00 PM Daphne Savory, PT [facility]   4/21/2021 11:30 AM Daphne Savory, PT [facility]   4/23/2021 11:30 AM Daphne Savory, PT [facility]   5/14/2021  1:30 PM Terese Saleem MD [facility]   5/19/2021  9:30 AM Terese Saleem MD [facility] AM[facility]   6/15/2021  4:30 PM [facility]STOPHER RM 3 3D [facility]

## 2021-04-09 ENCOUNTER — APPOINTMENT (OUTPATIENT)
Dept: PHYSICAL THERAPY | Age: 51
End: 2021-04-09
Payer: OTHER GOVERNMENT

## 2021-04-12 ENCOUNTER — TELEPHONE (OUTPATIENT)
Dept: FAMILY MEDICINE CLINIC | Age: 51
End: 2021-04-12

## 2021-04-12 ENCOUNTER — HOSPITAL ENCOUNTER (OUTPATIENT)
Dept: PHYSICAL THERAPY | Age: 51
Discharge: HOME | End: 2021-04-12
Payer: OTHER GOVERNMENT

## 2021-04-12 PROCEDURE — 97112 NEUROMUSCULAR REEDUCATION: CPT

## 2021-04-12 PROCEDURE — 97110 THERAPEUTIC EXERCISES: CPT

## 2021-04-12 NOTE — TELEPHONE ENCOUNTER
Patient is requesting (New  Updated) referral to the following office:    Office called and stated the on she had . Speciality: janet  Specialist Name: Naeem Crane  [address] [place] [address].   [facility] [address]    Tel: [phone]  Fax: [phone]    Diagnosis: knee pain (both)  Date of appointment (if scheduled): n/a

## 2021-04-12 NOTE — PROGRESS NOTES
[facility]  [address]  [place], [address]  [phone] fax    Physical Therapy Progress Note  Patient name: Elizabeth Reilly Start of Care: 3/23/2021   Referral source: Esha Blair : 1970               Medical Diagnosis: Pain in right hip [M25.551]  Payor: CLAIR / Plan: Zohaib Turner 74 / Product Type: [facility] /  Onset GSLV:1-3-85               Treatment Diagnosis: right hip pain (s/p right NOREEN-lateral approach)   Prior Hospitalization: see medical history Provider#: [id number]   Medications: Verified on Patient summary List    Comorbidities: OA, left NOREEN, right knee pain, right knee arthroscopy, HTN    Prior Level of Function: functionally I with daily tasks, cane with gait   Visits from Start of Care: 8    Missed Visits: 0    Key Functional Changes:   Left Hip ABD strength: 3-/5 MMT         FOTO: 73  Light home activity: No difficulty     Significant improvement appreciated regarding active hip flexion to 90 degrees. (able to attain 12\" step taps [still below 90 degrees active flexion]). Short Term Goals: To be accomplished in 1 weeks:              0. The pt will be I and compliant with HEP              IE- issued HEP              EXTEMJH: Met, pt reports compliance. 3/31/2021  Long Term Goals: To be accomplished in 4 weeks:              1. Improve FOTO score to predicted outcome to improve ability for daily tasks              IE- 73              Current: MET 73              2. Improve right hip AROM flexion to 90 degrees for improved ability for ADL              IE- 80 degrees              Current: MET 90 degrees 2021              3.  The pt will demonstrate ability for sit to supine transfer without use of UE to assist the right LE.               IE- needs self assistance with right LE              Current: Met able to perform transfer without use of UE. 2021              4. Improve right glute med strength to 3/5 to improve gait              IE- 2/5                Current: Progressing - 3-/5 MMT 4/12/2021              5. The pt will report no difficulty with performing light home activities              HD- moderate difficulty              Current: MET reports no difficulty 4/12/2021    Updated Goals: to be achieved in 4 weeks:   1. Improve right glute med strength to 4/5 to improve gait              PN: Progressing - 3-/5 MMT 4/12/2021   2. The patient will demonstrate right LE single leg stance for 30\" to improve stability in stance phase of gait. PN: unable. 3. The patient will demonstrate mild trendelenburg during ambulation to improve ease and efficiency of gait. PN; marked trendelenburg    ASSESSMENT/RECOMMENDATIONS:  The patient has made progress regarding hip ABD strength, though continues to demonstrate trendelenburg. Her FOTO score has improved to 73 meeting goal and  indicating improved ease of ADL efficiency. The patient will benefit from further PT in order progress her strength in order to lend improved ease of gait efficiency.      [x]Continue therapy per initial plan/protocol at a frequency of  2 x per week for 4 weeks  []Continue therapy with the following recommended changes:_____________________      _____________________________________________________________________  []Discontinue therapy progressing towards or have reached established goals  []Discontinue therapy due to lack of appreciable progress towards goals  []Discontinue therapy due to lack of attendance or compliance  []Await Physician's recommendations/decisions regarding therapy  []Other:________________________________________________________________    Thank you for this referral.    Heidi Triana, PT 4/12/2021 12:51 PM  NOTE TO PHYSICIAN:  PLEASE COMPLETE THE ORDERS BELOW AND   FAX TO [facility]: [phone]  If you are unable to process this request in 24 hours please contact our office: [phone]    ? I have read the above report and request that my patient continue as recommended. ? I have read the above report and request that my patient continue therapy with the following changes/special instructions:__________________________________________________________  ? I have read the above report and request that my patient be discharged from therapy.     Physicians signature: ______________________________Date: ______Time:______     [facility]

## 2021-04-12 NOTE — PROGRESS NOTES
PT DAILY TREATMENT NOTE     Patient Name: Cecil Buenrostro  Date:2021  : 1970  [x]  Patient  Verified  Payor: CLAIR / Plan: Zohaib Turner 74 / Product Type: Amber Alan /    In time:12:00  Out time:12:43  Total Treatment Time (min): 43  Visit #: 8 of 8    Treatment Area: Pain in right hip [M25.551]    SUBJECTIVE  Pain Level (0-10 scale): 0/10  Any medication changes, allergies to medications, adverse drug reactions, diagnosis change, or new procedure performed?: [x] No    [] Yes (see summary sheet for update)  Subjective functional status/changes:   [] No changes reported  The patient has no new complaints upon arrival.      OBJECTIVE  31 min Therapeutic Exercise:  [] See flow sheet :   Rationale: increase ROM and increase strength to improve the patients ability to improve ADL ease. 12 min Neuromuscular Re-education:  [x]  See flow sheet :   Rationale: increase strength, improve coordination and improve balance  to improve the patients ability to improve ADL ease. With   [] TE   [] TA   [] neuro   [] other: Patient Education: [x] Review HEP    [] Progressed/Changed HEP based on:   [] positioning   [] body mechanics   [] transfers   [] heat/ice application    [] other:      Other Objective/Functional Measures:   Left Hip ABD strength: 3-/5 MMT   FOTO: 73  Light home activity: No difficulty    Significant improvement appreciated regarding active hip flexion to 90 degrees. (able to attain 12\" step taps [still below 90 degrees active flexion]). Pain Level (0-10 scale) post treatment: 0/10    ASSESSMENT/Changes in Function: The patient has made progress regarding hip ABD strength, though continues to demonstrate trendelenburg. Her FOTO score has improved to 73 meeting goal and  indicating improved ease of ADL efficiency. The patient will benefit from further PT in order progress her strength in order to lend improved ease of gait efficiency.      Patient will continue to benefit from skilled PT services to modify and progress therapeutic interventions, address functional mobility deficits, address ROM deficits, address strength deficits, analyze and address soft tissue restrictions, analyze and cue movement patterns, analyze and modify body mechanics/ergonomics, assess and modify postural abnormalities and instruct in home and community integration to attain remaining goals. []  See Plan of Care  []  See progress note/recertification  []  See Discharge Summary         Progress towards goals / Updated goals:  Short Term Goals: To be accomplished in 1 weeks:              9. The pt will be I and compliant with HEP              IE- issued HEP              BNDIDEV: Met, pt reports compliance. 3/31/2021  Long Term Goals: To be accomplished in 4 weeks:              2. Improve FOTO score to predicted outcome to improve ability for daily tasks              IE- 73   Current: MET 73              2. Improve right hip AROM flexion to 90 degrees for improved ability for ADL              IE- 80 degrees              Current: MET 90 degrees 4/05/2021              3. The pt will demonstrate ability for sit to supine transfer without use of UE to assist the right LE.               IE- needs self assistance with right LE              Current: Met able to perform transfer without use of UE. 4/07/2021              4. Improve right glute med strength to 3/5 to improve gait              IE- 2/5     Current: Progressing - 3-/5 MMT 4/12/2021              5.  The pt will report no difficulty with performing light home activities              KG- moderate difficulty   Current: MET reports no difficulty 4/12/2021    PLAN  []  Upgrade activities as tolerated     [x]  Continue plan of care  []  Update interventions per flow sheet       []  Discharge due to:_  []  Other:_      Young Arciniega PT 4/12/2021  12:12 PM    Future Appointments   Date Time Provider Kingsley Stout   4/14/2021 11:30 AM Mounika Smith S, PT [facility]   4/16/2021 10:40 AM Carroll Prader, PA-C [facility]   4/16/2021  1:45 PM Keven Coppola, PTA [facility]   4/19/2021 12:00 PM Birtha Fransico, PT [facility]   4/21/2021 11:30 AM Birtha Fransico, PT [facility]   4/23/2021 11:30 AM Birtha Fransico, PT [facility]   5/14/2021  1:30 PM Yemi Farrell MD [facility]   5/19/2021  9:30 AM Yemi Farrell MD [facility] AM[facility]   6/15/2021  4:30 PM [facility] CHRISTOPHER RM 3 3D [facility]

## 2021-04-13 DIAGNOSIS — I10 ESSENTIAL HYPERTENSION WITH GOAL BLOOD PRESSURE LESS THAN 130/80: ICD-10-CM

## 2021-04-13 RX ORDER — AMLODIPINE BESYLATE 5 MG/1
5 TABLET ORAL DAILY
Qty: 90 TAB | Refills: 1 | Status: SHIPPED | OUTPATIENT
Start: 2021-04-13 | End: 2021-05-14

## 2021-04-14 ENCOUNTER — HOSPITAL ENCOUNTER (OUTPATIENT)
Dept: PHYSICAL THERAPY | Age: 51
Discharge: HOME | End: 2021-04-14
Payer: OTHER GOVERNMENT

## 2021-04-14 PROCEDURE — 97110 THERAPEUTIC EXERCISES: CPT

## 2021-04-14 PROCEDURE — 97112 NEUROMUSCULAR REEDUCATION: CPT

## 2021-04-14 NOTE — PROGRESS NOTES
HISTORY OF PRESENT ILLNESS  Carlos Chamorro is a 51 y.o. female. HPI; here for follow up . H/o hypertension. Stable vitals. Asymptomatic. Taking medication with compliance. Said having fatigue on and off. She had positive ALY in the past. Seen rheutmatology. But said no specific diagnosis was told. Adalid [place] it was false positive. She also noted to have enlarge thyroid gland. She has seen endocrinology. No further recommendations. Denies any trouble swallowing. No change in voice. Does have on and off constipation. No blood. Working on diet modification. She has chronic bilateral knee pain. Now on and off hip pain. Said since she had cortisone injection in both knees they are fairly stable. No trouble walking. As needed otc medication for joint pain. Said she feels whole body feels stiff in the morning. No specific joint swelling or redness. H/o low vitamin d. Not taking any supplement. Will recheck labs but advised her to continue otc supplement. Ordered mammogram as she was over due. Said she had ear piercing and since then no migraine headaches. She has stopped taking topamax. Denies any headache, dizziness, no chest pain or trouble breathing, no arm or leg weakness. No nausea or vomiting, no weight or appetite changes, no mood changes . No urine or bowel complains, no palpitation, no diaphoresis. No abdominal pain. No cold or cough. No leg swelling. No fever. No sleep trouble. Visit Vitals  /86 (BP 1 Location: Right arm, BP Patient Position: Sitting)   Pulse (!) 54   Temp 97.6 °F (36.4 °C) (Oral)   Resp 16   Ht 5' 3\" (1.6 m)   Wt 157 lb 9.6 oz (71.5 kg)   SpO2 98%   BMI 27.92 kg/m²     Review medication list, vitals, problem list,allergies.    Lab Results   Component Value Date/Time    WBC 2.8 (L) 05/04/2018 10:07 AM    HGB 13.0 05/04/2018 10:07 AM    HCT 39.2 05/04/2018 10:07 AM    PLATELET 303 45/43/9081 10:07 AM    MCV 90.1 05/04/2018 10:07 AM     Lab Results   Component Value Date/Time    Sodium 145 05/03/2019 11:12 AM    Potassium 3.7 05/03/2019 11:12 AM    Chloride 108 05/03/2019 11:12 AM    CO2 33 (H) 05/03/2019 11:12 AM    Anion gap 4 05/03/2019 11:12 AM    Glucose 68 (L) 05/03/2019 11:12 AM    BUN 11 05/03/2019 11:12 AM    Creatinine 0.78 05/03/2019 11:12 AM    BUN/Creatinine ratio 14 05/03/2019 11:12 AM    GFR est AA >60 05/03/2019 11:12 AM    GFR est non-AA >60 05/03/2019 11:12 AM    Calcium 9.3 05/03/2019 11:12 AM    Bilirubin, total 0.5 05/03/2019 11:12 AM    AST (SGOT) 16 05/03/2019 11:12 AM    Alk. phosphatase 97 05/03/2019 11:12 AM    Protein, total 7.3 05/03/2019 11:12 AM    Albumin 4.1 05/03/2019 11:12 AM    Globulin 3.2 05/03/2019 11:12 AM    A-G Ratio 1.3 05/03/2019 11:12 AM    ALT (SGPT) 21 05/03/2019 11:12 AM     Lab Results   Component Value Date/Time    Cholesterol, total 198 05/04/2018 10:07 AM    HDL Cholesterol 72 (H) 05/04/2018 10:07 AM    LDL, calculated 111 (H) 05/04/2018 10:07 AM    VLDL, calculated 15 05/04/2018 10:07 AM    Triglyceride 75 05/04/2018 10:07 AM    CHOL/HDL Ratio 2.8 05/04/2018 10:07 AM     Lab Results   Component Value Date/Time    TSH 1.56 05/03/2019 11:12 AM     Lab Results   Component Value Date/Time    Hemoglobin A1c 5.3 05/04/2018 10:07 AM     Lab Results   Component Value Date/Time    Vitamin D 25-Hydroxy 16.9 (L) 05/03/2019 11:12 AM         ROS: see HPI     Physical Exam  Constitutional:       General: She is not in acute distress. Neck:      Comments: Mild palpable enlarge thyroid gland. Non tender. Cardiovascular:      Rate and Rhythm: Normal rate and regular rhythm. Heart sounds: Normal heart sounds. Abdominal:      General: Bowel sounds are normal.      Palpations: Abdomen is soft. Tenderness: There is no tenderness. Musculoskeletal:         General: No swelling. Comments: No specific joint swelling or redness or tenderness    Lymphadenopathy:      Cervical: No cervical adenopathy.    Neurological: [FreeTextEntry1] : 74 year old F with cc of recurrent UTI. Pt reports that she has had issues this year with recurrent infection. Had  infection in Jan and Feb. Sx during an infection include dysuria and increased frequency and feelings of incomplete emptying and odor. Appears to be culture proven. UCx in Jan showed citrobacter. No cx on second episode. SHe was treated both times with macrobid. No febrile infections. Last on abx completed 1mo ago. SHe had episode of pyelo 5y ago. \par \par Drinks occasional herbal tea but mostly water. At baseline, no urinary complaints. No issues with constipation. No hx of stones. Had abd US 7/2019 that was normal. \par  Mental Status: She is alert and oriented to person, place, and time. Psychiatric:         Behavior: Behavior normal.         ASSESSMENT and PLAN    ICD-10-CM ICD-9-CM    1. Essential hypertension with goal blood pressure less than 130/80: well controlled. Continue current dose of medication and low salt diet. Exercise as tolerated. I10 401.9 amLODIPine (NORVASC) 5 mg tablet      METABOLIC PANEL, COMPREHENSIVE   2. Encounter for immunization Z23 V03.89 INFLUENZA VIRUS VAC QUAD,SPLIT,PRESV FREE SYRINGE IM   3. Screening for breast cancer Z12.39 V76.10 CHRISTOPHER MAMMO BI SCREENING INCL CAD   4. H/O migraine: not taking medication since had ear piercing. Will observe. Z86.69 V12.49     not taking topamax    5. Abnormal CBC: advised to make a follow up with hematology as she has missed an appt. Will repeat labs. R79.89 790.6 CBC WITH AUTOMATED DIFF    will make f/u appt with hematology    6. Chronic pain of both knees: improved at this time. Following ortho. M25.561 719.46     M25.562 338.29     G89.29      better post cortisone injection    7. Status post hysterectomy Z90.710 V88.01    8. Enlarged thyroid gland: for now observe. checking thyroid function. Seen endo in the past.  E04.9 240.9 TSH 3RD GENERATION   9. Elevated antinuclear antibody (ALY) level: repeat labs. Has multiple site pain and generalize stiffness. Seen rheumatology in the past. No specific recommendations. For now tylenol as needed and f/u after result. R76.8 795.79 ANTINUCLEAR ANTIBODIES, IFA   10. Vitamin D deficiency: not taking supplement . Recheck labs. Advised otc supplement. E55.9 268.9 VITAMIN D, 25 HYDROXY   11. Fatigue, unspecified type: checking labs. R53.83 780.79 ANTINUCLEAR ANTIBODIES, IFA      SED RATE (ESR)      RHEUMATOID FACTOR, QT   12. Generalized body aches R52 780.96 CBC WITH AUTOMATED DIFF      METABOLIC PANEL, COMPREHENSIVE      ANTINUCLEAR ANTIBODIES, IFA      SED RATE (ESR)      RHEUMATOID FACTOR, QT   13.  Constipation, unspecified constipation type: symptomatic treatment. High fiber diet. drink more fluid and routine exercise. K59.00 564.00    Pt understood and agree with the plan   Review hM   Follow-up and Dispositions    · Return in about 1 month (around 2/14/2020).

## 2021-04-16 ENCOUNTER — APPOINTMENT (OUTPATIENT)
Dept: PHYSICAL THERAPY | Age: 51
End: 2021-04-16
Payer: OTHER GOVERNMENT

## 2021-04-16 ENCOUNTER — OFFICE VISIT (OUTPATIENT)
Dept: ORTHOPEDIC SURGERY | Age: 51
End: 2021-04-16
Payer: OTHER GOVERNMENT

## 2021-04-16 VITALS
TEMPERATURE: 98.6 F | OXYGEN SATURATION: 98 % | BODY MASS INDEX: 29.3 KG/M2 | WEIGHT: 165.4 LBS | RESPIRATION RATE: 16 BRPM | HEART RATE: 75 BPM | HEIGHT: 63 IN

## 2021-04-16 DIAGNOSIS — M17.0 PRIMARY OSTEOARTHRITIS OF BOTH KNEES: Primary | ICD-10-CM

## 2021-04-16 DIAGNOSIS — M25.551 RIGHT HIP PAIN: ICD-10-CM

## 2021-04-16 PROCEDURE — 99024 POSTOP FOLLOW-UP VISIT: CPT | Performed by: PHYSICIAN ASSISTANT

## 2021-04-16 PROCEDURE — 99213 OFFICE O/P EST LOW 20 MIN: CPT | Performed by: PHYSICIAN ASSISTANT

## 2021-04-16 PROCEDURE — 20611 DRAIN/INJ JOINT/BURSA W/US: CPT | Performed by: PHYSICIAN ASSISTANT

## 2021-04-16 PROCEDURE — 73502 X-RAY EXAM HIP UNI 2-3 VIEWS: CPT | Performed by: PHYSICIAN ASSISTANT

## 2021-04-16 NOTE — PROGRESS NOTES
[address] [facility], [address] [facility]  [phone]           Patient: Damian Barker                MRN: [id number]       SSN: [id number]  YOB: 1970        AGE: 49 y.o. SEX: female  Body mass index is 29.3 kg/m². PCP: Behzad Roy MD  04/16/21            REVIEW OF SYSTEMS:  Constitutional: Negative for fever, chills, weight loss and malaise/fatigue. HENT: Negative. Eyes: Negative. Respiratory: Negative. Cardiovascular: Negative. Gastrointestinal: No bowel incontinence or constipation. Genitourinary: No bladder incontinence or saddle anesthesia. Skin: Negative. Neurological: Negative. Endo/Heme/Allergies: Negative. Psychiatric/Behavioral: Negative. Musculoskeletal: As per HPI above. Past Medical History:   Diagnosis Date    Adverse effect of anesthesia     Hypotension    Allergic rhinitis, seasonal     spring    Anemia NEC     iron deficiency associated with heavy menstruation and fibroids noted 2008 with pregnancy    Dysmenorrhea     increasing past year    Fibroid, uterine     Goiter     Bilateral    Headache(784.0)     Hypertension     Ill-defined condition     Chronic knee pain    Menorrhagia     worsening past year    Neutropenia ([facility] [address]          Current Outpatient Medications:     amLODIPine (NORVASC) 5 mg tablet, Take 1 Tab by mouth daily. , Disp: 90 Tab, Rfl: 1    hydrOXYchloroQUINE (PLAQUENIL) 200 mg tablet, Take 200 mg by mouth two (2) times a day., Disp: , Rfl:     ergocalciferol (Vitamin D2) 1,250 mcg (50,000 unit) capsule, Take 50,000 Units by mouth daily. , Disp: , Rfl:     aspirin (ASPIRIN) 325 mg tablet, Take 1 Tab by mouth two (2) times a day., Disp: 60 Tab, Rfl: 0    celecoxib (CeleBREX) 200 mg capsule, Take 1 Cap by mouth two (2) times a day for 90 days. , Disp: 60 Cap, Rfl: 2    cephALEXin (Keflex) 500 mg capsule, Take 1 Cap by mouth four (4) times daily. , Disp: 12 Cap, Rfl: 0    docusate sodium 100 mg tab, Take 1 Tab by mouth daily. , Disp: , Rfl:     acetaminophen (TYLENOL) 500 mg tablet, Take 1,000 mg by mouth every six (6) hours as needed for Pain. Pain, Disp: , Rfl:     Blood Pressure Monitor kit, Once a day, Disp: 1 Kit, Rfl: 0    Current Facility-Administered Medications:     sodium hyaluronate (SUPARTZ FX/EUFLEXXA/HYALGAN) 10 mg/mL injection syrg 40 mg, 40 mg, Intra artICUlar, ONCE, Kamilla Moreno PA-C    No Known Allergies    Social History     Socioeconomic History    Marital status:      Spouse name: Not on file    Number of children: Not on file    Years of education: Not on file    Highest education level: Not on file   Occupational History    Not on file   Social Needs    Financial resource strain: Not on file    Food insecurity     Worry: Not on file     Inability: Not on file    Transportation needs     Medical: Not on file     Non-medical: Not on file   Tobacco Use    Smoking status: Never Smoker    Smokeless tobacco: Never Used   Substance and Sexual Activity    Alcohol use:  Yes     Alcohol/week: 0.0 standard drinks     Comment: occass: 2/month    Drug use: No    Sexual activity: Yes     Partners: Male     Birth control/protection: Surgical     Comment: hysterectomy   Lifestyle    Physical activity     Days per week: Not on file     Minutes per session: Not on file    Stress: Not on file   Relationships    Social connections     Talks on phone: Not on file     Gets together: Not on file     Attends Confucianism service: Not on file     Active member of club or organization: Not on file     Attends meetings of clubs or organizations: Not on file     Relationship status: Not on file    Intimate partner violence     Fear of current or ex partner: Not on file     Emotionally abused: Not on file     Physically abused: Not on file     Forced sexual activity: Not on file   Other Topics Concern    Not on file   Social History Narrative    Not on file       Past Surgical History:   Procedure Laterality Date    HX HIP REPLACEMENT      right and left    HX HYSTERECT[facility]  04/2014    [facility]    HX KNEE ARTHROSCOPY Right     NV TOTAL HIP ARTHROPLASTY Left 12/13/2020       Patient seen and evaluated today for her right hip. She is status post right total hip with surgery and has done very well with it. She is very pleased with the results of the hip replacement. She does continue with outpatient physical therapy. She is had no troubles the wound. Pain is well controlled. She does have known advancing arthritis of her knees. She has had cortisone in the past as well as viscosupplementation. She does have decreased walking tolerance. She has trouble stairs. She does have a previous right knee scope. Patient denies recent fevers, chills, chest pain, SOB, or injuries. No recent systemic changes noted. A 12-point review of systems is performed today. Pertinent positives are noted. All other systems reviewed and otherwise are negative. Physical exam: General: Alert and oriented x3, nad.  well-developed, well nourished. normal affect, AF. NC/AT, EOMI, neck supple, trachea midline, no JVD present. Breathing is non-labored. Examination of lower extremities reveals pain-free range of motion hips. There is no pain to palpation trochanter bursa. The surgical wound on the right hip healed nicely. She has negative straight leg raise. Negative calf tenderness. Negative Homans no signs of DVT present. Leg lengths are perfect. Examination of each knees reveal skin intact. There is no erythema, ecchymosis, warmth. There are no signs of infection or cellulitis present. Findings are consistent with advanced arthritis of the knees.     Radiographs obtained in office today 4/16/2021 at the [facility] location including an AP pelvis, AP and crosstable lateral the right hip show the total components to be well fixed without evidence of loosening or fracture noted. Assessment: #1 status post right total replacement, #2 bilateral knee osteoarthritis    Plan: At this point, she will continue and finish up her outpatient physical therapy. She denies need for analgesics. We will move forward with a series of viscosupplementation for each of her knees. The first Euflexxa injection will be today. After informed consent, under aseptic conditions, with US guided assitance, the each knee was prepped with betadine and 2ml of euflexxa was injected into each knee without complications. The patient tolerated the injection well. The patient is instructed on post-injection care. We will see her back next week for second injection. Chart reviewed for the following:  John ORDAZ PA-C, have reviewed the History, Physical and updated the Allergic reactions for Carlos Buenrostro? TIME OUT performed immediately prior to start of procedure:  John ORDAZ PA-C, have performed the following reviews on Carlos Buenrostro prior to the start of the procedure:  ????????  * Patient was identified by name and date of birth   * Agreement on procedure being performed was verified  * Risks and Benefits explained to the patient  * Procedure site verified and marked as necessary  * Patient was positioned for comfort  * Consent was signed and verified    Time:11:16 AM    Body part: bilateral knee, intra-articular    Medication & Dose: 2ml euflexxa each    Date of procedure: 04/16/21    Procedure performed by: John Solares PA-C    Provider assisted by: none    Patient assisted by: self    How tolerated by patient: tolerated the procedure well with no complications    Post Procedural Pain Scale: 3    Comments:   [address] using a frequency of 10MHz with a 12L-RS transducer head was used to confirm needle placement.   Ultrasound images captured using [address] Ultrasound machine and scanned into patient's chart       Sylvie Pallas, PA-C, ATC

## 2021-04-16 NOTE — TELEPHONE ENCOUNTER
[facility] approval has been received; Auth: [id number] beginning 4/06/21 and expires 4/06/22 - valid four visits.

## 2021-04-19 ENCOUNTER — HOSPITAL ENCOUNTER (OUTPATIENT)
Dept: PHYSICAL THERAPY | Age: 51
Discharge: HOME | End: 2021-04-19
Payer: OTHER GOVERNMENT

## 2021-04-19 PROCEDURE — 97110 THERAPEUTIC EXERCISES: CPT

## 2021-04-19 PROCEDURE — 97112 NEUROMUSCULAR REEDUCATION: CPT

## 2021-04-19 NOTE — PROGRESS NOTES
PT DAILY TREATMENT NOTE     Patient Name: Jayme Buenrostro  Date:2021  : 1970  [x]  Patient  Verified  Payor: CLAIR / Plan: Zohaib Turner 74 / Product Type: Macel Radha /    In time:12:00  Out time:12:44  Total Treatment Time (min): 44  Visit #: 2 of 8    Treatment Area: Pain in right hip [M25.551]    SUBJECTIVE  Pain Level (0-10 scale): 0/10  Any medication changes, allergies to medications, adverse drug reactions, diagnosis change, or new procedure performed?: [x] No    [] Yes (see summary sheet for update)  Subjective functional status/changes:   [] No changes reported  The patient presents to PT with no new complaints. She indicates she had her first right knee injection Friday to good effect. OBJECTIVE  34 min Therapeutic Exercise:  [] See flow sheet :   Rationale: increase ROM and increase strength to improve the patients ability to improve ADL ease. 10 min Neuromuscular Re-education:  []  See flow sheet :   Rationale: improve coordination, improve balance and increase proprioception  to improve the patients ability to improve ADL ease. With   [] TE   [] TA   [] neuro   [] other: Patient Education: [x] Review HEP    [] Progressed/Changed HEP based on:   [] positioning   [] body mechanics   [] transfers   [] heat/ice application    [] other:      Other Objective/Functional Measures:   Able to complete 15 reps of sidelying abduction without significant compensations. Pain Level (0-10 scale) post treatment: 0/10    ASSESSMENT/Changes in Function: Progressing with hip ABD strength though continues to demonstrate notable compensations.      Patient will continue to benefit from skilled PT services to modify and progress therapeutic interventions, address functional mobility deficits, address ROM deficits, address strength deficits, analyze and address soft tissue restrictions, analyze and cue movement patterns, analyze and modify body mechanics/ergonomics, assess and modify postural abnormalities and instruct in home and community integration to attain remaining goals. []  See Plan of Care  []  See progress note/recertification  []  See Discharge Summary         Progress towards goals / Updated goals:  Updated Goals: to be achieved in 4 weeks:              1. Improve right glute med strength to 4/5 to improve gait              PN: Progressing - 3-/5 MMT 4/12/2021              Current: 3/5 MMT hip ABD 4/14/2021              2. The patient will demonstrate right LE single leg stance for 30\" to improve stability in stance phase of gait.             LY: unable.               3. The patient will demonstrate mild trendelenburg during ambulation to improve ease and efficiency of gait.               IP: marked trendelenburg    PLAN  []  Upgrade activities as tolerated     [x]  Continue plan of care  []  Update interventions per flow sheet       []  Discharge due to:_  []  Other:_      Shaneka Gomez, PT 4/19/2021  2:06 PM    Future Appointments   Date Time Provider Kingsley Stout   4/21/2021 11:30 AM Emerson Sherman, PT [facility]   4/22/2021  1:30 PM Marques Berger PA-C [facility]   4/27/2021  4:30 PM Bert Corner, PTA [facility]   4/29/2021  7:45 AM Bert Corner, PTA [facility]   4/29/2021  1:30 PM Marques Berger PA-C [facility]   5/3/2021 12:45 PM Bert Corner, PTA [facility]   5/6/2021 11:30 AM Bert Corner, PTA [facility]   5/10/2021 12:00 PM Bert Corner, PTA [facility]   5/13/2021 11:30 AM Bert Corner, PTA [facility]   5/14/2021  1:30 PM Heriberto Moyer MD [facility]   5/19/2021  9:30 AM Heriberto Moyer MD [facility]   6/4/2021 10:40 AM Marques Berger PA-C [facility]   6/15/2021  4:30 PM [facility] CHRISTOPHER RM 3 3D [facility]

## 2021-04-21 ENCOUNTER — HOSPITAL ENCOUNTER (OUTPATIENT)
Dept: PHYSICAL THERAPY | Age: 51
Discharge: HOME | End: 2021-04-21
Payer: OTHER GOVERNMENT

## 2021-04-21 PROCEDURE — 97110 THERAPEUTIC EXERCISES: CPT

## 2021-04-21 PROCEDURE — 97112 NEUROMUSCULAR REEDUCATION: CPT

## 2021-04-21 NOTE — PROGRESS NOTES
PT DAILY TREATMENT NOTE     Patient Name: Lord Kaylee Buenrostro  Date:2021  : 1970  [x]  Patient  Verified  Payor: CLAIR / Plan: Zohaib Turner 74 / Product Type:  /    In time:11:32  Out time:12:12  Total Treatment Time (min): 40  Visit #: 3 of 8    Treatment Area: Pain in right hip [M25.551]    SUBJECTIVE  Pain Level (0-10 scale): 0/10  Any medication changes, allergies to medications, adverse drug reactions, diagnosis change, or new procedure performed?: [x] No    [] Yes (see summary sheet for update)  Subjective functional status/changes:   [] No changes reported  The patient denies complaints upon arrival.    OBJECTIVE  25 min Therapeutic Exercise:  [x] See flow sheet :   Rationale: increase ROM and increase strength to improve the patients ability to improve ADL ease. 15 min Neuromuscular Re-education:  []  See flow sheet :   Rationale: improve coordination, improve balance and increase proprioception  to improve the patients ability to improve ADL ease. With   [] TE   [] TA   [] neuro   [] other: Patient Education: [x] Review HEP    [] Progressed/Changed HEP based on:   [] positioning   [] body mechanics   [] transfers   [] heat/ice application    [] other:      Other Objective/Functional Measures: The patient is progressing well with her gait noting minimal trendelenburg bilaterally, but still mild right trendelenburg noted. Initiated single leg stance to 20\" today. Pain Level (0-10 scale) post treatment: 0/10    ASSESSMENT/Changes in Function: The patient has progressed quite well noting improvements in gait as well as step ups. Improved to 20\" with single leg stance noting improvements in stability.      Patient will continue to benefit from skilled PT services to modify and progress therapeutic interventions, address functional mobility deficits, address ROM deficits, address strength deficits, analyze and address soft tissue restrictions, analyze and cue movement patterns, analyze and modify body mechanics/ergonomics, assess and modify postural abnormalities and instruct in home and community integration to attain remaining goals. []  See Plan of Care  []  See progress note/recertification  []  See Discharge Summary         Progress towards goals / Updated goals:  Updated Goals: to be achieved in 4 weeks:              1. Improve right glute med strength to 4/5 to improve gait              PN: Progressing - 3-/5 MMT 4/12/2021              Current: 3/5 MMT hip ABD 4/14/2021              2. The patient will demonstrate right LE single leg stance for 30\" to improve stability in stance phase of gait.             VO: unable. Current: Improved to 20\" 4/21/2021              3. The patient will demonstrate mild trendelenburg during ambulation to improve ease and efficiency of gait.               PN: marked trendelenburg       PLAN  []  Upgrade activities as tolerated     [x]  Continue plan of care  []  Update interventions per flow sheet       []  Discharge due to:_  []  Other:_      Allie Wong, PT 4/21/2021  11:44 AM    Future Appointments   Date Time Provider Kingsley Stout   4/22/2021  1:30 PM Sue Henry PA-C [facility]   4/27/2021  4:30 PM Lenoard Canal, PTA [facility]   4/29/2021  7:45 AM Lenoard Canal, PTA [facility]   4/29/2021  1:30 PM Sue Henry PA-C [facility]   5/3/2021 12:45 PM Lenoard Canal, PTA [facility]   5/6/2021 11:30 AM Lenoard Canal, PTA [facility]   5/10/2021 12:00 PM Lenoard Canal, PTA [facility]   5/13/2021 11:30 AM Lenoard Canal, PTA [facility]   5/14/2021  1:30 PM MD SANDOVAL [facility] AM[facility]   5/19/2021  9:30 AM MD ARNAUD Quigley BS AM[facility]   6/4/2021 10:40 AM Sue Henry PA-C [facility]   6/15/2021  4:30 PM [facility] CHRISTOPHER RM 3 3D [facility]

## 2021-04-22 ENCOUNTER — OFFICE VISIT (OUTPATIENT)
Dept: ORTHOPEDIC SURGERY | Age: 51
End: 2021-04-22
Payer: OTHER GOVERNMENT

## 2021-04-22 VITALS
BODY MASS INDEX: 29.8 KG/M2 | WEIGHT: 168.2 LBS | HEART RATE: 68 BPM | OXYGEN SATURATION: 99 % | TEMPERATURE: 97.3 F | HEIGHT: 63 IN

## 2021-04-22 DIAGNOSIS — M17.0 PRIMARY OSTEOARTHRITIS OF BOTH KNEES: Primary | ICD-10-CM

## 2021-04-22 PROCEDURE — 20611 DRAIN/INJ JOINT/BURSA W/US: CPT | Performed by: PHYSICIAN ASSISTANT

## 2021-04-22 NOTE — PROGRESS NOTES
Patient: Josh York                MRN: [id number]       SSN: [id number]  YOB: 1970        AGE: 49 y.o. SEX: female  Body mass index is 29.8 kg/m². PCP: Lucila Lemons MD  04/22/21        Pt presents today for their 2nd euflexxa  injection for Bilateral knee . There has been no recent fevers/chills, systemic changes, or injuries to report. PE:  General A&Ox3, NAD  Exam of the knees reveals skin intact, no erythema, no ecchymosis, no signs for infection. No cyanosis, clubbing, or edema present distally. Neg calf tenderness, neg homans, no signs for DVT present. A: Bilateral knee OA    P: The Bilateral knee was injected 2ml euflexxa with US guided assistance without complications. Patient was instructed on post injection care. Chart reviewed for the following:  Savannah ORDAZ PA-C, have reviewed the History, Physical and updated the Allergic reactions for Carlos Buenrostro? TIME OUT performed immediately prior to start of procedure:  Savannah ORDAZ PA-C, have performed the following reviews on Carlos Buenrostro prior to the start of the procedure:  ????????  * Patient was identified by name and date of birth   * Agreement on procedure being performed was verified  * Risks and Benefits explained to the patient  * Procedure site verified and marked as necessary  * Patient was positioned for comfort  * Consent was signed and verified    Time: 1:43 PM    Body part: Bilateral knee, intra-articular    Medication & Dose: 2ml euflexxa each knee    Date of procedure: 4/22/21    Procedure performed by: Anastasiya Vazquez PA-C    Provider assisted by: none    Patient assisted by: self    How tolerated by patient: tolerated the procedure well with no complications    Post Procedural Pain Scale: 2    Comments:  [address] using a frequency of 10MHz with a 12L-RS transducer head was used to confirm needle placement.   Ultrasound images captured using [address] Ultrasound machine and scanned into patient's chart      Jesse Chavarria PA-C

## 2021-04-23 ENCOUNTER — APPOINTMENT (OUTPATIENT)
Dept: PHYSICAL THERAPY | Age: 51
End: 2021-04-23
Payer: OTHER GOVERNMENT

## 2021-04-27 ENCOUNTER — HOSPITAL ENCOUNTER (OUTPATIENT)
Dept: PHYSICAL THERAPY | Age: 51
Discharge: HOME | End: 2021-04-27
Payer: OTHER GOVERNMENT

## 2021-04-27 PROCEDURE — 97112 NEUROMUSCULAR REEDUCATION: CPT

## 2021-04-27 PROCEDURE — 97110 THERAPEUTIC EXERCISES: CPT

## 2021-04-27 NOTE — PROGRESS NOTES
PT DAILY TREATMENT NOTE     Patient Name: Yara Buenrostro  Date:2021  : 1970  [x]  Patient  Verified  Payor: CLAIR / Plan: Zohaib Turner 74 / Product Type:  /    In time:4:32  Out time:5:12  Total Treatment Time (min): 40  Visit #: 4 of 8    Treatment Area: Pain in right hip [M25.551]    SUBJECTIVE  Pain Level (0-10 scale): 0/10  Any medication changes, allergies to medications, adverse drug reactions, diagnosis change, or new procedure performed?: [x] No    [] Yes (see summary sheet for update)  Subjective functional status/changes:   [] No changes reported  \"They started my knee injections a couple of weeks ago. \"    OBJECTIVE    25 min Therapeutic Exercise:  [x] See flow sheet :   Rationale: increase ROM and increase strength to improve the patients ability to perform ADL's. 15 min Neuromuscular Re-education:  [x]  See flow sheet : Dynamic step ups, lateral side-steps with mini-squats, toe taps, SLS. Rationale: increase strength, improve coordination, improve balance and increase proprioception  to improve the patients ability to perform functional activities. With   [x] TE   [] TA   [] neuro   [] other: Patient Education: [x] Review HEP    [] Progressed/Changed HEP based on:   [] positioning   [] body mechanics   [] transfers   [] heat/ice application    [] other:      Other Objective/Functional Measures: No change in there ex. Pain Level (0-10 scale) post treatment: 0/10    ASSESSMENT/Changes in Function: Pt fully participated in treatment. Demonstrating improved Right LE strength and hip stability with dynamic step ups, lateral side-steps. Able to perform sit to stand from low plinth. Continue PT to further increase Right hip abd to further improve ease of performing functional and dynamic activities.     Patient will continue to benefit from skilled PT services to modify and progress therapeutic interventions, address functional mobility deficits, address ROM deficits, address strength deficits, analyze and address soft tissue restrictions and analyze and modify body mechanics/ergonomics to attain remaining goals. [x]  See Plan of Care  []  See progress note/recertification  []  See Discharge Summary         Progress towards goals / Updated goals:  Updated Goals: to be achieved in 4 weeks:              1. Improve right glute med strength to 4/5 to improve gait              PN: Progressing - 3-/5 MMT 4/12/2021              Current: 3/5 MMT hip ABD 4/14/2021              2. The patient will demonstrate right LE single leg stance for 30\" to improve stability in stance phase of gait.             ES: unable.               Current: Improved to 20\" 4/21/2021              3. The patient will demonstrate mild trendelenburg during ambulation to improve ease and efficiency of gait.               PN: marked trendelenburg    PLAN  []  Upgrade activities as tolerated     [x]  Continue plan of care  []  Update interventions per flow sheet       []  Discharge due to:_  []  Other:_      Cassandra Larios, PTA 4/27/2021  4:31 PM    Future Appointments   Date Time Provider Kingsley Stout   4/29/2021  7:45 AM Elby Cimarron, PTA [facility]   4/29/2021  1:30 PM TAM Neville BS AMB   5/3/2021 12:45 PM Elby Cimarron, PTA [facility]   5/6/2021 11:30 AM Elby Cimarron, PTA [facility]   5/10/2021 12:00 PM Elby Cimarron, PTA [facility]   5/13/2021 11:30 AM Elby Cimarron, PTA [facility]   5/14/2021  1:30 PM MD SANDOVAL VieraFP BS AMB   5/19/2021  9:30 AM MD SANDOVAL VieraFP BS AMB   6/4/2021 10:40 AM TAM Neville BS AMB   6/15/2021  4:30 PM [facility] CHRISTOPHER RM 3 3D [facility]

## 2021-04-29 ENCOUNTER — HOSPITAL ENCOUNTER (OUTPATIENT)
Dept: PHYSICAL THERAPY | Age: 51
Discharge: HOME | End: 2021-04-29
Payer: OTHER GOVERNMENT

## 2021-04-29 ENCOUNTER — TELEPHONE (OUTPATIENT)
Dept: FAMILY MEDICINE CLINIC | Age: 51
End: 2021-04-29

## 2021-04-29 ENCOUNTER — OFFICE VISIT (OUTPATIENT)
Dept: ORTHOPEDIC SURGERY | Age: 51
End: 2021-04-29
Payer: OTHER GOVERNMENT

## 2021-04-29 VITALS
HEIGHT: 63 IN | HEART RATE: 82 BPM | WEIGHT: 168 LBS | BODY MASS INDEX: 29.77 KG/M2 | RESPIRATION RATE: 16 BRPM | OXYGEN SATURATION: 99 %

## 2021-04-29 DIAGNOSIS — M17.0 PRIMARY OSTEOARTHRITIS OF BOTH KNEES: Primary | ICD-10-CM

## 2021-04-29 PROCEDURE — 20611 DRAIN/INJ JOINT/BURSA W/US: CPT | Performed by: PHYSICIAN ASSISTANT

## 2021-04-29 PROCEDURE — 97110 THERAPEUTIC EXERCISES: CPT

## 2021-04-29 PROCEDURE — 97112 NEUROMUSCULAR REEDUCATION: CPT

## 2021-04-29 RX ORDER — DICLOFENAC SODIUM 10 MG/G
4 GEL TOPICAL 4 TIMES DAILY
Qty: 100 G | Refills: 4 | Status: SHIPPED | OUTPATIENT
Start: 2021-04-29

## 2021-04-29 NOTE — PROGRESS NOTES
PT DAILY TREATMENT NOTE 11    Patient Name: Lakisha Buenrostro  Date:2021  : 1970  [x]  Patient  Verified  Payor: CLAIR / Plan: Zohaib Turner 74 / Product Type:  /    In time:7:46  Out time:8:21  Total Treatment Time (min): 35  Visit #: 5 of 8    Treatment Area: Pain in right hip [M25.551]    SUBJECTIVE  Pain Level (0-10 scale): 0/10  Any medication changes, allergies to medications, adverse drug reactions, diagnosis change, or new procedure performed?: [x] No    [] Yes (see summary sheet for update)  Subjective functional status/changes:   [x] No changes reported    OBJECTIVE    20 min Therapeutic Exercise:  [x] See flow sheet :   Rationale: increase ROM and increase strength to improve the patients ability to perform ADL's. 15 min Neuromuscular Re-education:  [x]  See flow sheet : Dynamic step ups, lateral side-steps with mini-squats, toe taps, SLS, bandwalks. Rationale: increase strength, improve coordination, improve balance and increase proprioception  to improve the patients ability to ease of performing functional activities. With   [x] TE   [] TA   [] neuro   [] other: Patient Education: [x] Review HEP    [] Progressed/Changed HEP based on:   [] positioning   [] body mechanics   [] transfers   [] heat/ice application    [] other:      Other Objective/Functional Measures: Increased hip 3-way to 2#, increased shuttle press resistance, added band-walks per flow sheet. Pain Level (0-10 scale) post treatment: 0/10    ASSESSMENT/Changes in Function: Slight trendelenburg gait pattern. Pt is making gradual progress, demonstrates improved Right LE strength and balance. Continue PT to increase Right hip abd strength to improve gait pattern.     Patient will continue to benefit from skilled PT services to modify and progress therapeutic interventions, address ROM deficits, address strength deficits, analyze and cue movement patterns and analyze and modify body mechanics/ergonomics to attain remaining goals. [x]  See Plan of Care  []  See progress note/recertification  []  See Discharge Summary         Progress towards goals / Updated goals:  Updated Goals: to be achieved in 4 weeks:              1. Improve right glute med strength to 4/5 to improve gait              PN: Progressing - 3-/5 MMT 4/12/2021              Current: 3/5 MMT hip ABD 4/14/2021              2. The patient will demonstrate right LE single leg stance for 30\" to improve stability in stance phase of gait.             OE: unable.               EKPSXVM: Improved to 20\" 4/21/2021              3. The patient will demonstrate mild trendelenburg during ambulation to improve ease and efficiency of gait.             PN: marked trendelenburg   Current: Slight trendelenburg gait pattern.  4/29/2021    PLAN  []  Upgrade activities as tolerated     [x]  Continue plan of care  []  Update interventions per flow sheet       []  Discharge due to:_  []  Other:_      Dash Serrano, KARYN 4/29/2021  7:48 AM    Future Appointments   Date Time Provider Kingsley Stout   4/29/2021  1:30 PM Tano Kenyon PA-C [facility] AMB   5/3/2021 12:45 PM Vernida Tee, PTA [facility]   5/6/2021 11:30 AM Vernida Tee, PTA [facility]   5/10/2021 12:00 PM Vernida Tee, PTA [facility]   5/13/2021 11:30 AM Vernida Tee, PTA [facility]   5/14/2021  1:30 PM MD SANDOVAL AzulFP BS AMB   5/19/2021  9:30 AM MD ARNAUD Azul BS AMB   6/4/2021 10:40 AM Tano Kenyon PA-C [facility] AMB   6/15/2021  4:30 PM [facility] CHRISTOPHER RM 3 3D [facility]

## 2021-04-29 NOTE — PROGRESS NOTES
Patient: Josh York                MRN: [id number]       SSN: [id number]  YOB: 1970        AGE: 49 y.o. SEX: female  Body mass index is 29.76 kg/m². PCP: Lucila Lemons MD  04/29/21        Pt presents today for their 3rd euflexxa  injection for Bilateral knee . There has been no recent fevers/chills, systemic changes, or injuries to report. PE:  General A&Ox3, NAD  Exam of the knees reveals skin intact, no erythema, no ecchymosis, no signs for infection. No cyanosis, clubbing, or edema present distally. Neg calf tenderness, neg homans, no signs for DVT present. A: Bilateral knee OA    P: The Bilateral knee was injected 2ml Euflexxa with US guided assitance without complications. Patient was instructed on post injection care. Chart reviewed for the following:  Savannah ORDAZ PA-C, have reviewed the History, Physical and updated the Allergic reactions for Carlos Buenrostro? TIME OUT performed immediately prior to start of procedure:  Savannah ORDAZ PA-C, have performed the following reviews on Carlos Buenrostro prior to the start of the procedure:  ????????  * Patient was identified by name and date of birth   * Agreement on procedure being performed was verified  * Risks and Benefits explained to the patient  * Procedure site verified and marked as necessary  * Patient was positioned for comfort  * Consent was signed and verified    Time: 1:46 PM    Body part: Bilateral knee, intra-articular    Medication & Dose: 2ml euflexxa each knee    Date of procedure: 4/29/21    Procedure performed by: Anastasiya Vazquez PA-C    Provider assisted by: none    Patient assisted by: self    How tolerated by patient: tolerated the procedure well with no complications    Post Procedural Pain Scale: 3    Comments:  [address] using a frequency of 10MHz with a 12L-RS transducer head was used to confirm needle placement.   Ultrasound images captured using [address] Ultrasound machine and scanned into patient's chart      Garrison Grimaldo PA-C

## 2021-04-29 NOTE — TELEPHONE ENCOUNTER
Pt called stating she she just turned 49 and would like a referral for a colonoscopy. Please advise.

## 2021-04-30 DIAGNOSIS — Z12.11 SCREENING FOR COLON CANCER: Primary | ICD-10-CM

## 2021-04-30 NOTE — TELEPHONE ENCOUNTER
Noted in 2014 had positive FIT test. Could not see she had done colonoscopy during that time. Even though she had it done , now she is 51 and I have done a referral mean time. Further discussion on coming up appt.

## 2021-05-03 ENCOUNTER — HOSPITAL ENCOUNTER (OUTPATIENT)
Dept: PHYSICAL THERAPY | Age: 51
Discharge: HOME | End: 2021-05-03
Payer: OTHER GOVERNMENT

## 2021-05-03 PROCEDURE — 97110 THERAPEUTIC EXERCISES: CPT

## 2021-05-03 PROCEDURE — 97112 NEUROMUSCULAR REEDUCATION: CPT

## 2021-05-03 NOTE — PROGRESS NOTES
PT DAILY TREATMENT NOTE     Patient Name: Kandis Casillas  Date:5/3/2021  : 1970  [x]  Patient  Verified  Payor: CLAIR / Plan: Zohaib Turner 74 / Product Type:  /    In time:12:45  Out time:1:24  Total Treatment Time (min): 39  Visit #: 6 of 8    Treatment Area: Pain in right hip [M25.551]    SUBJECTIVE  Pain Level (0-10 scale): 0/10  Any medication changes, allergies to medications, adverse drug reactions, diagnosis change, or new procedure performed?: [x] No    [] Yes (see summary sheet for update)  Subjective functional status/changes:   [] No changes reported  \"I can feel I'm getting stronger. \"    OBJECTIVE    24 min Therapeutic Exercise:  [x] See flow sheet :   Rationale: increase ROM and increase strength to improve the patients ability to perform ADL's. 15 min Neuromuscular Re-education:  [x]  See flow sheet : Dynamic step ups, lateral side-steps with mini-squats, toe taps, SLS, bandwalks. Rationale: increase strength, improve coordination, improve balance and increase proprioception  to improve the patients ability to perform functional and recreational exercises. With   [x] TE   [] TA   [] neuro   [] other: Patient Education: [x] Review HEP    [] Progressed/Changed HEP based on:   [] positioning   [] body mechanics   [] transfers   [] heat/ice application    [] other:      Other Objective/Functional Measures: Added airex to sit to stands, increased reps for several exercises. Pain Level (0-10 scale) post treatment: 0/10    ASSESSMENT/Changes in Function: Performed exercises well with good form. Tolerated increased resistance/reps. Performing SLR 20 reps without assistant. Good static Right SLS balance, challenged with SLS phase of dynamic activities.     Patient will continue to benefit from skilled PT services to modify and progress therapeutic interventions, address functional mobility deficits, address ROM deficits, address strength deficits, analyze and cue movement patterns and analyze and modify body mechanics/ergonomics to attain remaining goals. [x]  See Plan of Care  []  See progress note/recertification  []  See Discharge Summary         Progress towards goals / Updated goals:  Updated Goals: to be achieved in 4 weeks:              1. Improve right glute med strength to 4/5 to improve gait              PN: Progressing - 3-/5 MMT 4/12/2021              Current: 3/5 MMT hip ABD 4/14/2021              2. The patient will demonstrate right LE single leg stance for 30\" to improve stability in stance phase of gait.             TQ: unable.               [id number]: Improved to 20\" 4/21/2021              3. The patient will demonstrate mild trendelenburg during ambulation to improve ease and efficiency of gait.             PN: marked trendelenburg              Current: Slight trendelenburg gait pattern.  4/29/2021    PLAN  []  Upgrade activities as tolerated     [x]  Continue plan of care  []  Update interventions per flow sheet       []  Discharge due to:_  []  Other:_      Luis Elizabeth, KARYN 5/3/2021  12:51 PM    Future Appointments   Date Time Provider Kingsley Stout   5/6/2021 11:30 AM Brooke Baxter PTA [facility]   5/10/2021 12:00 PM Brooke Baxter PTA [facility]   5/13/2021 11:30 AM Brooke Baxter PTA [facility]   5/14/2021  1:30 PM Paige Canseco MD [facility] AMB   6/4/2021 10:40 AM Shavonne Beltran PA-C [facility] AMB   6/15/2021  4:30 PM [facility] CHRISTOPHER RM 3 3D [facility]

## 2021-05-06 ENCOUNTER — HOSPITAL ENCOUNTER (OUTPATIENT)
Dept: PHYSICAL THERAPY | Age: 51
Discharge: HOME | End: 2021-05-06
Payer: OTHER GOVERNMENT

## 2021-05-06 PROCEDURE — 97110 THERAPEUTIC EXERCISES: CPT

## 2021-05-06 PROCEDURE — 97112 NEUROMUSCULAR REEDUCATION: CPT

## 2021-05-06 NOTE — PROGRESS NOTES
PT DAILY TREATMENT NOTE     Patient Name: Emma Buenrostro  Date:2021  : 1970  [x]  Patient  Verified  Payor: CLAIR / Plan: Zohaib Turner 74 / Product Type:  /    In time:11:30  Out time:12:10  Total Treatment Time (min): 40  Visit #: 7 of 8    Treatment Area: Pain in right hip [M25.551]    SUBJECTIVE  Pain Level (0-10 scale): 0/10  Any medication changes, allergies to medications, adverse drug reactions, diagnosis change, or new procedure performed?: [x] No    [] Yes (see summary sheet for update)  Subjective functional status/changes:   [] No changes reported  \"Doing well. \"    OBJECTIVE    25 min Therapeutic Exercise:  [x] See flow sheet :   Rationale: increase ROM and increase strength to improve the patients ability to perform ADL's. 15 min Neuromuscular Re-education:  [x]  See flow sheet : Dynamic step ups, lateral side-steps with mini-squats, toe taps, SLS, bandwalks. Rationale: increase strength, improve coordination and increase proprioception  to improve the patients ability to perform functional activities. With   [x] TE   [] TA   [] neuro   [] other: Patient Education: [x] Review HEP    [] Progressed/Changed HEP based on:   [] positioning   [] body mechanics   [] transfers   [] heat/ice application    [] other:      Other Objective/Functional Measures: Added 2# to dynamic step ups to improve ease of hiking with boots. Pain Level (0-10 scale) post treatment: 0/10    ASSESSMENT/Changes in Function: Pt reports feeling Right hip has improved to about the same as the Left hip. Denies difficulty performing ADL's and bed transfers/mobility. Plan to D/C to HEP next visit.     Patient will continue to benefit from skilled PT services to modify and progress therapeutic interventions, address functional mobility deficits, address ROM deficits, address strength deficits, analyze and cue movement patterns and analyze and modify body mechanics/ergonomics to attain remaining goals. [x]  See Plan of Care  []  See progress note/recertification  []  See Discharge Summary         Progress towards goals / Updated goals:  Updated Goals: to be achieved in 4 weeks:              1. Improve right glute med strength to 4/5 to improve gait              PN: Progressing - 3-/5 MMT 4/12/2021              Current: 3/5 MMT hip ABD 4/14/2021              2. The patient will demonstrate right LE single leg stance for 30\" to improve stability in stance phase of gait.             SP: unable.               XSQQUFQ: Improved to 20\" 4/21/2021              3. The patient will demonstrate mild trendelenburg during ambulation to improve ease and efficiency of gait.             PN: marked trendelenburg              Current: Slight trendelenburg gait pattern.  4/29/2021    PLAN  []  Upgrade activities as tolerated     [x]  Continue plan of care  []  Update interventions per flow sheet       []  Discharge due to:_  []  Other:_      Giovanna Irving, PTA 5/6/2021  11:28 AM    Future Appointments   Date Time Provider Kingsley Stout   5/6/2021 11:30 AM Siva Andrew, PTA [facility]   5/10/2021 12:00 PM Siva Andrew, PTA [facility]   5/13/2021 11:30 AM Siva Andrew, PTA [facility]   5/14/2021  1:30 PM Abbie Cates MD [facility] AMB   6/4/2021 10:40 AM Jason Cuenca PA-C [facility] AMB   6/15/2021  4:30 PM [facility] RM 3 3D [facility]

## 2021-05-10 ENCOUNTER — HOSPITAL ENCOUNTER (OUTPATIENT)
Dept: PHYSICAL THERAPY | Age: 51
Discharge: HOME | End: 2021-05-10
Payer: OTHER GOVERNMENT

## 2021-05-10 PROCEDURE — 97112 NEUROMUSCULAR REEDUCATION: CPT

## 2021-05-10 PROCEDURE — 97110 THERAPEUTIC EXERCISES: CPT

## 2021-05-10 NOTE — PROGRESS NOTES
PT DAILY TREATMENT NOTE     Patient Name: Prabhakar Buenrostro  Date:5/10/2021  : 1970  [x]  Patient  Verified  Payor: CLAIR / Plan: Pancho Garrett / Product Type:  /    In time:12:02  Out time:12:40  Total Treatment Time (min): 38  Visit #: 8 of 8    Treatment Area: Pain in right hip [M25.551]    SUBJECTIVE  Pain Level (0-10 scale): 0/10  Any medication changes, allergies to medications, adverse drug reactions, diagnosis change, or new procedure performed?: [x] No    [] Yes (see summary sheet for update)  Subjective functional status/changes:   [] No changes reported  \"Doing pretty good. \"    OBJECTIVE    23 min Therapeutic Exercise:  [x] See flow sheet :   Rationale: increase ROM and increase strength to improve the patients ability to perform ADL's. 15 min Neuromuscular Re-education:  [x]  See flow sheet : Dynamic step ups, lateral side-steps with mini-squats, toe taps, SLS, bandwalks. Rationale: increase strength, improve coordination, improve balance and increase proprioception  to improve the patients ability to perform functional activities. With   [x] TE   [] TA   [] neuro   [] other: Patient Education: [x] Review HEP    [] Progressed/Changed HEP based on:   [] positioning   [] body mechanics   [] transfers   [] heat/ice application    [] other:      Other Objective/Functional Measures: Right SLS for 30\" without support. MMT Right hip abd 3+/5. Pt has made considerable progress since IE. Pt has improved strength/stability/AROM. Continues to ambulate with slight trendelenburg pattern. D/C to HEP at this time. Instructed pt to continue HEP and gradually increase resistance/reps as tolerable. Pt had no further questions, comments or concerns.     Pain Level (0-10 scale) post treatment: 0/10    ASSESSMENT/Changes in Function:      []  See Plan of Care  []  See progress note/recertification  [x]  See Discharge Summary         Progress towards goals / Updated goals:  Updated Goals: to be achieved in 4 weeks:              1. Improve right glute med strength to 4/5 to improve gait              PN: Progressing - 3-/5 MMT 4/12/2021              Current: 3/5 MMT hip ABD 4/14/2021; Improved to 3+/5. 5/10/2021              2. The patient will demonstrate right LE single leg stance for 30\" to improve stability in stance phase of gait.             OV: unable.               BPWXRVQ: Improved to 20\" 4/21/2021; Met, 30\". 5/10/2021              3. The patient will demonstrate mild trendelenburg during ambulation to improve ease and efficiency of gait.             PN: marked trendelenburg              Current: Slight trendelenburg gait pattern. 4/29/2021    PLAN  []  Upgrade activities as tolerated     []  Continue plan of care  []  Update interventions per flow sheet       [x]  Discharge due to: Met/partially achieved LTG's, D/C to HEP.   []  Other:_      Tevin Alvarez, PTA 5/10/2021  12:10 PM    Future Appointments   Date Time Provider Kingsley Stout   5/14/2021  1:30 PM Eneida Mabry MD [facility] AMB   6/4/2021 10:40 AM Marsha Gonsales PA-C [facility] AM[facility]   6/15/2021  4:30 PM [facility] CHRISTOPHER RM 3 3D [facility]

## 2021-05-10 NOTE — PROGRESS NOTES
[facility]  [address]  [place], [address]  [phone] fax    Physical Therapy Discharge Summary  Patient name: Vonda Berger Start of Care: 3/23/2021   Referral source: Naomie Maddox : 1970   Medical/Treatment Diagnosis: Pain in right hip [M25.551]  Payor:  / Plan: Teto Roach / Product Type: Lorena Leghorn /  [place]set Date:3/2/2021     Prior Hospitalization: see medical history Provider#: [id number]   Medications: Verified on Patient Summary List    Comorbidities: OA, left NOREEN, right knee pain, right knee arthroscopy, HTN   Prior Level of Function:functionally I with daily tasks, cane with gait   Visits from Start of Care: 16    Missed Visits: 1  Reporting Period : 2021 to 5/10/2021    Summary of Care:    Right SLS for 30\" without support. MMT Right hip abd 3+/5. Pt has made considerable progress since IE. Pt has improved strength/stability/AROM. Continues to ambulate with slight trendelenburg pattern. D/C to HEP at this time. Instructed pt to continue HEP and gradually increase resistance/reps as tolerable. Pt had no further questions, comments or concerns. Updated Goals: to be achieved in 4 weeks:              1. Improve right glute med strength to 4/5 to improve gait              PN: Progressing - 3-/5 MMT 2021              Current: Improved to 3+/5. 5/10/2021              2. The patient will demonstrate right LE single leg stance for 30\" to improve stability in stance phase of gait.             WG: unable.               UXZUDID: Met, 30\". 5/10/2021              3. The patient will demonstrate mild trendelenburg during ambulation to improve ease and efficiency of gait.             PN: marked trendelenburg              Current: Slight trendelenburg gait pattern.  2021     ASSESSMENT/RECOMMENDATIONS:  [x]Discontinue therapy: [x]Patient has reached or is progressing toward set goals      []Patient is non-compliant or has abdicated      []Due to lack of appreciable progress towards set goals    Lidia Bhardwaj, PTA 5/10/2021 2:03 PM  Co-sign: Julio Degroot DPT PT [facility]

## 2021-05-13 ENCOUNTER — APPOINTMENT (OUTPATIENT)
Dept: PHYSICAL THERAPY | Age: 51
End: 2021-05-13
Payer: OTHER GOVERNMENT

## 2021-05-14 ENCOUNTER — OFFICE VISIT (OUTPATIENT)
Dept: FAMILY MEDICINE CLINIC | Age: 51
End: 2021-05-14
Payer: OTHER GOVERNMENT

## 2021-05-14 VITALS
SYSTOLIC BLOOD PRESSURE: 132 MMHG | RESPIRATION RATE: 16 BRPM | OXYGEN SATURATION: 100 % | HEIGHT: 63 IN | DIASTOLIC BLOOD PRESSURE: 88 MMHG | BODY MASS INDEX: 29.27 KG/M2 | HEART RATE: 60 BPM | TEMPERATURE: 97.1 F | WEIGHT: 165.2 LBS

## 2021-05-14 DIAGNOSIS — G43.009 MIGRAINE WITHOUT AURA AND WITHOUT STATUS MIGRAINOSUS, NOT INTRACTABLE: ICD-10-CM

## 2021-05-14 DIAGNOSIS — M25.562 CHRONIC PAIN OF BOTH KNEES: ICD-10-CM

## 2021-05-14 DIAGNOSIS — M25.561 CHRONIC PAIN OF BOTH KNEES: ICD-10-CM

## 2021-05-14 DIAGNOSIS — Z22.7 LATENT TUBERCULOSIS: ICD-10-CM

## 2021-05-14 DIAGNOSIS — I10 ESSENTIAL HYPERTENSION WITH GOAL BLOOD PRESSURE LESS THAN 130/80: ICD-10-CM

## 2021-05-14 DIAGNOSIS — I10 ESSENTIAL HYPERTENSION: ICD-10-CM

## 2021-05-14 DIAGNOSIS — E04.9 GOITER: ICD-10-CM

## 2021-05-14 DIAGNOSIS — G89.29 CHRONIC PAIN OF BOTH KNEES: ICD-10-CM

## 2021-05-14 DIAGNOSIS — R76.8 POSITIVE ANA (ANTINUCLEAR ANTIBODY): Primary | ICD-10-CM

## 2021-05-14 DIAGNOSIS — M35.9 CONNECTIVE TISSUE DISEASE, UNDIFFERENTIATED (HCC): ICD-10-CM

## 2021-05-14 DIAGNOSIS — R79.89 ABNORMAL CBC: ICD-10-CM

## 2021-05-14 PROCEDURE — 99214 OFFICE O/P EST MOD 30 MIN: CPT | Performed by: FAMILY MEDICINE

## 2021-05-14 RX ORDER — AMLODIPINE BESYLATE 10 MG/1
10 TABLET ORAL DAILY
Qty: 90 TAB | Refills: 1 | Status: SHIPPED | OUTPATIENT
Start: 2021-05-14 | End: 2022-04-26 | Stop reason: SDUPTHER

## 2021-05-14 RX ORDER — RIFAMPIN 300 MG/1
2 CAPSULE ORAL DAILY
COMMUNITY
Start: 2021-03-18 | End: 2022-01-28

## 2021-05-14 NOTE — PROGRESS NOTES
1. Have you been to the ER, urgent care clinic since your last visit? Hospitalized since your last visit? [facility] 3/02/2021 surgery Dr. Devin Fernández. 2. Have you seen or consulted any other health care providers outside of the [address] [place] [address] since your last visit? Include any pap smears or colon screening. Dr. Deanna Carpenter: two weeks ago.     Chief Complaint   Patient presents with    Hypertension    Migraine    Thyroid Problem    Abnormal White Blood Cell Count    Other     positive ALY and abnormal CBC

## 2021-05-14 NOTE — PROGRESS NOTES
HISTORY OF PRESENT ILLNESS  Carlos Lamb is a 49 y.o. female. HPI: Here for follow-up. Recently went through hip surgery and no complications. She does have a undifferentiated connective tissue disease and/or osteoarthritis of multiple sites. Has been following rheumatology. On immunocompromise medication and it has been helping for her routine chronic pain. During visit sitting comfortable without any acute distress. Recently diagnosed with latent TB and currently on rifampin. Noted mildly elevated diastolic blood pressure. Repeat was stable and she is asymptomatic. Currently taking medication with compliance. She used to take amlodipine 5 mg 2 times a day I have change it to 10 mg once a day. Denies any headache, dizziness, no chest pain or trouble breathing, no arm or leg weakness. No nausea or vomiting, no weight or appetite changes, no mood changes . No urine or bowel complains, no palpitation, no diaphoresis. No abdominal pain. No cold or cough. No leg swelling. No fever. No sleep trouble. Also wondering that immunization for travel last year is going to [facility] and can climb [place] she was wondering if she can get the malaria prophylaxis and Diamox. She will get needed immunization from [facility] or health department. Before doing that she will confirm with rheumatologist about okay to take all needed vaccination. History of goiter. Has been following Endo. Reviewed notes from Endo today. No new recommendation. No trouble swallowing or change in voice. Also reviewed notes from hematology as that she follows them for abnormal CBC. No new recommendations. Recommended no need for bone marrow biopsy at this time.   Visit Vitals  BP (!) 134/94 (BP 1 Location: Left upper arm, BP Patient Position: Sitting, BP Cuff Size: Adult)   Pulse 60   Temp 97.1 °F (36.2 °C) (Temporal)   Resp 16   Ht 5' 3\" (1.6 m)   Wt 165 lb 3.2 oz (74.9 kg)   SpO2 100%   BMI 29.26 kg/m² Review medication list, vitals, problem list,allergies. ROS : see HPI   Physical Exam  Constitutional:       General: She is not in acute distress. Cardiovascular:      Rate and Rhythm: Normal rate and regular rhythm. Heart sounds: Normal heart sounds. Abdominal:      General: Bowel sounds are normal.      Palpations: Abdomen is soft. Tenderness: There is no abdominal tenderness. Musculoskeletal:         General: No swelling. Neurological:      Mental Status: She is oriented to person, place, and time. Psychiatric:         Behavior: Behavior normal.         ASSESSMENT and PLAN    ICD-10-CM ICD-9-CM    1. Positive ALY (antinuclear antibody)/ test done by hemotology and requested referral by PCP: Been following rheumatology and current treatment feeling much better with the pain. R76.8 795.79    2. Abnormal CBC/ seen hematology: Seen hematology. Reviewed their notes. No new recommendation R79.89 790.6    3. Essential hypertension  I10 401.9    4. Migraine without aura and without status migrainosus, not intractable: No migraine headaches since years G43.009 346.10    5. Chronic pain of both knees/ following ortho: Has been fairly stable at this time M25.561 719.46     M25.562 338.29     G89.29      post hip surgery , it is helping    6. Connective tissue disease, undifferentiated ([address]): Been treated by rheumatology. Pain is well controlled M35.9 710.9    7. Latent tuberculosis: Been treated by rheumatology with rifampin. She is asymptomatic Z22.7 795.51    8. Essential hypertension with goal blood pressure less than 130/80: Repeat was improved. Will continue current plan. She was taking amlodipine 5 mg 2 times a day I will change it to 10 mg once a day I10 401.9 amLODIPine (NORVASC) 10 mg tablet   9. Goiter: following endo. Review their notes and no new recommendations. Pt understood and agree with the plan   Has been going to [facility] in couple of months. See HPI.   She will get needed immunization form process never but she will call closer to her travel if she is going to for malaria prophylaxis and Diamox as she can climb high altitude  Provided # for  GI for colonoscopy  Again she will speak with rheumatologist regarding her shingles vaccine  She has completed her both Covid vaccine. She will bring the records next visit  Follow-up and Dispositions    · Return in about 4 months (around 9/14/2021). Please note that this dictation was completed with [employer], the computer voice recognition software. Quite often unanticipated grammatical, syntax, homophones, and other interpretive errors are inadvertently transcribed by the computer software. Please disregard these errors. Please excuse any errors that have escaped final proofreading.

## 2021-05-14 NOTE — PATIENT INSTRUCTIONS

## 2021-06-03 ENCOUNTER — TELEPHONE (OUTPATIENT)
Dept: FAMILY MEDICINE CLINIC | Age: 51
End: 2021-06-03

## 2021-06-03 NOTE — TELEPHONE ENCOUNTER
Pt states that she has not heard about her referral for gastroenterology for a colonoscopy. Please advise.  Thank you

## 2021-06-04 ENCOUNTER — OFFICE VISIT (OUTPATIENT)
Dept: ORTHOPEDIC SURGERY | Age: 51
End: 2021-06-04
Payer: OTHER GOVERNMENT

## 2021-06-04 VITALS — BODY MASS INDEX: 28.17 KG/M2 | WEIGHT: 165 LBS | HEIGHT: 64 IN

## 2021-06-04 DIAGNOSIS — Z96.641 STATUS POST TOTAL REPLACEMENT OF RIGHT HIP: ICD-10-CM

## 2021-06-04 DIAGNOSIS — M17.0 PRIMARY OSTEOARTHRITIS OF BOTH KNEES: Primary | ICD-10-CM

## 2021-06-04 DIAGNOSIS — M25.551 RIGHT HIP PAIN: ICD-10-CM

## 2021-06-04 PROCEDURE — 99214 OFFICE O/P EST MOD 30 MIN: CPT | Performed by: PHYSICIAN ASSISTANT

## 2021-06-04 NOTE — PROGRESS NOTES
[address] [facility], [address] [facility]  [phone]           Patient: Ruddy Dumont                MRN: [id number]       SSN: [id number]  YOB: 1970        AGE: 49 y.o. SEX: female  Body mass index is 28.32 kg/m². PCP: Susan Sutherland MD  06/04/21      This office note has been dictated. REVIEW OF SYSTEMS:  Constitutional: Negative for fever, chills, weight loss and malaise/fatigue. HENT: Negative. Eyes: Negative. Respiratory: Negative. Cardiovascular: Negative. Gastrointestinal: No bowel incontinence or constipation. Genitourinary: No bladder incontinence or saddle anesthesia. Skin: Negative. Neurological: Negative. Endo/Heme/Allergies: Negative. Psychiatric/Behavioral: Negative. Musculoskeletal: As per HPI above. Past Medical History:   Diagnosis Date    Adverse effect of anesthesia     Hypotension    Allergic rhinitis, seasonal     spring    Anemia NEC     iron deficiency associated with heavy menstruation and fibroids noted 2008 with pregnancy    Dysmenorrhea     increasing past year    Fibroid, uterine     Goiter     Bilateral    Headache(784.0)     Hypertension     Ill-defined condition     Chronic knee pain    Menorrhagia     worsening past year    Neutropenia ([facility] [address]          Current Outpatient Medications:     cholecalciferol, vitamin D3, (VITAMIN D3 PO), Take  by mouth., Disp: , Rfl:     rifAMPin (RIFADIN) 300 mg capsule, Take 2 Caps by mouth daily. , Disp: , Rfl:     amLODIPine (NORVASC) 10 mg tablet, Take 1 Tab by mouth daily. , Disp: 90 Tab, Rfl: 1    diclofenac (VOLTAREN) 1 % gel, Apply 4 g to affected area four (4) times daily. , Disp: 100 g, Rfl: 4    hydrOXYchloroQUINE (PLAQUENIL) 200 mg tablet, Take 200 mg by mouth two (2) times a day., Disp: , Rfl:     acetaminophen (TYLENOL) 500 mg tablet, Take 1,000 mg by mouth every six (6) hours as needed for Pain.  Pain, Disp: , Rfl:     Blood Pressure Monitor kit, Once a day, Disp: 1 Kit, Rfl: 0    ergocalciferol (Vitamin D2) 1,250 mcg (50,000 unit) capsule, Take 50,000 Units by mouth daily. (Patient not taking: Reported on 6/4/2021), Disp: , Rfl:     aspirin (ASPIRIN) 325 mg tablet, Take 1 Tab by mouth two (2) times a day. (Patient not taking: Reported on 6/4/2021), Disp: 60 Tab, Rfl: 0    cephALEXin (Keflex) 500 mg capsule, Take 1 Cap by mouth four (4) times daily. (Patient not taking: Reported on 6/4/2021), Disp: 12 Cap, Rfl: 0    docusate sodium 100 mg tab, Take 1 Tab by mouth daily. (Patient not taking: Reported on 6/4/2021), Disp: , Rfl:     No Known Allergies    Social History     Socioeconomic History    Marital status:      Spouse name: Not on file    Number of children: Not on file    Years of education: Not on file    Highest education level: Not on file   Occupational History    Not on file   Tobacco Use    Smoking status: Never Smoker    Smokeless tobacco: Never Used   Vaping Use    Vaping Use: Never used   Substance and Sexual Activity    Alcohol use: Yes     Alcohol/week: 0.0 standard drinks     Comment: occass: 2/month    Drug use: No    Sexual activity: Yes     Partners: Male     Birth control/protection: Surgical     Comment: hysterectomy   Other Topics Concern    Not on file   Social History Narrative    Not on file     Social Determinants of Health     Financial Resource Strain:     Difficulty of Paying Living Expenses:    Food Insecurity:     Worried About Running Out of Food in the Last Year:     [address] in the Last Year:    Transportation Needs:     Lack of Transportation (Medical):      Lack of Transportation (Non-Medical):    Physical Activity:     Days of Exercise per Week:     Minutes of Exercise per Session:    Stress:     Feeling of Stress :    Social Connections:     Frequency of Communication with Friends and Family:     Frequency of Social Gatherings with Friends and Family:     Attends Jew Services:     Active Member of Clubs or Organizations:     Attends Club or Organization Meetings:     Marital Status:    Intimate Partner Violence:     Fear of Current or Ex-Partner:     Emotionally Abused:     Physically Abused:     Sexually Abused:        Past Surgical History:   Procedure Laterality Date    HX HIP REPLACEMENT      right and left    HX HYSTERECTOMY  04/2014    [facility]    HX KNEE ARTHROSCOPY Right     [place] TOTAL HIP ARTHROPLASTY Left 12/13/2020           Patient seen and evaluated today for her right hip. She is now 3 months status post surgery and she is done very well with it. She is having no pain in her right hip. She does have a little bit of stiffness after being seated. She does continue on Celebrex. She is had no troubles the wound. No fevers or chills. No systemic changes to report. Patient denies recent fevers, chills, chest pain, SOB, or injuries. No recent systemic changes noted. A 12-point review of systems is performed today. Pertinent positives are noted. All other systems reviewed and otherwise are negative. Physical exam: General: Alert and oriented x3, nad.  well-developed, well nourished. normal affect, AF. NC/AT, EOMI, neck supple, trachea midline, no JVD present. Breathing is non-labored. Examination of lower extremities reveals pain-free range of motion hips. There is no pain to palpation of trochanteric bursa. She has negative straight leg raise. Negative calf tenderness. Negative Homans. No signs of DVT present. Leg lengths look good grossly sitting in chair. Abduction strength is 5 - on the right and 5 out of 5 on the left. Assessment: Status post right total hip replacement    Plan: At this point, the patient will continue activity as tolerated. She is reminded of her precautions. She will continue with abduction exercises and this point she does have a .   She would like to get back into yoga and she was instructed to talk to the instructor regarding positions post hip replacement. She will continue on her Celebrex. We will see her back in the office in 3 months time for evaluation.             JR Josh MARTIN, PAScarlettC, ATC

## 2021-06-04 NOTE — TELEPHONE ENCOUNTER
[facility] approval has been received for [employer]; Auth: [id number] beginning 5/29/2021 and expires on 5/29/2022; valid four visits.

## 2021-06-07 NOTE — TELEPHONE ENCOUNTER
An appointment request has been faxed to [facility] ([address] [facility]; [phone]). A fax confirmation has been received. They will contact patient to schedule an appointment. Patient notified via [address]

## 2021-09-03 ENCOUNTER — OFFICE VISIT (OUTPATIENT)
Dept: ORTHOPEDIC SURGERY | Age: 51
End: 2021-09-03
Payer: OTHER GOVERNMENT

## 2021-09-03 VITALS
HEART RATE: 72 BPM | OXYGEN SATURATION: 98 % | TEMPERATURE: 97.5 F | HEIGHT: 64 IN | WEIGHT: 159 LBS | BODY MASS INDEX: 27.14 KG/M2

## 2021-09-03 DIAGNOSIS — Z96.642 STATUS POST LEFT HIP REPLACEMENT: ICD-10-CM

## 2021-09-03 DIAGNOSIS — M17.0 PRIMARY OSTEOARTHRITIS OF BOTH KNEES: Primary | ICD-10-CM

## 2021-09-03 DIAGNOSIS — Z96.641 STATUS POST TOTAL REPLACEMENT OF RIGHT HIP: ICD-10-CM

## 2021-09-03 PROCEDURE — 99213 OFFICE O/P EST LOW 20 MIN: CPT | Performed by: PHYSICIAN ASSISTANT

## 2021-09-03 NOTE — PROGRESS NOTES
[address] [facility], [address] [facility]  [phone]           Patient: oJhn Flores                MRN: [id number]       SSN: [id number]  YOB: 1970        AGE: 49 y.o. SEX: female  Body mass index is 27.29 kg/m². PCP: Amanda Burgess MD  09/03/21      This office note has been dictated. REVIEW OF SYSTEMS:  Constitutional: Negative for fever, chills, weight loss and malaise/fatigue. HENT: Negative. Eyes: Negative. Respiratory: Negative. Cardiovascular: Negative. Gastrointestinal: No bowel incontinence or constipation. Genitourinary: No bladder incontinence or saddle anesthesia. Skin: Negative. Neurological: Negative. Endo/Heme/Allergies: Negative. Psychiatric/Behavioral: Negative. Musculoskeletal: As per HPI above. Past Medical History:   Diagnosis Date    Adverse effect of anesthesia     Hypotension    Allergic rhinitis, seasonal     spring    Anemia NEC     iron deficiency associated with heavy menstruation and fibroids noted 2008 with pregnancy    Dysmenorrhea     increasing past year    Fibroid, uterine     Goiter     Bilateral    Headache(784.0)     Hypertension     Ill-defined condition     Chronic knee pain    Menorrhagia     worsening past year    Neutropenia ([facility] [address]          Current Outpatient Medications:     cholecalciferol, vitamin D3, (VITAMIN D3 PO), Take  by mouth., Disp: , Rfl:     rifAMPin (RIFADIN) 300 mg capsule, Take 2 Caps by mouth daily. , Disp: , Rfl:     amLODIPine (NORVASC) 10 mg tablet, Take 1 Tab by mouth daily. , Disp: 90 Tab, Rfl: 1    diclofenac (VOLTAREN) 1 % gel, Apply 4 g to affected area four (4) times daily. , Disp: 100 g, Rfl: 4    hydrOXYchloroQUINE (PLAQUENIL) 200 mg tablet, Take 200 mg by mouth two (2) times a day., Disp: , Rfl:     Blood Pressure Monitor kit, Once a day, Disp: 1 Kit, Rfl: 0    ergocalciferol (Vitamin D2) 1,250 mcg (50,000 unit) capsule, Take 50,000 Units by mouth daily. (Patient not taking: Reported on 6/4/2021), Disp: , Rfl:     aspirin (ASPIRIN) 325 mg tablet, Take 1 Tab by mouth two (2) times a day. (Patient not taking: Reported on 6/4/2021), Disp: 60 Tab, Rfl: 0    cephALEXin (Keflex) 500 mg capsule, Take 1 Cap by mouth four (4) times daily. (Patient not taking: Reported on 6/4/2021), Disp: 12 Cap, Rfl: 0    docusate sodium 100 mg tab, Take 1 Tab by mouth daily. (Patient not taking: Reported on 6/4/2021), Disp: , Rfl:     acetaminophen (TYLENOL) 500 mg tablet, Take 1,000 mg by mouth every six (6) hours as needed for Pain. Pain (Patient not taking: Reported on 9/3/2021), Disp: , Rfl:     No Known Allergies    Social History     Socioeconomic History    Marital status:      Spouse name: Not on file    Number of children: Not on file    Years of education: Not on file    Highest education level: Not on file   Occupational History    Not on file   Tobacco Use    Smoking status: Never Smoker    Smokeless tobacco: Never Used   Vaping Use    Vaping Use: Never used   Substance and Sexual Activity    Alcohol use: Yes     Alcohol/week: 0.0 standard drinks     Comment: occass: 2/month    Drug use: No    Sexual activity: Yes     Partners: Male     Birth control/protection: Surgical     Comment: hysterectomy   Other Topics Concern    Not on file   Social History Narrative    Not on file     Social Determinants of Health     Financial Resource Strain:     Difficulty of Paying Living Expenses:    Food Insecurity:     Worried About Running Out of Food in the Last Year:     [address] in the Last Year:    Transportation Needs:     Lack of Transportation (Medical):      Lack of Transportation (Non-Medical):    Physical Activity:     Days of Exercise per Week:     Minutes of Exercise per Session:    Stress:     Feeling of Stress :    Social Connections:     Frequency of Communication with Friends and Family:  Frequency of Social Gatherings with Friends and Family:     Attends Faith Services:     Active Member of Clubs or Organizations:     Attends Club or Organization Meetings:     Marital Status:    Intimate Partner Violence:     Fear of Current or Ex-Partner:     Emotionally Abused:     Physically Abused:     Sexually Abused:        Past Surgical History:   Procedure Laterality Date    HX HIP REPLACEMENT      right and left    H[facility] HY[facility]CTOMY  04/2014    [facility]    HX KNEE ARTHROSCOPY Right     [place] TOTAL HIP ARTHROPLASTY Left 12/13/2020           Patient seen today for bilateral hip replacements. See is approximately 6-month status post right hip replacement in 9 months status post left hip replacement. She does continue with a home exercise program.  She has had no troubles the wound. She has no pain in her hips. She is quite pleased with the results and back to doing her normal daily activities. Patient denies recent fevers, chills, chest pain, SOB, or injuries. No recent systemic changes noted. A 12-point review of systems is performed today. Pertinent positives are noted. All other systems reviewed and otherwise are negative. Physical exam: General: Alert and oriented x3, nad.  well-developed, well nourished. normal affect, AF. NC/AT, EOMI, neck supple, trachea midline, no JVD present. Breathing is non-labored. Examination of the lower extremities reveals pain-free range of motion of the hips. There is no pain to palpation the trochanter bursa. Negative straight leg raise. Negative calf tenderness. Negative Homans. No signs of DVT present. Leg lengths are perfect. Abduction strength is symmetric bilaterally, 5 -. Assessment: Status post bilateral hip replacements    Plan: At this point, she will continue with a home exercise program and abduction exercises 3 times a week. She is working with a . She is to keep in mind her precautions.   We will see her back in approximate 6 months time for evaluation and x-ray of each of her hips.             JR Josh MARTIN, PA-C, ATC

## 2021-12-18 ENCOUNTER — HOSPITAL ENCOUNTER (OUTPATIENT)
Dept: MAMMOGRAPHY | Age: 51
Discharge: HOME | End: 2021-12-18
Attending: FAMILY MEDICINE
Payer: OTHER GOVERNMENT

## 2021-12-18 DIAGNOSIS — Z12.31 SCREENING MAMMOGRAM, ENCOUNTER FOR: ICD-10-CM

## 2021-12-18 PROCEDURE — 77063 BREAST TOMOSYNTHESIS BI: CPT

## 2021-12-21 ENCOUNTER — TELEPHONE (OUTPATIENT)
Dept: FAMILY MEDICINE CLINIC | Age: 51
End: 2021-12-21

## 2021-12-21 NOTE — TELEPHONE ENCOUNTER
----- Message from Gregorio Ca sent at 12/21/2021  3:08 PM EST -----  Subject: Referral Request    QUESTIONS   Reason for referral request? Needs referral to [address] [place] [address] [place]   Specialist for knee injections. Has the physician seen you for this condition before? Yes  Select a date? 2021-09-14  Select the Provider the patient wants to be referred to, if known (PCP or   Specialist)? Outside Physician - Dr. Gerardo Ackerman   Preferred Specialist (if applicable)? Do you already have an appointment scheduled? Yes  Select Scheduled Date? 2022-01-21  Select Scheduled Physician? Outside Physician - Dr. Gerardo Ackerman  Additional Information for Provider? Needs new referral every 6 months.  ---------------------------------------------------------------------------  --------------  CALL BACK INFO  What is the best way for the office to contact you? OK to leave message on   voicemail  Preferred Call Back Phone Number?  [phone]

## 2021-12-21 NOTE — TELEPHONE ENCOUNTER
Estephanie referral submitted and approved. Copy of referral authorization faxed to [facility] at [phone].      Auth #[id number] valid 12/15/2021-12/15/2022 for 10 visits.

## 2021-12-21 NOTE — TELEPHONE ENCOUNTER
----- Message from Emily Hurtado sent at 12/21/2021  3:06 PM EST -----  Subject: Referral Request    QUESTIONS   Reason for referral request? Needs referral to Rheumatology   Has the physician seen you for this condition before? Yes  Select a date? 2021-09-14  Select the Provider the patient wants to be referred to, if known (PCP or   Specialist)? Outside Physician - Dr. Gladys Andrew   Preferred Specialist (if applicable)? Do you already have an appointment scheduled? No  Additional Information for Provider?   ---------------------------------------------------------------------------  --------------  CALL BACK INFO  What is the best way for the office to contact you? OK to leave message on   voicemail  Preferred Call Back Phone Number?  [phone]

## 2021-12-21 NOTE — TELEPHONE ENCOUNTER
Estephanie referral submitted and approved. Copy of referral authorization faxed to [address] [place] [address] at [phone]. Auth #[id number] valid 12/15/2021-12/15/2022 for 16 visits. [facility] advising patient.

## 2021-12-27 DIAGNOSIS — M16.10 HIP ARTHRITIS: Primary | ICD-10-CM

## 2021-12-27 NOTE — TELEPHONE ENCOUNTER
Last Visit: 9/3/21 with HELEN Salazar  Next Appointment: 1/21/22 with HELEN Salazar  Previous Refill Encounter(s): 3/2/21 #60 with 2 refills    Requested Prescriptions     Pending Prescriptions Disp Refills    celecoxib (CeleBREX) 200 mg capsule 60 Capsule 2     Sig: Take 1 Capsule by mouth two (2) times a day.

## 2021-12-28 RX ORDER — CELECOXIB 200 MG/1
200 CAPSULE ORAL 2 TIMES DAILY
Qty: 60 CAPSULE | Refills: 2 | Status: SHIPPED | OUTPATIENT
Start: 2021-12-28 | End: 2022-04-26 | Stop reason: SDUPTHER

## 2022-01-21 ENCOUNTER — OFFICE VISIT (OUTPATIENT)
Dept: ORTHOPEDIC SURGERY | Age: 52
End: 2022-01-21
Payer: OTHER GOVERNMENT

## 2022-01-21 VITALS
HEIGHT: 64 IN | OXYGEN SATURATION: 98 % | HEART RATE: 68 BPM | TEMPERATURE: 97.1 F | BODY MASS INDEX: 28.48 KG/M2 | WEIGHT: 166.8 LBS

## 2022-01-21 DIAGNOSIS — Z96.641 STATUS POST TOTAL REPLACEMENT OF RIGHT HIP: ICD-10-CM

## 2022-01-21 DIAGNOSIS — M17.0 PRIMARY OSTEOARTHRITIS OF BOTH KNEES: Primary | ICD-10-CM

## 2022-01-21 DIAGNOSIS — Z96.642 STATUS POST LEFT HIP REPLACEMENT: ICD-10-CM

## 2022-01-21 PROCEDURE — 73564 X-RAY EXAM KNEE 4 OR MORE: CPT | Performed by: PHYSICIAN ASSISTANT

## 2022-01-21 PROCEDURE — 20611 DRAIN/INJ JOINT/BURSA W/US: CPT | Performed by: PHYSICIAN ASSISTANT

## 2022-01-21 PROCEDURE — 73523 X-RAY EXAM HIPS BI 5/> VIEWS: CPT | Performed by: PHYSICIAN ASSISTANT

## 2022-01-21 RX ORDER — BETAMETHASONE SODIUM PHOSPHATE AND BETAMETHASONE ACETATE 3; 3 MG/ML; MG/ML
12 INJECTION, SUSPENSION INTRA-ARTICULAR; INTRALESIONAL; INTRAMUSCULAR; SOFT TISSUE ONCE
Status: CANCELLED | OUTPATIENT
Start: 2022-01-21 | End: 2022-01-21

## 2022-01-21 NOTE — PROGRESS NOTES
[address] [facility], [address] [facility]  [phone]           Patient: Tong Estrada                MRN: [id number]       SSN: [id number]  YOB: 1970        AGE: 49 y.o. SEX: female  Body mass index is 28.63 kg/m². PCP: Dory Eason MD  01/21/22            REVIEW OF SYSTEMS:  Constitutional: Negative for fever, chills, weight loss and malaise/fatigue. HENT: Negative. Eyes: Negative. Respiratory: Negative. Cardiovascular: Negative. Gastrointestinal: No bowel incontinence or constipation. Genitourinary: No bladder incontinence or saddle anesthesia. Skin: Negative. Neurological: Negative. Endo/Heme/Allergies: Negative. Psychiatric/Behavioral: Negative. Musculoskeletal: As per HPI above. Past Medical History:   Diagnosis Date    Adverse effect of anesthesia     Hypotension    Allergic rhinitis, seasonal     spring    Anemia NEC     iron deficiency associated with heavy menstruation and fibroids noted 2008 with pregnancy    Dysmenorrhea     increasing past year    Fibroid, uterine     Goiter     Bilateral    Headache(784.0)     Hypertension     Ill-defined condition     Chronic knee pain    Menorrhagia     worsening past year    Neutropenia (HCC)          Current Outpatient Medications:     celecoxib (CeleBREX) 200 mg capsule, Take 1 Capsule by mouth two (2) times a day., Disp: 60 Capsule, Rfl: 2    cholecalciferol, vitamin D3, (VITAMIN D3 PO), Take  by mouth., Disp: , Rfl:     amLODIPine (NORVASC) 10 mg tablet, Take 1 Tab by mouth daily. , Disp: 90 Tab, Rfl: 1    diclofenac (VOLTAREN) 1 % gel, Apply 4 g to affected area four (4) times daily. , Disp: 100 g, Rfl: 4    hydrOXYchloroQUINE (PLAQUENIL) 200 mg tablet, Take 200 mg by mouth two (2) times a day., Disp: , Rfl:     acetaminophen (TYLENOL) 500 mg tablet, Take 1,000 mg by mouth every six (6) hours as needed for Pain.  Pain, Disp: , Rfl:     Blood Pressure Monitor kit, Once a day, Disp: 1 Kit, Rfl: 0    rifAMPin (RIFADIN) 300 mg capsule, Take 2 Caps by mouth daily. (Patient not taking: Reported on 1/21/2022), Disp: , Rfl:     ergocalciferol (Vitamin D2) 1,250 mcg (50,000 unit) capsule, Take 50,000 Units by mouth daily. (Patient not taking: Reported on 6/4/2021), Disp: , Rfl:     aspirin (ASPIRIN) 325 mg tablet, Take 1 Tab by mouth two (2) times a day. (Patient not taking: Reported on 6/4/2021), Disp: 60 Tab, Rfl: 0    cephALEXin (Keflex) 500 mg capsule, Take 1 Cap by mouth four (4) times daily. (Patient not taking: Reported on 6/4/2021), Disp: 12 Cap, Rfl: 0    docusate sodium 100 mg tab, Take 1 Tab by mouth daily. (Patient not taking: Reported on 6/4/2021), Disp: , Rfl:     No Known Allergies    Social History     Socioeconomic History    Marital status:      Spouse name: Not on file    Number of children: Not on file    Years of education: Not on file    Highest education level: Not on file   Occupational History    Not on file   Tobacco Use    Smoking status: Never Smoker    Smokeless tobacco: Never Used   Vaping Use    Vaping Use: Never used   Substance and Sexual Activity    Alcohol use: Yes     Alcohol/week: 0.0 standard drinks     Comment: occass: 2/month    Drug use: No    Sexual activity: Yes     Partners: Male     Birth control/protection: Surgical     Comment: hysterectomy   Other Topics Concern    Not on file   Social History Narrative    Not on file     Social Determinants of Health     Financial Resource Strain:     Difficulty of Paying Living Expenses: Not on file   Food Insecurity:     Worried About Running Out of Food in the Last Year: Not on file    Kimberly of Food in the Last Year: Not on file   Transportation Needs:     Lack of Transportation (Medical): Not on file    Lack of Transportation (Non-Medical):  Not on file   Physical Activity:     Days of Exercise per Week: Not on file    Minutes of Exercise per Session: Not on file   Stress:     Feeling of Stress : Not on file   Social Connections:     Frequency of Communication with Friends and Family: Not on file    Frequency of Social Gatherings with Friends and Family: Not on file    Attends [employer] Services: Not on file    Active Member of [address] [place] [address] or Organizations: Not on file    Attends Club or Organization Meetings: Not on file    Marital Status: Not on file   Intimate Partner Violence:     Fear of Current or Ex-Partner: Not on file    Emotionally Abused: Not on file    Physically Abused: Not on file    Sexually Abused: Not on file   Housing Stability:     Unable to Pay for Housing in the Last Year: Not on file    Number of [place] in the Last Year: Not on file    Unstable Housing in the Last Year: Not on file       Past Surgical History:   Procedure Laterality Date    HX HIP REPLACEMENT      right and left    HX HYSTERECTOMY  04/2014    [facility]    HX KNEE ARTHROSCOPY Right     MS TOTAL HIP ARTHROPLASTY Left 12/13/2020     Patient seen evaluate today for bilateral hips and bilateral knees. She has had bilateral hip replacements and done well with them. She is happy the results of the hips. She does little soreness on the sides at times. Her main problem is that of her knees. She does have known advanced arthritis of her knees. She has decreased walking tolerance. She has trouble getting from a chair. She has trouble stairs. There are no mechanical symptoms. She denies any radiating pain down the lower extremities. Patient denies recent fevers, chills, chest pain, SOB, or injuries. No recent systemic changes noted. A 12-point review of systems is performed today. Pertinent positives are noted. All other systems reviewed and otherwise are negative. Physical exam: General: Alert and oriented x3, nad.  well-developed, well nourished. normal affect, AF.   NC/AT, EOMI, neck supple, trachea midline, no JVD present. Breathing is non-labored. Examination of lower extremities reveals pain-free range of motion the hips. There is no pain to palpation the trochanter bursa bilaterally. Leg lengths are perfect. Abduction strength is symmetric bilaterally. Negative straight leg raise. Negative calf tenderness. Negative Homans. No signs of DVT present. Each of the knees reveal skin intact. There is no erythema, ecchymosis or warmth. She has negative joint effusion. There are no signs of infection or cellulitis present. She does have discomfort with patient medial joint line as well as patellofemoral grinding crepitus anteriorly with range of motion activities noted. Radiographs obtained the office today 1/21/2022 at the [facility] patient include an AP, tunnel, lateral, skyline of each of the knees show advanced osteoarthritis particularly patellofemoral articulation. There is evidence for fibrous dysplasia noted on the lateral x-ray of the left knee. An AP pelvis, AP and crosstable lateral of each of the hips is also obtained showing the total hip components to be well fixed. No loosening or fracture noted. Assessment: #1 status post bilateral hip replacements, #2 bilateral knee osteoarthritis    Plan: At this point, the patient is doing quite well with her hip replacements. She will continue activity as tolerated. In regards to her knees she has failed conservative treatment with cortisone which is not efficacious for her. She has done well in the past with viscosupplementation today we will move forward with her first Euflexxa injection. After informed consent, under aseptic conditions, with US guided assitance, each knee was prepped with betadine and 2ml euflexxa was injected into each knee without complications. The patient tolerated the injection well. The patient is instructed on post-injection care.   Surgical invention was discussed that she will eventually require total knee replacements bilaterally. We will try to maximize her nonoperative treatment. Chart reviewed for the following:  Didier ORDAZ PA-C, have reviewed the History, Physical and updated the Allergic reactions for Carlos Buenrostro? TIME OUT performed immediately prior to start of procedure:  Didier ORDAZ PA-C, have performed the following reviews on Carlos Buenrostro prior to the start of the procedure:  ????????  * Patient was identified by name and date of birth   * Agreement on procedure being performed was verified  * Risks and Benefits explained to the patient  * Procedure site verified and marked as necessary  * Patient was positioned for comfort  * Consent was signed and verified    Time:10:42 AM    Body part: bilateral knees, intra-articular    Medication & Dose: 2ml euflexxa each knee    Date of procedure: 01/21/22    Procedure performed by: Didier Langford PA-C    Provider assisted by: none    Patient assisted by: self    How tolerated by patient: tolerated the procedure well with no complications    Post Procedural Pain Scale: 0    Comments:   [address] using a frequency of 10MHz with a 12L-RS transducer head was used to confirm needle placement.   Ultrasound images captured using [address] Ultrasound machine and scanned into patient's chart       Pj Pitts PA-C, ATC

## 2022-01-27 ENCOUNTER — OFFICE VISIT (OUTPATIENT)
Dept: ORTHOPEDIC SURGERY | Age: 52
End: 2022-01-27
Payer: OTHER GOVERNMENT

## 2022-01-27 VITALS
WEIGHT: 166 LBS | HEIGHT: 63 IN | TEMPERATURE: 98.7 F | BODY MASS INDEX: 29.41 KG/M2 | OXYGEN SATURATION: 98 % | HEART RATE: 93 BPM

## 2022-01-27 DIAGNOSIS — M17.0 PRIMARY OSTEOARTHRITIS OF BOTH KNEES: Primary | ICD-10-CM

## 2022-01-27 PROCEDURE — 20611 DRAIN/INJ JOINT/BURSA W/US: CPT | Performed by: PHYSICIAN ASSISTANT

## 2022-01-27 NOTE — PROGRESS NOTES
Patient: Wade Lopez                MRN: [id number]       SSN: [id number]  YOB: 1970        AGE: 49 y.o. SEX: female  There is no height or weight on file to calculate BMI. PCP: Susie Mckeon MD  01/27/22        Pt presents today for their 2nd Euflexxa  injection for Bilateral knee . There has been no recent fevers/chills, systemic changes, or injuries to report. PE:  General A&Ox3, NAD  Exam of the knees reveals skin intact, no erythema, no ecchymosis, no signs for infection. No cyanosis, clubbing, or edema present distally. Neg calf tenderness, neg homans, no signs for DVT present. A: Bilateral knee OA    P: Each knee was injected with 2 mL of Euflexxa with US guided assistance without complications. Patient was instructed on post injection care. Chart reviewed for the following:  Ad ORDAZ PA-C, have reviewed the History, Physical and updated the Allergic reactions for Carlos Buenrostro? TIME OUT performed immediately prior to start of procedure:  Ad ORDAZ PA-C, have performed the following reviews on Carlos Buenrostro prior to the start of the procedure:  ????????  * Patient was identified by name and date of birth   * Agreement on procedure being performed was verified  * Risks and Benefits explained to the patient  * Procedure site verified and marked as necessary  * Patient was positioned for comfort  * Consent was signed and verified    Time: 2:10 PM    Body part: Bilateral knee, intra-articular    Medication & Dose: 2 mL of Euflexxa    Date of procedure: 1/27/2022    Procedure performed by: Migue Chauhan PA-C    Provider assisted by: none    Patient assisted by: self    How tolerated by patient: tolerated the procedure well with no complications    Post Procedural Pain Scale: 1    Comments:  [address] using a frequency of 10MHz with a 12L-RS transducer head was used to confirm needle placement.   Ultrasound images captured using [address] [place] [address] Ultrasound machine and scanned into patient's chart      Constantino Dyson PA-C

## 2022-01-28 ENCOUNTER — OFFICE VISIT (OUTPATIENT)
Dept: FAMILY MEDICINE CLINIC | Age: 52
End: 2022-01-28
Payer: OTHER GOVERNMENT

## 2022-01-28 VITALS
SYSTOLIC BLOOD PRESSURE: 122 MMHG | HEIGHT: 64 IN | WEIGHT: 167 LBS | TEMPERATURE: 97.2 F | RESPIRATION RATE: 16 BRPM | BODY MASS INDEX: 28.51 KG/M2 | OXYGEN SATURATION: 100 % | HEART RATE: 73 BPM | DIASTOLIC BLOOD PRESSURE: 80 MMHG

## 2022-01-28 DIAGNOSIS — Z13.1 SCREENING FOR DIABETES MELLITUS: ICD-10-CM

## 2022-01-28 DIAGNOSIS — G89.29 CHRONIC PAIN OF BOTH KNEES: ICD-10-CM

## 2022-01-28 DIAGNOSIS — M25.562 CHRONIC PAIN OF BOTH KNEES: ICD-10-CM

## 2022-01-28 DIAGNOSIS — M25.561 CHRONIC PAIN OF BOTH KNEES: ICD-10-CM

## 2022-01-28 DIAGNOSIS — I10 ESSENTIAL HYPERTENSION: Primary | ICD-10-CM

## 2022-01-28 DIAGNOSIS — Z90.710 S/P HYSTERECTOMY: ICD-10-CM

## 2022-01-28 DIAGNOSIS — Z13.220 SCREENING FOR HYPERLIPIDEMIA: ICD-10-CM

## 2022-01-28 DIAGNOSIS — R79.89 ABNORMAL CBC: ICD-10-CM

## 2022-01-28 DIAGNOSIS — E04.9 GOITER: ICD-10-CM

## 2022-01-28 DIAGNOSIS — R76.8 POSITIVE ANA (ANTINUCLEAR ANTIBODY): ICD-10-CM

## 2022-01-28 DIAGNOSIS — E55.9 VITAMIN D DEFICIENCY: ICD-10-CM

## 2022-01-28 DIAGNOSIS — G43.009 MIGRAINE WITHOUT AURA AND WITHOUT STATUS MIGRAINOSUS, NOT INTRACTABLE: ICD-10-CM

## 2022-01-28 PROCEDURE — 99214 OFFICE O/P EST MOD 30 MIN: CPT | Performed by: FAMILY MEDICINE

## 2022-01-28 RX ORDER — POLYETHYLENE GLYCOL 3350 17 G/17G
POWDER, FOR SOLUTION ORAL
COMMUNITY
Start: 2022-01-19 | End: 2022-07-14

## 2022-01-28 NOTE — PROGRESS NOTES
HISTORY OF PRESENT ILLNESS  Carlos Marin is a 49 y.o. female. HPI: Here for follow-up. Multiple joint pain. Positive ALY. Mainly bilateral knee and hip pain. Following Dr. Ben Corbett at this time. Also following rheumatology. Currently pain is manageable. Sitting comfortable without any acute distress. History of goiter. No trouble swallowing or change in voice. Been following Endo yearly. Now she is due for their follow-up. Advised to make an appointment. Denies any headache, dizziness, no chest pain or trouble breathing, no arm or leg weakness. No nausea or vomiting, no weight or appetite changes, no mood changes . No urine or bowel complains, no palpitation, no diaphoresis. No abdominal pain. No cold or cough. No leg swelling. No fever. No sleep trouble. History of abnormal CBC. Leukopenia. Denies any frequent cold cough or fever. Following hematology and the recommendation. No unusual fatigue. History of hypertension. Taking medication with compliance and no side effects. Visit Vitals  /80 (BP 1 Location: Left arm, BP Patient Position: Sitting, BP Cuff Size: Adult)   Pulse 73   Temp 97.2 °F (36.2 °C) (Temporal)   Resp 16   Ht 5' 4\" (1.626 m)   Wt 167 lb (75.8 kg)   SpO2 100%   BMI 28.67 kg/m²     Review medication list, vitals, problem list,allergies.    Lab Results   Component Value Date/Time    WBC 3.6 (L) 02/17/2021 12:48 PM    HGB 12.4 02/17/2021 12:48 PM    HCT 37.3 02/17/2021 12:48 PM    PLATELET 735 11/93/8593 12:48 PM    MCV 89.7 02/17/2021 12:48 PM     Lab Results   Component Value Date/Time    Sodium 139 02/17/2021 12:48 PM    Potassium 3.5 02/17/2021 12:48 PM    Chloride 105 02/17/2021 12:48 PM    CO2 30 02/17/2021 12:48 PM    Anion gap 4 02/17/2021 12:48 PM    Glucose 79 02/17/2021 12:48 PM    BUN 16 02/17/2021 12:48 PM    Creatinine 0.54 (L) 02/17/2021 12:48 PM    BUN/Creatinine ratio 30 (H) 02/17/2021 12:48 PM    GFR est AA >60 02/17/2021 12:48 PM    GFR est non-AA >60 02/17/2021 12:48 PM    Calcium 8.7 02/17/2021 12:48 PM    Bilirubin, total 0.5 10/06/2020 09:11 AM    Alk. phosphatase 96 10/06/2020 09:11 AM    Protein, total 7.1 10/06/2020 09:11 AM    Albumin 4.0 02/17/2021 12:48 PM    Globulin 3.4 10/06/2020 09:11 AM    A-G Ratio 1.1 10/06/2020 09:11 AM    ALT (SGPT) 15 10/06/2020 09:11 AM    AST (SGOT) 13 10/06/2020 09:11 AM     Lab Results   Component Value Date/Time    Cholesterol, total 198 05/04/2018 10:07 AM    HDL Cholesterol 72 (H) 05/04/2018 10:07 AM    LDL, calculated 111 (H) 05/04/2018 10:07 AM    VLDL, calculated 15 05/04/2018 10:07 AM    Triglyceride 75 05/04/2018 10:07 AM    CHOL/HDL Ratio 2.8 05/04/2018 10:07 AM     Lab Results   Component Value Date/Time    TSH 1.04 12/05/2020 08:52 AM     Lab Results   Component Value Date/Time    Hemoglobin A1c 5.1 02/17/2021 12:48 PM     Lab Results   Component Value Date/Time    Vitamin D 25-Hydroxy 14.0 (L) 01/14/2020 04:13 PM             ROS: see HPI    Physical Exam  Constitutional:       General: She is not in acute distress. Neck:      Comments: Palpable enlarged thyroid gland. Nontender  Cardiovascular:      Rate and Rhythm: Normal rate and regular rhythm. Heart sounds: Normal heart sounds. Abdominal:      General: Bowel sounds are normal.      Palpations: Abdomen is soft. Tenderness: There is no abdominal tenderness. Musculoskeletal:         General: No swelling. Lymphadenopathy:      Cervical: No cervical adenopathy. Neurological:      Mental Status: She is oriented to person, place, and time. Psychiatric:         Behavior: Behavior normal.         ASSESSMENT and PLAN    ICD-10-CM ICD-9-CM    1. Essential hypertension:well controlled. Continue current dose of medication and low salt diet. Exercise as tolerated. P67 002.4 METABOLIC PANEL, COMPREHENSIVE   2. Migraine without aura and without status migrainosus, not intractable: At this time no headaches. Has been very stable.  G43.009 346.10 3. Goiter/ seen endocrinology/ observe: Asymptomatic. Advised that make a follow-up appointment in Endo as she is due. We will recheck labs E04.9 240.9 TSH 3RD GENERATION   4. Abnormal CBC/ seen hematology: Advised to make a follow-up with hematology and follow the recommendation R79.89 790.6 CBC W/O DIFF   5. S/P hysterectomy/ due to menorrhagea   Z90.710 V88.01    6. Positive ALY (antinuclear antibody)/ test done by hemotology and requested referral by PCP: Currently following rheumatology R76.8 795.79    7. Chronic pain of both knees/ following ortho: Following specialist and the recommendation G99.131 249.43 METABOLIC PANEL, COMPREHENSIVE    M25.562 338.29     G89.29     8. Vitamin D deficiency: On supplement. Will recheck E55.9 268.9 VITAMIN D, 25 HYDROXY   9. Screening for diabetes mellitus  Z13.1 V77.1 HEMOGLOBIN A1C WITH EAG   10. Screening for hyperlipidemia  Z13.220 V77.91 LIPID PANEL   Patient understood agreed with the plan  She has taken her COVID and flu shots  Colonoscopy scheduled in 2 weeks  Follow-up and Dispositions    · Return in about 6 months (around 7/28/2022). Please note that this dictation was completed with [employer], the computer voice recognition software. Quite often unanticipated grammatical, syntax, homophones, and other interpretive errors are inadvertently transcribed by the computer software. Please disregard these errors. Please excuse any errors that have escaped final proofreading.

## 2022-01-28 NOTE — PATIENT INSTRUCTIONS
A Healthy Lifestyle: Care Instructions  Your Care Instructions     A healthy lifestyle can help you feel good, stay at a healthy weight, and have plenty of energy for both work and play. A healthy lifestyle is something you can share with your whole family. A healthy lifestyle also can lower your risk for serious health problems, such as high blood pressure, heart disease, and diabetes. You can follow a few steps listed below to improve your health and the health of your family. Follow-up care is a key part of your treatment and safety. Be sure to make and go to all appointments, and call your doctor if you are having problems. It's also a good idea to know your test results and keep a list of the medicines you take. How can you care for yourself at home? · Do not eat too much sugar, fat, or fast foods. You can still have dessert and treats now and then. The goal is moderation. · Start small to improve your eating habits. Pay attention to portion sizes, drink less juice and soda pop, and eat more fruits and vegetables. ? Eat a healthy amount of food. A 3-ounce serving of meat, for example, is about the size of a deck of cards. Fill the rest of your plate with vegetables and whole grains. ? Limit the amount of soda and sports drinks you have every day. Drink more water when you are thirsty. ? Eat plenty of fruits and vegetables every day. Have an apple or some carrot sticks as an afternoon snack instead of a candy bar. Try to have fruits and/or vegetables at every meal.  · Make exercise part of your daily routine. You may want to start with simple activities, such as walking, bicycling, or slow swimming. Try to be active 30 to 60 minutes every day. You do not need to do all 30 to 60 minutes all at once. For example, you can exercise 3 times a day for 10 or 20 minutes.  Moderate exercise is safe for most people, but it is always a good idea to talk to your doctor before starting an exercise program.  · Keep moving. Joe Guardadoter the lawn, work in the garden, or [place]. Take the stairs instead of the elevator at work. · If you smoke, quit. People who smoke have an increased risk for heart attack, stroke, cancer, and other lung illnesses. Quitting is hard, but there are ways to boost your chance of quitting tobacco for good. ? Use nicotine gum, patches, or lozenges. ? Ask your doctor about stop-smoking programs and medicines. ? Keep trying. In addition to reducing your risk of diseases in the future, you will notice some benefits soon after you stop using tobacco. If you have shortness of breath or asthma symptoms, they will likely get better within a few weeks after you quit. · Limit how much alcohol you drink. Moderate amounts of alcohol (up to 2 drinks a day for men, 1 drink a day for women) are okay. But drinking too much can lead to liver problems, high blood pressure, and other health problems. Family health  If you have a family, there are many things you can do together to improve your health. · Eat meals together as a family as often as possible. · Eat healthy foods. This includes fruits, vegetables, lean meats and dairy, and whole grains. · Include your family in your fitness plan. Most people think of activities such as jogging or tennis as the way to fitness, but there are many ways you and your family can be more active. Anything that makes you breathe hard and gets your heart pumping is exercise. Here are some tips:  ? Walk to do errands or to take your child to school or the bus.  ? Go for a family bike ride after dinner instead of watching TV. Where can you learn more? Go to http://www.gray.com/  Enter W845 in the search box to learn more about \"A Healthy Lifestyle: Care Instructions. \"  Current as of: June 16, 2021               Content Version: 13.0  © 6416-2716 Healthwise, Incorporated.    Care instructions adapted under license by [facility] (which disclaims liability or warranty for this information). If you have questions about a medical condition or this instruction, always ask your healthcare professional. [address] any warranty or liability for your use of this information. Low Sodium Diet (2,000 Milligram): Care Instructions  Overview     Limiting sodium can be an important part of managing some health problems. The most common source of sodium is salt. People get most of the salt in their diet from canned, prepared, and packaged foods. Fast food and restaurant meals also are very high in sodium. Your doctor will probably limit your sodium to less than 2,000 milligrams (mg) a day. This limit counts all the sodium in prepared and packaged foods and any salt you add to your food. Follow-up care is a key part of your treatment and safety. Be sure to make and go to all appointments, and call your doctor if you are having problems. It's also a good idea to know your test results and keep a list of the medicines you take. How can you care for yourself at home? Read food labels  · Read labels on cans and food packages. The labels tell you how much sodium is in each serving. Make sure that you look at the serving size. If you eat more than the serving size, you have eaten more sodium. · Food labels also tell you the Percent Daily Value for sodium. Choose products with low Percent Daily Values for sodium. · Be aware that sodium can come in forms other than salt, including monosodium glutamate (MSG), sodium citrate, and sodium bicarbonate (baking soda). MSG is often added to Asian food. When you eat out, you can sometimes ask for food without MSG or added salt. Buy low-sodium foods  · Buy foods that are labeled \"unsalted\" (no salt added), \"sodium-free\" (less than 5 mg of sodium per serving), or \"low-sodium\" (140 mg or less of sodium per serving).  Foods labeled \"reduced-sodium\" and \"light sodium\" may still have too much sodium. Be sure to read the label to see how much sodium you are getting. · Buy fresh vegetables, or frozen vegetables without added sauces. Buy low-sodium versions of canned vegetables, soups, and other canned goods. Prepare low-sodium meals  · Cut back on the amount of salt you use in cooking. This will help you adjust to the taste. Do not add salt after cooking. One teaspoon of salt has about 2,300 mg of sodium. · Take the salt shaker off the table. · Flavor your food with garlic, lemon juice, onion, vinegar, herbs, and spices. Do not use soy sauce, lite soy sauce, steak sauce, onion salt, garlic salt, celery salt, or ketchup on your food. · Use low-sodium salad dressings, sauces, and ketchup. Or make your own salad dressings and sauces without adding salt. · Use less salt (or none) when recipes call for it. You can often use half the salt a recipe calls for without losing flavor. Other foods such as rice, pasta, and grains do not need added salt. · Rinse canned vegetables, and cook them in fresh water. This removes some--but not all--of the salt. · Avoid water that is naturally high in sodium or that has been treated with water softeners, which add sodium. If you buy bottled water, read the label and choose a sodium-free brand. Avoid high-sodium foods  · Avoid eating:  ? Smoked, cured, salted, and canned meat, fish, and poultry. ? Ham, leon, hot dogs, and luncheon meats. ? Regular, hard, and processed cheese and regular peanut butter. ? Crackers with salted tops, and other salted snack foods such as pretzels, chips, and salted popcorn. ? Frozen prepared meals, unless labeled low-sodium. ? Canned and dried soups, broths, and bouillon, unless labeled sodium-free or low-sodium. ? Canned vegetables, unless labeled sodium-free or low-sodium. ? [place] fries, pizza, tacos, and other fast foods.   ? Pickles, olives, ketchup, and other condiments, especially soy sauce, unless labeled sodium-free or low-sodium. Where can you learn more? Go to http://www.gray.com/  Enter V843 in the search box to learn more about \"Low Sodium Diet (2,000 Milligram): Care Instructions. \"  Current as of: December 17, 2020               Content Version: 13.0  © 2831-4772 Healthwise, [employer]. Care instructions adapted under license by [facility] (which disclaims liability or warranty for this information). If you have questions about a medical condition or this instruction, always ask your healthcare professional. [address] any warranty or liability for your use of this information.

## 2022-01-28 NOTE — PROGRESS NOTES
Chief Complaint   Patient presents with    Hypertension    Pain (Chronic)     manageable    Thyroid Problem     1. \"Have you been to the ER, urgent care clinic since your last visit? Hospitalized since your last visit? \" No    2. \"Have you seen or consulted any other health care providers outside of the [address] [place] [address] since your last visit? \" Dr. Calvin Hernández     3. For patients aged 39-70: Has the patient had a colonoscopy / FIT/ Cologuard? No- scheduled for Yesenia 9 at [facility]      If the patient is female:    4. For patients aged 41-77: Has the patient had a mammogram within the past 2 years? Yes - no Care Gap present  See top three    5. For patients aged 21-65: Has the patient had a pap smear?  No

## 2022-02-03 ENCOUNTER — HOSPITAL ENCOUNTER (OUTPATIENT)
Dept: LAB | Age: 52
Discharge: HOME | End: 2022-02-03
Payer: OTHER GOVERNMENT

## 2022-02-03 DIAGNOSIS — Z13.1 SCREENING FOR DIABETES MELLITUS: ICD-10-CM

## 2022-02-03 DIAGNOSIS — E55.9 VITAMIN D DEFICIENCY: ICD-10-CM

## 2022-02-03 DIAGNOSIS — Z13.220 SCREENING FOR HYPERLIPIDEMIA: ICD-10-CM

## 2022-02-03 DIAGNOSIS — E04.9 GOITER: ICD-10-CM

## 2022-02-03 DIAGNOSIS — M25.562 CHRONIC PAIN OF BOTH KNEES: ICD-10-CM

## 2022-02-03 DIAGNOSIS — R79.89 ABNORMAL CBC: ICD-10-CM

## 2022-02-03 DIAGNOSIS — G89.29 CHRONIC PAIN OF BOTH KNEES: ICD-10-CM

## 2022-02-03 DIAGNOSIS — M25.561 CHRONIC PAIN OF BOTH KNEES: ICD-10-CM

## 2022-02-03 DIAGNOSIS — I10 ESSENTIAL HYPERTENSION: ICD-10-CM

## 2022-02-03 LAB
25(OH)D3 SERPL-MCNC: 20.7 NG/ML (ref 30–100)
ALBUMIN SERPL-MCNC: 4 G/DL (ref 3.4–5)
ALBUMIN/GLOB SERPL: 1.3 {RATIO} (ref 0.8–1.7)
ALP SERPL-CCNC: 82 U/L (ref 45–117)
ALT SERPL-CCNC: 16 U/L (ref 13–56)
ANION GAP SERPL CALC-SCNC: 4 MMOL/L (ref 3–18)
APPEARANCE UR: CLEAR
AST SERPL-CCNC: 21 U/L (ref 10–38)
BACTERIA URNS QL MICRO: ABNORMAL /HPF
BILIRUB SERPL-MCNC: 0.3 MG/DL (ref 0.2–1)
BILIRUB UR QL: NEGATIVE
BUN SERPL-MCNC: 13 MG/DL (ref 7–18)
BUN/CREAT SERPL: 19 (ref 12–20)
CALCIUM SERPL-MCNC: 9 MG/DL (ref 8.5–10.1)
CHLORIDE SERPL-SCNC: 108 MMOL/L (ref 100–111)
CHOLEST SERPL-MCNC: 210 MG/DL
CO2 SERPL-SCNC: 30 MMOL/L (ref 21–32)
COLOR UR: YELLOW
CREAT SERPL-MCNC: 0.67 MG/DL (ref 0.6–1.3)
CRP SERPL-MCNC: <0.3 MG/DL (ref 0–0.3)
EPITH CASTS URNS QL MICRO: ABNORMAL /LPF (ref 0–5)
ERYTHROCYTE [DISTWIDTH] IN BLOOD BY AUTOMATED COUNT: 14 % (ref 11.6–14.5)
ERYTHROCYTE [SEDIMENTATION RATE] IN BLOOD: 13 MM/HR (ref 0–20)
EST. AVERAGE GLUCOSE BLD GHB EST-MCNC: 105 MG/DL
GLOBULIN SER CALC-MCNC: 3.1 G/DL (ref 2–4)
GLUCOSE SERPL-MCNC: 85 MG/DL (ref 74–99)
GLUCOSE UR STRIP.AUTO-MCNC: NEGATIVE MG/DL
HBA1C MFR BLD: 5.3 % (ref 4.2–5.6)
HCT VFR BLD AUTO: 38.1 % (ref 35–45)
HDLC SERPL-MCNC: 79 MG/DL (ref 40–60)
HDLC SERPL: 2.7 {RATIO} (ref 0–5)
HGB BLD-MCNC: 12.4 G/DL (ref 12–16)
HGB UR QL STRIP: NEGATIVE
KETONES UR QL STRIP.AUTO: NEGATIVE MG/DL
LDLC SERPL CALC-MCNC: 122.6 MG/DL (ref 0–100)
LEUKOCYTE ESTERASE UR QL STRIP.AUTO: ABNORMAL
LIPID PROFILE,FLP: ABNORMAL
MCH RBC QN AUTO: 29.5 PG (ref 24–34)
MCHC RBC AUTO-ENTMCNC: 32.5 G/DL (ref 31–37)
MCV RBC AUTO: 90.5 FL (ref 78–100)
MUCOUS THREADS URNS QL MICRO: ABNORMAL /LPF
NITRITE UR QL STRIP.AUTO: NEGATIVE
NRBC # BLD: 0 K/UL (ref 0–0.01)
NRBC BLD-RTO: 0 PER 100 WBC
PH UR STRIP: 7 [PH] (ref 5–8)
PLATELET # BLD AUTO: 238 K/UL (ref 135–420)
PMV BLD AUTO: 10.4 FL (ref 9.2–11.8)
POTASSIUM SERPL-SCNC: 3.9 MMOL/L (ref 3.5–5.5)
PROT SERPL-MCNC: 7.1 G/DL (ref 6.4–8.2)
PROT UR STRIP-MCNC: 30 MG/DL
RBC # BLD AUTO: 4.21 M/UL (ref 4.2–5.3)
SODIUM SERPL-SCNC: 142 MMOL/L (ref 136–145)
SP GR UR REFRACTOMETRY: 1.03 (ref 1–1.03)
TRIGL SERPL-MCNC: 42 MG/DL (ref ?–150)
TSH SERPL DL<=0.05 MIU/L-ACNC: 1.5 UIU/ML (ref 0.36–3.74)
UROBILINOGEN UR QL STRIP.AUTO: 1 EU/DL (ref 0.2–1)
VLDLC SERPL CALC-MCNC: 8.4 MG/DL
WBC # BLD AUTO: 3.3 K/UL (ref 4.6–13.2)
WBC URNS QL MICRO: ABNORMAL /HPF (ref 0–5)

## 2022-02-03 PROCEDURE — 81001 URINALYSIS AUTO W/SCOPE: CPT

## 2022-02-03 PROCEDURE — 86140 C-REACTIVE PROTEIN: CPT

## 2022-02-03 PROCEDURE — 82306 VITAMIN D 25 HYDROXY: CPT

## 2022-02-03 PROCEDURE — 84443 ASSAY THYROID STIM HORMONE: CPT

## 2022-02-03 PROCEDURE — 83036 HEMOGLOBIN GLYCOSYLATED A1C: CPT

## 2022-02-03 PROCEDURE — 80061 LIPID PANEL: CPT

## 2022-02-03 PROCEDURE — 80053 COMPREHEN METABOLIC PANEL: CPT

## 2022-02-03 PROCEDURE — 36415 COLL VENOUS BLD VENIPUNCTURE: CPT

## 2022-02-03 PROCEDURE — 85027 COMPLETE CBC AUTOMATED: CPT

## 2022-02-03 PROCEDURE — 85652 RBC SED RATE AUTOMATED: CPT

## 2022-02-04 ENCOUNTER — OFFICE VISIT (OUTPATIENT)
Dept: ORTHOPEDIC SURGERY | Age: 52
End: 2022-02-04
Payer: OTHER GOVERNMENT

## 2022-02-04 VITALS
OXYGEN SATURATION: 98 % | WEIGHT: 165.6 LBS | TEMPERATURE: 98.2 F | HEIGHT: 64 IN | BODY MASS INDEX: 28.27 KG/M2 | HEART RATE: 87 BPM

## 2022-02-04 DIAGNOSIS — M17.0 PRIMARY OSTEOARTHRITIS OF BOTH KNEES: Primary | ICD-10-CM

## 2022-02-04 PROCEDURE — 20611 DRAIN/INJ JOINT/BURSA W/US: CPT | Performed by: PHYSICIAN ASSISTANT

## 2022-02-04 NOTE — PROGRESS NOTES
Patient: Subhash Harkins                MRN: [id number]       SSN: [id number]  YOB: 1970        AGE: 49 y.o. SEX: female  Body mass index is 28.43 kg/m². PCP: Sharlene Billingsley MD  02/04/22        Pt presents today for their 3rd euflexxa  injection for Bilateral knee . There has been no recent fevers/chills, systemic changes, or injuries to report. PE:  General A&Ox3, NAD  Exam of the knees reveals skin intact, no erythema, no ecchymosis, no signs for infection. No cyanosis, clubbing, or edema present distally. Neg calf tenderness, neg homans, no signs for DVT present. A: Bilateral knee OA    P: Each knee was injected with 2ml Euflexxa under US-guided assistance without complications. Patient was instructed on post injection care. Chart reviewed for the following:  Glenna ORDAZ PA-C, have reviewed the History, Physical and updated the Allergic reactions for Carlos Buenrostro? TIME OUT performed immediately prior to start of procedure:  Glenna ORDAZ PA-C, have performed the following reviews on Lenoras Angus Buenrostro prior to the start of the procedure:  ????????  * Patient was identified by name and date of birth   * Agreement on procedure being performed was verified  * Risks and Benefits explained to the patient  * Procedure site verified and marked as necessary  * Patient was positioned for comfort  * Consent was signed and verified    Time: 10:44 AM    Body part: Bilateral knee, intra-articular    Medication & Dose: 2 mL euflexxa each knee    Date of procedure: 2/4/22    Procedure performed by: Mary Rogers PA-C    Provider assisted by: none    Patient assisted by: self    How tolerated by patient: tolerated the procedure well with no complications    Post Procedural Pain Scale: 2    Comments:  [address] using a frequency of 10MHz with a 12L-RS transducer head was used to confirm needle placement.   Ultrasound images captured using [address] Ultrasound machine and scanned into patient's chart      Alanna Mello PA-C

## 2022-02-07 NOTE — PROGRESS NOTES
Low vitamin D. Take 5000 units of vitamin D3 daily. Lipid panel showed mildly elevated LDL and total cholesterol. Diet modification. Send low fat diet info if needed. Still low wbc count. Pt is following hematology. Send these labs to hematology. Further discussion on follow up visit.

## 2022-03-10 NOTE — PROGRESS NOTES
Contacted patient and verified identity using name and date of birth (2- identifiers)  Spoke with patient and she verbalized understanding of Low vitamin D. Take 5000 units of vitamin D3 daily. Lipid panel showed mildly elevated LDL and total cholesterol. Diet modification. Send low fat diet info if needed. Still low wbc count. Pt is following hematology. Send these labs to hematology.  Further discussion on follow up visit

## 2022-03-19 PROBLEM — M16.10 HIP ARTHRITIS: Status: ACTIVE | Noted: 2020-12-15

## 2022-03-19 PROBLEM — E55.9 VITAMIN D DEFICIENCY: Status: ACTIVE | Noted: 2022-01-28

## 2022-03-24 ENCOUNTER — OFFICE VISIT (OUTPATIENT)
Dept: ORTHOPEDIC SURGERY | Age: 52
End: 2022-03-24
Payer: OTHER GOVERNMENT

## 2022-03-24 VITALS — BODY MASS INDEX: 29.23 KG/M2 | HEIGHT: 63 IN | WEIGHT: 165 LBS

## 2022-03-24 DIAGNOSIS — M17.0 PRIMARY OSTEOARTHRITIS OF BOTH KNEES: Primary | ICD-10-CM

## 2022-03-24 PROCEDURE — 99213 OFFICE O/P EST LOW 20 MIN: CPT | Performed by: PHYSICIAN ASSISTANT

## 2022-03-24 PROCEDURE — 20611 DRAIN/INJ JOINT/BURSA W/US: CPT | Performed by: PHYSICIAN ASSISTANT

## 2022-03-24 RX ORDER — BETAMETHASONE SODIUM PHOSPHATE AND BETAMETHASONE ACETATE 3; 3 MG/ML; MG/ML
12 INJECTION, SUSPENSION INTRA-ARTICULAR; INTRALESIONAL; INTRAMUSCULAR; SOFT TISSUE ONCE
Status: COMPLETED | OUTPATIENT
Start: 2022-03-24 | End: 2022-03-24

## 2022-03-24 RX ADMIN — BETAMETHASONE SODIUM PHOSPHATE AND BETAMETHASONE ACETATE 12 MG: 3; 3 INJECTION, SUSPENSION INTRA-ARTICULAR; INTRALESIONAL; INTRAMUSCULAR; SOFT TISSUE at 16:16

## 2022-03-24 NOTE — PROGRESS NOTES
[address], [address] [facility]  [phone]           Patient: Stephanie Sanders                MRN: [id number]       SSN: [id number]  YOB: 1970        AGE: 49 y.o. SEX: female  Body mass index is 29.23 kg/m². PCP: Juli Fonseca MD  03/24/22            REVIEW OF SYSTEMS:  Constitutional: Negative for fever, chills, weight loss and malaise/fatigue. HENT: Negative. Eyes: Negative. Respiratory: Negative. Cardiovascular: Negative. Gastrointestinal: No bowel incontinence or constipation. Genitourinary: No bladder incontinence or saddle anesthesia. Skin: Negative. Neurological: Negative. Endo/Heme/Allergies: Negative. Psychiatric/Behavioral: Negative. Musculoskeletal: As per HPI above. Past Medical History:   Diagnosis Date    Adverse effect of anesthesia     Hypotension    Allergic rhinitis, seasonal     spring    Anemia NEC     iron deficiency associated with heavy menstruation and fibroids noted 2008 with pregnancy    Dysmenorrhea     increasing past year    Fibroid, uterine     Goiter     Bilateral    Headache(784.0)     Hypertension     Ill-defined condition     Chronic knee pain    Menorrhagia     worsening past year    Neutropenia ([facility] [address]          Current Outpatient Medications:     polyethylene glycol (MIRALAX) 17 gram/dose powder, Take as a split dose as directed., Disp: , Rfl:     celecoxib (CeleBREX) 200 mg capsule, Take 1 Capsule by mouth two (2) times a day., Disp: 60 Capsule, Rfl: 2    cholecalciferol, vitamin D3, (VITAMIN D3 PO), Take  by mouth., Disp: , Rfl:     amLODIPine (NORVASC) 10 mg tablet, Take 1 Tab by mouth daily. , Disp: 90 Tab, Rfl: 1    diclofenac (VOLTAREN) 1 % gel, Apply 4 g to affected area four (4) times daily. , Disp: 100 g, Rfl: 4    hydrOXYchloroQUINE (PLAQUENIL) 200 mg tablet, Take 200 mg by mouth two (2) times a day., Disp: , Rfl:     Blood Pressure Monitor kit, Once a day, Disp: 1 Kit, Rfl: 0    docusate sodium 100 mg tab, Take 1 Tablet by mouth daily. (Patient not taking: Reported on 3/24/2022), Disp: , Rfl:     acetaminophen (TYLENOL) 500 mg tablet, Take 1,000 mg by mouth every six (6) hours as needed for Pain. Pain (Patient not taking: Reported on 3/24/2022), Disp: , Rfl:     Current Facility-Administered Medications:     betamethasone (CELESTONE) injection 12 mg, 12 mg, Intra artICUlar, ONCE, Yair Moreno PA-C    No Known Allergies    Social History     Socioeconomic History    Marital status:      Spouse name: Not on file    Number of children: Not on file    Years of education: Not on file    Highest education level: Not on file   Occupational History    Not on file   Tobacco Use    Smoking status: Never Smoker    Smokeless tobacco: Never Used   Vaping Use    Vaping Use: Never used   Substance and Sexual Activity    Alcohol use: Yes     Alcohol/week: 0.0 standard drinks     Comment: occass: 2/month    Drug use: No    Sexual activity: Yes     Partners: Male     Birth control/protection: Surgical     Comment: hysterectomy   Other Topics Concern    Not on file   Social History Narrative    Not on file     Social Determinants of Health     Financial Resource Strain:     Difficulty of Paying Living Expenses: Not on file   Food Insecurity:     Worried About Running Out of Food in the Last Year: Not on file    Kimberly of Food in the Last Year: Not on file   Transportation Needs:     Lack of Transportation (Medical): Not on file    Lack of Transportation (Non-Medical):  Not on file   Physical Activity:     Days of Exercise per Week: Not on file    Minutes of Exercise per Session: Not on file   Stress:     Feeling of Stress : Not on file   Social Connections:     Frequency of Communication with Friends and Family: Not on file    Frequency of Social Gatherings with Friends and Family: Not on file    Attends Restoration Services: Not on file    Active Member of Clubs or Organizations: Not on file    Attends Club or Organization Meetings: Not on file    Marital Status: Not on file   Intimate Partner Violence:     Fear of Current or Ex-Partner: Not on file    Emotionally Abused: Not on file    Physically Abused: Not on file    Sexually Abused: Not on file   Housing Stability:     Unable to Pay for Housing in the Last Year: Not on file    Number of [place] in the Last Year: Not on file    Unstable Housing in the Last Year: Not on file       Past Surgical History:   Procedure Laterality Date    HX HIP REPLACEMENT      right and left    [facility]    HX KNEE ARTHROSCOPY Right     WI TOTAL HIP ARTHROPLASTY Left 12/13/2020         Patient seen evaluated today for bilateral knee pain. She does have known advanced arthritis of her knees. She does have a mixed connective tissue disease and is followed by rheumatology. She is currently on Plaquenil as well as takes Celebrex. She does report morning stiffness. She has recently finished up a series of viscosupplementation which did help some. Is been quite sometime since she had a cortisone injection. Patient denies recent fevers, chills, chest pain, SOB, or injuries. No recent systemic changes noted. A 12-point review of systems is performed today. Pertinent positives are noted. All other systems reviewed and otherwise are negative. Physical exam: General: Alert and oriented x3, nad.  well-developed, well nourished. normal affect, AF. NC/AT, EOMI, neck supple, trachea midline, no JVD present. Breathing is non-labored. Examination of the lower extremities reveals pain-free range of motion the hips. There is no pain to palpation the trochanter bursa. Negative straight leg raise. Negative Homans. No signs of DVT present. Each of the knees reveal skin intact. There is no erythema ecchymosis noted.   There are no signs of infection or cellulitis present. He does have discomfort palpation medial joint line as well as patellofemoral grinding some mild crepitus anteriorly with range of motion activities noted. Assessment: Bilateral knee osteoarthritis    Plan: At this point, we discussed treatment options. We will move forward a cortisone injection for each of her knees. After informed consent, under aseptic conditions, with US guided assitance, each knee was prepped with betadine and a mxiture of 3ml 1% lidocaine and 6mg of celestone was injected without complications. The patient tolerated the injection well. The patient is instructed on post-injection care. She will continue with her rheumatologist and maintain on her current regimen of Plaquenil and Celebrex. We will see her back in 3 months time for evaluation. Chart reviewed for the following:  Jayesh ORDAZ PA-C, have reviewed the History, Physical and updated the Allergic reactions for Carlos Buenrostro?     TIME OUT performed immediately prior to start of procedure:  Jayesh ORDAZ PA-C, have performed the following reviews on Carlos Buenrostro prior to the start of the procedure:  ????????  * Patient was identified by name and date of birth   * Agreement on procedure being performed was verified  * Risks and Benefits explained to the patient  * Procedure site verified and marked as necessary  * Patient was positioned for comfort  * Consent was signed and verified    Time:4:14 PM    Body part: bilateral knees, intra-articular    Medication & Dose: 3ml 1% lidocaine and 6mg celestone each knee    Date of procedure: 03/24/22    Procedure performed by: Jayesh Lim PA-C    Provider assisted by: none    Patient assisted by: self    How tolerated by patient: tolerated the procedure well with no complications    Post Procedural Pain Scale: 4    Comments:   [address] using a frequency of 10MHz with a 12L-RS transducer head was used to confirm needle placement.   Ultrasound images captured using [address] [place] [address] Ultrasound machine and scanned into patient's chart       Jewell Jackson PA-C, ATC  \

## 2022-03-29 ENCOUNTER — TELEPHONE (OUTPATIENT)
Dept: FAMILY MEDICINE CLINIC | Age: 52
End: 2022-03-29

## 2022-03-29 DIAGNOSIS — Z79.899 LONG-TERM USE OF HYDROXYCHLOROQUINE: Primary | ICD-10-CM

## 2022-03-29 NOTE — TELEPHONE ENCOUNTER
A Estephanie authorization has been received for consult with [facility]; Auth: [id number] beginning 3/04/22 and expires 3/24/23; valid five visits. The approval has been faxed to [facility]. A fax confirmation has been received.

## 2022-03-29 NOTE — TELEPHONE ENCOUNTER
Spoke with Td Prater at [facility] to inquire which diagnosis code to use on  referral request. Td Prater said to use diagnosis code Z79.899 for long-term use of hydroxychloroquine.

## 2022-03-29 NOTE — TELEPHONE ENCOUNTER
Patient is requesting (New  Updated) referral to the following office:    Speciality: ophthalmology   Specialist Name: vince's   Office Location: [address] [place] [address]  Phone (if available): [phone]  Fax (if available): [phone]  Diagnosis: New Pt referred from rheumatology due to use of hydroxychloroquine  Date of appointment (if scheduled): 3/30/2022 Estephanie

## 2022-04-26 DIAGNOSIS — I10 ESSENTIAL HYPERTENSION WITH GOAL BLOOD PRESSURE LESS THAN 130/80: ICD-10-CM

## 2022-04-27 RX ORDER — AMLODIPINE BESYLATE 10 MG/1
10 TABLET ORAL DAILY
Qty: 90 TABLET | Refills: 1 | Status: SHIPPED | OUTPATIENT
Start: 2022-04-27 | End: 2022-07-29 | Stop reason: SDUPTHER

## 2022-05-04 ENCOUNTER — TELEPHONE (OUTPATIENT)
Dept: FAMILY MEDICINE CLINIC | Age: 52
End: 2022-05-04

## 2022-05-14 NOTE — TELEPHONE ENCOUNTER
Estephanie referral submitted and approved. Copy of authorization faxed to [facility] at [phone]. Auth # [id number] valid 5/8/22-5/8/23 for 24 visits.

## 2022-07-14 ENCOUNTER — OFFICE VISIT (OUTPATIENT)
Dept: ORTHOPEDIC SURGERY | Age: 52
End: 2022-07-14
Payer: OTHER GOVERNMENT

## 2022-07-14 VITALS — TEMPERATURE: 97.8 F | HEIGHT: 63 IN | WEIGHT: 166.8 LBS | BODY MASS INDEX: 29.55 KG/M2

## 2022-07-14 DIAGNOSIS — M17.0 PRIMARY OSTEOARTHRITIS OF BOTH KNEES: Primary | ICD-10-CM

## 2022-07-14 PROCEDURE — 99214 OFFICE O/P EST MOD 30 MIN: CPT | Performed by: PHYSICIAN ASSISTANT

## 2022-07-14 NOTE — PROGRESS NOTES
[address] [facility], [address] [facility]  [phone]           Patient: Kiran Traylor                MRN: [id number]       SSN: [id number]  YOB: 1970        AGE: 47 y.o. SEX: female  Body mass index is 29.55 kg/m². PCP: Aparna Sahu MD  07/14/22      This office note has been dictated. REVIEW OF SYSTEMS:  Constitutional: Negative for fever, chills, weight loss and malaise/fatigue. HENT: Negative. Eyes: Negative. Respiratory: Negative. Cardiovascular: Negative. Gastrointestinal: No bowel incontinence or constipation. Genitourinary: No bladder incontinence or saddle anesthesia. Skin: Negative. Neurological: Negative. Endo/Heme/Allergies: Negative. Psychiatric/Behavioral: Negative. Musculoskeletal: As per HPI above. Past Medical History:   Diagnosis Date    Adverse effect of anesthesia     Hypotension    Allergic rhinitis, seasonal     spring    Anemia NEC     iron deficiency associated with heavy menstruation and fibroids noted 2008 with pregnancy    Dysmenorrhea     increasing past year    Fibroid, uterine     Goiter     Bilateral    Headache(784.0)     Hypertension     Ill-defined condition     Chronic knee pain    Menorrhagia     worsening past year    Neutropenia ([facility] [address]          Current Outpatient Medications:     amLODIPine (NORVASC) 10 mg tablet, Take 1 Tablet by mouth daily. , Disp: 90 Tablet, Rfl: 1    celecoxib (CeleBREX) 200 mg capsule, Take 1 Capsule by mouth two (2) times a day., Disp: 60 Capsule, Rfl: 2    polyethylene glycol (MIRALAX) 17 gram/dose powder, Take as a split dose as directed., Disp: , Rfl:     cholecalciferol, vitamin D3, (VITAMIN D3 PO), Take  by mouth., Disp: , Rfl:     diclofenac (VOLTAREN) 1 % gel, Apply 4 g to affected area four (4) times daily. , Disp: 100 g, Rfl: 4    hydrOXYchloroQUINE (PLAQUENIL) 200 mg tablet, Take 200 mg by mouth two (2) times a day., Disp: , Rfl:     docusate sodium 100 mg tab, Take 1 Tablet by mouth daily. (Patient not taking: Reported on 3/24/2022), Disp: , Rfl:     acetaminophen (TYLENOL) 500 mg tablet, Take 1,000 mg by mouth every six (6) hours as needed for Pain. Pain (Patient not taking: Reported on 3/24/2022), Disp: , Rfl:     Blood Pressure Monitor kit, Once a day, Disp: 1 Kit, Rfl: 0    No Known Allergies    Social History     Socioeconomic History    Marital status:      Spouse name: Not on file    Number of children: Not on file    Years of education: Not on file    Highest education level: Not on file   Occupational History    Not on file   Tobacco Use    Smoking status: Never Smoker    Smokeless tobacco: Never Used   Vaping Use    Vaping Use: Never used   Substance and Sexual Activity    Alcohol use: Yes     Alcohol/week: 0.0 standard drinks     Comment: occass: 2/month    Drug use: No    Sexual activity: Yes     Partners: Male     Birth control/protection: Surgical     Comment: hysterectomy   Other Topics Concern    Not on file   Social History Narrative    Not on file     Social Determinants of Health     Financial Resource Strain:     Difficulty of Paying Living Expenses: Not on file   Food Insecurity:     Worried About Running Out of Food in the Last Year: Not on file    Kimberly of Food in the Last Year: Not on file   Transportation Needs:     Lack of Transportation (Medical): Not on file    Lack of Transportation (Non-Medical):  Not on file   Physical Activity:     Days of Exercise per Week: Not on file    Minutes of Exercise per Session: Not on file   Stress:     Feeling of Stress : Not on file   Social Connections:     Frequency of Communication with Friends and Family: Not on file    Frequency of Social Gatherings with Friends and Family: Not on file    Attends Religion Services: Not on file    Active Member of Clubs or Organizations: Not on file    Attends Club or Organization Meetings: Not on file    Marital Status: Not on file   Intimate Partner Violence:     Fear of Current or Ex-Partner: Not on file    Emotionally Abused: Not on file    Physically Abused: Not on file    Sexually Abused: Not on file   Housing Stability:     Unable to Pay for Housing in the Last Year: Not on file    Number of [place] in the Last Year: Not on file    Unstable Housing in the Last Year: Not on file       Past Surgical History:   Procedure Laterality Date    HX HIP REPLACEMENT      right and left    HX HYSTERECTOMY  04/2014    [facility]    HX KNEE ARTHROSCOPY Right     [place] TOTAL HIP ARTHROPLASTY Left 12/13/2020         Patient seen and evaluated today for bilateral knee pain. Does have known advanced arthritis of her knees. She had a cortisone injection about 6 weeks ago which was not effective for her. She does have trouble with stairs. She has trouble kneeling. She has decreased walking tolerance. There are no mechanical symptoms. No feelings of instability. Patient denies recent fevers, chills, chest pain, SOB, or injuries. No recent systemic changes noted. A 12-point review of systems is performed today. Pertinent positives are noted. All other systems reviewed and otherwise are negative. Physical exam: General: Alert and oriented x3, nad.  well-developed, well nourished. normal affect, AF. NC/AT, EOMI, neck supple, trachea midline, no JVD present. Breathing is non-labored. Examination of lower extremities reveals pain-free range of motion of the hips. There is no pain to palpation the trochanter bursa. Neck straight leg raise. Negative calf tenderness. Negative Homans. No signs of DVT present. Each of the knees reveal skin intact. There is no erythema, ecchymosis or warmth. There are no signs for infection or cellulitis present.   She does have pain to palpation the medial joint line as well as patellofemoral grinding crepitus anteriorly with range of motion activities noted which is worse on the right side. Review of radiographs from previous does confirm fairly advancing arthritis of her knees which is worse to the medial patellofemoral articulation. Assessment: Bilateral knee advancing arthritis    Plan: At this point, we discussed treatment options. We discussed surgical intervention and would like to hold off on any surgery at this point. She does understand the total knee replacements are near future. We will try a series of viscosupplementation. We will get these preapproved and see her back in about 3 weeks time for evaluation and begin the series.             JR Josh MARTIN, PA-C, ATC

## 2022-07-25 ENCOUNTER — TELEPHONE (OUTPATIENT)
Dept: FAMILY MEDICINE CLINIC | Age: 52
End: 2022-07-25

## 2022-07-25 NOTE — TELEPHONE ENCOUNTER
Pt sent this message through [id number] headed to [address] on August 2 and need medication for malaria, Diamox for altitude and a z pack. I had my yellow fever when I visited last year. I will be there 14 days total.    Pt states that she needs this by this weekend. She leave next month. Please advise.

## 2022-07-26 DIAGNOSIS — Z29.8 NEED FOR MALARIA PROPHYLAXIS: Primary | ICD-10-CM

## 2022-07-26 DIAGNOSIS — A09 TRAVELER'S DIARRHEA: ICD-10-CM

## 2022-07-26 RX ORDER — MEFLOQUINE HYDROCHLORIDE 250 MG/1
TABLET ORAL
Qty: 8 TABLET | Refills: 0 | Status: SHIPPED | OUTPATIENT
Start: 2022-07-26

## 2022-07-26 RX ORDER — AZITHROMYCIN 250 MG/1
TABLET, FILM COATED ORAL
Qty: 6 TABLET | Refills: 0 | Status: SHIPPED | OUTPATIENT
Start: 2022-07-26 | End: 2022-07-31

## 2022-07-26 RX ORDER — ACETAZOLAMIDE 125 MG/1
TABLET ORAL
Qty: 30 TABLET | Refills: 0 | Status: SHIPPED | OUTPATIENT
Start: 2022-07-26

## 2022-07-29 ENCOUNTER — OFFICE VISIT (OUTPATIENT)
Dept: FAMILY MEDICINE CLINIC | Age: 52
End: 2022-07-29
Payer: OTHER GOVERNMENT

## 2022-07-29 VITALS
OXYGEN SATURATION: 100 % | WEIGHT: 166.4 LBS | RESPIRATION RATE: 16 BRPM | DIASTOLIC BLOOD PRESSURE: 82 MMHG | SYSTOLIC BLOOD PRESSURE: 124 MMHG | HEART RATE: 65 BPM | TEMPERATURE: 97.4 F | BODY MASS INDEX: 29.48 KG/M2

## 2022-07-29 DIAGNOSIS — R79.89 ABNORMAL CBC: ICD-10-CM

## 2022-07-29 DIAGNOSIS — I10 ESSENTIAL HYPERTENSION WITH GOAL BLOOD PRESSURE LESS THAN 130/80: Primary | ICD-10-CM

## 2022-07-29 DIAGNOSIS — G43.009 MIGRAINE WITHOUT AURA AND WITHOUT STATUS MIGRAINOSUS, NOT INTRACTABLE: ICD-10-CM

## 2022-07-29 DIAGNOSIS — E04.9 GOITER: ICD-10-CM

## 2022-07-29 DIAGNOSIS — E55.9 VITAMIN D DEFICIENCY: ICD-10-CM

## 2022-07-29 PROCEDURE — 99213 OFFICE O/P EST LOW 20 MIN: CPT | Performed by: FAMILY MEDICINE

## 2022-07-29 RX ORDER — AMLODIPINE BESYLATE 5 MG/1
5 TABLET ORAL DAILY
Qty: 90 TABLET | Refills: 0 | Status: SHIPPED | OUTPATIENT
Start: 2022-07-29

## 2022-07-29 NOTE — PROGRESS NOTES
HISTORY OF PRESENT ILLNESS  Carlos Trujillo is a 47 y.o. female. HPI: Here for routine follow-up. Hypertension. Vitals been stable. She is keeping blood pressure log at home. Wondering if she can lower the dose of medication which was just recently increased before surgery. Agreed to lower the dose. Advised to keep daily blood pressure log and if it is more than 140/90 she needs to go back to 10 mg. She understood it well. Sitting comfortable without any acute distress  Denies any headache, dizziness, no chest pain or trouble breathing, no arm or leg weakness. No nausea or vomiting, no weight or appetite changes, no mood changes . No urine or bowel complains, no palpitation, no diaphoresis. No abdominal pain. No cold or cough. No leg swelling. No fever. No sleep trouble. She is going for high-altitude climbing. Now recommended malaria prophylaxis, Bumex, traveler's diarrhea prophylaxis  Advised to follow instructions  Goiter. Following Endo. Asymptomatic. No trouble swallowing or change in voice. Appetite been same weight has been stable. Leukopenia. Following hematology. No acute finding. No new recommendation. No cold cough or wheezing. Visit Vitals  /82 (BP 1 Location: Left upper arm, BP Patient Position: Sitting, BP Cuff Size: Adult)   Pulse 65   Temp 97.4 °F (36.3 °C) (Temporal)   Resp 16   Wt 166 lb 6.4 oz (75.5 kg)   SpO2 100%   BMI 29.48 kg/m²     Review medication list, vitals, problem list,allergies.  .  Lab Results   Component Value Date/Time    WBC 3.3 (L) 02/03/2022 09:29 AM    HGB 12.4 02/03/2022 09:29 AM    HCT 38.1 02/03/2022 09:29 AM    PLATELET 022 53/68/2368 09:29 AM    MCV 90.5 02/03/2022 09:29 AM     Lab Results   Component Value Date/Time    Sodium 142 02/03/2022 09:29 AM    Potassium 3.9 02/03/2022 09:29 AM    Chloride 108 02/03/2022 09:29 AM    CO2 30 02/03/2022 09:29 AM    Anion gap 4 02/03/2022 09:29 AM    Glucose 85 02/03/2022 09:29 AM    BUN 13 02/03/2022 09:29 AM    Creatinine 0.67 02/03/2022 09:29 AM    BUN/Creatinine ratio 19 02/03/2022 09:29 AM    GFR est AA >60 02/03/2022 09:29 AM    GFR est non-AA >60 02/03/2022 09:29 AM    Calcium 9.0 02/03/2022 09:29 AM    Bilirubin, total 0.3 02/03/2022 09:29 AM    Alk. phosphatase 82 02/03/2022 09:29 AM    Protein, total 7.1 02/03/2022 09:29 AM    Albumin 4.0 02/03/2022 09:29 AM    Globulin 3.1 02/03/2022 09:29 AM    A-G Ratio 1.3 02/03/2022 09:29 AM    ALT (SGPT) 16 02/03/2022 09:29 AM    AST (SGOT) 21 02/03/2022 09:29 AM     Lab Results   Component Value Date/Time    Cholesterol, total 210 (H) 02/03/2022 09:29 AM    HDL Cholesterol 79 (H) 02/03/2022 09:29 AM    LDL, calculated 122.6 (H) 02/03/2022 09:29 AM    VLDL, calculated 8.4 02/03/2022 09:29 AM    Triglyceride 42 02/03/2022 09:29 AM    CHOL/HDL Ratio 2.7 02/03/2022 09:29 AM     Lab Results   Component Value Date/Time    TSH 1.50 02/03/2022 09:29 AM     Lab Results   Component Value Date/Time    Hemoglobin A1c 5.3 02/03/2022 09:29 AM     Lab Results   Component Value Date/Time    Vitamin D 25-Hydroxy 20.7 (L) 02/03/2022 09:29 AM           ROS: See HPI    Physical Exam  Constitutional:       General: She is not in acute distress. Cardiovascular:      Rate and Rhythm: Normal rate and regular rhythm. Heart sounds: Normal heart sounds. Abdominal:      General: Bowel sounds are normal.      Palpations: Abdomen is soft. Tenderness: There is no abdominal tenderness. Musculoskeletal:         General: No swelling. Cervical back: Neck supple. Neurological:      Mental Status: She is oriented to person, place, and time. Psychiatric:         Behavior: Behavior normal.       ASSESSMENT and PLAN    ICD-10-CM ICD-9-CM    1. Essential hypertension with goal blood pressure less than 130/80 :well controlled. Continue current dose of medication and low salt diet. Exercise as tolerated. I10 401.9 amLODIPine (NORVASC) 5 mg tablet      2.  Migraine without aura and without status migrainosus, not intractable: Stable no concern G43.009 346.10       3. Goiter/ seen endocrinology/ observe: Asymptomatic. Following Endo. No new recommendation E04.9 240.9       4. Abnormal CBC/ seen hematology: Asymptomatic. Following hematology. No new recommendation R79.89 790.6       5. Vitamin D deficiency: Advised 2000 units of vitamin D daily E55.9 268.9     2000 units daily       Patient understood and agreed with the plan  She has taken her COVID booster  Has completed both shingles vaccine  Up-to-date with mammogram and colonoscopy  Follow-up and Dispositions    Return in about 6 months (around 1/29/2023). Please note that this dictation was completed with [employer], the computer voice recognition software. Quite often unanticipated grammatical, syntax, homophones, and other interpretive errors are inadvertently transcribed by the computer software. Please disregard these errors. Please excuse any errors that have escaped final proofreading.

## 2022-07-29 NOTE — PROGRESS NOTES
1. Have you been to the ER, urgent care clinic since your last visit? Hospitalized since your last visit? No    2. Have you seen or consulted any other health care providers outside of the [address] [place] [address] since your last visit? Include any pap smears or colon screening. [facility] 2/2022 for colonoscopy and Dr. Luisa Kelly: 2/08/22. Chief Complaint   Patient presents with    Hypertension    Thyroid Problem    Vitamin D Deficiency    Medication Evaluation     Wants to discuss decreasing dose of Amlodipine to 5 mg.

## 2022-08-18 ENCOUNTER — TELEPHONE (OUTPATIENT)
Dept: FAMILY MEDICINE CLINIC | Age: 52
End: 2022-08-18

## 2022-08-19 ENCOUNTER — TELEPHONE (OUTPATIENT)
Dept: FAMILY MEDICINE CLINIC | Age: 52
End: 2022-08-19

## 2022-08-19 RX ORDER — FLUTICASONE PROPIONATE 110 UG/1
1 AEROSOL, METERED RESPIRATORY (INHALATION) EVERY 12 HOURS
Qty: 12 G | Refills: 0 | Status: SHIPPED | OUTPATIENT
Start: 2022-08-19

## 2022-08-19 RX ORDER — ALBUTEROL SULFATE 90 UG/1
1 AEROSOL, METERED RESPIRATORY (INHALATION)
Qty: 18 G | Refills: 0 | Status: SHIPPED | OUTPATIENT
Start: 2022-08-19

## 2022-10-31 NOTE — TELEPHONE ENCOUNTER
Patient is requesting (New  Updated) referral to the following office:    Speciality: hema/oncol  Specialist Name: COLEEN [facility]  Office Location: [address] [facility] [address]  Phone (if available): [phone]  Fax (if available): [phone]  Diagnosis: D72.819 Leukopenia, unspecified type continuity of care    Date of appointment (if scheduled): 5/24/2022 Estephanie Juan Bob (dtr) [phone]

## 2023-01-06 ENCOUNTER — OFFICE VISIT (OUTPATIENT)
Dept: ORTHOPEDIC SURGERY | Age: 53
End: 2023-01-06
Payer: OTHER GOVERNMENT

## 2023-01-06 ENCOUNTER — DOCUMENTATION ONLY (OUTPATIENT)
Dept: ORTHOPEDIC SURGERY | Age: 53
End: 2023-01-06

## 2023-01-06 DIAGNOSIS — M17.0 PRIMARY OSTEOARTHRITIS OF BOTH KNEES: Primary | ICD-10-CM

## 2023-01-06 NOTE — PROGRESS NOTES
Per [employer], No referral required but coverage is limited to osteoarthritis of the knee which has failed conservative treatment. If drug is not available in the providers office, contact Gil at [phone] to obtain drug.

## 2023-01-06 NOTE — PROGRESS NOTES
Patient: Lionel Mckeon                MRN: [id number]       SSN: [id number]  YOB: 1970        AGE: 47 y.o. SEX: female  There is no height or weight on file to calculate BMI. PCP: Debbie Osborne MD  01/06/23        Pt presents today for their 1st euflexxa  injection for Bilateral knee . There has been no recent fevers/chills, systemic changes, or injuries to report. PE:  General A&Ox3, NAD  Exam of the knees reveals skin intact, no erythema, no ecchymosis, no signs for infection. No cyanosis, clubbing, or edema present distally. Neg calf tenderness, neg homans, no signs for DVT present. A: Bilateral knee OA    P: The Bilateral knee was injected 2ml euflexxa with US guided assistance without complications. Patient was instructed on post injection care. Chart reviewed for the following:  Noelle ORDAZ PA-C, have reviewed the History, Physical and updated the Allergic reactions for Carlos Buenrostro? TIME OUT performed immediately prior to start of procedure:  Noelle ORDAZ PA-C, have performed the following reviews on Carlos Buenrostro prior to the start of the procedure:  ????????  * Patient was identified by name and date of birth   * Agreement on procedure being performed was verified  * Risks and Benefits explained to the patient  * Procedure site verified and marked as necessary  * Patient was positioned for comfort  * Consent was signed and verified    Time: 3:47 PM    Body part: Bilateral knee, intra-articular    Medication & Dose: 2ml euflexxa each knee    Date of procedure: 01/06/23    Procedure performed by: Therese Richard PA-C    Patient assisted by: self    How tolerated by patient: tolerated the procedure well with no complications    Post Procedural Pain Scale: 5    Comments:  [address] using a frequency of 10MHz with a 12L-RS transducer head was used to confirm needle placement.   Ultrasound images captured using [address] [employer] Ultrasound machine and scanned into patient's chart      Christopher Gonzalez PA-C

## 2023-01-13 ENCOUNTER — OFFICE VISIT (OUTPATIENT)
Dept: ORTHOPEDIC SURGERY | Age: 53
End: 2023-01-13
Payer: OTHER GOVERNMENT

## 2023-01-13 DIAGNOSIS — M17.0 PRIMARY OSTEOARTHRITIS OF BOTH KNEES: Primary | ICD-10-CM

## 2023-01-13 NOTE — PROGRESS NOTES
Patient: Wade Lopez                MRN: [id number]       SSN: [id number]  YOB: 1970        AGE: 47 y.o. SEX: female  There is no height or weight on file to calculate BMI. PCP: Susie Mckeon MD  01/13/23        Pt presents today for their 2nd euflexxa  injection for Bilateral knee . There has been no recent fevers/chills, systemic changes, or injuries to report. PE:  General A&Ox3, NAD  Exam of the knees reveals skin intact, no erythema, no ecchymosis, no signs for infection. No cyanosis, clubbing, or edema present distally. Neg calf tenderness, neg homans, no signs for DVT present. A: Bilateral knee OA    P: The Bilateral knee was injected 2ml euflexxa with US guided assistance without complications. Patient was instructed on post injection care. Chart reviewed for the following:  Ad ORDAZ PA-C, have reviewed the History, Physical and updated the Allergic reactions for Carlos Buenrostro? TIME OUT performed immediately prior to start of procedure:  Ad ORDAZ PA-C, have performed the following reviews on Carlos Buenrostro prior to the start of the procedure:  ????????  * Patient was identified by name and date of birth   * Agreement on procedure being performed was verified  * Risks and Benefits explained to the patient  * Procedure site verified and marked as necessary  * Patient was positioned for comfort  * Consent was signed and verified    Time: 3:39 PM    Body part: Bilateral knee, intra-articular    Medication & Dose: 2ml euflexxa each knee    Date of procedure: 01/13/23    Procedure performed by: Migue Chauhan PA-C    Patient assisted by: self    How tolerated by patient: tolerated the procedure well with no complications    Post Procedural Pain Scale: 5    Comments:  [address] using a frequency of 10MHz with a 12L-RS transducer head was used to confirm needle placement.   Ultrasound images captured using 701 [employer] Ultrasound machine and scanned into patient's chart      Michele Horn PA-C

## 2023-01-30 ENCOUNTER — OFFICE VISIT (OUTPATIENT)
Dept: FAMILY MEDICINE CLINIC | Age: 53
End: 2023-01-30
Payer: OTHER GOVERNMENT

## 2023-01-30 VITALS
RESPIRATION RATE: 16 BRPM | WEIGHT: 175.2 LBS | OXYGEN SATURATION: 98 % | TEMPERATURE: 97.5 F | HEIGHT: 63 IN | SYSTOLIC BLOOD PRESSURE: 120 MMHG | BODY MASS INDEX: 31.04 KG/M2 | HEART RATE: 73 BPM | DIASTOLIC BLOOD PRESSURE: 88 MMHG

## 2023-01-30 DIAGNOSIS — I10 ESSENTIAL HYPERTENSION: Primary | ICD-10-CM

## 2023-01-30 DIAGNOSIS — G89.29 CHRONIC PAIN OF BOTH KNEES: ICD-10-CM

## 2023-01-30 DIAGNOSIS — Z13.220 SCREENING FOR HYPERLIPIDEMIA: ICD-10-CM

## 2023-01-30 DIAGNOSIS — Z90.710 S/P HYSTERECTOMY: ICD-10-CM

## 2023-01-30 DIAGNOSIS — R45.89 FEELING DOWN: ICD-10-CM

## 2023-01-30 DIAGNOSIS — R79.89 ABNORMAL CBC: ICD-10-CM

## 2023-01-30 DIAGNOSIS — M25.561 CHRONIC PAIN OF BOTH KNEES: ICD-10-CM

## 2023-01-30 DIAGNOSIS — E04.9 GOITER: ICD-10-CM

## 2023-01-30 DIAGNOSIS — R76.8 POSITIVE ANA (ANTINUCLEAR ANTIBODY): ICD-10-CM

## 2023-01-30 DIAGNOSIS — E55.9 VITAMIN D DEFICIENCY: ICD-10-CM

## 2023-01-30 DIAGNOSIS — Z13.1 SCREENING FOR DIABETES MELLITUS: ICD-10-CM

## 2023-01-30 DIAGNOSIS — M25.562 CHRONIC PAIN OF BOTH KNEES: ICD-10-CM

## 2023-01-30 DIAGNOSIS — Z12.31 ENCOUNTER FOR SCREENING MAMMOGRAM FOR MALIGNANT NEOPLASM OF BREAST: ICD-10-CM

## 2023-01-30 DIAGNOSIS — G43.009 MIGRAINE WITHOUT AURA AND WITHOUT STATUS MIGRAINOSUS, NOT INTRACTABLE: ICD-10-CM

## 2023-01-30 PROCEDURE — 3074F SYST BP LT 130 MM HG: CPT | Performed by: FAMILY MEDICINE

## 2023-01-30 PROCEDURE — 99214 OFFICE O/P EST MOD 30 MIN: CPT | Performed by: FAMILY MEDICINE

## 2023-01-30 PROCEDURE — 3079F DIAST BP 80-89 MM HG: CPT | Performed by: FAMILY MEDICINE

## 2023-01-30 NOTE — PROGRESS NOTES
HISTORY OF PRESENT ILLNESS  Carlos Kumar is a 47 y.o. female. HPI: Here for follow-up. Due for labs  Abnormal CBC. Has seen hematology. Has not kept follow-up. No cold cough wheezing or any unusual fatigue. Currently going through divorce, taking care of her elderly parents, Making little stress in the routine life. No thoughts of hurting herself or someone else. Feeling sad. Daughter is already in counseling. She wants to go for counseling as well. Given advised to make a follow-up with the same counseling center if she can and she agrees with it. Done a referral.  Sitting without any acute distress  History of goiter. No trouble swallowing or change in voice. No appetite or weight changes. Denies any headache, dizziness, no chest pain or trouble breathing, no arm or leg weakness. No nausea or vomiting, no weight or appetite changes, no mood changes . No urine or bowel complains, no palpitation, no diaphoresis. No abdominal pain. No cold or cough. No leg swelling. No fever. No sleep trouble. Hypertension. Vitals stable. Asymptomatic. Taking medication with compliance. History of chronic anemia. Has been stable and no concern at this time. Visit Vitals  /88 (BP 1 Location: Left upper arm, BP Patient Position: Sitting, BP Cuff Size: Adult)   Pulse 73   Temp 97.5 °F (36.4 °C) (Temporal)   Resp 16   Ht 5' 3\" (1.6 m)   Wt 175 lb 3.2 oz (79.5 kg)   SpO2 98%   BMI 31.04 kg/m²     Review medication list, vitals, problem list,allergies.    Lab Results   Component Value Date/Time    WBC 3.3 (L) 02/03/2022 09:29 AM    HGB 12.4 02/03/2022 09:29 AM    HCT 38.1 02/03/2022 09:29 AM    PLATELET 542 10/91/4437 09:29 AM    MCV 90.5 02/03/2022 09:29 AM     Lab Results   Component Value Date/Time    Cholesterol, total 210 (H) 02/03/2022 09:29 AM    HDL Cholesterol 79 (H) 02/03/2022 09:29 AM    LDL, calculated 122.6 (H) 02/03/2022 09:29 AM    Triglyceride 42 02/03/2022 09:29 AM    CHOL/HDL Ratio 2.7 02/03/2022 09:29 AM     Lab Results   Component Value Date/Time    TSH 1.50 02/03/2022 09:29 AM    T4, Free 0.9 12/30/2016 03:02 PM      Lab Results   Component Value Date/Time    Sodium 142 02/03/2022 09:29 AM    Potassium 3.9 02/03/2022 09:29 AM    Chloride 108 02/03/2022 09:29 AM    CO2 30 02/03/2022 09:29 AM    Anion gap 4 02/03/2022 09:29 AM    Glucose 85 02/03/2022 09:29 AM    BUN 13 02/03/2022 09:29 AM    Creatinine 0.67 02/03/2022 09:29 AM    BUN/Creatinine ratio 19 02/03/2022 09:29 AM    GFR est AA >60 02/03/2022 09:29 AM    GFR est non-AA >60 02/03/2022 09:29 AM    Calcium 9.0 02/03/2022 09:29 AM      Lab Results   Component Value Date/Time    Sodium 142 02/03/2022 09:29 AM    Potassium 3.9 02/03/2022 09:29 AM    Chloride 108 02/03/2022 09:29 AM    CO2 30 02/03/2022 09:29 AM    Anion gap 4 02/03/2022 09:29 AM    Glucose 85 02/03/2022 09:29 AM    BUN 13 02/03/2022 09:29 AM    Creatinine 0.67 02/03/2022 09:29 AM    BUN/Creatinine ratio 19 02/03/2022 09:29 AM    GFR est AA >60 02/03/2022 09:29 AM    GFR est non-AA >60 02/03/2022 09:29 AM    Calcium 9.0 02/03/2022 09:29 AM    Bilirubin, total 0.3 02/03/2022 09:29 AM    ALT (SGPT) 16 02/03/2022 09:29 AM    Alk. phosphatase 82 02/03/2022 09:29 AM    Protein, total 7.1 02/03/2022 09:29 AM    Albumin 4.0 02/03/2022 09:29 AM    Globulin 3.1 02/03/2022 09:29 AM    A-G Ratio 1.3 02/03/2022 09:29 AM      Lab Results   Component Value Date/Time    Hemoglobin A1c 5.3 02/03/2022 09:29 AM           ROS: See HPI    Physical Exam  Constitutional:       General: She is not in acute distress. Cardiovascular:      Rate and Rhythm: Normal rate and regular rhythm. Heart sounds: Normal heart sounds. Abdominal:      General: Bowel sounds are normal.      Palpations: Abdomen is soft. Tenderness: There is no abdominal tenderness. Musculoskeletal:         General: No swelling. Cervical back: Neck supple.    Neurological:      Mental Status: She is oriented to person, place, and time. Psychiatric:         Behavior: Behavior normal.       ASSESSMENT and PLAN    ICD-10-CM ICD-9-CM    1. Essential hypertension:well controlled. Continue current dose of medication and low salt diet. Exercise as tolerated. Y78 606.4 METABOLIC PANEL, COMPREHENSIVE      CBC WITH AUTOMATED DIFF      2. Migraine without aura and without status migrainosus, not intractable: Stable no concern G43.009 346.10       3. S/P hysterectomy/ due to menorrhagea   Z90.710 V88.01       4. Goiter/ seen endocrinology/ observe: Asymptomatic. Following Endo. Will obtain the notes E04.9 240.9 TSH 3RD GENERATION      5. Abnormal CBC/ seen hematology: Has not seen hematology lately. Asymptomatic. Will repeat labs R79.89 790.6 CBC WITH AUTOMATED DIFF      6. Positive ALY (antinuclear antibody)/ test done by hemotology and requested referral by PCP: Has been following rheumatology. We will follow the recommendation R76.8 795.79       7. Chronic pain of both knees/ following ortho  M25.561 719.46     M25.562 338.29     G89.29        8. Vitamin D deficiency: On supplement. We will recheck labs E55.9 268.9 VITAMIN D, 25 HYDROXY      9. Encounter for screening mammogram for malignant neoplasm of breast  Z12.31 V76.12 CHRISTOPHER MAMMO BI SCREENING INCL CAD      8. Screening for hyperlipidemia  Z13.220 V77.91 LIPID PANEL      11. Screening for diabetes mellitus  Z13.1 V77.1 HEMOGLOBIN A1C WITH EAG      12. Feeling down: Social stress, taking care of her elderly parents, getting divorce etc.  Sending to counseling R45.89 799.29 REFERRAL TO PSYCHOLOGY      Patient understood agreed with the plan  Follow-up and Dispositions    Return in about 3 months (around 4/30/2023). Please note that this dictation was completed with [employer], the [employer] voice recognition software. Quite often unanticipated grammatical, syntax, homophones, and other interpretive errors are inadvertently transcribed by the computer software.   Please disregard these errors. Please excuse any errors that have escaped final proofreading.

## 2023-01-30 NOTE — PROGRESS NOTES
1. \"Have you been to the ER, urgent care clinic since your last visit? Hospitalized since your last visit? \" No    2. \"Have you seen or consulted any other health care providers outside of the [address] [place] [address] since your last visit? \" No     3. For patients aged 39-70: Has the patient had a colonoscopy / FIT/ Cologuard? Yes - no Care Gap present      If the patient is female:    4. For patients aged 41-77: Has the patient had a mammogram within the past 2 years? Yes - Care Gap present. Most recent result on file      5. For patients aged 21-65: Has the patient had a pap smear?  No - hysterectomy    Chief Complaint   Patient presents with    Hypertension    Migraine    Thyroid Problem    Vitamin D Deficiency    Abnormal Lab Results     Abnormal CBC

## 2023-02-02 ENCOUNTER — OFFICE VISIT (OUTPATIENT)
Dept: ORTHOPEDIC SURGERY | Age: 53
End: 2023-02-02
Payer: OTHER GOVERNMENT

## 2023-02-02 VITALS — HEIGHT: 63 IN | WEIGHT: 175 LBS | BODY MASS INDEX: 31.01 KG/M2

## 2023-02-02 DIAGNOSIS — M17.0 PRIMARY OSTEOARTHRITIS OF BOTH KNEES: Primary | ICD-10-CM

## 2023-02-02 NOTE — PROGRESS NOTES
Patient: Princess Alcazar                MRN: [id number]       SSN: [id number]  YOB: 1970        AGE: 47 y.o. SEX: female  There is no height or weight on file to calculate BMI. PCP: Deidre Conroy MD  02/02/23        Pt presents today for their 3rd euflexxa  injection for Bilateral knee . There has been no recent fevers/chills, systemic changes, or injuries to report. PE:  General A&Ox3, NAD  Exam of the knees reveals skin intact, no erythema, no ecchymosis, no signs for infection. No cyanosis, clubbing, or edema present distally. Neg calf tenderness, neg homans, no signs for DVT present. A: Bilateral knee OA    P: The Bilateral knee was injected 2ml euflexxa with US guided assistance without complications. Patient was instructed on post injection care. Chart reviewed for the following:  Jessica ORDAZ PA-C, have reviewed the History, Physical and updated the Allergic reactions for Carlos Buenrostro? TIME OUT performed immediately prior to start of procedure:  Jessica ORDAZ PA-C, have performed the following reviews on Carlos Buenrostro prior to the start of the procedure:  ????????  * Patient was identified by name and date of birth   * Agreement on procedure being performed was verified  * Risks and Benefits explained to the patient  * Procedure site verified and marked as necessary  * Patient was positioned for comfort  * Consent was signed and verified    Time: 1:03 PM    Body part: Bilateral knee, intra-articular    Medication & Dose: 2ml euflexxa each knee    Date of procedure: 02/02/23    Procedure performed by: Jaswinder Riley PA-C    Patient assisted by: self    How tolerated by patient: tolerated the procedure well with no complications    Post Procedural Pain Scale: 0    Comments:  [address] using a frequency of 10MHz with a 12L-RS transducer head was used to confirm needle placement.   Ultrasound images captured using [address] [employer] [address] Ultrasound machine and scanned into patient's chart      Rod Bentley PA-C

## 2023-02-10 ENCOUNTER — HOSPITAL ENCOUNTER (OUTPATIENT)
Dept: LAB | Age: 53
Discharge: HOME | End: 2023-02-10
Payer: OTHER GOVERNMENT

## 2023-02-10 ENCOUNTER — TELEPHONE (OUTPATIENT)
Dept: FAMILY MEDICINE CLINIC | Age: 53
End: 2023-02-10

## 2023-02-10 DIAGNOSIS — E04.9 GOITER: ICD-10-CM

## 2023-02-10 DIAGNOSIS — I10 ESSENTIAL HYPERTENSION: ICD-10-CM

## 2023-02-10 DIAGNOSIS — Z13.220 SCREENING FOR HYPERLIPIDEMIA: ICD-10-CM

## 2023-02-10 DIAGNOSIS — E55.9 VITAMIN D DEFICIENCY: ICD-10-CM

## 2023-02-10 DIAGNOSIS — Z13.1 SCREENING FOR DIABETES MELLITUS: ICD-10-CM

## 2023-02-10 DIAGNOSIS — R79.89 ABNORMAL CBC: ICD-10-CM

## 2023-02-10 LAB
25(OH)D3 SERPL-MCNC: 15.1 NG/ML (ref 30–100)
ALBUMIN SERPL-MCNC: 3.8 G/DL (ref 3.4–5)
ALBUMIN/GLOB SERPL: 1.4 (ref 0.8–1.7)
ALP SERPL-CCNC: 82 U/L (ref 45–117)
ALT SERPL-CCNC: 17 U/L (ref 13–56)
ANION GAP SERPL CALC-SCNC: 5 MMOL/L (ref 3–18)
AST SERPL-CCNC: 21 U/L (ref 10–38)
BASOPHILS # BLD: 0 K/UL (ref 0–0.1)
BASOPHILS NFR BLD: 1 % (ref 0–2)
BILIRUB SERPL-MCNC: 0.3 MG/DL (ref 0.2–1)
BUN SERPL-MCNC: 10 MG/DL (ref 7–18)
BUN/CREAT SERPL: 14 (ref 12–20)
CALCIUM SERPL-MCNC: 8.7 MG/DL (ref 8.5–10.1)
CHLORIDE SERPL-SCNC: 111 MMOL/L (ref 100–111)
CHOLEST SERPL-MCNC: 203 MG/DL
CO2 SERPL-SCNC: 27 MMOL/L (ref 21–32)
CREAT SERPL-MCNC: 0.7 MG/DL (ref 0.6–1.3)
DIFFERENTIAL METHOD BLD: ABNORMAL
EOSINOPHIL # BLD: 0.1 K/UL (ref 0–0.4)
EOSINOPHIL NFR BLD: 2 % (ref 0–5)
ERYTHROCYTE [DISTWIDTH] IN BLOOD BY AUTOMATED COUNT: 13.5 % (ref 11.6–14.5)
EST. AVERAGE GLUCOSE BLD GHB EST-MCNC: 105 MG/DL
GLOBULIN SER CALC-MCNC: 2.8 G/DL (ref 2–4)
GLUCOSE SERPL-MCNC: 104 MG/DL (ref 74–99)
HBA1C MFR BLD: 5.3 % (ref 4.2–5.6)
HCT VFR BLD AUTO: 36.6 % (ref 35–45)
HDLC SERPL-MCNC: 86 MG/DL (ref 40–60)
HDLC SERPL: 2.4 (ref 0–5)
HGB BLD-MCNC: 12.1 G/DL (ref 12–16)
IMM GRANULOCYTES # BLD AUTO: 0 K/UL (ref 0–0.04)
IMM GRANULOCYTES NFR BLD AUTO: 0 % (ref 0–0.5)
LDLC SERPL CALC-MCNC: 110 MG/DL (ref 0–100)
LIPID PROFILE,FLP: ABNORMAL
LYMPHOCYTES # BLD: 1.7 K/UL (ref 0.9–3.6)
LYMPHOCYTES NFR BLD: 53 % (ref 21–52)
MCH RBC QN AUTO: 29.9 PG (ref 24–34)
MCHC RBC AUTO-ENTMCNC: 33.1 G/DL (ref 31–37)
MCV RBC AUTO: 90.4 FL (ref 78–100)
MONOCYTES # BLD: 0.3 K/UL (ref 0.05–1.2)
MONOCYTES NFR BLD: 9 % (ref 3–10)
NEUTS SEG # BLD: 1.1 K/UL (ref 1.8–8)
NEUTS SEG NFR BLD: 35 % (ref 40–73)
NRBC # BLD: 0 K/UL (ref 0–0.01)
NRBC BLD-RTO: 0 PER 100 WBC
PLATELET # BLD AUTO: 218 K/UL (ref 135–420)
PMV BLD AUTO: 10.5 FL (ref 9.2–11.8)
POTASSIUM SERPL-SCNC: 3.8 MMOL/L (ref 3.5–5.5)
PROT SERPL-MCNC: 6.6 G/DL (ref 6.4–8.2)
RBC # BLD AUTO: 4.05 M/UL (ref 4.2–5.3)
SODIUM SERPL-SCNC: 143 MMOL/L (ref 136–145)
TRIGL SERPL-MCNC: 35 MG/DL (ref ?–150)
TSH SERPL DL<=0.05 MIU/L-ACNC: 1.86 UIU/ML (ref 0.36–3.74)
VLDLC SERPL CALC-MCNC: 7 MG/DL
WBC # BLD AUTO: 3.1 K/UL (ref 4.6–13.2)

## 2023-02-10 PROCEDURE — 85025 COMPLETE CBC W/AUTO DIFF WBC: CPT

## 2023-02-10 PROCEDURE — 80061 LIPID PANEL: CPT

## 2023-02-10 PROCEDURE — 80053 COMPREHEN METABOLIC PANEL: CPT

## 2023-02-10 PROCEDURE — 36415 COLL VENOUS BLD VENIPUNCTURE: CPT

## 2023-02-10 PROCEDURE — 82306 VITAMIN D 25 HYDROXY: CPT

## 2023-02-10 PROCEDURE — 84443 ASSAY THYROID STIM HORMONE: CPT

## 2023-02-10 PROCEDURE — 83036 HEMOGLOBIN GLYCOSYLATED A1C: CPT

## 2023-02-10 RX ORDER — ERGOCALCIFEROL 1.25 MG/1
50000 CAPSULE ORAL
Qty: 12 CAPSULE | Refills: 0 | Status: SHIPPED | OUTPATIENT
Start: 2023-02-10

## 2023-02-11 NOTE — PROGRESS NOTES
Low vitamin d. Sending Drisdol. Other labs showed A1c in prediabetic range and mild elevated lipid panel. Continue diet and life style modification. Low wbc count. Keep appt with hematology as their recommendations. Further discussion on follow up visit.

## 2023-02-13 NOTE — PROGRESS NOTES
Patient aware that vitamin d and wbc level is low. A1c is within prediabetic range and lipid level is mildly elevated . Mrs Jose Head was notified that Dr Smooth Franks has made drisdol available at her preferred pharmacy and recommends diet and lifestyle modification.  Ox[employer] info verified

## 2023-03-17 ENCOUNTER — OFFICE VISIT (OUTPATIENT)
Age: 53
End: 2023-03-17

## 2023-03-17 VITALS — RESPIRATION RATE: 18 BRPM | BODY MASS INDEX: 31.01 KG/M2 | HEIGHT: 63 IN | WEIGHT: 175 LBS

## 2023-03-17 DIAGNOSIS — Z96.642 PRESENCE OF LEFT ARTIFICIAL HIP JOINT: ICD-10-CM

## 2023-03-17 DIAGNOSIS — M17.11 UNILATERAL PRIMARY OSTEOARTHRITIS, RIGHT KNEE: ICD-10-CM

## 2023-03-17 DIAGNOSIS — M17.12 UNILATERAL PRIMARY OSTEOARTHRITIS, LEFT KNEE: ICD-10-CM

## 2023-03-17 DIAGNOSIS — Z96.641 PRESENCE OF RIGHT ARTIFICIAL HIP JOINT: Primary | ICD-10-CM

## 2023-03-17 RX ORDER — BETAMETHASONE SODIUM PHOSPHATE AND BETAMETHASONE ACETATE 3; 3 MG/ML; MG/ML
6 INJECTION, SUSPENSION INTRA-ARTICULAR; INTRALESIONAL; INTRAMUSCULAR; SOFT TISSUE ONCE
Status: COMPLETED | OUTPATIENT
Start: 2023-03-17 | End: 2023-03-17

## 2023-03-17 RX ADMIN — BETAMETHASONE SODIUM PHOSPHATE AND BETAMETHASONE ACETATE 6 MG: 3; 3 INJECTION, SUSPENSION INTRA-ARTICULAR; INTRALESIONAL; INTRAMUSCULAR; SOFT TISSUE at 14:05

## 2023-03-17 RX ADMIN — BETAMETHASONE SODIUM PHOSPHATE AND BETAMETHASONE ACETATE 6 MG: 3; 3 INJECTION, SUSPENSION INTRA-ARTICULAR; INTRALESIONAL; INTRAMUSCULAR; SOFT TISSUE at 14:06

## 2023-03-17 NOTE — PROGRESS NOTES
Patient: Mick Hinojosa                MRN: [id number]       SSN: [id number]  YOB: 1970        AGE: 47 y.o. SEX: female  Body mass index is 31 kg/m². PCP: Eliza Steward MD  03/17/23            REVIEW OF SYSTEMS:  Constitutional: Negative for fever, chills, weight loss and malaise/fatigue. HENT: Negative. Eyes: Negative. Respiratory: Negative. Cardiovascular: Negative. Gastrointestinal: No bowel incontinence or constipation. Genitourinary: No bladder incontinence or saddle anesthesia. Skin: Negative. Neurological: Negative. Endo/Heme/Allergies: Negative. Psychiatric/Behavioral: Negative. Musculoskeletal: As per HPI above. Past Medical History:   Diagnosis Date    Adverse effect of anesthesia     Hypotension    Allergic rhinitis, seasonal     spring    Dysmenorrhea     increasing past year    Fibroid, uterine     Goiter     Bilateral    Headache(784.0)     Hypertension     Ill-defined condition     Chronic knee pain    Menorrhagia     worsening past year    Neutropenia (HCC)          Current Outpatient Medications:     Blood Pressure Monitor KIT, Once a day, Disp: , Rfl:     acetaZOLAMIDE (DIAMOX) 125 MG tablet, Note: Use in addition to gradual ascent; start the day before (preferred) or on theday of ascent .  Oral: 125 mg twice daily; may be discontinued after staying at the same elevationfor 2 to 4 days or if descent is initiated, Disp: , Rfl:     albuterol sulfate HFA (PROVENTIL;VENTOLIN;PROAIR) 108 (90 Base) MCG/ACT inhaler, Inhale 1 puff into the lungs every 6 hours as needed, Disp: , Rfl:     amLODIPine (NORVASC) 5 MG tablet, Take 5 mg by mouth daily, Disp: , Rfl:     celecoxib (CELEBREX) 200 MG capsule, Take 200 mg by mouth 2 times daily, Disp: , Rfl:     diclofenac sodium (VOLTAREN) 1 % GEL, Apply 4 g topically 4 times daily, Disp: , Rfl:     fluticasone (FLOVENT HFA) 110 MCG/ACT inhaler, Inhale 1 puff into the lungs every 12 hours, Disp: , Rfl: hydroxychloroquine (PLAQUENIL) 200 MG tablet, Take 200 mg by mouth 2 times daily, Disp: , Rfl:     mefloquine (LARIAM) 250 MG tablet, Take with food and at least 8 ounces of water. Start 1 tab weekly 2 weeks before travel time, continue taking 1 tab weekly during stay and continue taking it 4 weeks after travel 1 tab weekly. , Disp: , Rfl:     No Known Allergies    Social History     Socioeconomic History    Marital status:      Spouse name: Not on file    Number of children: Not on file    Years of education: Not on file    Highest education level: Not on file   Occupational History    Not on file   Tobacco Use    Smoking status: Never    Smokeless tobacco: Never   Substance and Sexual Activity    Alcohol use: Yes     Alcohol/week: 0.0 standard drinks    Drug use: No    Sexual activity: Not on file     Comment: hysterectomy   Other Topics Concern    Not on file   Social History Narrative    Not on file     Social Determinants of Health     Financial Resource Strain: Low Risk     Difficulty of Paying Living Expenses: Not hard at all   Food Insecurity: No Food Insecurity    Worried About Running Out of Food in the Last Year: Never true    Ran Out of Food in the Last Year: Never true   Transportation Needs: Not on file   Physical Activity: Not on file   Stress: Not on file   Social Connections: Not on file   Intimate Partner Violence: Not on file   Housing Stability: Not on file       Past Surgical History:   Procedure Laterality Date    HYSTERECTOMY ([facility])  04/2014    [facility] Left 12/13/2020    TOTAL HIP ARTHROPLASTY      right and left       Patient seen evaluated today for her hips and her knees. She is status post bilateral hip replacements in the past and done very well with them. She is very pleased with the results. She has no hip pain. Her main problem is that of her knees.   She does have known advancing arthritis of each of her knees. With a cortisone in the past which gives her good relief. We did a series of viscosupplementation which really did not help her much. She has decreased walking tolerance. She has trouble in from a chair. She has trouble with stairs. She has occasional night pain. There are no mechanical symptoms. Patient denies recent fevers, chills, chest pain, SOB, or injuries. No recent systemic changes noted. A 12-point review of systems is performed today. Pertinent positives are noted. All other systems reviewed and otherwise are negative. Physical exam: General: Alert and oriented x3, nad.  well-developed, well nourished. normal affect, AF. NC/AT, EOMI, neck supple, trachea midline, no JVD present. Breathing is non-labored. Examination of the lower extremities reveals pain-free range of motion of the hips. There is no pain to palpation the trochanteric bursa. Leg lengths are perfect. Negative calf tenderness. Negative Homans. No signs of DVT present. Each of the knees reveal skin intact. There is no erythema or ecchymosis noted. There are no signs for infection or cellulitis present. She does have pain to palpation tricompartmentally about the knees and crepitus arising from anterior compartment. Findings are consistent with advancing arthritis of each of her knees. Radiographs obtained in the office today 3/17/2023 at the operative location include AP pelvis showing the total hip components to be well fixed without evidence for loosening or fracture noted. Assessment: #1 status post bilateral hip replacements, #2 bilateral knee advancing osteoarthritis    Plan: At this point, the patient will continue activities as tolerated regarding her hips. She is done well with her hip replacements. Regarding her knees we had a discussion regarding knee replacement and they are currently not recommended as she still gets good relief from conservative treatment.   Today in the office we will move forth a cortisone injection for each of her knees. After informed consent, under aseptic conditions, with US guided assitance, each knee was prepped with betadine and a mxiture of 3ml 1% lidocaine and 6mg of celestone was injected without complications. The patient tolerated the injection well. The patient is instructed on post-injection care. She will continue to work on range of motion and mobility. We will see her back in 3 months time for evaluation        Chart reviewed for the following:  Jordyn PAYTON PA-C, have reviewed the History, Physical and updated the Allergic reactions for Eleni Resendez? TIME OUT performed immediately prior to start of procedure:  Jordyn PAYTON PA-C, have performed the following reviews on Eleni Resendez prior to the start of the procedure:  ????????  * Patient was identified by name and date of birth   * Agreement on procedure being performed was verified  * Risks and Benefits explained to the patient  * Procedure site verified and marked as necessary  * Patient was positioned for comfort  * Consent was signed and verified    Time:2:02 PM    Body part: bilateral knees, intra-articular    Medication & Dose: 3ml 1% lidocaine and 6mg celestone    Date of procedure: 03/17/23    Procedure performed by: Oleg Collier PA-C    Provider assisted by: none    Patient assisted by: self    How tolerated by patient: tolerated the procedure well with no complications    Post Procedural Pain Scale: 3    Comments:   [address] using a frequency of 10MHz with a 12L-RS transducer head was used to confirm needle placement.   Ultrasound images captured using [address] Ultrasound machine and scanned into patient's chart       Liliana Corbin PA-C, ATC

## 2023-07-03 ENCOUNTER — OFFICE VISIT (OUTPATIENT)
Age: 53
End: 2023-07-03
Payer: OTHER GOVERNMENT

## 2023-07-03 VITALS — WEIGHT: 179 LBS | HEIGHT: 64 IN | BODY MASS INDEX: 30.56 KG/M2

## 2023-07-03 DIAGNOSIS — M17.11 ARTHRITIS OF RIGHT KNEE: Primary | ICD-10-CM

## 2023-07-03 DIAGNOSIS — M17.12 ARTHRITIS OF LEFT KNEE: ICD-10-CM

## 2023-07-03 PROCEDURE — 99213 OFFICE O/P EST LOW 20 MIN: CPT | Performed by: PHYSICIAN ASSISTANT

## 2023-07-03 PROCEDURE — 20611 DRAIN/INJ JOINT/BURSA W/US: CPT | Performed by: PHYSICIAN ASSISTANT

## 2023-07-03 RX ORDER — BETAMETHASONE SODIUM PHOSPHATE AND BETAMETHASONE ACETATE 3; 3 MG/ML; MG/ML
6 INJECTION, SUSPENSION INTRA-ARTICULAR; INTRALESIONAL; INTRAMUSCULAR; SOFT TISSUE ONCE
Status: COMPLETED | OUTPATIENT
Start: 2023-07-03 | End: 2023-07-03

## 2023-07-03 RX ORDER — TRIAMCINOLONE ACETONIDE 40 MG/ML
40 INJECTION, SUSPENSION INTRA-ARTICULAR; INTRAMUSCULAR ONCE
Status: CANCELLED | OUTPATIENT
Start: 2023-07-03 | End: 2023-07-03

## 2023-07-03 RX ADMIN — BETAMETHASONE SODIUM PHOSPHATE AND BETAMETHASONE ACETATE 6 MG: 3; 3 INJECTION, SUSPENSION INTRA-ARTICULAR; INTRALESIONAL; INTRAMUSCULAR; SOFT TISSUE at 11:34

## 2023-07-03 NOTE — PROGRESS NOTES
cortisone. She gets about the same with viscosupplementation. She reports decreased walking tolerance. She has trouble with stairs. She has occasional pain at night. There is no mechanical symptoms. Patient denies recent fevers, chills, chest pain, SOB, or injuries. No recent systemic changes noted. A 12-point review of systems is performed today. Pertinent positives are noted. All other systems reviewed and otherwise are negative. Physical exam: General: Alert and oriented x3, nad.  well-developed, well nourished. normal affect, AF. NC/AT, EOMI, neck supple, trachea midline, no JVD present. Breathing is non-labored. Examination of the lower extremities reveals pain-free range of motion the hips. There is no pain to palpation the trochanter bursa. Negative straight leg raise. Negative calf tenderness. Negative Homans. No signs of DVT present. She does have discomfort elevation tricompartmentally and crepitus arising from the anterior compartment. Range of motion is well-preserved. Assessment: Bilateral knee advanced arthritis, status post bilateral hip replacements doing well    Plan: At this point, we discussed treatment options. We will move forth a cortisone injection for each of her knees. After informed consent, under aseptic conditions, with US guided assitance, each knee was prepped with betadine and a mxiture of 3ml 1% lidocaine and 6mg of celestone was injected without complications. The patient tolerated the injection well. The patient is instructed on post-injection care. She will continue with home exercise program to maintain her mobility and range of motion. We will plan on seeing her back in 3 months time for evaluation. We will get repeat x-rays of each of her knees upon her return. Chart reviewed for the following:  IJv PA-C, have reviewed the History, Physical and updated the Allergic reactions for Eleni Resendez?     TIME OUT performed

## 2023-08-10 ENCOUNTER — OFFICE VISIT (OUTPATIENT)
Age: 53
End: 2023-08-10

## 2023-08-10 VITALS — WEIGHT: 179 LBS | HEIGHT: 64 IN | BODY MASS INDEX: 30.56 KG/M2

## 2023-08-10 DIAGNOSIS — M25.561 RIGHT KNEE PAIN, UNSPECIFIED CHRONICITY: ICD-10-CM

## 2023-08-10 DIAGNOSIS — M23.91 LOCKING OF RIGHT KNEE: ICD-10-CM

## 2023-08-10 DIAGNOSIS — M25.361 KNEE GIVES WAY, RIGHT: ICD-10-CM

## 2023-08-10 DIAGNOSIS — M17.11 UNILATERAL PRIMARY OSTEOARTHRITIS, RIGHT KNEE: Primary | ICD-10-CM

## 2023-08-10 RX ORDER — CELECOXIB 200 MG/1
200 CAPSULE ORAL 2 TIMES DAILY
Qty: 60 CAPSULE | Refills: 2 | Status: SHIPPED | OUTPATIENT
Start: 2023-08-10

## 2023-08-18 ENCOUNTER — HOSPITAL ENCOUNTER (OUTPATIENT)
Facility: HOSPITAL | Age: 53
Discharge: HOME | End: 2023-08-18
Payer: OTHER GOVERNMENT

## 2023-08-18 DIAGNOSIS — M25.361 KNEE GIVES WAY, RIGHT: ICD-10-CM

## 2023-08-18 DIAGNOSIS — M23.91 LOCKING OF RIGHT KNEE: ICD-10-CM

## 2023-08-18 PROCEDURE — 73721 MRI JNT OF LWR EXTRE W/O DYE: CPT

## 2023-10-06 ENCOUNTER — OFFICE VISIT (OUTPATIENT)
Age: 53
End: 2023-10-06
Payer: OTHER GOVERNMENT

## 2023-10-06 VITALS — WEIGHT: 181 LBS | BODY MASS INDEX: 30.9 KG/M2 | HEIGHT: 64 IN | RESPIRATION RATE: 18 BRPM

## 2023-10-06 DIAGNOSIS — M23.91 LOCKING OF RIGHT KNEE: ICD-10-CM

## 2023-10-06 DIAGNOSIS — M25.361 KNEE GIVES WAY, RIGHT: ICD-10-CM

## 2023-10-06 DIAGNOSIS — M17.11 UNILATERAL PRIMARY OSTEOARTHRITIS, RIGHT KNEE: Primary | ICD-10-CM

## 2023-10-06 DIAGNOSIS — M25.561 RIGHT KNEE PAIN, UNSPECIFIED CHRONICITY: ICD-10-CM

## 2023-10-06 PROCEDURE — 99213 OFFICE O/P EST LOW 20 MIN: CPT | Performed by: PHYSICIAN ASSISTANT

## 2023-12-19 NOTE — TELEPHONE ENCOUNTER
Pt states that her rheumatologist told pt to call and let Dr Kp Frederick know that she testedpositive for COVID 19 on 8/9/2022. Pt states that she is now testing negative but still has symptoms , coughing and SOB but nothing drastic.  This is just an FYI thank you 82

## 2024-01-12 ENCOUNTER — OFFICE VISIT (OUTPATIENT)
Age: 54
End: 2024-01-12
Payer: OTHER GOVERNMENT

## 2024-01-12 VITALS
RESPIRATION RATE: 16 BRPM | SYSTOLIC BLOOD PRESSURE: 142 MMHG | OXYGEN SATURATION: 97 % | DIASTOLIC BLOOD PRESSURE: 88 MMHG | WEIGHT: 180 LBS | BODY MASS INDEX: 30.73 KG/M2 | HEIGHT: 64 IN | TEMPERATURE: 97 F | HEART RATE: 66 BPM

## 2024-01-12 DIAGNOSIS — Z12.31 ENCOUNTER FOR SCREENING MAMMOGRAM FOR MALIGNANT NEOPLASM OF BREAST: ICD-10-CM

## 2024-01-12 DIAGNOSIS — F32.89 OTHER DEPRESSION: ICD-10-CM

## 2024-01-12 DIAGNOSIS — E55.9 VITAMIN D DEFICIENCY: ICD-10-CM

## 2024-01-12 DIAGNOSIS — R03.0 ELEVATED BLOOD PRESSURE READING: ICD-10-CM

## 2024-01-12 DIAGNOSIS — Z00.00 ENCOUNTER FOR WELL ADULT EXAM WITHOUT ABNORMAL FINDINGS: Primary | ICD-10-CM

## 2024-01-12 DIAGNOSIS — Z13.220 SCREENING FOR HYPERLIPIDEMIA: ICD-10-CM

## 2024-01-12 DIAGNOSIS — Z13.1 SCREENING FOR DIABETES MELLITUS: ICD-10-CM

## 2024-01-12 DIAGNOSIS — R76.8 POSITIVE ANA (ANTINUCLEAR ANTIBODY): ICD-10-CM

## 2024-01-12 DIAGNOSIS — D72.819 LEUKOPENIA, UNSPECIFIED TYPE: ICD-10-CM

## 2024-01-12 DIAGNOSIS — G43.009 MIGRAINE WITHOUT AURA AND WITHOUT STATUS MIGRAINOSUS, NOT INTRACTABLE: ICD-10-CM

## 2024-01-12 DIAGNOSIS — Z23 NEED FOR VACCINATION: ICD-10-CM

## 2024-01-12 PROCEDURE — 90674 CCIIV4 VAC NO PRSV 0.5 ML IM: CPT | Performed by: FAMILY MEDICINE

## 2024-01-12 RX ORDER — AMLODIPINE BESYLATE 5 MG/1
5 TABLET ORAL DAILY
Qty: 90 TABLET | Refills: 1 | Status: SHIPPED | OUTPATIENT
Start: 2024-01-12

## 2024-01-12 SDOH — ECONOMIC STABILITY: FOOD INSECURITY: WITHIN THE PAST 12 MONTHS, THE FOOD YOU BOUGHT JUST DIDN'T LAST AND YOU DIDN'T HAVE MONEY TO GET MORE.: NEVER TRUE

## 2024-01-12 SDOH — ECONOMIC STABILITY: HOUSING INSECURITY
IN THE LAST 12 MONTHS, WAS THERE A TIME WHEN YOU DID NOT HAVE A STEADY PLACE TO SLEEP OR SLEPT IN A SHELTER (INCLUDING NOW)?: NO

## 2024-01-12 SDOH — ECONOMIC STABILITY: INCOME INSECURITY: HOW HARD IS IT FOR YOU TO PAY FOR THE VERY BASICS LIKE FOOD, HOUSING, MEDICAL CARE, AND HEATING?: NOT HARD AT ALL

## 2024-01-12 SDOH — ECONOMIC STABILITY: FOOD INSECURITY: WITHIN THE PAST 12 MONTHS, YOU WORRIED THAT YOUR FOOD WOULD RUN OUT BEFORE YOU GOT MONEY TO BUY MORE.: NEVER TRUE

## 2024-01-12 ASSESSMENT — PATIENT HEALTH QUESTIONNAIRE - PHQ9
SUM OF ALL RESPONSES TO PHQ QUESTIONS 1-9: 2
SUM OF ALL RESPONSES TO PHQ QUESTIONS 1-9: 2
2. FEELING DOWN, DEPRESSED OR HOPELESS: 2
1. LITTLE INTEREST OR PLEASURE IN DOING THINGS: 0
SUM OF ALL RESPONSES TO PHQ QUESTIONS 1-9: 2
SUM OF ALL RESPONSES TO PHQ QUESTIONS 1-9: 2
SUM OF ALL RESPONSES TO PHQ9 QUESTIONS 1 & 2: 2

## 2024-01-12 NOTE — PROGRESS NOTES
1. \"Have you been to the ER, urgent care clinic since your last visit?  Hospitalized since your last visit?\" No    2. \"Have you seen or consulted any other health care providers outside of the [facility] since your last visit?\" No     3. For patients aged 45-75: Has the patient had a colonoscopy / FIT/ Cologuard? Yes - no Care Gap present 2/11/22 colonoscopy      If the patient is female:    4. For patients aged 40-74: Has the patient had a mammogram within the past 2 years? Yes - Care Gap present. Most recent result on file 12/18/21      5. For patients aged 21-65: Has the patient had a pap smear? No - hysterectomy    Chief Complaint   Patient presents with    Annual Exam    Referral - General     Rheumatology - Dr. Ashley Varghese

## 2024-01-12 NOTE — PATIENT INSTRUCTIONS
saturated fat. Reduce salt in your diet.  Limit alcohol. Have no more than 1 drink a day or 7 drinks a week.  Get at least 30 minutes of exercise on most days of the week. Walking is a good choice. You also may want to do other activities, such as running, swimming, cycling, or playing tennis or team sports.  Reach and stay at a healthy weight. This will lower your risk for many problems, such as obesity, diabetes, heart disease, and high blood pressure.  Do not smoke. Smoking can make health problems worse. If you need help quitting, talk to your doctor about stop-smoking programs and medicines. These can increase your chances of quitting for good.  Care for your mental health. It is easy to get weighed down by worry and stress. Learn strategies to manage stress, like deep breathing and mindfulness, and stay connected with your family and community. If you find you often feel sad or hopeless, talk with your doctor. Treatment can help.  Talk to your doctor about whether you have any risk factors for sexually transmitted infections (STIs). You can help prevent STIs if you wait to have sex with a new partner (or partners) until you've each been tested for STIs. It also helps if you use condoms (male or female condoms) and if you limit your sex partners to one person who only has sex with you. Vaccines are available for some STIs.  If you think you may have a problem with alcohol or drug use, talk to your doctor. This includes prescription medicines (such as amphetamines and opioids) and illegal drugs (such as cocaine and methamphetamine). Your doctor can help you figure out what type of treatment is best for you.  Protect your skin from too much sun. When you're outdoors from 10 a.m. to 4 p.m., stay in the shade or cover up with clothing and a hat with a wide brim. Wear sunglasses that block UV rays. Even when it's cloudy, put broad-spectrum sunscreen (SPF 30 or higher) on any exposed skin.  See a dentist one or two

## 2024-01-12 NOTE — PROGRESS NOTES
Patient presents for the following vaccines: Influenza. Patient consent obtained by patient. Patient tolerated procedure well at left deltoid. Patient advised to remain in lobby for period of 10 minutes post injection to ensure no reaction. VIS was given to patient.    Lot # [id number]  Exp: 6/30/24  MFG: [employer]  NDC: 96409-150-88

## 2024-01-12 NOTE — PROGRESS NOTES
Well Adult Note  Name: Eleni Resendez Today’s Date: 2024   MRN: [id number] Sex: Female   Age: 53 y.o. Ethnicity: Non- / Non    : 1970 Race: Black /       Eleni Resendez is here for well adult exam.  History:  Physical and follow-up.  Overdue for follow-up.  Not sexually active.  History of hysterectomy.  No pelvic pain or vaginal discharge.  Does self breast exam on and off.  No prior history of abnormal mammogram.  Overdue for mammogram.  Has not completed mammogram which was ordered last year.  No palpable abnormality on self breast exam.  No family history of breast cancer, cervical cancer, ovarian cancer.  No family history of colon cancer.  Father  at early age from heart attack in his 50s.  Does go for yearly eye and dental exam.  Noted elevated blood pressure.  Has not taken medication for blood pressure since last couple of months.  Going through separation.  Depression.  Able to cope up well now.  Going through it since 2 years but feeling much better at this time.  Does not need for any counseling.  Does not need for any medication.  Migraine headache has been stable.  History of polyarthritis.  Positive MAIA.  Following rheumatology.  Needed a renewal of a referral.  Reviewed prior labs.  Not compliantly taking vitamin D.  Will restart.  Recheck labs.  Low WBC count.  No cold cough or fever.  Following hematology.  Currently observe.  CMP:  Lab Results   Component Value Date/Time     02/10/2023 08:35 AM    K 3.8 02/10/2023 08:35 AM     02/10/2023 08:35 AM    CO2 27 02/10/2023 08:35 AM    BUN 10 02/10/2023 08:35 AM    CREATININE 0.70 02/10/2023 08:35 AM    GLUCOSE 104 02/10/2023 08:35 AM    CALCIUM 8.7 02/10/2023 08:35 AM    PROT 6.6 02/10/2023 08:35 AM    LABALBU 3.8 02/10/2023 08:35 AM    BILITOT 0.3 02/10/2023 08:35 AM    AST 21 02/10/2023 08:35 AM    ALT 17 02/10/2023 08:35 AM          CBC:  Lab Results   Component Value Date/Time    WBC

## 2024-01-22 ENCOUNTER — HOSPITAL ENCOUNTER (OUTPATIENT)
Facility: HOSPITAL | Age: 54
Discharge: HOME | End: 2024-01-25
Payer: OTHER GOVERNMENT

## 2024-01-22 DIAGNOSIS — Z13.1 SCREENING FOR DIABETES MELLITUS: ICD-10-CM

## 2024-01-22 DIAGNOSIS — F32.89 OTHER DEPRESSION: ICD-10-CM

## 2024-01-22 DIAGNOSIS — Z13.220 SCREENING FOR HYPERLIPIDEMIA: ICD-10-CM

## 2024-01-22 DIAGNOSIS — D72.819 LEUKOPENIA, UNSPECIFIED TYPE: ICD-10-CM

## 2024-01-22 DIAGNOSIS — R03.0 ELEVATED BLOOD PRESSURE READING: ICD-10-CM

## 2024-01-22 DIAGNOSIS — E55.9 VITAMIN D DEFICIENCY: ICD-10-CM

## 2024-01-22 LAB
25(OH)D3 SERPL-MCNC: 39.5 NG/ML (ref 30–100)
ALBUMIN SERPL-MCNC: 3.7 G/DL (ref 3.4–5)
ALBUMIN/GLOB SERPL: 1.3 (ref 0.8–1.7)
ALP SERPL-CCNC: 88 U/L (ref 45–117)
ALT SERPL-CCNC: 16 U/L (ref 13–56)
ANION GAP SERPL CALC-SCNC: 1 MMOL/L (ref 3–18)
AST SERPL-CCNC: 14 U/L (ref 10–38)
BASOPHILS # BLD: 0 K/UL (ref 0–0.1)
BASOPHILS NFR BLD: 1 % (ref 0–2)
BILIRUB SERPL-MCNC: 0.6 MG/DL (ref 0.2–1)
BUN SERPL-MCNC: 10 MG/DL (ref 7–18)
BUN/CREAT SERPL: 13 (ref 12–20)
CALCIUM SERPL-MCNC: 8.9 MG/DL (ref 8.5–10.1)
CHLORIDE SERPL-SCNC: 110 MMOL/L (ref 100–111)
CHOLEST SERPL-MCNC: 199 MG/DL
CO2 SERPL-SCNC: 29 MMOL/L (ref 21–32)
CREAT SERPL-MCNC: 0.77 MG/DL (ref 0.6–1.3)
DIFFERENTIAL METHOD BLD: ABNORMAL
EOSINOPHIL # BLD: 0 K/UL (ref 0–0.4)
EOSINOPHIL NFR BLD: 0 % (ref 0–5)
ERYTHROCYTE [DISTWIDTH] IN BLOOD BY AUTOMATED COUNT: 12.9 % (ref 11.6–14.5)
EST. AVERAGE GLUCOSE BLD GHB EST-MCNC: 105 MG/DL
GLOBULIN SER CALC-MCNC: 2.8 G/DL (ref 2–4)
GLUCOSE SERPL-MCNC: 95 MG/DL (ref 74–99)
HBA1C MFR BLD: 5.3 % (ref 4.2–5.6)
HCT VFR BLD AUTO: 39.7 % (ref 35–45)
HDLC SERPL-MCNC: 79 MG/DL (ref 40–60)
HDLC SERPL: 2.5 (ref 0–5)
HGB BLD-MCNC: 13 G/DL (ref 12–16)
IMM GRANULOCYTES # BLD AUTO: 0 K/UL (ref 0–0.04)
IMM GRANULOCYTES NFR BLD AUTO: 0 % (ref 0–0.5)
LDLC SERPL CALC-MCNC: 112.6 MG/DL (ref 0–100)
LIPID PANEL: ABNORMAL
LYMPHOCYTES # BLD: 1.1 K/UL (ref 0.9–3.6)
LYMPHOCYTES NFR BLD: 48 % (ref 21–52)
MCH RBC QN AUTO: 30.1 PG (ref 24–34)
MCHC RBC AUTO-ENTMCNC: 32.7 G/DL (ref 31–37)
MCV RBC AUTO: 91.9 FL (ref 78–100)
MONOCYTES # BLD: 0.2 K/UL (ref 0.05–1.2)
MONOCYTES NFR BLD: 10 % (ref 3–10)
NEUTS SEG # BLD: 0.9 K/UL (ref 1.8–8)
NEUTS SEG NFR BLD: 42 % (ref 40–73)
NRBC # BLD: 0 K/UL (ref 0–0.01)
NRBC BLD-RTO: 0 PER 100 WBC
PLATELET # BLD AUTO: 225 K/UL (ref 135–420)
PMV BLD AUTO: 10.9 FL (ref 9.2–11.8)
POTASSIUM SERPL-SCNC: 3.8 MMOL/L (ref 3.5–5.5)
PROT SERPL-MCNC: 6.5 G/DL (ref 6.4–8.2)
RBC # BLD AUTO: 4.32 M/UL (ref 4.2–5.3)
SODIUM SERPL-SCNC: 140 MMOL/L (ref 136–145)
TRIGL SERPL-MCNC: 37 MG/DL
TSH SERPL DL<=0.05 MIU/L-ACNC: 1.14 UIU/ML (ref 0.36–3.74)
VLDLC SERPL CALC-MCNC: 7.4 MG/DL
WBC # BLD AUTO: 2.2 K/UL (ref 4.6–13.2)

## 2024-01-22 PROCEDURE — 36415 COLL VENOUS BLD VENIPUNCTURE: CPT

## 2024-01-22 PROCEDURE — 82306 VITAMIN D 25 HYDROXY: CPT

## 2024-01-22 PROCEDURE — 80061 LIPID PANEL: CPT

## 2024-01-22 PROCEDURE — 84443 ASSAY THYROID STIM HORMONE: CPT

## 2024-01-22 PROCEDURE — 85025 COMPLETE CBC W/AUTO DIFF WBC: CPT

## 2024-01-22 PROCEDURE — 83036 HEMOGLOBIN GLYCOSYLATED A1C: CPT

## 2024-01-22 PROCEDURE — 80053 COMPREHEN METABOLIC PANEL: CPT

## 2024-02-06 ENCOUNTER — TELEPHONE (OUTPATIENT)
Age: 54
End: 2024-02-06

## 2024-02-06 NOTE — TELEPHONE ENCOUNTER
A referral request has been submitted online to [facility]; pending approval. Patient rescheduled appointment with Arnold Godoy PA-C to 2/09/24, per chart review.

## 2024-02-06 NOTE — TELEPHONE ENCOUNTER
Patient is requesting (New  Updated) referral to the following office:    Speciality: Orthopedics   Specialist Name: Arnold Godoy   Office Location: [address] [place] [address]  Phone (if available): [phone]  Fax (if available): [phone]  Diagnosis: M17.0 (ICD-10-CM) - Bilateral primary osteoarthritis of knee   Date of appointment (if scheduled): 2/7/2024 Aishwarya

## 2024-02-08 ENCOUNTER — TELEPHONE (OUTPATIENT)
Age: 54
End: 2024-02-08

## 2024-02-08 NOTE — TELEPHONE ENCOUNTER
Spoke with patient (two identifiers verified) to advise [facility] would not process orthopedic referral, because they do not have Dr. Gallego listed as her PCP. They have Dr. Tiburcio Soto listed as PCP. She was instructed to contact [facility] customer service, at [phone] and select the enrollment option, to update PCP to Dr. Stephany Gallego. Patient verbalized understanding. She was given the order # ([id number]) to the returned ortho referral so she can ask the  to process referral. Patient will return call to [facility] to let us know if we need to resubmit ortho referral after PCP has been switched back to Dr. Gallego.

## 2024-02-08 NOTE — TELEPHONE ENCOUNTER
Spoke with patient (two identifiers verified) to advise [facility] would not process orthopedic referral, because they do not have Dr. Gallego listed as her PCP. They have Dr. Tiburcio Soto listed as PCP. She was instructed to contact [facility] customer service, at [phone] and select the enrollment option, to update PCP to Dr. Stephany Gallego. Patient verbalized understanding. She was given the order # ([id number]) to the returned ortho referral so she can ask the  to process referral. Patient will return call to [facility] to let us know if we need to resubmit ortho referral after PCP has been switched back to Dr. Gallego.

## 2024-02-12 DIAGNOSIS — Z29.89 NEED FOR MALARIA PROPHYLAXIS: Primary | ICD-10-CM

## 2024-02-12 DIAGNOSIS — Z29.89 NEED FOR MALARIA PROPHYLAXIS: ICD-10-CM

## 2024-02-12 RX ORDER — AZITHROMYCIN 250 MG/1
250 TABLET, FILM COATED ORAL SEE ADMIN INSTRUCTIONS
Qty: 6 TABLET | Refills: 0 | Status: SHIPPED | OUTPATIENT
Start: 2024-02-12 | End: 2024-02-12 | Stop reason: SDUPTHER

## 2024-02-12 RX ORDER — ACETAZOLAMIDE 250 MG/1
TABLET ORAL
Qty: 20 TABLET | Refills: 0 | Status: SHIPPED | OUTPATIENT
Start: 2024-02-12 | End: 2024-02-12 | Stop reason: SDUPTHER

## 2024-02-12 RX ORDER — AZITHROMYCIN 250 MG/1
250 TABLET, FILM COATED ORAL SEE ADMIN INSTRUCTIONS
Qty: 6 TABLET | Refills: 0 | Status: SHIPPED | OUTPATIENT
Start: 2024-02-12 | End: 2024-02-17

## 2024-02-12 RX ORDER — ACETAZOLAMIDE 250 MG/1
TABLET ORAL
Qty: 20 TABLET | Refills: 0 | Status: SHIPPED | OUTPATIENT
Start: 2024-02-12

## 2024-02-12 RX ORDER — MEFLOQUINE HYDROCHLORIDE 250 MG/1
TABLET ORAL
Qty: 10 TABLET | Refills: 0 | Status: SHIPPED | OUTPATIENT
Start: 2024-02-12 | End: 2024-02-12 | Stop reason: SDUPTHER

## 2024-02-12 RX ORDER — MEFLOQUINE HYDROCHLORIDE 250 MG/1
TABLET ORAL
Qty: 10 TABLET | Refills: 0 | Status: SHIPPED | OUTPATIENT
Start: 2024-02-12

## 2024-02-12 NOTE — TELEPHONE ENCOUNTER
Attempted to contact patient, but no answer. Left a voice message on mobile phone advising referral has been resubmitted to [facility] with correct PCP name and is pending approval. She may contact [facility] customer service to request referral be processed sooner if she would like. Also, patient was advised prescriptions have been sent to [facility], as requested. She may contact the pharmacy to find out when she can pick-up her medications.

## 2024-02-12 NOTE — TELEPHONE ENCOUNTER
Pt called and fixed at [facility] and needs her referral for Orthopedic w/ J Godoy to be sent through for approval again. Pt is leaving on Thursday 2/18/2024. She was hoping to see her Ortho before she goes. She sent this message through My chart.     Topic: [facility] Billing Questions.     I spoke to [facility] and they have corrected my PCP and said the referral needs to be run back through.      I also need medication for my visit to [facility]. I will be in [facility] from February 18-26. I need Diamox for altitude, a Z-Zhang and malaria medication.  Please call into the [facility] pharmacy on [address]     Thank you

## 2024-02-15 NOTE — TELEPHONE ENCOUNTER
Pt called and states that she has spoke to Aishwarya and got it straight. Can you please rerun that referral. Thank you

## 2024-02-15 NOTE — TELEPHONE ENCOUNTER
We have received a  authorization for consult at [facility] The authorization number is [id number] which begins on 2/06/2024 and expires on 2/05/2025. Valid for 12 visits.     Attempted to contact patient at mobile phone number, but no answer. Left a voice message advising patient a  approval has been received for her specialist consult. She was advised I will send detailed message to her MyChart regarding approval.

## 2025-06-28 NOTE — TELEPHONE ENCOUNTER
Nick Persaud from [address] states that pt's BP has been running High ranging 156-/96 when she first arrived. , 156/104 5 mins after the initial check , 152/98 few minutes after a light workout and some rest. She wanted to let Dr Trey Downey to know so if she wanted to adjust her bp medication.  Please call if you have questions Patient: Jd Witt    Procedure Summary       Date: 25 Room / Location:     Anesthesia Start: 0 Anesthesia Stop:     Procedure: Labor Analgesia Diagnosis:     Scheduled Providers:  Responsible Provider: Alem Feng MD    Anesthesia Type: epidural ASA Status: 3            Anesthesia Type: epidural        Anesthesia Post Evaluation    Patient location during evaluation: bedside  Patient participation: complete - patient participated  Level of consciousness: awake  Pain management: adequate  Airway patency: patent  Cardiovascular status: acceptable  Respiratory status: acceptable  Hydration status: acceptable  Postoperative Nausea and Vomiting: none        No notable events documented.    Jd Witt is a 36 y.o., , who had a Vaginal, Spontaneous delivery on 2025 at 39w1d and is now POD1.    She had Neuraxial Anesthesia without immediate complications noted.       Pain well controlled    Vitals:    25 0838   BP: 115/71   Pulse: 97   Resp: 16   Temp: 36.4 °C (97.5 °F)   SpO2: 95%       Neuraxial site assessed. No visible redness or swelling or drainage. Patient able to ambulate and move all extremities without difficulty. Able to void. No complaints of nausea/vomiting. Tolerating PO intake well. No s/sx of PDPH.     Anesthesia will sign off     Dina Velazquez MD

## (undated) DEVICE — BLANKET WRM AD W50XL85.8IN PACU FULL BODY FORC AIR

## (undated) DEVICE — SUTURE VCRL + SZ 2 L27IN ABSRB UD TP-1 L65MM 1/2 CIR TAPR VCP849G

## (undated) DEVICE — INTENDED FOR TISSUE SEPARATION, AND OTHER PROCEDURES THAT REQUIRE A SHARP SURGICAL BLADE TO PUNCTURE OR CUT.: Brand: BARD-PARKER SAFETY BLADES SIZE 10, STERILE

## (undated) DEVICE — 4-PORT MANIFOLD: Brand: NEPTUNE 2

## (undated) DEVICE — HANDPIECE SET WITH HIGH FLOW TIP AND SUCTION TUBE: Brand: INTERPULSE

## (undated) DEVICE — 3M™ STERI-DRAPE™ U-DRAPE 1015: Brand: STERI-DRAPE™

## (undated) DEVICE — X-RAY SPONGES,12 PLY: Brand: DERMACEA

## (undated) DEVICE — SPIROMETER INCENT 2500ML W ONE W VLV

## (undated) DEVICE — Z DISCONTINUED USE 2744636  DRESSING AQUACEL 14 IN ALG W3.5XL14IN POLYUR FLM CVR W/ HYDRCOLL

## (undated) DEVICE — Z DISCONTINUED BY MEDLINE USE 2711682 TRAY SKIN PREP DRY W/ PREM GLV

## (undated) DEVICE — BIPOLAR SEALER 23-112-1 AQM 6.0: Brand: AQUAMANTYS ®

## (undated) DEVICE — APPLICATOR SCRB 26ML TEAL STRL -- CHLORAPREP 26ML

## (undated) DEVICE — STOCKING COMPR M L16-18IN LNG 19MMHG ANK 8-9IN CALF 12-15IN

## (undated) DEVICE — SOLUTION IRRIG 3000ML LAC R FLX CONT

## (undated) DEVICE — HOOD, PEEL-AWAY: Brand: FLYTE

## (undated) DEVICE — GOWN,REINFORCED,POLY,AURORA,XXLARGE,STR: Brand: MEDLINE

## (undated) DEVICE — NEEDLE HYPO 18GA L1.5IN PNK S STL HUB POLYPR SHLD REG BVL

## (undated) DEVICE — TAPE,CLOTH/SILK,CURAD,3"X10YD,LF,40/CS: Brand: CURAD

## (undated) DEVICE — SUTURE MCRYL + SZ 2-0 L36IN ABSRB UD CT-1 L36MM 1/2 CIR MCP945H

## (undated) DEVICE — HIP PILLOW, ABDUCTION: Brand: DEROYAL

## (undated) DEVICE — NEEDLE NRV BLK 21GA L4IN 30DEG INSUL BVL EXTN SET STIMUPLEX

## (undated) DEVICE — Device

## (undated) DEVICE — BLANKET WRM W29.9XL79.1IN UP BODY FORC AIR MISTRAL-AIR

## (undated) DEVICE — SOLUTION IV 1000ML 0.9% SOD CHL

## (undated) DEVICE — SKIN MARKER,REGULAR TIP WITH RULER AND LABELS: Brand: DEVON

## (undated) DEVICE — INTENDED FOR TISSUE SEPARATION, AND OTHER PROCEDURES THAT REQUIRE A SHARP SURGICAL BLADE TO PUNCTURE OR CUT.: Brand: BARD-PARKER SAFETY BLADES SIZE 15, STERILE

## (undated) DEVICE — SYR LR LCK 1ML GRAD NSAF 30ML --

## (undated) DEVICE — GARMENT,MEDLINE,DVT,SEQUENTIAL,CALF,MD: Brand: MEDLINE

## (undated) DEVICE — KIT CLN UP BON SECOURS MARYV

## (undated) DEVICE — SUT VCRL + 0 27IN CT1 UD --

## (undated) DEVICE — DECANTER BAG 9": Brand: MEDLINE INDUSTRIES, INC.

## (undated) DEVICE — STRYKER PERFORMANCE SERIES SAGITTAL BLADE: Brand: STRYKER PERFORMANCE SERIES

## (undated) DEVICE — NEEDLE SPNL 22GA L3.5IN BLK HUB S STL REG WALL FIT STYL W/

## (undated) DEVICE — 3M™ STERI-DRAPE™ INSTRUMENT POUCH 1018: Brand: STERI-DRAPE™

## (undated) DEVICE — GOWN,REINF,POLY,ECL,PP SLV,3XL,XLONG: Brand: MEDLINE

## (undated) DEVICE — SUTURE VCRL + SZ 1-0 L36IN ABSRB UD CTX 1/2 CIR TAPR PNT VCP977H

## (undated) DEVICE — SOFT SILICONE HYDROCELLULAR SACRUM DRESSING WITH LOCK AWAY LAYER: Brand: ALLEVYN LIFE SACRUM (LARGE) PACK OF 10

## (undated) DEVICE — PACK PROCEDURE SURG TOT HIP BSHR LF

## (undated) DEVICE — NEEDLE HYPO 21GA L1.5IN INTRAMUSCULAR S STL LATCH BVL UP

## (undated) DEVICE — SYRINGE MED 3ML NDL 22GA L1 1/2IN REG BVL SFGLDE

## (undated) DEVICE — PREP SKN CHLRAPRP 26ML TNT -- CONVERT TO ITEM 373320